# Patient Record
Sex: FEMALE | Race: WHITE
[De-identification: names, ages, dates, MRNs, and addresses within clinical notes are randomized per-mention and may not be internally consistent; named-entity substitution may affect disease eponyms.]

---

## 2021-03-01 ENCOUNTER — NON-APPOINTMENT (OUTPATIENT)
Age: 66
End: 2021-03-01

## 2021-03-01 ENCOUNTER — APPOINTMENT (OUTPATIENT)
Dept: DERMATOLOGY | Facility: CLINIC | Age: 66
End: 2021-03-01
Payer: MEDICARE

## 2021-03-01 PROCEDURE — 99203 OFFICE O/P NEW LOW 30 MIN: CPT

## 2021-03-01 NOTE — PHYSICAL EXAM
[FreeTextEntry3] : right palm;  = small pinpoint wound with collarette of scale; \par no visible lesion

## 2021-03-01 NOTE — ASSESSMENT
[FreeTextEntry1] : ? small traumatic injury;  ? splinter \par appears to be resolving without txs; no visible wart; \par pared; \par dressed\par keep bandaged, f/u if fails to improve

## 2021-03-01 NOTE — HISTORY OF PRESENT ILLNESS
[de-identified] : Pt. c/o lesion on hand;  was bleeding, very large;  for 1 week, now improving without treatments;

## 2021-03-15 ENCOUNTER — NON-APPOINTMENT (OUTPATIENT)
Age: 66
End: 2021-03-15

## 2021-03-23 ENCOUNTER — APPOINTMENT (OUTPATIENT)
Dept: DERMATOLOGY | Facility: CLINIC | Age: 66
End: 2021-03-23
Payer: MEDICARE

## 2021-03-23 DIAGNOSIS — R21 RASH AND OTHER NONSPECIFIC SKIN ERUPTION: ICD-10-CM

## 2021-03-23 PROCEDURE — 99213 OFFICE O/P EST LOW 20 MIN: CPT

## 2021-03-23 PROCEDURE — 99072 ADDL SUPL MATRL&STAF TM PHE: CPT

## 2021-03-23 NOTE — HISTORY OF PRESENT ILLNESS
[FreeTextEntry1] : Lesion of the right palm. [de-identified] : Previously tx'ed here, with "scraping of site" followed by healing of wound.  No tenderness, but some bleeding prior to her initial presentation.

## 2021-03-23 NOTE — ASSESSMENT
[FreeTextEntry1] : Imp:  Likely with pyogenic granuloma, resolved, and now fully healed.\par Discussed at length with patient.\par Call if recurs.

## 2021-03-23 NOTE — PHYSICAL EXAM
[Alert] : alert [Oriented x 3] : ~L oriented x 3 [Well Nourished] : well nourished [FreeTextEntry3] : Erythematous patch without tenderness of the right palm.  Trace central scar tissue.\par Photo from cellphone reviewed, showing central prominent granulation tissue surrounded by macerated skin.

## 2021-09-15 ENCOUNTER — OUTPATIENT (OUTPATIENT)
Dept: OUTPATIENT SERVICES | Facility: HOSPITAL | Age: 66
LOS: 1 days | End: 2021-09-15
Payer: MEDICARE

## 2021-09-15 VITALS
WEIGHT: 229.06 LBS | RESPIRATION RATE: 16 BRPM | SYSTOLIC BLOOD PRESSURE: 168 MMHG | OXYGEN SATURATION: 99 % | DIASTOLIC BLOOD PRESSURE: 94 MMHG | HEIGHT: 60 IN | TEMPERATURE: 98 F | HEART RATE: 51 BPM

## 2021-09-15 DIAGNOSIS — Z90.710 ACQUIRED ABSENCE OF BOTH CERVIX AND UTERUS: Chronic | ICD-10-CM

## 2021-09-15 DIAGNOSIS — Z01.818 ENCOUNTER FOR OTHER PREPROCEDURAL EXAMINATION: ICD-10-CM

## 2021-09-15 DIAGNOSIS — Z98.890 OTHER SPECIFIED POSTPROCEDURAL STATES: Chronic | ICD-10-CM

## 2021-09-15 DIAGNOSIS — M17.11 UNILATERAL PRIMARY OSTEOARTHRITIS, RIGHT KNEE: ICD-10-CM

## 2021-09-15 DIAGNOSIS — Z29.9 ENCOUNTER FOR PROPHYLACTIC MEASURES, UNSPECIFIED: ICD-10-CM

## 2021-09-15 LAB
ALBUMIN SERPL ELPH-MCNC: 3.6 G/DL — SIGNIFICANT CHANGE UP (ref 3.3–5)
ANION GAP SERPL CALC-SCNC: 3 MMOL/L — LOW (ref 5–17)
APTT BLD: 34.6 SEC — SIGNIFICANT CHANGE UP (ref 27.5–35.5)
BASOPHILS # BLD AUTO: 0.05 K/UL — SIGNIFICANT CHANGE UP (ref 0–0.2)
BASOPHILS NFR BLD AUTO: 0.6 % — SIGNIFICANT CHANGE UP (ref 0–2)
BUN SERPL-MCNC: 21 MG/DL — SIGNIFICANT CHANGE UP (ref 7–23)
CALCIUM SERPL-MCNC: 9.3 MG/DL — SIGNIFICANT CHANGE UP (ref 8.5–10.1)
CHLORIDE SERPL-SCNC: 107 MMOL/L — SIGNIFICANT CHANGE UP (ref 96–108)
CO2 SERPL-SCNC: 29 MMOL/L — SIGNIFICANT CHANGE UP (ref 22–31)
CREAT SERPL-MCNC: 0.87 MG/DL — SIGNIFICANT CHANGE UP (ref 0.5–1.3)
EOSINOPHIL # BLD AUTO: 0.23 K/UL — SIGNIFICANT CHANGE UP (ref 0–0.5)
EOSINOPHIL NFR BLD AUTO: 2.8 % — SIGNIFICANT CHANGE UP (ref 0–6)
GLUCOSE SERPL-MCNC: 95 MG/DL — SIGNIFICANT CHANGE UP (ref 70–99)
HCT VFR BLD CALC: 42.5 % — SIGNIFICANT CHANGE UP (ref 34.5–45)
HGB BLD-MCNC: 13.7 G/DL — SIGNIFICANT CHANGE UP (ref 11.5–15.5)
IMM GRANULOCYTES NFR BLD AUTO: 0.4 % — SIGNIFICANT CHANGE UP (ref 0–1.5)
INR BLD: 1.08 RATIO — SIGNIFICANT CHANGE UP (ref 0.88–1.16)
LYMPHOCYTES # BLD AUTO: 1.44 K/UL — SIGNIFICANT CHANGE UP (ref 1–3.3)
LYMPHOCYTES # BLD AUTO: 17.6 % — SIGNIFICANT CHANGE UP (ref 13–44)
MCHC RBC-ENTMCNC: 28.9 PG — SIGNIFICANT CHANGE UP (ref 27–34)
MCHC RBC-ENTMCNC: 32.2 GM/DL — SIGNIFICANT CHANGE UP (ref 32–36)
MCV RBC AUTO: 89.7 FL — SIGNIFICANT CHANGE UP (ref 80–100)
MONOCYTES # BLD AUTO: 0.53 K/UL — SIGNIFICANT CHANGE UP (ref 0–0.9)
MONOCYTES NFR BLD AUTO: 6.5 % — SIGNIFICANT CHANGE UP (ref 2–14)
NEUTROPHILS # BLD AUTO: 5.88 K/UL — SIGNIFICANT CHANGE UP (ref 1.8–7.4)
NEUTROPHILS NFR BLD AUTO: 72.1 % — SIGNIFICANT CHANGE UP (ref 43–77)
PLATELET # BLD AUTO: 280 K/UL — SIGNIFICANT CHANGE UP (ref 150–400)
POTASSIUM SERPL-MCNC: 3.9 MMOL/L — SIGNIFICANT CHANGE UP (ref 3.5–5.3)
POTASSIUM SERPL-SCNC: 3.9 MMOL/L — SIGNIFICANT CHANGE UP (ref 3.5–5.3)
PROTHROM AB SERPL-ACNC: 12.5 SEC — SIGNIFICANT CHANGE UP (ref 10.6–13.6)
RBC # BLD: 4.74 M/UL — SIGNIFICANT CHANGE UP (ref 3.8–5.2)
RBC # FLD: 14.9 % — HIGH (ref 10.3–14.5)
SODIUM SERPL-SCNC: 139 MMOL/L — SIGNIFICANT CHANGE UP (ref 135–145)
WBC # BLD: 8.16 K/UL — SIGNIFICANT CHANGE UP (ref 3.8–10.5)
WBC # FLD AUTO: 8.16 K/UL — SIGNIFICANT CHANGE UP (ref 3.8–10.5)

## 2021-09-15 PROCEDURE — 93005 ELECTROCARDIOGRAM TRACING: CPT

## 2021-09-15 PROCEDURE — 71046 X-RAY EXAM CHEST 2 VIEWS: CPT | Mod: 26

## 2021-09-15 PROCEDURE — 86901 BLOOD TYPING SEROLOGIC RH(D): CPT

## 2021-09-15 PROCEDURE — 86900 BLOOD TYPING SEROLOGIC ABO: CPT

## 2021-09-15 PROCEDURE — 85730 THROMBOPLASTIN TIME PARTIAL: CPT

## 2021-09-15 PROCEDURE — 83036 HEMOGLOBIN GLYCOSYLATED A1C: CPT

## 2021-09-15 PROCEDURE — 87640 STAPH A DNA AMP PROBE: CPT

## 2021-09-15 PROCEDURE — 87641 MR-STAPH DNA AMP PROBE: CPT

## 2021-09-15 PROCEDURE — 71046 X-RAY EXAM CHEST 2 VIEWS: CPT

## 2021-09-15 PROCEDURE — 36415 COLL VENOUS BLD VENIPUNCTURE: CPT

## 2021-09-15 PROCEDURE — 82040 ASSAY OF SERUM ALBUMIN: CPT

## 2021-09-15 PROCEDURE — 80048 BASIC METABOLIC PNL TOTAL CA: CPT

## 2021-09-15 PROCEDURE — 93010 ELECTROCARDIOGRAM REPORT: CPT

## 2021-09-15 PROCEDURE — 86850 RBC ANTIBODY SCREEN: CPT

## 2021-09-15 PROCEDURE — 85610 PROTHROMBIN TIME: CPT

## 2021-09-15 PROCEDURE — 85025 COMPLETE CBC W/AUTO DIFF WBC: CPT

## 2021-09-15 PROCEDURE — G0463: CPT | Mod: 25

## 2021-09-15 NOTE — H&P PST ADULT - NSICDXPASTMEDICALHX_GEN_ALL_CORE_FT
PAST MEDICAL HISTORY:  Arthritis     DM (diabetes mellitus)     HTN (hypertension)     Hyperlipidemia     Ovarian cancer and uterine cancer--2004 surgery & chemo     PAST MEDICAL HISTORY:  Arthritis     DM (diabetes mellitus)     HTN (hypertension)     Hyperlipidemia     Obesity, morbid, BMI 40.0-49.9     Ovarian cancer and uterine cancer--2004 surgery & chemo

## 2021-09-15 NOTE — H&P PST ADULT - ASSESSMENT
66 y.o female scheduled for  66 y.o female scheduled for Right Total Knee Replacement   Plan  1. Stop all NSAIDS, herbal supplements and vitamins for 7 days. Hold Metformin 24 hours before surgery   2. NPO at midnight.  3. Take the following medications Edarbi, Tiadylt with small sips of water on the morning of your procedure/surgery.  4. Use EZ sponges as directed  5. Use mupirocin as directed  6. Labs, EKG, CXR as per surgeon  7. PMD/cardiologist YOON Zambrano  visit for optimization prior to surgery as per surgeon.  8. COVID swab appt: 2021    CAPRINI SCORE    AGE RELATED RISK FACTORS                                                       MOBILITY RELATED FACTORS  [ ] Age 41-60 years                                            (1 Point)                  [ ] Bed rest                                                        (1 Point)  [x ] Age: 61-74 years                                           (2 Points)                [ ] Plaster cast                                                   (2 Points)  [ ] Age= 75 years                                              (3 Points)                 [ ] Bed bound for more than 72 hours                   (2 Points)    DISEASE RELATED RISK FACTORS                                               GENDER SPECIFIC FACTORS  [x ] Edema in the lower extremities                       (1 Point)                  [ ] Pregnancy                                                     (1 Point)  [ ] Varicose veins                                               (1 Point)                  [ ] Post-partum < 6 weeks                                   (1 Point)             [x BMI > 25 Kg/m2                                            (1 Point)                  [ ] Hormonal therapy  or oral contraception            (1 Point)                 [ ] Sepsis (in the previous month)                        (1 Point)                  [ ] History of pregnancy complications  [ ] Pneumonia or serious lung disease                                               [ ] Unexplained or recurrent                       (1 Point)           (in the previous month)                               (1 Point)  [ ] Abnormal pulmonary function test                     (1 Point)                 SURGERY RELATED RISK FACTORS  [ ] Acute myocardial infarction                              (1 Point)                 [ ]  Section                                            (1 Point)  [ ] Congestive heart failure (in the previous month)  (1 Point)                 [ ] Minor surgery                                                 (1 Point)   [ ] Inflammatory bowel disease                             (1 Point)                 [ ] Arthroscopic surgery                                        (2 Points)  [ ] Central venous access                                    (2 Points)                [ ] General surgery lasting more than 45 minutes   (2 Points)       [ ] Stroke (in the previous month)                          (5 Points)               [ x] Elective arthroplasty                                        (5 Points)                                                                                                                                               HEMATOLOGY RELATED FACTORS                                                 TRAUMA RELATED RISK FACTORS  [ ] Prior episodes of VTE                                     (3 Points)                 [ ] Fracture of the hip, pelvis, or leg                       (5 Points)  [ ] Positive family history for VTE                         (3 Points)                 [ ] Acute spinal cord injury (in the previous month)  (5 Points)  [ ] Prothrombin 62962 A                                      (3 Points)                 [ ] Paralysis  (less than 1 month)                          (5 Points)  [ ] Factor V Leiden                                             (3 Points)                 [ ] Multiple Trauma within 1 month                         (5 Points)  [ ] Lupus anticoagulants                                     (3 Points)                                                           [ ] Anticardiolipin antibodies                                (3 Points)                                                       [ ] High homocysteine in the blood                      (3 Points)                                             [ ] Other congenital or acquired thrombophilia       (3 Points)                                                [ ] Heparin induced thrombocytopenia                  (3 Points)                                          Total Score [   9       ]    The Caprini score indicates that this patient is at high risk for a VTE event (score > = 6).    Ssurgical patients in this group will benefit from both pharmacologic prophylaxis and intermittent compression devices.    The surgical team will determine the balance between VTE risk and bleeding risk, and other clinical considerations.

## 2021-09-15 NOTE — H&P PST ADULT - NSICDXPASTSURGICALHX_GEN_ALL_CORE_FT
PAST SURGICAL HISTORY:  H/O umbilical hernia repair 2005    S/P total hysterectomy bilateral salpingectomy-oophorectomy --2004 ovarian & uterine cancer

## 2021-09-15 NOTE — H&P PST ADULT - HISTORY OF PRESENT ILLNESS
66 y.o  66 y.o morbidly obese female presents for PST with hx of right knee pain. Patient reports right knee pain for sometime. She has followed with ortho and reports diagnostics revealing advanced arthritis and she is opting for surgical intervention. Scheduled for Right Total Knee Replacement

## 2021-09-16 DIAGNOSIS — M17.11 UNILATERAL PRIMARY OSTEOARTHRITIS, RIGHT KNEE: ICD-10-CM

## 2021-09-16 DIAGNOSIS — Z01.818 ENCOUNTER FOR OTHER PREPROCEDURAL EXAMINATION: ICD-10-CM

## 2021-09-16 LAB
A1C WITH ESTIMATED AVERAGE GLUCOSE RESULT: 6.5 % — HIGH (ref 4–5.6)
ESTIMATED AVERAGE GLUCOSE: 140 MG/DL — HIGH (ref 68–114)
MRSA PCR RESULT.: SIGNIFICANT CHANGE UP
S AUREUS DNA NOSE QL NAA+PROBE: DETECTED

## 2021-09-21 ENCOUNTER — APPOINTMENT (OUTPATIENT)
Dept: DISASTER EMERGENCY | Facility: CLINIC | Age: 66
End: 2021-09-21

## 2021-09-21 DIAGNOSIS — Z01.818 ENCOUNTER FOR OTHER PREPROCEDURAL EXAMINATION: ICD-10-CM

## 2021-09-22 LAB — SARS-COV-2 N GENE NPH QL NAA+PROBE: NOT DETECTED

## 2021-09-24 ENCOUNTER — TRANSCRIPTION ENCOUNTER (OUTPATIENT)
Age: 66
End: 2021-09-24

## 2021-09-24 ENCOUNTER — INPATIENT (INPATIENT)
Facility: HOSPITAL | Age: 66
LOS: 1 days | Discharge: HOME HEALTH | DRG: 470 | End: 2021-09-26
Attending: ORTHOPAEDIC SURGERY | Admitting: ORTHOPAEDIC SURGERY
Payer: MEDICARE

## 2021-09-24 ENCOUNTER — RESULT REVIEW (OUTPATIENT)
Age: 66
End: 2021-09-24

## 2021-09-24 VITALS
HEART RATE: 56 BPM | SYSTOLIC BLOOD PRESSURE: 201 MMHG | DIASTOLIC BLOOD PRESSURE: 81 MMHG | RESPIRATION RATE: 16 BRPM | HEIGHT: 60 IN | TEMPERATURE: 98 F | WEIGHT: 227.08 LBS | OXYGEN SATURATION: 98 %

## 2021-09-24 DIAGNOSIS — M17.11 UNILATERAL PRIMARY OSTEOARTHRITIS, RIGHT KNEE: ICD-10-CM

## 2021-09-24 DIAGNOSIS — Z90.710 ACQUIRED ABSENCE OF BOTH CERVIX AND UTERUS: Chronic | ICD-10-CM

## 2021-09-24 DIAGNOSIS — Z98.890 OTHER SPECIFIED POSTPROCEDURAL STATES: Chronic | ICD-10-CM

## 2021-09-24 LAB
ANION GAP SERPL CALC-SCNC: 4 MMOL/L — LOW (ref 5–17)
BUN SERPL-MCNC: 20 MG/DL — SIGNIFICANT CHANGE UP (ref 7–23)
CALCIUM SERPL-MCNC: 8.6 MG/DL — SIGNIFICANT CHANGE UP (ref 8.5–10.1)
CHLORIDE SERPL-SCNC: 108 MMOL/L — SIGNIFICANT CHANGE UP (ref 96–108)
CO2 SERPL-SCNC: 27 MMOL/L — SIGNIFICANT CHANGE UP (ref 22–31)
CREAT SERPL-MCNC: 1.02 MG/DL — SIGNIFICANT CHANGE UP (ref 0.5–1.3)
GLUCOSE SERPL-MCNC: 124 MG/DL — HIGH (ref 70–99)
HCT VFR BLD CALC: 39.1 % — SIGNIFICANT CHANGE UP (ref 34.5–45)
HGB BLD-MCNC: 12.3 G/DL — SIGNIFICANT CHANGE UP (ref 11.5–15.5)
MCHC RBC-ENTMCNC: 28.9 PG — SIGNIFICANT CHANGE UP (ref 27–34)
MCHC RBC-ENTMCNC: 31.5 GM/DL — LOW (ref 32–36)
MCV RBC AUTO: 91.8 FL — SIGNIFICANT CHANGE UP (ref 80–100)
PLATELET # BLD AUTO: 260 K/UL — SIGNIFICANT CHANGE UP (ref 150–400)
POTASSIUM SERPL-MCNC: 4.3 MMOL/L — SIGNIFICANT CHANGE UP (ref 3.5–5.3)
POTASSIUM SERPL-SCNC: 4.3 MMOL/L — SIGNIFICANT CHANGE UP (ref 3.5–5.3)
RBC # BLD: 4.26 M/UL — SIGNIFICANT CHANGE UP (ref 3.8–5.2)
RBC # FLD: 14.8 % — HIGH (ref 10.3–14.5)
SODIUM SERPL-SCNC: 139 MMOL/L — SIGNIFICANT CHANGE UP (ref 135–145)
WBC # BLD: 8.46 K/UL — SIGNIFICANT CHANGE UP (ref 3.8–10.5)
WBC # FLD AUTO: 8.46 K/UL — SIGNIFICANT CHANGE UP (ref 3.8–10.5)

## 2021-09-24 PROCEDURE — 73560 X-RAY EXAM OF KNEE 1 OR 2: CPT | Mod: 26,RT

## 2021-09-24 PROCEDURE — 99221 1ST HOSP IP/OBS SF/LOW 40: CPT

## 2021-09-24 PROCEDURE — 80048 BASIC METABOLIC PNL TOTAL CA: CPT

## 2021-09-24 PROCEDURE — 97530 THERAPEUTIC ACTIVITIES: CPT | Mod: GP

## 2021-09-24 PROCEDURE — G0008: CPT

## 2021-09-24 PROCEDURE — 73560 X-RAY EXAM OF KNEE 1 OR 2: CPT | Mod: RT

## 2021-09-24 PROCEDURE — 85027 COMPLETE CBC AUTOMATED: CPT

## 2021-09-24 PROCEDURE — C1776: CPT

## 2021-09-24 PROCEDURE — 97162 PT EVAL MOD COMPLEX 30 MIN: CPT | Mod: GP

## 2021-09-24 PROCEDURE — 86769 SARS-COV-2 COVID-19 ANTIBODY: CPT

## 2021-09-24 PROCEDURE — C1713: CPT

## 2021-09-24 PROCEDURE — 88305 TISSUE EXAM BY PATHOLOGIST: CPT | Mod: 26

## 2021-09-24 PROCEDURE — 99024 POSTOP FOLLOW-UP VISIT: CPT

## 2021-09-24 PROCEDURE — 97116 GAIT TRAINING THERAPY: CPT | Mod: GP

## 2021-09-24 PROCEDURE — 88305 TISSUE EXAM BY PATHOLOGIST: CPT

## 2021-09-24 PROCEDURE — 90686 IIV4 VACC NO PRSV 0.5 ML IM: CPT

## 2021-09-24 PROCEDURE — 82962 GLUCOSE BLOOD TEST: CPT

## 2021-09-24 PROCEDURE — 99223 1ST HOSP IP/OBS HIGH 75: CPT

## 2021-09-24 PROCEDURE — 36415 COLL VENOUS BLD VENIPUNCTURE: CPT

## 2021-09-24 RX ORDER — NADOLOL 80 MG/1
40 TABLET ORAL AT BEDTIME
Refills: 0 | Status: DISCONTINUED | OUTPATIENT
Start: 2021-09-24 | End: 2021-09-26

## 2021-09-24 RX ORDER — HYDROMORPHONE HYDROCHLORIDE 2 MG/ML
0.5 INJECTION INTRAMUSCULAR; INTRAVENOUS; SUBCUTANEOUS EVERY 4 HOURS
Refills: 0 | Status: DISCONTINUED | OUTPATIENT
Start: 2021-09-24 | End: 2021-09-26

## 2021-09-24 RX ORDER — SENNA PLUS 8.6 MG/1
1 TABLET ORAL
Qty: 7 | Refills: 0
Start: 2021-09-24 | End: 2021-09-30

## 2021-09-24 RX ORDER — DEXTROSE 50 % IN WATER 50 %
25 SYRINGE (ML) INTRAVENOUS ONCE
Refills: 0 | Status: DISCONTINUED | OUTPATIENT
Start: 2021-09-24 | End: 2021-09-26

## 2021-09-24 RX ORDER — CEFAZOLIN SODIUM 1 G
2000 VIAL (EA) INJECTION EVERY 8 HOURS
Refills: 0 | Status: COMPLETED | OUTPATIENT
Start: 2021-09-24 | End: 2021-09-25

## 2021-09-24 RX ORDER — OXYCODONE HYDROCHLORIDE 5 MG/1
10 TABLET ORAL EVERY 4 HOURS
Refills: 0 | Status: DISCONTINUED | OUTPATIENT
Start: 2021-09-24 | End: 2021-09-26

## 2021-09-24 RX ORDER — ACETAMINOPHEN 500 MG
975 TABLET ORAL EVERY 8 HOURS
Refills: 0 | Status: DISCONTINUED | OUTPATIENT
Start: 2021-09-24 | End: 2021-09-26

## 2021-09-24 RX ORDER — INSULIN LISPRO 100/ML
VIAL (ML) SUBCUTANEOUS
Refills: 0 | Status: DISCONTINUED | OUTPATIENT
Start: 2021-09-24 | End: 2021-09-26

## 2021-09-24 RX ORDER — PANTOPRAZOLE SODIUM 20 MG/1
40 TABLET, DELAYED RELEASE ORAL ONCE
Refills: 0 | Status: COMPLETED | OUTPATIENT
Start: 2021-09-24 | End: 2021-09-24

## 2021-09-24 RX ORDER — SODIUM CHLORIDE 9 MG/ML
1000 INJECTION, SOLUTION INTRAVENOUS
Refills: 0 | Status: DISCONTINUED | OUTPATIENT
Start: 2021-09-24 | End: 2021-09-26

## 2021-09-24 RX ORDER — LOSARTAN POTASSIUM 100 MG/1
50 TABLET, FILM COATED ORAL DAILY
Refills: 0 | Status: DISCONTINUED | OUTPATIENT
Start: 2021-09-24 | End: 2021-09-26

## 2021-09-24 RX ORDER — ATORVASTATIN CALCIUM 80 MG/1
40 TABLET, FILM COATED ORAL AT BEDTIME
Refills: 0 | Status: DISCONTINUED | OUTPATIENT
Start: 2021-09-24 | End: 2021-09-26

## 2021-09-24 RX ORDER — OXYCODONE HYDROCHLORIDE 5 MG/1
10 TABLET ORAL ONCE
Refills: 0 | Status: DISCONTINUED | OUTPATIENT
Start: 2021-09-24 | End: 2021-09-24

## 2021-09-24 RX ORDER — ATORVASTATIN CALCIUM 80 MG/1
40 TABLET, FILM COATED ORAL DAILY
Refills: 0 | Status: DISCONTINUED | OUTPATIENT
Start: 2021-09-24 | End: 2021-09-24

## 2021-09-24 RX ORDER — INFLUENZA VIRUS VACCINE 15; 15; 15; 15 UG/.5ML; UG/.5ML; UG/.5ML; UG/.5ML
0.5 SUSPENSION INTRAMUSCULAR ONCE
Refills: 0 | Status: COMPLETED | OUTPATIENT
Start: 2021-09-24 | End: 2021-09-25

## 2021-09-24 RX ORDER — POLYETHYLENE GLYCOL 3350 17 G/17G
17 POWDER, FOR SOLUTION ORAL
Qty: 1 | Refills: 0
Start: 2021-09-24

## 2021-09-24 RX ORDER — FERROUS SULFATE 325(65) MG
325 TABLET ORAL DAILY
Refills: 0 | Status: DISCONTINUED | OUTPATIENT
Start: 2021-09-24 | End: 2021-09-26

## 2021-09-24 RX ORDER — POLYETHYLENE GLYCOL 3350 17 G/17G
17 POWDER, FOR SOLUTION ORAL AT BEDTIME
Refills: 0 | Status: DISCONTINUED | OUTPATIENT
Start: 2021-09-24 | End: 2021-09-26

## 2021-09-24 RX ORDER — SODIUM CHLORIDE 9 MG/ML
1000 INJECTION, SOLUTION INTRAVENOUS
Refills: 0 | Status: DISCONTINUED | OUTPATIENT
Start: 2021-09-24 | End: 2021-09-24

## 2021-09-24 RX ORDER — FOLIC ACID 0.8 MG
1 TABLET ORAL DAILY
Refills: 0 | Status: DISCONTINUED | OUTPATIENT
Start: 2021-09-24 | End: 2021-09-26

## 2021-09-24 RX ORDER — OXYCODONE HYDROCHLORIDE 5 MG/1
1 TABLET ORAL
Qty: 30 | Refills: 0
Start: 2021-09-24

## 2021-09-24 RX ORDER — ONDANSETRON 8 MG/1
4 TABLET, FILM COATED ORAL ONCE
Refills: 0 | Status: DISCONTINUED | OUTPATIENT
Start: 2021-09-24 | End: 2021-09-24

## 2021-09-24 RX ORDER — ACETAMINOPHEN 500 MG
2 TABLET ORAL
Qty: 84 | Refills: 0
Start: 2021-09-24 | End: 2021-10-07

## 2021-09-24 RX ORDER — ONDANSETRON 8 MG/1
8 TABLET, FILM COATED ORAL EVERY 8 HOURS
Refills: 0 | Status: DISCONTINUED | OUTPATIENT
Start: 2021-09-24 | End: 2021-09-26

## 2021-09-24 RX ORDER — DEXTROSE 50 % IN WATER 50 %
15 SYRINGE (ML) INTRAVENOUS ONCE
Refills: 0 | Status: DISCONTINUED | OUTPATIENT
Start: 2021-09-24 | End: 2021-09-26

## 2021-09-24 RX ORDER — DILTIAZEM HCL 120 MG
360 CAPSULE, EXT RELEASE 24 HR ORAL DAILY
Refills: 0 | Status: DISCONTINUED | OUTPATIENT
Start: 2021-09-24 | End: 2021-09-26

## 2021-09-24 RX ORDER — PANTOPRAZOLE SODIUM 20 MG/1
40 TABLET, DELAYED RELEASE ORAL
Refills: 0 | Status: DISCONTINUED | OUTPATIENT
Start: 2021-09-24 | End: 2021-09-26

## 2021-09-24 RX ORDER — GLUCAGON INJECTION, SOLUTION 0.5 MG/.1ML
1 INJECTION, SOLUTION SUBCUTANEOUS ONCE
Refills: 0 | Status: DISCONTINUED | OUTPATIENT
Start: 2021-09-24 | End: 2021-09-26

## 2021-09-24 RX ORDER — RIVAROXABAN 15 MG-20MG
10 KIT ORAL ONCE
Refills: 0 | Status: COMPLETED | OUTPATIENT
Start: 2021-09-24 | End: 2021-09-24

## 2021-09-24 RX ORDER — RIVAROXABAN 15 MG-20MG
10 KIT ORAL DAILY
Refills: 0 | Status: DISCONTINUED | OUTPATIENT
Start: 2021-09-25 | End: 2021-09-26

## 2021-09-24 RX ORDER — PANTOPRAZOLE SODIUM 20 MG/1
1 TABLET, DELAYED RELEASE ORAL
Qty: 30 | Refills: 0
Start: 2021-09-24 | End: 2021-10-23

## 2021-09-24 RX ORDER — INSULIN LISPRO 100/ML
VIAL (ML) SUBCUTANEOUS AT BEDTIME
Refills: 0 | Status: DISCONTINUED | OUTPATIENT
Start: 2021-09-24 | End: 2021-09-26

## 2021-09-24 RX ORDER — CELECOXIB 200 MG/1
200 CAPSULE ORAL EVERY 12 HOURS
Refills: 0 | Status: DISCONTINUED | OUTPATIENT
Start: 2021-09-25 | End: 2021-09-26

## 2021-09-24 RX ORDER — DEXTROSE 50 % IN WATER 50 %
12.5 SYRINGE (ML) INTRAVENOUS ONCE
Refills: 0 | Status: DISCONTINUED | OUTPATIENT
Start: 2021-09-24 | End: 2021-09-26

## 2021-09-24 RX ORDER — SODIUM CHLORIDE 9 MG/ML
1000 INJECTION, SOLUTION INTRAVENOUS
Refills: 0 | Status: DISCONTINUED | OUTPATIENT
Start: 2021-09-25 | End: 2021-09-26

## 2021-09-24 RX ORDER — SENNA PLUS 8.6 MG/1
2 TABLET ORAL AT BEDTIME
Refills: 0 | Status: DISCONTINUED | OUTPATIENT
Start: 2021-09-24 | End: 2021-09-26

## 2021-09-24 RX ORDER — ASCORBIC ACID 60 MG
500 TABLET,CHEWABLE ORAL
Refills: 0 | Status: DISCONTINUED | OUTPATIENT
Start: 2021-09-24 | End: 2021-09-26

## 2021-09-24 RX ORDER — OXYCODONE HYDROCHLORIDE 5 MG/1
5 TABLET ORAL EVERY 4 HOURS
Refills: 0 | Status: DISCONTINUED | OUTPATIENT
Start: 2021-09-24 | End: 2021-09-26

## 2021-09-24 RX ORDER — FENTANYL CITRATE 50 UG/ML
50 INJECTION INTRAVENOUS
Refills: 0 | Status: DISCONTINUED | OUTPATIENT
Start: 2021-09-24 | End: 2021-09-24

## 2021-09-24 RX ADMIN — RIVAROXABAN 10 MILLIGRAM(S): KIT at 17:07

## 2021-09-24 RX ADMIN — POLYETHYLENE GLYCOL 3350 17 GRAM(S): 17 POWDER, FOR SOLUTION ORAL at 22:45

## 2021-09-24 RX ADMIN — Medication 975 MILLIGRAM(S): at 14:54

## 2021-09-24 RX ADMIN — Medication 975 MILLIGRAM(S): at 22:45

## 2021-09-24 RX ADMIN — Medication 100 MILLIGRAM(S): at 17:07

## 2021-09-24 RX ADMIN — NADOLOL 40 MILLIGRAM(S): 80 TABLET ORAL at 22:45

## 2021-09-24 RX ADMIN — Medication 1 TABLET(S): at 14:56

## 2021-09-24 RX ADMIN — ATORVASTATIN CALCIUM 40 MILLIGRAM(S): 80 TABLET, FILM COATED ORAL at 22:45

## 2021-09-24 RX ADMIN — SODIUM CHLORIDE 75 MILLILITER(S): 9 INJECTION, SOLUTION INTRAVENOUS at 13:14

## 2021-09-24 RX ADMIN — Medication 500 MILLIGRAM(S): at 22:45

## 2021-09-24 RX ADMIN — SENNA PLUS 2 TABLET(S): 8.6 TABLET ORAL at 22:45

## 2021-09-24 RX ADMIN — Medication 1 TABLET(S): at 22:45

## 2021-09-24 RX ADMIN — PANTOPRAZOLE SODIUM 40 MILLIGRAM(S): 20 TABLET, DELAYED RELEASE ORAL at 07:11

## 2021-09-24 NOTE — PHYSICAL THERAPY INITIAL EVALUATION ADULT - DISCHARGE DISPOSITION, PT EVAL
Home PT vs [facility] pending further ambulation tx and assessment, the pt hopes to go to home with home PT./home w/ home PT

## 2021-09-24 NOTE — DISCHARGE NOTE PROVIDER - NSDCCPGOAL_GEN_ALL_CORE_FT
I will START or STAY ON the medications listed below when I get home from the hospital:  None
To get better and follow your care plan as instructed.

## 2021-09-24 NOTE — DISCHARGE NOTE NURSING/CASE MANAGEMENT/SOCIAL WORK - PATIENT PORTAL LINK FT
You can access the FollowMyHealth Patient Portal offered by [facility] by registering at the following website: [email or web address]. By joining [facility]’s FollowMyHealth portal, you will also be able to view your health information using other applications (apps) compatible with our system.

## 2021-09-24 NOTE — PHYSICAL THERAPY INITIAL EVALUATION ADULT - GENERAL OBSERVATIONS, REHAB EVAL
The pt was pleasant and cooperative and eager to ambulate with PT, the pt was received on 2N, supine, with her sister and brother in law present. The pt had 1 IV and bilateral SCDs in place.

## 2021-09-24 NOTE — CONSULT NOTE ADULT - SUBJECTIVE AND OBJECTIVE BOX
PCP: Dr Khoi Zambrano    CHIEF COMPLAINT: right knee OA    HISTORY OF THE PRESENT ILLNESS: this is a 67 yo female with PMH of  OA, DM, HTN, HLD, morbid obesity, ovarian and uterine CA in  with surgery and chemo and OA of her right kneee with progressive pain with ambulation, climbing stairs and INC pain at night. She failed the usual modalities for knee pain and is now s/p right TKR  Pt is seen in PACU, IN NAD, denies any CP or SOB.  States she took both ant-htn's this am. We are consulted for medical management    PAST MEDICAL HISTORY: as above    PAST SURGICAL HISTORY: umbilical hernia repair, OPAL    FAMILY HISTORY:   both parents     SOCIAL HISTORY:  no smoking, no alcohol, no drugs    ALLERGIES: NKDA    HOME MEDS: see med rec    REVIEW OF SYSTEMS:   All 10 systems reviewed in detailed and found to be negative with the exception of what has already been described above    MEDICATIONS  (STANDING):  influenza   Vaccine 0.5 milliLiter(s) IntraMuscular once    MEDICATIONS  (PRN):      VITALS:  T(F): 97.8 (21 @ 08:37), Max: 97.8 (21 @ 08:37)  HR: 53 (21 @ 10:30) (52 - 56)  BP: 141/82 (21 @ 10:30) (127/63 - 201/81)  RR: 19 (21 @ 10:30) (15 - 21)  SpO2: 95% (21 @ 10:30) (94% - 100%)  Wt(kg): --    I&O's Summary    24 Sep 2021 07:01  -  24 Sep 2021 10:58  --------------------------------------------------------  IN: 900 mL / OUT: 0 mL / NET: 900 mL        CAPILLARY BLOOD GLUCOSE      POCT Blood Glucose.: 124 mg/dL (24 Sep 2021 09:42)  POCT Blood Glucose.: 135 mg/dL (24 Sep 2021 07:29)    PHYSICAL EXAM:    GENERAL: Comfortable, no acute distress   HEAD:  Normocephalic, atraumatic  EYES: EOMI, PERRLA  HEENT: Moist mucous membranes  NECK: Supple, No JVD  NERVOUS SYSTEM:  Alert & Oriented X3, Motor Strength 5/5 B/L upper and lower extremities  CHEST/LUNG: Clear to auscultation bilaterally  HEART: Regular rate and rhythm  ABDOMEN: Soft, non tender, Nondistended, Bowel sounds present  GENITOURINARY: Voiding, no palpable bladder  EXTREMITIES:   No clubbing, cyanosis, or edema  MUSCULOSKELETAL- right knee ace dsg c/d/i  SKIN-no rash            LABS: pending        IMPRESSION:  65yo female with above pmh a/w:  # right knee OA  # S/P right TKR    POD#0  pain control  PT eval  Bowel regimen  IVF's  Finish ABXs  regular diet  PPI  VTE prophylaxis  monitor CBC/BMP      # HTN  cont bb/CCB/arb  monitor vs    # HLD  cont statin    #T2DM   hold OHAS    # Vte prophylaxis  AC consult  [employer]  INC mobility      Thank you for the consult, will follow      
HPI:  Patient is a 66y old  Female who presents with a chief complaint of right knee pain s/p right total knee replacement 21.    Consulted by Dr. Diaz for VTE prophylaxis, risk stratification, and anticoagulation management.    PAST MEDICAL & SURGICAL HISTORY:  HTN (hypertension)    Hyperlipidemia    DM (diabetes mellitus)    Arthritis    Ovarian cancer  and uterine cancer-- surgery &amp; chemo    Obesity, morbid, BMI 40.0-49.9    H/O umbilical hernia repair      S/P total hysterectomy  bilateral salpingectomy-oophorectomy -- ovarian &amp; uterine cancer    Interval History:  21: Patient seen at bedside in PACU able to communicate effectively and appropriately. Reporting 2/10 pain right knee. Denies any personal history of bleeding or clotting. Advised of necessity for Xarelto x 12 days total. Patient is amenable to plan.    BMI: 44.4    CrCl: pending labs    Incision Time: 08  Procedure End Time:944  EBL: 100ml    Caprini VTE Risk Score:  CAPRINI SCORE  AGE RELATED RISK FACTORS                                                       MOBILITY RELATED FACTORS  [ ] Age 41-60 years                                            (1 Point)                  [ ] Bed rest /restricted mobility                      (1 Point)  [ x] Age: 61-74 years                                           (2 Points)                [ ] Plaster cast                                                   (2 Points)  [ ] Age= 75 years                                              (3 Points)                 [ ] Bed bound for more than 72 hours                   (2 Points)    DISEASE RELATED RISK FACTORS                                               GENDER SPECIFIC FACTORS  [ ] Edema in the lower extremities                       (1 Point)           [ ] Pregnancy                                                            (1 Point)  [ ] Varicose veins                                               (1 Point)                  [ ] Post-partum < 6 weeks                                      (1 Point)             x[ ] BMI > 25 Kg/m2                                            (1 Point)                  [ ] Hormonal therapy or oral contraception       (1 Point)                 [ ] Sepsis (in the previous month)                        (1 Point)             [ ] History of pregnancy complications                (1Point)  [ ] Pneumonia or serious lung disease                                             [ ] Unexplained or recurrent  (=/>3), premature                                 (In the previous month)                               (1 Point)                birth with toxemia or growth-restricted infant (1 Point)  [ ] Abnormal pulmonary function test            (1 Point)                                   SURGERY RELATED RISK FACTORS  [ ] Acute myocardial infarction                       (1 Point)                  [ ]  Section                                         (1 Point)  [ ] Congestive heart failure (in the previous month) (1 Point)   [ ] Minor surgery   lasting <45 minutes       (1 Point)   [ ] Inflammatory bowel disease                             (1 Point)          [ ] Arthroscopic surgery                                  (2 Points)  [ ] Central venous access                                    (2 Points)            [ ] General surgery lasting >45 minutes      (2 Points)       [ ] Stroke (in the previous month)                  (5 Points)            [x ] Elective major lower extremity arthroplasty (5 Points)                                   [ x ] Malignancy (present or past include skin melanoma                                          but exclude  basal skin cell)    (2 points)                                      TRAUMA RELATED RISK FACTORS                HEMATOLOGY RELATED FACTORS                                  [ ] Fracture of the hip, pelvis, or leg                       (5 Points)  [ ] Prior episodes of VTE                                     (3 Points)          [ ] Acute spinal cord injury (in the previous month)  (5 Points)  [ ] Positive family history for VTE                         (3 Points)       [ ] Paralysis (less than 1 month)                          (5 Points)  [ ] Prothrombin 01244 A                                      (3 Points)         [ ] Multiple Trauma (within 1month)                 (5Points)                                                                                                                                                                [ ] Factor V Leiden                                          (3 Points)                                OTHER RISK FACTORS                          [ ] Lupus anticoagulants                                     (3 Points)                     [x ] BMI > 40                          (1 Point)                                                         [ ] Anticardiolipin antibodies                                (3 Points)                 [ ] Smoking                              (1Point)                                                [ ] High homocysteine in the blood                      (3 Points)                [  ] Diabetes requiring insulin (1point)                         [ ] Other congenital or acquired thrombophilia       (3 Points)          [  ] Chemotherapy                   (1 Point)  [ ] Heparin induced thrombocytopenia                  (3 Points)             [  ] Blood Transfusion                (1 point)                                                                                                             Total Score [     11     ]                                                                                                                                                                                                                                                                                                                                                                                                                                       IMPROVE Bleeding Risk Score:1.5      Falls Risk:   High ( x )  Mod (  )  Low (  )      FAMILY HISTORY:    Denies any personal or familial history of clotting or bleeding disorders.    Allergies    Bee stings (Swelling)  No Known Drug Allergies    Intolerances    REVIEW OF SYSTEMS    (  )Fever	        (  )Constipation	(  )SOB				  (  )Headache   (  )Dysuria  (  )Chills	        (  )Melena	        (  )Dyspnea on exertion (  )Dizziness    (  )Polyuria  (  )Nausea      (  )Hematochezia	(  )Cough                          (  )Syncope      (  )Hematuria  (  )Vomiting   (  )Chest Pain	        (  )Wheezing			  (  )Weakness  (  )Diarrhea    (  )Palpitations	(  )Anorexia			  ( x )Myalgia       ( x  ) Arthralgia    Pertinent positives in HPI and daily subjective. All other systems negative.    Vital Signs Last 24 Hrs  T(C): 36.6 (21 @ 08:37), Max: 36.6 (21 @ 08:37)  T(F): 97.8 (21 @ 08:37), Max: 97.8 (21 @ 08:37)  HR: 53 (21 @ 10:30) (52 - 56)  BP: 141/82 (21 @ 10:30) (127/63 - 201/81)  BP(mean): --  RR: 19 (21 @ 10:30) (15 - 21)  SpO2: 95% (21 @ 10:30) (94% - 100%)      PHYSICAL EXAM:    Constitutional: Appears Well    Neurological: A& O x 3    Skin: Warm    Respiratory and Thorax: normal effort; Breath sounds: normal; No rales/wheezing/rhonchi  	  Cardiovascular: S1, S2, regular, NMBR	MP: SBR 47bpm    Gastrointestinal: BS + x 4Q, nontender	    Genitourinary:  Bladder nondistended, nontender    Musculoskeletal:   General Right:   + muscle/joint tenderness,   normal tone, no joint swelling,   ROM: limited	    General Left:   no muscle/joint tenderness,   normal tone, no joint swelling,   ROM: full    Knee:  Right: Incision: ; Dressing CDI    Lower extrems:   Right: no calf tenderness              negative collette's sign               + pedal pulses    Left:   no calf tenderness              negative collette's sign               + pedal pulses                          12.3   8.46  )-----------( 260      ( 24 Sep 2021 11:10 )             39.1           139  |  108  |  20  ----------------------------<  124<H>  4.3   |  27  |  1.02    Ca    8.6      24 Sep 2021 11:10    MEDICATIONS  (STANDING):  influenza   Vaccine 0.5 milliLiter(s) IntraMuscular once  propranolol 40 milliGRAM(s) Oral at bedtime  rivaroxaban 10 milliGRAM(s) Oral once      **Current DVT Prophylaxis:    LMWH                   (  )  Heparin SQ           (  )  Coumadin             (  )  Xarelto                  ( x )  Eliquis                   (  )  Venodynes           ( x )  Ambulation          (x  )  UFH                       (  )  ECASA                   (  )  Contraindicated  (  )

## 2021-09-24 NOTE — PHYSICAL THERAPY INITIAL EVALUATION ADULT - ADDITIONAL COMMENTS
The pt reports having a few steps to enter the home with rails and a few steps inside without rails. The pt acknowledges having a few falls in the past, the pt states that she had a few "slip and falls" on ice in the past. The pt's sister reports that the patient is very "sedentary."

## 2021-09-24 NOTE — DISCHARGE NOTE PROVIDER - NSDCFUADDINST_GEN_ALL_CORE_FT
Discharge Instructions for Total Knee Arthroplasty:    1. ACTIVITY: WBAT, Rolling walker, Daily PT. Gentle ROM 0-full as tolerated. Walk plenty.  Knee Immobilizer at night time only for 3 weeks.  2. CALL FOR: fever over 101, wound redness, drainage or open area, calf pain/calf swelling.  3. BANDAGE: Change dressing to a new Mepilex Ag bandage POD7 (10/1). May change sooner if dressing saturated or falling off. DO NOT REMOVE BANDAGE TO CHECK WOUND ON INTAKE.  4. SHOWER: Okay to shower. Do not scrub over dressing or submerge. No baths or hot tub.   5. STAPLES: RN Remove Staples POD14 (10/8).  6. DVT PE PROPHYLAXIS: Managed by Anticoag Team. See Med Rec.  7. GI: Continue Protonix daily while on Anticoagulant. an eRx has been sent to your pharmacy.  8. LABS:  INR if on Coumadin.  9. FOLLOW UP: Dr. Diaz in 1 month. Call to schedule.  10. MEDICATIONS:  If going home, eRX sent to your pharmacy for .   11.**Call office if medications not covered under your insurance, especially BLOOD CLOT PREVENTION/anticoagulant medication.

## 2021-09-24 NOTE — CONSULT NOTE ADULT - ASSESSMENT
This is a 66 year old female s/p right knee replacement with high risk for VTE due to age, BMI >40, impaired mobility r/t orthopedic surgery. She is low beleding risk.    Discussed Xarelto and the risks and benefits with patient. Emphasized the importance of taking medication daily to prevent VTE. Verbalized understanding and is in agreement with treatment plan.    Plan:  ::Xarelto 10 mg PO daily x 12 days starting tonight at 6pm with last dose--->10/5/21  ::PPI- Protonix 40 mg PO daily   ::Daily CBC/BMP  ::Enc ambulation  ::Kathy    Thank you for this consult, will continue to follow.

## 2021-09-24 NOTE — DISCHARGE NOTE PROVIDER - HOSPITAL COURSE
H&P:  Pt is a66y Female     Now s/p Right Total Knee Arthroplasty. Pt is afebrile with stable vital signs. Pain is controlled. Alert and Oriented. Exam reveals intact EHL FHL TA GS, +DP. Dressing is clean and dry with a new bandage on.  Primary osteoarthritis of right knee    HTN (hypertension)    Hyperlipidemia    DM (diabetes mellitus)    Arthritis    Ovarian cancer    Obesity, morbid, BMI 40.0-49.9    H/O umbilical hernia repair    S/P total hysterectomy      Hospital Course:  Patient presented to [facility] medically cleared for elective Right Knee Replacement Surgery, having failed outpatient conservative management. Prophylactic antibiotics were started before the procedure and continued for 24 hours. They were admitted after surgery to the orthopedic floor.   There were no complications during the hospital stay. All home medications were continued.     Routine consults were obtained from the Anticoagulation Team for DVT/PE prophylaxis, from Physical Therapy for twice daily PT, and from the Hospitalist for Medical Co-management. Patient was placed on anticoagulation.  Pertinent home medications were continued.  Daily labs were followed.      On POD 0 pt was stable overnight. No major events post op. Pt received twice daily PT and a new dressing was applied if needed prior to discharge. The plan is for DC to home with home PT. The orthopedic Attending is aware and agrees.

## 2021-09-24 NOTE — DISCHARGE NOTE NURSING/CASE MANAGEMENT/SOCIAL WORK - NSDCVIVACCINE_GEN_ALL_CORE_FT
No Vaccines Administered. influenza, injectable, quadrivalent, preservative free; 25-Sep-2021 09:22; Michelle Benedict (RN); [employer]; [id number] (Exp. Date: 30-Jun-2022); IntraMuscular; Deltoid Left.; 0.5 milliLiter(s); VIS (VIS Published: 15-Aug-2019, VIS Presented: 25-Sep-2021);

## 2021-09-24 NOTE — DISCHARGE NOTE PROVIDER - CARE PROVIDER_API CALL
Jax Diaz)  Orthopaedic Surgery  [address] [place] [address] [place] [address]  Phone: [phone]  Fax: [phone]  Follow Up Time:

## 2021-09-24 NOTE — PHYSICAL THERAPY INITIAL EVALUATION ADULT - PERTINENT HX OF CURRENT PROBLEM, REHAB EVAL
66 y.o morbidly obese female presents for PST with hx of right knee pain. Patient reports right knee pain for sometime. She has followed with ortho and reports diagnostics revealing advanced arthritis and she is opting for surgical intervention. Scheduled for Right Total Knee Replacement

## 2021-09-24 NOTE — CONSULT NOTE ADULT - ATTENDING COMMENTS
Patient is seen and examined at bedside, d/w NP Roseanna Cruz  67yo/F s/p RT TKR  Seen postop, was up and ambulated with PT  Pain is OK controlled  Incentive spirometry  Bowel regimen  Mobilize with PT  Agree with above assessment and plan. D/w pt and sister at bedside

## 2021-09-24 NOTE — DISCHARGE NOTE PROVIDER - NSDCMRMEDTOKEN_GEN_ALL_CORE_FT
atorvastatin 40 mg oral tablet: 1 tab(s) orally once a day  Edarbi 80 mg oral tablet: 1 tab(s) orally once a day  ezetimibe 10 mg oral tablet: 1 tab(s) orally once a day  ibuprofen 200 mg oral tablet: 2  orally once a day (at bedtime)  metFORMIN 500 mg oral tablet: orally once a day (in the morning)  MiraLax oral powder for reconstitution: 17 gram(s) orally once a day, As Needed   Multiple Vitamins oral tablet: 1 tab(s) orally once a day  nadolol 40 mg oral tablet: 1 tab(s) orally once a day (at bedtime)  oxyCODONE 5 mg oral tablet: 1 tab(s) orally every 6 hours MDD:6 tablets  Protonix 40 mg oral delayed release tablet: 1 tab(s) orally once a day   senna oral tablet: 1 tab(s) orally once a day, As Needed -for constipation   Tiadylt  mg/24 hours oral capsule, extended release: 1 cap(s) orally once a day  Tylenol Extra Strength 500 mg oral tablet: 2 tab(s) orally 3 times a day   Vitamin B12: orally once a day   atorvastatin 40 mg oral tablet: 1 tab(s) orally once a day  Edarbi 80 mg oral tablet: 1 tab(s) orally once a day  ezetimibe 10 mg oral tablet: 1 tab(s) orally once a day  metFORMIN 500 mg oral tablet: orally once a day (in the morning)  MiraLax oral powder for reconstitution: 17 gram(s) orally once a day, As Needed   Multiple Vitamins oral tablet: 1 tab(s) orally once a day  nadolol 40 mg oral tablet: 1 tab(s) orally once a day (at bedtime)  oxyCODONE 5 mg oral tablet: 1 tab(s) orally every 6 hours MDD:6 tablets  Protonix 40 mg oral delayed release tablet: 1 tab(s) orally once a day   rivaroxaban 10 mg oral tablet: 1 tab once daily as directed by  Services [phone]  senna oral tablet: 1 tab(s) orally once a day, As Needed -for constipation   Tiadylt  mg/24 hours oral capsule, extended release: 1 cap(s) orally once a day  Tylenol Extra Strength 500 mg oral tablet: 2 tab(s) orally 3 times a day   Vitamin B12: orally once a day

## 2021-09-24 NOTE — PROGRESS NOTE ADULT - SUBJECTIVE AND OBJECTIVE BOX
Orthopedics Post-op Check    POD 0 Right Total Knee Arthroplasty  Pt seen and examined in PACU. Pain is controlled. Pt feeling well. No nausea or vomiting.     Vital Signs Last 24 Hrs  T(C): 36.6 (09-24-21 @ 08:37), Max: 36.6 (09-24-21 @ 08:37)  T(F): 97.8 (09-24-21 @ 08:37), Max: 97.8 (09-24-21 @ 08:37)  HR: 53 (09-24-21 @ 10:30) (52 - 56)  BP: 141/82 (09-24-21 @ 10:30) (127/63 - 201/81)  BP(mean): --  RR: 19 (09-24-21 @ 10:30) (15 - 21)  SpO2: 95% (09-24-21 @ 10:30) (94% - 100%)                        12.3   8.46  )-----------( 260      ( 24 Sep 2021 11:10 )             39.1       Exam:  NAD AAOx3  Dressing clean and dry  SCDs on b/l  +EHL FHL TA GS  SILT toes 1-5  +DP  Calf Soft NT    A/P:  Stable POD 0 Right Total Knee Arthroplasty  -Analgesia  -Ppx ABX  -DVT PE ppx  -OOB PT  -Encourage IS

## 2021-09-25 LAB
ANION GAP SERPL CALC-SCNC: 4 MMOL/L — LOW (ref 5–17)
BUN SERPL-MCNC: 23 MG/DL — SIGNIFICANT CHANGE UP (ref 7–23)
CALCIUM SERPL-MCNC: 9.7 MG/DL — SIGNIFICANT CHANGE UP (ref 8.5–10.1)
CHLORIDE SERPL-SCNC: 106 MMOL/L — SIGNIFICANT CHANGE UP (ref 96–108)
CO2 SERPL-SCNC: 28 MMOL/L — SIGNIFICANT CHANGE UP (ref 22–31)
COVID-19 SPIKE DOMAIN AB INTERP: POSITIVE
COVID-19 SPIKE DOMAIN ANTIBODY RESULT: >250 U/ML — HIGH
CREAT SERPL-MCNC: 1.02 MG/DL — SIGNIFICANT CHANGE UP (ref 0.5–1.3)
GLUCOSE SERPL-MCNC: 128 MG/DL — HIGH (ref 70–99)
HCT VFR BLD CALC: 33.5 % — LOW (ref 34.5–45)
HGB BLD-MCNC: 10.7 G/DL — LOW (ref 11.5–15.5)
MCHC RBC-ENTMCNC: 28.7 PG — SIGNIFICANT CHANGE UP (ref 27–34)
MCHC RBC-ENTMCNC: 31.9 GM/DL — LOW (ref 32–36)
MCV RBC AUTO: 89.8 FL — SIGNIFICANT CHANGE UP (ref 80–100)
PLATELET # BLD AUTO: 212 K/UL — SIGNIFICANT CHANGE UP (ref 150–400)
POTASSIUM SERPL-MCNC: 4.2 MMOL/L — SIGNIFICANT CHANGE UP (ref 3.5–5.3)
POTASSIUM SERPL-SCNC: 4.2 MMOL/L — SIGNIFICANT CHANGE UP (ref 3.5–5.3)
RBC # BLD: 3.73 M/UL — LOW (ref 3.8–5.2)
RBC # FLD: 14.7 % — HIGH (ref 10.3–14.5)
SARS-COV-2 IGG+IGM SERPL QL IA: >250 U/ML — HIGH
SARS-COV-2 IGG+IGM SERPL QL IA: POSITIVE
SODIUM SERPL-SCNC: 138 MMOL/L — SIGNIFICANT CHANGE UP (ref 135–145)
WBC # BLD: 9.54 K/UL — SIGNIFICANT CHANGE UP (ref 3.8–10.5)
WBC # FLD AUTO: 9.54 K/UL — SIGNIFICANT CHANGE UP (ref 3.8–10.5)

## 2021-09-25 PROCEDURE — 99231 SBSQ HOSP IP/OBS SF/LOW 25: CPT

## 2021-09-25 PROCEDURE — 99232 SBSQ HOSP IP/OBS MODERATE 35: CPT

## 2021-09-25 RX ORDER — RIVAROXABAN 15 MG-20MG
1 KIT ORAL
Qty: 10 | Refills: 0
Start: 2021-09-25 | End: 2021-10-04

## 2021-09-25 RX ORDER — IBUPROFEN 200 MG
2 TABLET ORAL
Qty: 0 | Refills: 0 | DISCHARGE

## 2021-09-25 RX ADMIN — Medication 500 MILLIGRAM(S): at 22:24

## 2021-09-25 RX ADMIN — NADOLOL 40 MILLIGRAM(S): 80 TABLET ORAL at 22:24

## 2021-09-25 RX ADMIN — SENNA PLUS 2 TABLET(S): 8.6 TABLET ORAL at 22:26

## 2021-09-25 RX ADMIN — CELECOXIB 200 MILLIGRAM(S): 200 CAPSULE ORAL at 22:24

## 2021-09-25 RX ADMIN — OXYCODONE HYDROCHLORIDE 10 MILLIGRAM(S): 5 TABLET ORAL at 23:15

## 2021-09-25 RX ADMIN — Medication 1 TABLET(S): at 06:05

## 2021-09-25 RX ADMIN — Medication 1 TABLET(S): at 09:22

## 2021-09-25 RX ADMIN — Medication 325 MILLIGRAM(S): at 09:22

## 2021-09-25 RX ADMIN — Medication 975 MILLIGRAM(S): at 06:06

## 2021-09-25 RX ADMIN — Medication 500 MILLIGRAM(S): at 09:22

## 2021-09-25 RX ADMIN — Medication 1: at 12:28

## 2021-09-25 RX ADMIN — INFLUENZA VIRUS VACCINE 0.5 MILLILITER(S): 15; 15; 15; 15 SUSPENSION INTRAMUSCULAR at 09:22

## 2021-09-25 RX ADMIN — CELECOXIB 200 MILLIGRAM(S): 200 CAPSULE ORAL at 09:22

## 2021-09-25 RX ADMIN — OXYCODONE HYDROCHLORIDE 10 MILLIGRAM(S): 5 TABLET ORAL at 22:24

## 2021-09-25 RX ADMIN — Medication 1 MILLIGRAM(S): at 09:21

## 2021-09-25 RX ADMIN — Medication 975 MILLIGRAM(S): at 13:55

## 2021-09-25 RX ADMIN — Medication 360 MILLIGRAM(S): at 16:50

## 2021-09-25 RX ADMIN — PANTOPRAZOLE SODIUM 40 MILLIGRAM(S): 20 TABLET, DELAYED RELEASE ORAL at 06:05

## 2021-09-25 RX ADMIN — OXYCODONE HYDROCHLORIDE 5 MILLIGRAM(S): 5 TABLET ORAL at 09:21

## 2021-09-25 RX ADMIN — Medication 100 MILLIGRAM(S): at 00:13

## 2021-09-25 RX ADMIN — POLYETHYLENE GLYCOL 3350 17 GRAM(S): 17 POWDER, FOR SOLUTION ORAL at 22:24

## 2021-09-25 RX ADMIN — Medication 1: at 08:58

## 2021-09-25 RX ADMIN — RIVAROXABAN 10 MILLIGRAM(S): KIT at 09:25

## 2021-09-25 RX ADMIN — Medication 975 MILLIGRAM(S): at 22:25

## 2021-09-25 RX ADMIN — Medication 975 MILLIGRAM(S): at 22:24

## 2021-09-25 RX ADMIN — ATORVASTATIN CALCIUM 40 MILLIGRAM(S): 80 TABLET, FILM COATED ORAL at 22:24

## 2021-09-25 RX ADMIN — CELECOXIB 200 MILLIGRAM(S): 200 CAPSULE ORAL at 22:25

## 2021-09-25 RX ADMIN — LOSARTAN POTASSIUM 50 MILLIGRAM(S): 100 TABLET, FILM COATED ORAL at 09:21

## 2021-09-25 RX ADMIN — Medication 1 TABLET(S): at 13:56

## 2021-09-25 RX ADMIN — OXYCODONE HYDROCHLORIDE 5 MILLIGRAM(S): 5 TABLET ORAL at 09:51

## 2021-09-25 RX ADMIN — Medication 1 TABLET(S): at 22:24

## 2021-09-25 NOTE — PROGRESS NOTE ADULT - SUBJECTIVE AND OBJECTIVE BOX
POD 1 Right Total Knee Arthroplasty  Pt seen and examined at bedside. Pain is controlled. Pt feeling well. No nausea or vomiting.     Vital Signs Last 24 Hrs  T(C): 36.6 (25 Sep 2021 05:00), Max: 36.9 (24 Sep 2021 13:00)  T(F): 97.8 (25 Sep 2021 05:00), Max: 98.5 (24 Sep 2021 13:00)  HR: 52 (25 Sep 2021 05:00) (46 - 56)  BP: 150/69 (25 Sep 2021 05:00) (127/63 - 164/58)  BP(mean): --  RR: 16 (25 Sep 2021 05:00) (14 - 21)  SpO2: 95% (25 Sep 2021 05:00) (93% - 100%)    LABS:                        12.3   8.46  )-----------( 260      ( 24 Sep 2021 11:10 )             39.1     24 Sep 2021 11:10    139    |  108    |  20     ----------------------------<  124    4.3     |  27     |  1.02     Ca    8.6        24 Sep 2021 11:10        Exam:  NAD AAOx3  Dressing clean and dry  SCDs on b/l  +EHL FHL TA GS  SILT toes 1-5  +DP  Calf Soft NT    A/P:  Stable POD 1 Right Total Knee Arthroplasty  -Analgesia  -Ppx ABX  -DVT PE ppx  -OOB PT  -Encourage IS   -DC Planning after working with PT likely home today

## 2021-09-25 NOTE — PROGRESS NOTE ADULT - SUBJECTIVE AND OBJECTIVE BOX
HPI:  Patient is a 66y old  Female who presents with a chief complaint of right knee pain s/p right total knee replacement 21.    Consulted by Dr. Diaz for VTE prophylaxis, risk stratification, and anticoagulation management.    PAST MEDICAL & SURGICAL HISTORY:  HTN (hypertension)    Hyperlipidemia    DM (diabetes mellitus)    Arthritis    Ovarian cancer  and uterine cancer-- surgery &amp; chemo    Obesity, morbid, BMI 40.0-49.9    H/O umbilical hernia repair      S/P total hysterectomy  bilateral salpingectomy-oophorectomy -- ovarian &amp; uterine cancer    Interval History:  21: Patient seen at bedside in PACU able to communicate effectively and appropriately. Reporting 2/10 pain right knee. Denies any personal history of bleeding or clotting. Advised of necessity for Xarelto x 12 days total. Patient is amenable to plan.  21: Patient seen at bedside OOB to chair relaying that she was walked in hallway with walker and would like to go home today. She denies any issues with Xarelto Denies pain in knee but "This dressing is what is bothering me the most" referring to ace bandage.    BMI: 44.4    CrCl: pending labs    Incision Time: 08  Procedure End Time:944  EBL: 100ml    Caprini VTE Risk Score:  CAPRINI SCORE  AGE RELATED RISK FACTORS                                                       MOBILITY RELATED FACTORS  [ ] Age 41-60 years                                            (1 Point)                  [ ] Bed rest /restricted mobility                      (1 Point)  [ x] Age: 61-74 years                                           (2 Points)                [ ] Plaster cast                                                   (2 Points)  [ ] Age= 75 years                                              (3 Points)                 [ ] Bed bound for more than 72 hours                   (2 Points)    DISEASE RELATED RISK FACTORS                                               GENDER SPECIFIC FACTORS  [ ] Edema in the lower extremities                       (1 Point)           [ ] Pregnancy                                                            (1 Point)  [ ] Varicose veins                                               (1 Point)                  [ ] Post-partum < 6 weeks                                      (1 Point)             x[ ] BMI > 25 Kg/m2                                            (1 Point)                  [ ] Hormonal therapy or oral contraception       (1 Point)                 [ ] Sepsis (in the previous month)                        (1 Point)             [ ] History of pregnancy complications                (1Point)  [ ] Pneumonia or serious lung disease                                             [ ] Unexplained or recurrent  (=/>3), premature                                 (In the previous month)                               (1 Point)                birth with toxemia or growth-restricted infant (1 Point)  [ ] Abnormal pulmonary function test            (1 Point)                                   SURGERY RELATED RISK FACTORS  [ ] Acute myocardial infarction                       (1 Point)                  [ ]  Section                                         (1 Point)  [ ] Congestive heart failure (in the previous month) (1 Point)   [ ] Minor surgery   lasting <45 minutes       (1 Point)   [ ] Inflammatory bowel disease                             (1 Point)          [ ] Arthroscopic surgery                                  (2 Points)  [ ] Central venous access                                    (2 Points)            [ ] General surgery lasting >45 minutes      (2 Points)       [ ] Stroke (in the previous month)                  (5 Points)            [x ] Elective major lower extremity arthroplasty (5 Points)                                   [ x ] Malignancy (present or past include skin melanoma                                          but exclude  basal skin cell)    (2 points)                                      TRAUMA RELATED RISK FACTORS                HEMATOLOGY RELATED FACTORS                                  [ ] Fracture of the hip, pelvis, or leg                       (5 Points)  [ ] Prior episodes of VTE                                     (3 Points)          [ ] Acute spinal cord injury (in the previous month)  (5 Points)  [ ] Positive family history for VTE                         (3 Points)       [ ] Paralysis (less than 1 month)                          (5 Points)  [ ] Prothrombin 15347 A                                      (3 Points)         [ ] Multiple Trauma (within 1month)                 (5Points)                                                                                                                                                                [ ] Factor V Leiden                                          (3 Points)                                OTHER RISK FACTORS                          [ ] Lupus anticoagulants                                     (3 Points)                     [x ] BMI > 40                          (1 Point)                                                         [ ] Anticardiolipin antibodies                                (3 Points)                 [ ] Smoking                              (1Point)                                                [ ] High homocysteine in the blood                      (3 Points)                [  ] Diabetes requiring insulin (1point)                         [ ] Other congenital or acquired thrombophilia       (3 Points)          [  ] Chemotherapy                   (1 Point)  [ ] Heparin induced thrombocytopenia                  (3 Points)             [  ] Blood Transfusion                (1 point)                                                                                                             Total Score [     11     ]                                                                                                                                                                                                                                                                                                                                                                                                                                       IMPROVE Bleeding Risk Score:1.5      Falls Risk:   High ( x )  Mod (  )  Low (  )      FAMILY HISTORY:    Denies any personal or familial history of clotting or bleeding disorders.    Allergies    Bee stings (Swelling)  No Known Drug Allergies    Intolerances    REVIEW OF SYSTEMS    (  )Fever	        (  )Constipation	(  )SOB				  (  )Headache   (  )Dysuria  (  )Chills	        (  )Melena	        (  )Dyspnea on exertion (  )Dizziness    (  )Polyuria  (  )Nausea      (  )Hematochezia	(  )Cough                          (  )Syncope      (  )Hematuria  (  )Vomiting   (  )Chest Pain	        (  )Wheezing			  (  )Weakness  (  )Diarrhea    (  )Palpitations	(  )Anorexia			  ( x )Myalgia       ( x  ) Arthralgia    Pertinent positives in HPI and daily subjective. All other systems negative.    Vital Signs Last 24 Hrs  T(C): 37.1 (21 @ 09:30), Max: 37.1 (21 @ 09:30)  T(F): 98.7 (21 @ 09:30), Max: 98.7 (21 @ 09:30)  HR: 59 (21 @ 09:30) (46 - 59)  BP: 157/52 (21 @ 09:30) (150/61 - 164/58)  BP(mean): --  RR: 16 (21 @ 09:30) (14 - 17)  SpO2: 96% (21 @ 09:30) (93% - 98%)      PHYSICAL EXAM:    Constitutional: Appears Well    Neurological: A& O x 3    Skin: Warm    Respiratory and Thorax: normal effort; Breath sounds: normal; No rales/wheezing/rhonchi  	  Cardiovascular: S1, S2, regular, NMBR	MP: SBR 47bpm    Gastrointestinal: BS + x 4Q, nontender	    Genitourinary:  Bladder nondistended, nontender    Musculoskeletal:   General Right:   + muscle/joint tenderness,   normal tone, no joint swelling,   ROM: limited	    General Left:   no muscle/joint tenderness,   normal tone, no joint swelling,   ROM: full    Knee:  Right: Incision: ; Dressing CDI    Lower extrems:   Right: no calf tenderness              negative collette's sign               + pedal pulses    Left:   no calf tenderness              negative collette's sign               + pedal pulses                          10.7   9.54  )-----------( 212      ( 25 Sep 2021 07:42 )             33.5           138  |  106  |  23  ----------------------------<  128<H>  4.2   |  28  |  1.02    Ca    9.7      25 Sep 2021 07:42                                12.3   8.46  )-----------( 260      ( 24 Sep 2021 11:10 )             39.1           139  |  108  |  20  ----------------------------<  124<H>  4.3   |  27  |  1.02    Ca    8.6      24 Sep 2021 11:10    MEDICATIONS  (STANDING):  influenza   Vaccine 0.5 milliLiter(s) IntraMuscular once  propranolol 40 milliGRAM(s) Oral at bedtime  rivaroxaban 10 milliGRAM(s) Oral once      **Current DVT Prophylaxis:    LMWH                   (  )  Heparin SQ           (  )  Coumadin             (  )  Xarelto                  ( x )  Eliquis                   (  )  Venodynes           ( x )  Ambulation          (x  )  UFH                       (  )  ECASA                   (  )  Contraindicated  (  )

## 2021-09-25 NOTE — PROGRESS NOTE ADULT - SUBJECTIVE AND OBJECTIVE BOX
PCP: Dr Khoi Zambrano    CHIEF COMPLAINT: right knee OA    HISTORY OF THE PRESENT ILLNESS: this is a 65 yo female with PMH of  OA, DM, HTN, HLD, morbid obesity, ovarian and uterine CA in  with surgery and chemo and OA of her right kneee with progressive pain with ambulation, climbing stairs and INC pain at night. She failed the usual modalities for knee pain and is now s/p right TKR  Pt is seen in PACU, IN NAD, denies any CP or SOB.  States she took both ant-htn's this am. We are consulted for medical management    PAST MEDICAL HISTORY: as above  PAST SURGICAL HISTORY: umbilical hernia repair, OPAL  FAMILY HISTORY:   both parents   SOCIAL HISTORY:  no smoking, no alcohol, no drugs  ALLERGIES: NKDA  HOME MEDS: see med rec    21- did great with PT, denies pain. Wants to go home    REVIEW OF SYSTEMS:   All 10 systems reviewed in detailed and found to be negative with the exception of what has already been described above    Vital Signs Last 24 Hrs  T(C): 37.1 (25 Sep 2021 09:30), Max: 37.1 (25 Sep 2021 09:30)  T(F): 98.7 (25 Sep 2021 09:30), Max: 98.7 (25 Sep 2021 09:30)  HR: 54 (25 Sep 2021 14:00) (52 - 59)  BP: 160/50 (25 Sep 2021 14:00) (150/69 - 164/58)  BP(mean): --  RR: 15 (25 Sep 2021 14:00) (15 - 17)  SpO2: 96% (25 Sep 2021 14:00) (93% - 96%)    PHYSICAL EXAM:  GENERAL: Comfortable, no acute distress   HEAD:  Normocephalic, atraumatic  EYES: EOMI, PERRLA  HEENT: Moist mucous membranes  NECK: Supple, No JVD  NERVOUS SYSTEM:  Alert & Oriented X3, Motor Strength 5/5 B/L upper and lower extremities  CHEST/LUNG: Clear to auscultation bilaterally  HEART: Regular rate and rhythm  ABDOMEN: Soft, non tender, Nondistended, Bowel sounds present  GENITOURINARY: Voiding, no palpable bladder  EXTREMITIES:   No clubbing, cyanosis, or edema  MUSCULOSKELETAL- right knee ace dsg c/d/i  SKIN-no rash    LABS:                         10.7   9.54  )-----------( 212      ( 25 Sep 2021 07:42 )             33.5     25 Sep 2021 07:42    138    |  106    |  23     ----------------------------<  128    4.2     |  28     |  1.02     Ca    9.7        25 Sep 2021 07:42    CAPILLARY BLOOD GLUCOSE  POCT Blood Glucose.: 167 mg/dL (25 Sep 2021 11:48)  POCT Blood Glucose.: 161 mg/dL (25 Sep 2021 08:27)  POCT Blood Glucose.: 144 mg/dL (24 Sep 2021 22:47)  POCT Blood Glucose.: 107 mg/dL (24 Sep 2021 17:28)    MEDICATIONS  (STANDING):  acetaminophen   Tablet .. 975 milliGRAM(s) Oral every 8 hours  ascorbic acid 500 milliGRAM(s) Oral two times a day  atorvastatin 40 milliGRAM(s) Oral at bedtime  calcium carbonate 1250 mG  + Vitamin D (OsCal 500 + D) 1 Tablet(s) Oral three times a day  celecoxib 200 milliGRAM(s) Oral every 12 hours  dextrose 40% Gel 15 Gram(s) Oral once  dextrose 5%. 1000 milliLiter(s) (50 mL/Hr) IV Continuous <Continuous>  dextrose 5%. 1000 milliLiter(s) (100 mL/Hr) IV Continuous <Continuous>  dextrose 50% Injectable 25 Gram(s) IV Push once  dextrose 50% Injectable 12.5 Gram(s) IV Push once  dextrose 50% Injectable 25 Gram(s) IV Push once  diltiazem    milliGRAM(s) Oral daily  ferrous    sulfate 325 milliGRAM(s) Oral daily  folic acid 1 milliGRAM(s) Oral daily  glucagon  Injectable 1 milliGRAM(s) IntraMuscular once  insulin lispro (ADMELOG) corrective regimen sliding scale   SubCutaneous three times a day before meals  insulin lispro (ADMELOG) corrective regimen sliding scale   SubCutaneous at bedtime  lactated ringers. 1000 milliLiter(s) (75 mL/Hr) IV Continuous <Continuous>  losartan 50 milliGRAM(s) Oral daily  multivitamin 1 Tablet(s) Oral daily  nadolol 40 milliGRAM(s) Oral at bedtime  pantoprazole    Tablet 40 milliGRAM(s) Oral before breakfast  polyethylene glycol 3350 17 Gram(s) Oral at bedtime  rivaroxaban 10 milliGRAM(s) Oral daily  senna 2 Tablet(s) Oral at bedtime    MEDICATIONS  (PRN):  aluminum hydroxide/magnesium hydroxide/simethicone Suspension 30 milliLiter(s) Oral four times a day PRN Indigestion  HYDROmorphone  Injectable 0.5 milliGRAM(s) SubCutaneous every 4 hours PRN Severe Pain (7 - 10)  ondansetron Injectable 8 milliGRAM(s) IV Push every 8 hours PRN Nausea and/or Vomiting  oxyCODONE    IR 5 milliGRAM(s) Oral every 4 hours PRN Mild Pain (1 - 3)  oxyCODONE    IR 10 milliGRAM(s) Oral every 4 hours PRN Moderate Pain (4 - 6)    IMPRESSION:  67yo female with above pmh a/w:  #right knee OA  #S/P right TKR  Ortho following  PT as tolerated  Pain meds prn  AC by xarelto  HH stable  Bowel regimen  Incentive spirometry    #HTN  cont bb/CCB/arb  monitor vs    #HLD  cont statin    #T2DM   hold OHAS    # Vte prophylaxis- xarelto    #Dispo- home with home PT today-tomorrow. D/w pt

## 2021-09-26 VITALS
TEMPERATURE: 98 F | HEART RATE: 50 BPM | DIASTOLIC BLOOD PRESSURE: 56 MMHG | SYSTOLIC BLOOD PRESSURE: 159 MMHG | OXYGEN SATURATION: 95 % | RESPIRATION RATE: 16 BRPM

## 2021-09-26 PROCEDURE — 99232 SBSQ HOSP IP/OBS MODERATE 35: CPT

## 2021-09-26 PROCEDURE — 99231 SBSQ HOSP IP/OBS SF/LOW 25: CPT

## 2021-09-26 RX ADMIN — LOSARTAN POTASSIUM 50 MILLIGRAM(S): 100 TABLET, FILM COATED ORAL at 09:53

## 2021-09-26 RX ADMIN — Medication 1: at 12:00

## 2021-09-26 RX ADMIN — Medication 325 MILLIGRAM(S): at 09:53

## 2021-09-26 RX ADMIN — PANTOPRAZOLE SODIUM 40 MILLIGRAM(S): 20 TABLET, DELAYED RELEASE ORAL at 06:00

## 2021-09-26 RX ADMIN — CELECOXIB 200 MILLIGRAM(S): 200 CAPSULE ORAL at 09:54

## 2021-09-26 RX ADMIN — Medication 975 MILLIGRAM(S): at 14:59

## 2021-09-26 RX ADMIN — Medication 1 TABLET(S): at 06:00

## 2021-09-26 RX ADMIN — CELECOXIB 200 MILLIGRAM(S): 200 CAPSULE ORAL at 09:53

## 2021-09-26 RX ADMIN — Medication 1 TABLET(S): at 09:53

## 2021-09-26 RX ADMIN — Medication 975 MILLIGRAM(S): at 06:00

## 2021-09-26 RX ADMIN — Medication 1 MILLIGRAM(S): at 09:53

## 2021-09-26 RX ADMIN — Medication 500 MILLIGRAM(S): at 09:53

## 2021-09-26 RX ADMIN — Medication 1 TABLET(S): at 14:59

## 2021-09-26 RX ADMIN — RIVAROXABAN 10 MILLIGRAM(S): KIT at 09:53

## 2021-09-26 RX ADMIN — Medication 360 MILLIGRAM(S): at 09:54

## 2021-09-26 NOTE — PROGRESS NOTE ADULT - SUBJECTIVE AND OBJECTIVE BOX
HPI:  Patient is a 66y old  Female who presents with a chief complaint of right knee pain s/p right total knee replacement 21.    Consulted by Dr. Diaz for VTE prophylaxis, risk stratification, and anticoagulation management.    PAST MEDICAL & SURGICAL HISTORY:  HTN (hypertension)    Hyperlipidemia    DM (diabetes mellitus)    Arthritis    Ovarian cancer  and uterine cancer-- surgery &amp; chemo    Obesity, morbid, BMI 40.0-49.9    H/O umbilical hernia repair      S/P total hysterectomy  bilateral salpingectomy-oophorectomy -- ovarian &amp; uterine cancer    Interval History:  21: Patient seen at bedside in PACU able to communicate effectively and appropriately. Reporting 2/10 pain right knee. Denies any personal history of bleeding or clotting. Advised of necessity for Xarelto x 12 days total. Patient is amenable to plan.  21: Patient seen at bedside OOB to chair relaying that she was walked in hallway with walker and would like to go home today. She denies any issues with Xarelto Denies pain in knee but "This dressing is what is bothering me the most" referring to ace bandage.  21: Patient seen at bedside reporting no issues with PT and going home today. Asked "Can I drink beer with the blood thinner," advised to avoid etoh intake while on blood thinner due to thinning effect. Patient verbalized understanding     BMI: 44.4    CrCl: pending labs    Incision Time: 806  Procedure End Time:944  EBL: 100ml    Caprini VTE Risk Score:  CAPRINI SCORE  AGE RELATED RISK FACTORS                                                       MOBILITY RELATED FACTORS  [ ] Age 41-60 years                                            (1 Point)                  [ ] Bed rest /restricted mobility                      (1 Point)  [ x] Age: 61-74 years                                           (2 Points)                [ ] Plaster cast                                                   (2 Points)  [ ] Age= 75 years                                              (3 Points)                 [ ] Bed bound for more than 72 hours                   (2 Points)    DISEASE RELATED RISK FACTORS                                               GENDER SPECIFIC FACTORS  [ ] Edema in the lower extremities                       (1 Point)           [ ] Pregnancy                                                            (1 Point)  [ ] Varicose veins                                               (1 Point)                  [ ] Post-partum < 6 weeks                                      (1 Point)             x[ ] BMI > 25 Kg/m2                                            (1 Point)                  [ ] Hormonal therapy or oral contraception       (1 Point)                 [ ] Sepsis (in the previous month)                        (1 Point)             [ ] History of pregnancy complications                (1Point)  [ ] Pneumonia or serious lung disease                                             [ ] Unexplained or recurrent  (=/>3), premature                                 (In the previous month)                               (1 Point)                birth with toxemia or growth-restricted infant (1 Point)  [ ] Abnormal pulmonary function test            (1 Point)                                   SURGERY RELATED RISK FACTORS  [ ] Acute myocardial infarction                       (1 Point)                  [ ]  Section                                         (1 Point)  [ ] Congestive heart failure (in the previous month) (1 Point)   [ ] Minor surgery   lasting <45 minutes       (1 Point)   [ ] Inflammatory bowel disease                             (1 Point)          [ ] Arthroscopic surgery                                  (2 Points)  [ ] Central venous access                                    (2 Points)            [ ] General surgery lasting >45 minutes      (2 Points)       [ ] Stroke (in the previous month)                  (5 Points)            [x ] Elective major lower extremity arthroplasty (5 Points)                                   [ x ] Malignancy (present or past include skin melanoma                                          but exclude  basal skin cell)    (2 points)                                      TRAUMA RELATED RISK FACTORS                HEMATOLOGY RELATED FACTORS                                  [ ] Fracture of the hip, pelvis, or leg                       (5 Points)  [ ] Prior episodes of VTE                                     (3 Points)          [ ] Acute spinal cord injury (in the previous month)  (5 Points)  [ ] Positive family history for VTE                         (3 Points)       [ ] Paralysis (less than 1 month)                          (5 Points)  [ ] Prothrombin 62093 A                                      (3 Points)         [ ] Multiple Trauma (within 1month)                 (5Points)                                                                                                                                                                [ ] Factor V Leiden                                          (3 Points)                                OTHER RISK FACTORS                          [ ] Lupus anticoagulants                                     (3 Points)                     [x ] BMI > 40                          (1 Point)                                                         [ ] Anticardiolipin antibodies                                (3 Points)                 [ ] Smoking                              (1Point)                                                [ ] High homocysteine in the blood                      (3 Points)                [  ] Diabetes requiring insulin (1point)                         [ ] Other congenital or acquired thrombophilia       (3 Points)          [  ] Chemotherapy                   (1 Point)  [ ] Heparin induced thrombocytopenia                  (3 Points)             [  ] Blood Transfusion                (1 point)                                                                                                             Total Score [     11     ]                                                                                                                                                                                                                                                                                                                                                                                                                                       IMPROVE Bleeding Risk Score:1.5      Falls Risk:   High ( x )  Mod (  )  Low (  )      FAMILY HISTORY:    Denies any personal or familial history of clotting or bleeding disorders.    Allergies    Bee stings (Swelling)  No Known Drug Allergies    Intolerances    REVIEW OF SYSTEMS    (  )Fever	        (  )Constipation	(  )SOB				  (  )Headache   (  )Dysuria  (  )Chills	        (  )Melena	        (  )Dyspnea on exertion (  )Dizziness    (  )Polyuria  (  )Nausea      (  )Hematochezia	(  )Cough                          (  )Syncope      (  )Hematuria  (  )Vomiting   (  )Chest Pain	        (  )Wheezing			  (  )Weakness  (  )Diarrhea    (  )Palpitations	(  )Anorexia			  ( x )Myalgia       ( x  ) Arthralgia    Pertinent positives in HPI and daily subjective. All other systems negative.    Vital Signs Last 24 Hrs  T(C): 36.7 (21 @ 08:20), Max: 36.9 (21 @ 20:13)  T(F): 98 (21 @ 08:20), Max: 98.5 (21 @ 20:13)  HR: 50 (21 @ 08:20) (50 - 59)  BP: 159/56 (21 @ 08:20) (151/51 - 160/69)  BP(mean): --  RR: 16 (21 @ 08:20) (15 - 16)  SpO2: 95% (21 @ 08:20) (94% - 96%)      PHYSICAL EXAM:    Constitutional: Appears Well    Neurological: A& O x 3    Skin: Warm    Respiratory and Thorax: normal effort; Breath sounds: normal; No rales/wheezing/rhonchi  	  Cardiovascular: S1, S2, regular, NMBR	MP: SBR 47bpm    Gastrointestinal: BS + x 4Q, nontender	    Genitourinary:  Bladder nondistended, nontender    Musculoskeletal:   General Right:   + muscle/joint tenderness,   normal tone, no joint swelling,   ROM: limited	    General Left:   no muscle/joint tenderness,   normal tone, no joint swelling,   ROM: full    Knee:  Right: Incision: ; Dressing CDI    Lower extrems:   Right: no calf tenderness              negative collette's sign               + pedal pulses    Left:   no calf tenderness              negative collette's sign               + pedal pulses                          10.7   9.54  )-----------(       ( 25 Sep 2021 07:42 )             33.5           138  |  106  |  23  ----------------------------<  128<H>  4.2   |  28  |  1.02    Ca    9.7      25 Sep 2021 07:42                                10.7   9.54  )-----------(       ( 25 Sep 2021 07:42 )             33.5       09-    138  |  106  |  23  ----------------------------<  128<H>  4.2   |  28  |  1.02    Ca    9.7      25 Sep 2021 07:42                                12.3   8.46  )-----------( 260      ( 24 Sep 2021 11:10 )             39.1           139  |  108  |  20  ----------------------------<  124<H>  4.3   |  27  |  1.02    Ca    8.6      24 Sep 2021 11:10    MEDICATIONS  (STANDING):  acetaminophen   Tablet .. 975 milliGRAM(s) Oral every 8 hours  ascorbic acid 500 milliGRAM(s) Oral two times a day  atorvastatin 40 milliGRAM(s) Oral at bedtime  calcium carbonate 1250 mG  + Vitamin D (OsCal 500 + D) 1 Tablet(s) Oral three times a day  celecoxib 200 milliGRAM(s) Oral every 12 hours  dextrose 40% Gel 15 Gram(s) Oral once  dextrose 5%. 1000 milliLiter(s) IV Continuous <Continuous>  dextrose 5%. 1000 milliLiter(s) IV Continuous <Continuous>  dextrose 50% Injectable 12.5 Gram(s) IV Push once  dextrose 50% Injectable 25 Gram(s) IV Push once  dextrose 50% Injectable 25 Gram(s) IV Push once  diltiazem    milliGRAM(s) Oral daily  ferrous    sulfate 325 milliGRAM(s) Oral daily  folic acid 1 milliGRAM(s) Oral daily  glucagon  Injectable 1 milliGRAM(s) IntraMuscular once  insulin lispro (ADMELOG) corrective regimen sliding scale   SubCutaneous at bedtime  insulin lispro (ADMELOG) corrective regimen sliding scale   SubCutaneous three times a day before meals  lactated ringers. 1000 milliLiter(s) IV Continuous <Continuous>  losartan 50 milliGRAM(s) Oral daily  multivitamin 1 Tablet(s) Oral daily  nadolol 40 milliGRAM(s) Oral at bedtime  pantoprazole    Tablet 40 milliGRAM(s) Oral before breakfast  polyethylene glycol 3350 17 Gram(s) Oral at bedtime  rivaroxaban 10 milliGRAM(s) Oral daily  senna 2 Tablet(s) Oral at bedtime        **Current DVT Prophylaxis:    LMWH                   (  )  Heparin SQ           (  )  Coumadin             (  )  Xarelto                  ( x )  Eliquis                   (  )  Venodynes           ( x )  Ambulation          (x  )  UFH                       (  )  ECASA                   (  )  Contraindicated  (  )

## 2021-09-26 NOTE — PROGRESS NOTE ADULT - SUBJECTIVE AND OBJECTIVE BOX
PCP: Dr Khoi Zambrano    CHIEF COMPLAINT: right knee OA    HISTORY OF THE PRESENT ILLNESS: this is a 65 yo female with PMH of  OA, DM, HTN, HLD, morbid obesity, ovarian and uterine CA in  with surgery and chemo and OA of her right kneee with progressive pain with ambulation, climbing stairs and INC pain at night. She failed the usual modalities for knee pain and is now s/p right TKR  Pt is seen in PACU, IN NAD, denies any CP or SOB.  States she took both ant-htn's this am. We are consulted for medical management    PAST MEDICAL HISTORY: as above  PAST SURGICAL HISTORY: umbilical hernia repair, OPAL  FAMILY HISTORY:   both parents   SOCIAL HISTORY:  no smoking, no alcohol, no drugs  ALLERGIES: NKDA  HOME MEDS: see med rec    21- did great with PT, denies pain. Wants to go home  21 doing good, did stairs with PT. Going home today    REVIEW OF SYSTEMS:   All 10 systems reviewed in detailed and found to be negative with the exception of what has already been described above    Vital Signs Last 24 Hrs  T(C): 36.7 (26 Sep 2021 08:20), Max: 36.9 (25 Sep 2021 20:13)  T(F): 98 (26 Sep 2021 08:20), Max: 98.5 (25 Sep 2021 20:13)  HR: 50 (26 Sep 2021 08:20) (50 - 59)  BP: 159/56 (26 Sep 2021 08:20) (151/51 - 160/69)  BP(mean): --  RR: 16 (26 Sep 2021 08:20) (15 - 16)  SpO2: 95% (26 Sep 2021 08:20) (94% - 96%)    PHYSICAL EXAM:  GENERAL: Comfortable, no acute distress   HEAD:  Normocephalic, atraumatic  EYES: EOMI, PERRLA  HEENT: Moist mucous membranes  NECK: Supple, No JVD  NERVOUS SYSTEM:  Alert & Oriented X3, Motor Strength 5/5 B/L upper and lower extremities  CHEST/LUNG: Clear to auscultation bilaterally  HEART: Regular rate and rhythm  ABDOMEN: Soft, non tender, Nondistended, Bowel sounds present  GENITOURINARY: Voiding, no palpable bladder  EXTREMITIES:   No clubbing, cyanosis, or edema  MUSCULOSKELETAL- right knee ace dsg c/d/i  SKIN-no rash    LABS:                         10.7   9.54  )-----------( 212      ( 25 Sep 2021 07:42 )             33.5     25 Sep 2021 07:42    138    |  106    |  23     ----------------------------<  128    4.2     |  28     |  1.02     Ca    9.7        25 Sep 2021 07:42    CAPILLARY BLOOD GLUCOSE  POCT Blood Glucose.: 168 mg/dL (26 Sep 2021 11:56)  POCT Blood Glucose.: 133 mg/dL (26 Sep 2021 07:32)  POCT Blood Glucose.: 177 mg/dL (25 Sep 2021 22:23)  POCT Blood Glucose.: 149 mg/dL (25 Sep 2021 16:52)    MEDICATIONS  (STANDING):  acetaminophen   Tablet .. 975 milliGRAM(s) Oral every 8 hours  ascorbic acid 500 milliGRAM(s) Oral two times a day  atorvastatin 40 milliGRAM(s) Oral at bedtime  calcium carbonate 1250 mG  + Vitamin D (OsCal 500 + D) 1 Tablet(s) Oral three times a day  celecoxib 200 milliGRAM(s) Oral every 12 hours  dextrose 40% Gel 15 Gram(s) Oral once  dextrose 5%. 1000 milliLiter(s) (50 mL/Hr) IV Continuous <Continuous>  dextrose 5%. 1000 milliLiter(s) (100 mL/Hr) IV Continuous <Continuous>  dextrose 50% Injectable 25 Gram(s) IV Push once  dextrose 50% Injectable 12.5 Gram(s) IV Push once  dextrose 50% Injectable 25 Gram(s) IV Push once  diltiazem    milliGRAM(s) Oral daily  ferrous    sulfate 325 milliGRAM(s) Oral daily  folic acid 1 milliGRAM(s) Oral daily  glucagon  Injectable 1 milliGRAM(s) IntraMuscular once  insulin lispro (ADMELOG) corrective regimen sliding scale   SubCutaneous three times a day before meals  insulin lispro (ADMELOG) corrective regimen sliding scale   SubCutaneous at bedtime  lactated ringers. 1000 milliLiter(s) (75 mL/Hr) IV Continuous <Continuous>  losartan 50 milliGRAM(s) Oral daily  multivitamin 1 Tablet(s) Oral daily  nadolol 40 milliGRAM(s) Oral at bedtime  pantoprazole    Tablet 40 milliGRAM(s) Oral before breakfast  polyethylene glycol 3350 17 Gram(s) Oral at bedtime  rivaroxaban 10 milliGRAM(s) Oral daily  senna 2 Tablet(s) Oral at bedtime    MEDICATIONS  (PRN):  aluminum hydroxide/magnesium hydroxide/simethicone Suspension 30 milliLiter(s) Oral four times a day PRN Indigestion  HYDROmorphone  Injectable 0.5 milliGRAM(s) SubCutaneous every 4 hours PRN Severe Pain (7 - 10)  ondansetron Injectable 8 milliGRAM(s) IV Push every 8 hours PRN Nausea and/or Vomiting  oxyCODONE    IR 5 milliGRAM(s) Oral every 4 hours PRN Mild Pain (1 - 3)  oxyCODONE    IR 10 milliGRAM(s) Oral every 4 hours PRN Moderate Pain (4 - 6)    IMPRESSION:  65yo female with above pmh a/w:  #right knee OA  #S/P right TKR  Ortho following  PT as tolerated  Pain meds prn  AC by xarelto  HH stable  Bowel regimen  Incentive spirometry    #HTN  cont bb/CCB/arb  monitor vs    #HLD  cont statin    #T2DM   hold OHAS    #Vte prophylaxis- xarelto    #Dispo- home with home PT today. D/w pt

## 2021-09-26 NOTE — PROGRESS NOTE ADULT - SUBJECTIVE AND OBJECTIVE BOX
Pt seen and examined at bedside. Pt reports pain is well controlled. No acute overnight events. Denies fevers, dizziness, CP, SOB, N/V, numbness/tingling, calf pain.    Labs:                        10.7   9.54  )-----------( 212      ( 25 Sep 2021 07:42 )             33.5     09-25    138  |  106  |  23  ----------------------------<  128<H>  4.2   |  28  |  1.02    Ca    9.7      25 Sep 2021 07:42    Vitals:  T(C): 36.8 (09-26-21 @ 00:30), Max: 37.1 (09-25-21 @ 09:30)  HR: 56 (09-26-21 @ 00:30) (54 - 59)  BP: 151/51 (09-26-21 @ 00:30) (151/51 - 160/69)  RR: 16 (09-26-21 @ 00:30) (15 - 16)  SpO2: 94% (09-26-21 @ 00:30) (94% - 96%)    Physical Exam:  Gen: NAD, resting comfortably    RLE:  Dressing clean, dry, intact  +EHL/FHL/TA/GS  SILT L2-S1  +DP/PT Pulses  Compartments soft and compressible  No calf TTP B/L

## 2021-09-26 NOTE — PROGRESS NOTE ADULT - ASSESSMENT
A/P: Pt is a 66y Female POD 1 from [facility].  -Pain Control  -DVT PPx per AC team  -WBAT  -PT/OT  -Encourage Incentive Spirometry  -Med Management  -Dispo Planning--likely home today following PT  -Will discuss with attending and advise if plan changes
This is a 66 year old female s/p right knee replacement with high risk for VTE due to age, BMI >40, impaired mobility r/t orthopedic surgery. She is low beleding risk.    Discussed Xarelto and the risks and benefits with patient. Emphasized the importance of taking medication daily to prevent VTE. Verbalized understanding and is in agreement with treatment plan.    Plan:  ::Xarelto 10 mg PO daily x 12 days starting tonight at 6pm with last dose--->10/5/21 copay $10  ::PPI- Protonix 40 mg PO daily   ::Daily CBC/BMP  ::Enc ambulation  ::Vensharones    Will continue to follow.  Dispo: Home 9/26
This is a 66 year old female s/p right knee replacement with high risk for VTE due to age, BMI >40, impaired mobility r/t orthopedic surgery. She is low beleding risk.    Discussed Xarelto and the risks and benefits with patient. Emphasized the importance of taking medication daily to prevent VTE. Verbalized understanding and is in agreement with treatment plan.    Plan:  ::Xarelto 10 mg PO daily x 12 days starting tonight at 6pm with last dose--->10/5/21 copay $10  ::PPI- Protonix 40 mg PO daily   ::Daily CBC/BMP  ::Enc ambulation  ::Venodynes    Will continue to follow.  Dispo: Home

## 2021-09-30 DIAGNOSIS — Z90.79 ACQUIRED ABSENCE OF OTHER GENITAL ORGAN(S): ICD-10-CM

## 2021-09-30 DIAGNOSIS — M17.11 UNILATERAL PRIMARY OSTEOARTHRITIS, RIGHT KNEE: ICD-10-CM

## 2021-09-30 DIAGNOSIS — I10 ESSENTIAL (PRIMARY) HYPERTENSION: ICD-10-CM

## 2021-09-30 DIAGNOSIS — E11.9 TYPE 2 DIABETES MELLITUS WITHOUT COMPLICATIONS: ICD-10-CM

## 2021-09-30 DIAGNOSIS — Z23 ENCOUNTER FOR IMMUNIZATION: ICD-10-CM

## 2021-09-30 DIAGNOSIS — Z90.710 ACQUIRED ABSENCE OF BOTH CERVIX AND UTERUS: ICD-10-CM

## 2021-09-30 DIAGNOSIS — E66.01 MORBID (SEVERE) OBESITY DUE TO EXCESS CALORIES: ICD-10-CM

## 2021-09-30 DIAGNOSIS — Z85.42 PERSONAL HISTORY OF MALIGNANT NEOPLASM OF OTHER PARTS OF UTERUS: ICD-10-CM

## 2021-09-30 DIAGNOSIS — Z85.43 PERSONAL HISTORY OF MALIGNANT NEOPLASM OF OVARY: ICD-10-CM

## 2021-09-30 DIAGNOSIS — Z92.21 PERSONAL HISTORY OF ANTINEOPLASTIC CHEMOTHERAPY: ICD-10-CM

## 2021-09-30 DIAGNOSIS — E78.5 HYPERLIPIDEMIA, UNSPECIFIED: ICD-10-CM

## 2021-09-30 DIAGNOSIS — Z90.722 ACQUIRED ABSENCE OF OVARIES, BILATERAL: ICD-10-CM

## 2022-01-25 NOTE — PHYSICAL THERAPY INITIAL EVALUATION ADULT - REFERRING PHYSICIAN, REHAB EVAL
Encounter Date: 2022       History     Chief Complaint   Patient presents with    Animal Bite     Bit by friends dog on right hand and arm. Multiple small puncture wounds noted. Bleeding controlled. Reports dog is up to date on all immunizations      The patient is a healthy 29 year old female, who presents to the ER for an emergent evaluation following multiple dog bites. She states that her friend's dog is a nervous rescue who has current rabies vaccination. She states that the dog bite was provoked by being woken up unexpectedly. The small breed dog bit her right forearm and then bit her right hand. The injury occurred just prior to arrival. She is right hand dominant. She has had a TD vaccine in less than 5 years. She states that she did take Ibuprofen 400 mg for pain prior to arrival. She denies any numbness, weakness, or decreased ROM.          Review of patient's allergies indicates:   Allergen Reactions    Cephalosporins Rash    Penicillins Rash    Sulfa (sulfonamide antibiotics) Rash    Corn containing products Diarrhea and Nausea And Vomiting    Eggs [egg derived] Nausea Only    Latex, natural rubber Dermatitis and Rash     Past Medical History:   Diagnosis Date    Allergy      Past Surgical History:   Procedure Laterality Date    ADENOIDECTOMY      TONSILLECTOMY       Family History   Problem Relation Age of Onset    Fibromyalgia Mother     Hypertension Father      Social History     Tobacco Use    Smoking status: Former Smoker     Types: Cigarettes     Quit date: 2021     Years since quittin.6    Smokeless tobacco: Never Used   Substance Use Topics    Alcohol use: Yes     Comment: once week     Drug use: Yes     Types: Marijuana     Review of Systems   Constitutional: Negative for diaphoresis.   HENT: Negative for facial swelling.    Eyes: Negative for pain.   Respiratory: Negative for shortness of breath.    Cardiovascular: Negative for chest pain.   Gastrointestinal:  Negative for nausea and vomiting.   Musculoskeletal: Negative for joint swelling.   Skin: Positive for wound.   Allergic/Immunologic: Negative for immunocompromised state.   Neurological: Negative for weakness and numbness.   Psychiatric/Behavioral: Negative for confusion. The patient is nervous/anxious.        Physical Exam     Initial Vitals [01/24/22 2213]   BP Pulse Resp Temp SpO2   119/68 90 15 98.4 °F (36.9 °C) 100 %      MAP       --         Physical Exam    Nursing note and vitals reviewed.  Constitutional: She appears well-developed and well-nourished.   HENT:   Head: Atraumatic.   Eyes: Conjunctivae are normal.   Cardiovascular: Normal rate and intact distal pulses.   Pulmonary/Chest: No respiratory distress.   Abdominal: She exhibits no distension.   Musculoskeletal:      Comments: Acute superficial puncture wounds to right forearm and right hand from acute dog bites - see image.      Neurological: She is alert and oriented to person, place, and time. She has normal strength. No sensory deficit.   Skin: Skin is warm and dry.   Psychiatric:   She appears mildly upset and nervous                  ED Course   Procedures  Labs Reviewed - No data to display       Imaging Results           X-Ray Hand 3 view Right (Final result)  Result time 01/25/22 00:38:51    Final result by Shawn Powell MD (01/25/22 00:38:51)                 Impression:      Soft tissue defect as described above.    No foreign bodies are noted.    No fracture.    This report was flagged in Epic as abnormal.      Electronically signed by: Shawn Powell  Date:    01/25/2022  Time:    00:38             Narrative:    EXAMINATION:  XR HAND COMPLETE 3 VIEW RIGHT    CLINICAL HISTORY:  dog bite; Open bite of right hand, initial encounter    TECHNIQUE:  PA, lateral, and oblique views of the right hand were performed.    COMPARISON:  None    FINDINGS:  Imaging of the right hand indicates that there is a soft tissue defect in the webspace  between the thumb and index finger.  Apparently a gauze bandage is been placed over the area.  There does not appear to be evidence of a foreign body.  No apparent fracture injury involving the bones of the thumb or index finger.                                X-Ray Forearm Right (Final result)  Result time 01/25/22 00:37:32    Final result by Shawn Powell MD (01/25/22 00:37:32)                 Impression:      No apparent evidence of a fracture.    No foreign body.    Soft tissue defect appreciated.    This report was flagged in Epic as abnormal.      Electronically signed by: Shawn Powell  Date:    01/25/2022  Time:    00:37             Narrative:    EXAMINATION:  XR FOREARM RIGHT    CLINICAL HISTORY:  Open bite of right forearm, initial encounter    TECHNIQUE:  AP and lateral views of the right forearm were performed.    COMPARISON:  None    FINDINGS:  Multiple views of the right forearm indicates no evidence of an acute fracture injury.  There appears to be a soft tissue defect seen along the lateral aspect of the distal forearm near the radial styloid.  No foreign bodies.  No apparent injury involving the wrist joint or elbow joint.                                 Medications   neomycin-bacitracnZn-polymyxnB packet (5 each Topical (Top) Given 1/24/22 2321)   ondansetron disintegrating tablet 4 mg (4 mg Oral Given 1/24/22 2314)   clindamycin capsule 300 mg (300 mg Oral Given 1/24/22 2320)   ibuprofen tablet 400 mg (400 mg Oral Given 1/24/22 2321)     Medical Decision Making:   Initial Assessment:   28 yo female, presents for an emergent evaluation due to acute dog bite of her right forearm and right hand. TD and Rabies vaccines are reportedly current.   Differential Diagnosis:   Dog bite, puncture wounds, fracture, infection, etc   Clinical Tests:   Radiological Study: Ordered and Reviewed  ED Management:  Vital signs reviewed - benign   Wounds copiously irrigated using normal saline and cleaned using  Leisa Riggs skin anti-septic   Wounds dressed with triple antibiotic ointment and sterile gauze   X rays of right forearm and right hand reviewed - benign   Pt allergic to PCN, Sulfa, and Cephalosporin antibiotics - therefore Clindamycin was chosen for skin wound prophylaxis - 1st dose given in the ER and Rx for same provided   Pt given Ibuprofen for pain  Pt advised on proper wound care   Pt advised to follow up with primary care in the next 24-48 hours for wound re-check   Pt advised to return to the ER promptly if worse in any way                           Clinical Impression:   Final diagnoses:  [S61.451A, W54.0XXA] Dog bite of right hand, initial encounter (Primary)  [S51.851A, W54.0XXA] Dog bite of right forearm, initial encounter                 Francisco Salvador PA-C  01/25/22 0043

## 2022-09-20 NOTE — PATIENT PROFILE ADULT - NSPROSPHOSPCHAPLAINYN_GEN_A_NUR
ON TREATMENT  NOTE  RADIATION ONCOLOGY DEPARTMENT    Patient name:  Geena Richey    Primary Physician:  Pcp Pt States None MRN: [id number]  CSN: [id number]   Referring physician:  No ref. provider found : 1962, 60 y.o.     ENCOUNTER DATE:  22    DIAGNOSIS:    Primary lung squamous cell carcinoma, left (HCC)  Staging form: Lung, AJCC 8th Edition  - Clinical: Stage IIB (cT2b, cN1, cM0) - Signed by Nicole Newell M.D. on 2022  Histologic grade (G): G2  Histologic grading system: 4 grade system      TREATMENT SUMMARY:  Aria Treatment Information          Some values may be hidden. Unless noted otherwise, only the newest values recorded on each date are displayed.           Aria Treatment Summary 22   Course First Treatment Date 2022   Course Last Treatment Date 2022   L Lung,Hilum Plan from Course C2_L_lung/med   Fraction 16 of 30   Elapsed Course Days    Prescribed Fraction Dose 200 cGy   Prescribed Total Dose 6,000 cGy   L Lung,Hilum Reference Point from Course C2_L_lung/med   Elapsed Course Days    Session Dose 200 cGy   Total Dose 3,200 cGy   L Lung,Hilum CP Reference Point from Course C2_L_lung/med   Elapsed Course Days    Session Dose 210 cGy   Total Dose 3,359 cGy   2ndCheckPlan Plan from Course C1   Cylinder Plan from Course C1   Cylinder+.5 Reference Point from Course C1   calc pt Reference Point from Course C1               SUBJECTIVE:  Less pain today.  She is breathing better as well.        VITAL SIGNS:  KPS: 100, Fully active, able to carry on all pre-disease performed without restriction (ECOG equivalent 0)  Encounter Vitals  Blood Pressure: 106/71  Pulse: 90  Pulse Oximetry: 94 % (has oxygen tank)  No flowsheet data found.       PHYSICAL EXAM:  Short of breath today      Toxicity Assessment 2022   Toxicity Assessment Chest Chest Chest Chest   Fatigue (lethargy, malaise,  asthenia) Moderate (e.g., decrease in performance status by 1 ECOG level or 20% [place]) or causing difficulty performing some activities Moderate (e.g., decrease in performance status by 1 ECOG level or 20% [place]) or causing difficulty performing some activities Increased fatigue over baseline, but not altering normal activities Moderate (e.g., decrease in performance status by 1 ECOG level or 20% [place]) or causing difficulty performing some activities   Radiation Dermatitis None None None None   Anorexia None None None Loss of appetite   Dyspepsia/heartburn Severe Severe None None   Dysphagia, Esophagitis, odynophagoa (painful swallowing) None Mild dysphagia, but can eat regular diet None None   RT Dysphagia-Esophageal None None None None   Cough Mild, relieved by non-prescription medication Absent Mild, relieved by non-prescription medication Mild, relieved by non-prescription medication   Dyspnea Dyspnea at rest or requiring ventilator support Normal Dyspnea on exertion Dyspnea on exertion           IMPRESSION:  Cancer Staging  Primary lung squamous cell carcinoma, left (HCC)  Staging form: Lung, AJCC 8th Edition  - Clinical: Stage IIB (cT2b, cN1, cM0) - Signed by Nicole Newell M.D. on 8/16/2022      PLAN:  No change in treatment plan    Disposition:  Treatment plan reviewed. Questions answered. Continue therapy outlined.     Nicole Newell M.D.    No orders of the defined types were placed in this encounter.                   no

## 2022-12-15 ENCOUNTER — NON-APPOINTMENT (OUTPATIENT)
Age: 67
End: 2022-12-15

## 2022-12-15 DIAGNOSIS — Z82.69 FAMILY HISTORY OF OTHER DISEASES OF THE MUSCULOSKELETAL SYSTEM AND CONNECTIVE TISSUE: ICD-10-CM

## 2022-12-15 DIAGNOSIS — M17.11 UNILATERAL PRIMARY OSTEOARTHRITIS, RIGHT KNEE: ICD-10-CM

## 2022-12-15 RX ORDER — AZILSARTAN KAMEDOXOMIL 80 MG/1
80 TABLET ORAL DAILY
Refills: 0 | Status: ACTIVE | COMMUNITY

## 2022-12-15 RX ORDER — ATORVASTATIN CALCIUM 40 MG/1
40 TABLET, FILM COATED ORAL DAILY
Refills: 0 | Status: ACTIVE | COMMUNITY

## 2023-03-10 NOTE — H&P PST ADULT - ALLERGIC/IMMUNOLOGIC COMMENTS
For information on Fall & Injury Prevention, visit: [email or web address] OR  [email or web address] OR  https://www.cdc.gov/steadi/patient.html
as listed above

## 2023-11-23 NOTE — PHYSICAL THERAPY INITIAL EVALUATION ADULT - WEIGHT-BEARING RESTRICTIONS: STAND/SIT, REHAB EVAL
needed, If symptoms worsen    Your Primary Care Doctor      Please call to schedule a follow-up appointment for a visit within the next 1 week. Please be sure to discuss your emergency department visit and any results of testing.       DISCHARGE MEDICATIONS:  New Prescriptions    No medications on file          Grant Brown MD  11/23/23 1211
weight-bearing as tolerated

## 2024-03-21 PROBLEM — E66.01 MORBID (SEVERE) OBESITY DUE TO EXCESS CALORIES: Chronic | Status: ACTIVE | Noted: 2021-09-15

## 2024-03-21 PROBLEM — E11.9 TYPE 2 DIABETES MELLITUS WITHOUT COMPLICATIONS: Chronic | Status: ACTIVE | Noted: 2021-09-15

## 2024-03-21 PROBLEM — M19.90 UNSPECIFIED OSTEOARTHRITIS, UNSPECIFIED SITE: Chronic | Status: ACTIVE | Noted: 2021-09-15

## 2024-03-21 PROBLEM — I10 ESSENTIAL (PRIMARY) HYPERTENSION: Chronic | Status: ACTIVE | Noted: 2021-09-15

## 2024-03-21 PROBLEM — E78.5 HYPERLIPIDEMIA, UNSPECIFIED: Chronic | Status: ACTIVE | Noted: 2021-09-15

## 2024-03-21 PROBLEM — C56.9 MALIGNANT NEOPLASM OF UNSPECIFIED OVARY: Chronic | Status: ACTIVE | Noted: 2021-09-15

## 2024-03-22 ENCOUNTER — APPOINTMENT (OUTPATIENT)
Dept: ELECTROPHYSIOLOGY | Facility: CLINIC | Age: 69
End: 2024-03-22
Payer: MEDICARE

## 2024-03-22 VITALS
BODY MASS INDEX: 40.59 KG/M2 | HEIGHT: 61 IN | OXYGEN SATURATION: 96 % | WEIGHT: 215 LBS | HEART RATE: 129 BPM | DIASTOLIC BLOOD PRESSURE: 79 MMHG | SYSTOLIC BLOOD PRESSURE: 148 MMHG

## 2024-03-22 DIAGNOSIS — E66.01 MORBID (SEVERE) OBESITY DUE TO EXCESS CALORIES: ICD-10-CM

## 2024-03-22 DIAGNOSIS — I10 ESSENTIAL (PRIMARY) HYPERTENSION: ICD-10-CM

## 2024-03-22 DIAGNOSIS — E11.49 TYPE 2 DIABETES MELLITUS WITH OTHER DIABETIC NEUROLOGICAL COMPLICATION: ICD-10-CM

## 2024-03-22 PROCEDURE — 99204 OFFICE O/P NEW MOD 45 MIN: CPT

## 2024-03-22 PROCEDURE — 93000 ELECTROCARDIOGRAM COMPLETE: CPT

## 2024-03-22 NOTE — REVIEW OF SYSTEMS
[Feeling Fatigued] : feeling fatigued [Dyspnea on exertion] : dyspnea during exertion [Negative] : Neurological [Palpitations] : no palpitations

## 2024-03-22 NOTE — HISTORY OF PRESENT ILLNESS
[FreeTextEntry1] : 69-year-old female with history of hypertension diabetes endometrial/ovarian cancer status postTAH/BSO-remission, persistent atrial fibrillation nonischemic cardiomyopathy. 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 Patient has A-fib difficult to rate control despite being on maximum dose of medical therapy amiodarone 200 mg daily Toprol- mg bid, Cardizem 240 twice daily. She has alcantar with walking ~ one city block. Denies palpitations, cp, dizziness, syncope.   ECG 3.22.24 AF RVR 150s bpm narrow QRS    Cardiac workup: PET stress test from 3/2024 shows no perfusion defect mildly reduced LVEF 45%

## 2024-03-22 NOTE — DISCUSSION/SUMMARY
[EKG obtained to assist in diagnosis and management of assessed problem(s)] : EKG obtained to assist in diagnosis and management of assessed problem(s) [FreeTextEntry1] : 69-year-old female with multiple comorbidities as above including recent diagnosis of spleen CVA as well as thyroid mass patient will need to undergo splenectomy in the near future.  Patient has difficult to rate controlled A-fib with RVR despite multiple medications.  She has mild cardiomyopathy likely tachycardia induced and mild symptoms of dyspnea.  Patient has been compliant with Eliquis 5 mg twice daily.  We will load properly with amiodarone 600 mg daily for 10 days followed by cardioversion and hopefully maintain sinus rhythm. Afterwards while patient maintaining sinus rhythm she should be stable to proceed with surgical treatment for cancer Continue current medications Continue to follow with PCP cardiology and oncology  Will obtain TTE report from Dr Zambrano office I spent a total of 45 minutes on the encounter evaluating and discussing treatment options with the patient, as well as counseling and coordination of care as stated above.

## 2024-03-26 RX ORDER — NADOLOL 40 MG/1
40 TABLET ORAL DAILY
Refills: 0 | Status: DISCONTINUED | COMMUNITY
End: 2024-03-26

## 2024-03-26 RX ORDER — METOPROLOL SUCCINATE 100 MG/1
100 TABLET, EXTENDED RELEASE ORAL DAILY
Qty: 30 | Refills: 3 | Status: ACTIVE | COMMUNITY
Start: 2024-03-22

## 2024-03-27 RX ORDER — AMIODARONE HYDROCHLORIDE 200 MG/1
200 TABLET ORAL
Qty: 90 | Refills: 1 | Status: ACTIVE | COMMUNITY
Start: 2024-03-22 | End: 1900-01-01

## 2024-03-28 ENCOUNTER — APPOINTMENT (OUTPATIENT)
Dept: ELECTROPHYSIOLOGY | Facility: CLINIC | Age: 69
End: 2024-03-28
Payer: MEDICARE

## 2024-03-28 ENCOUNTER — NON-APPOINTMENT (OUTPATIENT)
Age: 69
End: 2024-03-28

## 2024-03-28 VITALS
DIASTOLIC BLOOD PRESSURE: 57 MMHG | OXYGEN SATURATION: 97 % | SYSTOLIC BLOOD PRESSURE: 97 MMHG | WEIGHT: 215 LBS | RESPIRATION RATE: 16 BRPM | HEIGHT: 61 IN | BODY MASS INDEX: 40.59 KG/M2

## 2024-03-28 PROCEDURE — 93000 ELECTROCARDIOGRAM COMPLETE: CPT

## 2024-03-28 PROCEDURE — 99213 OFFICE O/P EST LOW 20 MIN: CPT

## 2024-03-28 RX ORDER — DILTIAZEM HYDROCHLORIDE 240 MG/1
240 CAPSULE, COATED, EXTENDED RELEASE ORAL TWICE DAILY
Refills: 0 | Status: ACTIVE | COMMUNITY

## 2024-03-28 RX ORDER — GLIPIZIDE 5 MG/1
5 TABLET ORAL
Refills: 0 | Status: ACTIVE | COMMUNITY

## 2024-03-28 RX ORDER — DILTIAZEM HYDROCHLORIDE 360 MG/1
360 CAPSULE, EXTENDED RELEASE ORAL
Refills: 0 | Status: DISCONTINUED | COMMUNITY
End: 2024-03-28

## 2024-03-28 RX ORDER — APIXABAN 5 MG/1
5 TABLET, FILM COATED ORAL
Qty: 60 | Refills: 3 | Status: ACTIVE | COMMUNITY

## 2024-03-28 RX ORDER — SPIRONOLACTONE 25 MG/1
25 TABLET, FILM COATED ORAL
Refills: 0 | Status: ACTIVE | COMMUNITY

## 2024-03-28 RX ORDER — METFORMIN HYDROCHLORIDE 500 MG/1
500 TABLET, COATED ORAL DAILY
Refills: 0 | Status: DISCONTINUED | COMMUNITY
End: 2024-03-28

## 2024-03-28 RX ORDER — EMPAGLIFLOZIN 10 MG/1
10 TABLET, FILM COATED ORAL
Refills: 0 | Status: ACTIVE | COMMUNITY

## 2024-03-28 RX ORDER — OXYCODONE 5 MG/1
5 TABLET ORAL
Refills: 0 | Status: DISCONTINUED | COMMUNITY
End: 2024-03-28

## 2024-03-28 NOTE — PHYSICAL EXAM
[Clear Lung Fields] : clear lung fields [Normal] : no edema, no cyanosis, no clubbing, no varicosities [de-identified] : irregular S1S2

## 2024-03-28 NOTE — HISTORY OF PRESENT ILLNESS
[FreeTextEntry1] : 69-year-old female with history of hypertension diabetes endometrial/ovarian cancer status postTAH/BSO-remission, persistent atrial fibrillation nonischemic cardiomyopathy. 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 Patient has A-fib difficult to rate control despite being on maximum dose of medical therapy amiodarone 200 mg daily Toprol- mg bid, Cardizem 240 twice daily. She has alcantar with walking ~ one city block. Denies palpitations, cp, dizziness, syncope.   ECG 3.22.24 AF RVR 150s bpm narrow QRS ECG 3/28/24 AF 89 bpm  Pt presents with her sister, she is asymptomatic and  her HRs are much lower on the increased dose of Amiodarone.  Currently taking Amiodarone 400mg daily for 10 days. (she will decrease to 200mg on Monday 4/1). She is awaiting surgery on her spleen in which she has a tumor.

## 2024-03-28 NOTE — DISCUSSION/SUMMARY
[EKG obtained to assist in diagnosis and management of assessed problem(s)] : EKG obtained to assist in diagnosis and management of assessed problem(s) [FreeTextEntry2] : sister-Rosalinda

## 2024-03-28 NOTE — ASSESSMENT
[FreeTextEntry1] : She remains in atrial fibrillation but rates are now controlled.  She was sceduled for DCCV on 3/29 but takes Jardiance and has to hold x 3 days.  Now she will have DCCV on Mon 4/1. Continue Eliquis 5mg BID Continue Amiodarone 400mg PO daily to complete 10 days, then she will decrease dose to 200mg daily. Decrease Toprol XL to 100mg  daily from BID dose and decrease cardizem CD 240mg to daily from BID dose. If her sister find her BP to be high she will give extra dose Metoprolol as needed.

## 2024-04-01 ENCOUNTER — OUTPATIENT (OUTPATIENT)
Dept: OUTPATIENT SERVICES | Facility: HOSPITAL | Age: 69
LOS: 1 days | End: 2024-04-01
Payer: MEDICARE

## 2024-04-01 DIAGNOSIS — Z98.890 OTHER SPECIFIED POSTPROCEDURAL STATES: Chronic | ICD-10-CM

## 2024-04-01 DIAGNOSIS — Z90.710 ACQUIRED ABSENCE OF BOTH CERVIX AND UTERUS: Chronic | ICD-10-CM

## 2024-04-01 PROCEDURE — 85027 COMPLETE CBC AUTOMATED: CPT

## 2024-04-01 PROCEDURE — 83735 ASSAY OF MAGNESIUM: CPT

## 2024-04-01 PROCEDURE — 93005 ELECTROCARDIOGRAM TRACING: CPT | Mod: XU

## 2024-04-01 PROCEDURE — 92960 CARDIOVERSION ELECTRIC EXT: CPT

## 2024-04-01 PROCEDURE — 36415 COLL VENOUS BLD VENIPUNCTURE: CPT

## 2024-04-01 RX ORDER — DILTIAZEM HCL 120 MG
1 CAPSULE, EXT RELEASE 24 HR ORAL
Qty: 0 | Refills: 0 | DISCHARGE

## 2024-04-01 RX ORDER — METFORMIN HYDROCHLORIDE 850 MG/1
0 TABLET ORAL
Qty: 0 | Refills: 0 | DISCHARGE

## 2024-04-01 RX ORDER — NADOLOL 80 MG/1
1 TABLET ORAL
Qty: 0 | Refills: 0 | DISCHARGE

## 2024-04-03 DIAGNOSIS — I48.19 OTHER PERSISTENT ATRIAL FIBRILLATION: ICD-10-CM

## 2024-04-04 DIAGNOSIS — I48.19 OTHER PERSISTENT ATRIAL FIBRILLATION: ICD-10-CM

## 2024-04-19 ENCOUNTER — INPATIENT (INPATIENT)
Facility: HOSPITAL | Age: 69
LOS: 7 days | Discharge: NURSING, REHAB OR LONG-TERM CARE | DRG: 310 | End: 2024-04-27
Attending: STUDENT IN AN ORGANIZED HEALTH CARE EDUCATION/TRAINING PROGRAM
Payer: MEDICARE

## 2024-04-19 VITALS
SYSTOLIC BLOOD PRESSURE: 164 MMHG | OXYGEN SATURATION: 96 % | TEMPERATURE: 98 F | RESPIRATION RATE: 18 BRPM | DIASTOLIC BLOOD PRESSURE: 100 MMHG | HEART RATE: 100 BPM | WEIGHT: 240.08 LBS | HEIGHT: 61 IN

## 2024-04-19 DIAGNOSIS — Z96.651 PRESENCE OF RIGHT ARTIFICIAL KNEE JOINT: Chronic | ICD-10-CM

## 2024-04-19 DIAGNOSIS — Z90.710 ACQUIRED ABSENCE OF BOTH CERVIX AND UTERUS: Chronic | ICD-10-CM

## 2024-04-19 DIAGNOSIS — I48.91 UNSPECIFIED ATRIAL FIBRILLATION: ICD-10-CM

## 2024-04-19 DIAGNOSIS — Z98.890 OTHER SPECIFIED POSTPROCEDURAL STATES: Chronic | ICD-10-CM

## 2024-04-19 LAB
ALBUMIN SERPL ELPH-MCNC: 3.2 G/DL — LOW (ref 3.3–5)
ALP SERPL-CCNC: 79 U/L — SIGNIFICANT CHANGE UP (ref 40–120)
ALT FLD-CCNC: 51 U/L — SIGNIFICANT CHANGE UP (ref 12–78)
ANION GAP SERPL CALC-SCNC: 7 MMOL/L — SIGNIFICANT CHANGE UP (ref 5–17)
APPEARANCE UR: ABNORMAL
AST SERPL-CCNC: 41 U/L — HIGH (ref 15–37)
BACTERIA # UR AUTO: ABNORMAL /HPF
BASOPHILS # BLD AUTO: 0.03 K/UL — SIGNIFICANT CHANGE UP (ref 0–0.2)
BASOPHILS NFR BLD AUTO: 0.3 % — SIGNIFICANT CHANGE UP (ref 0–2)
BILIRUB SERPL-MCNC: 0.6 MG/DL — SIGNIFICANT CHANGE UP (ref 0.2–1.2)
BILIRUB UR-MCNC: NEGATIVE — SIGNIFICANT CHANGE UP
BUN SERPL-MCNC: 61 MG/DL — HIGH (ref 7–23)
CALCIUM SERPL-MCNC: 10.6 MG/DL — HIGH (ref 8.5–10.1)
CAST: 10 /LPF — HIGH (ref 0–4)
CHLORIDE SERPL-SCNC: 115 MMOL/L — HIGH (ref 96–108)
CO2 SERPL-SCNC: 20 MMOL/L — LOW (ref 22–31)
COLOR SPEC: YELLOW — SIGNIFICANT CHANGE UP
CREAT SERPL-MCNC: 2.68 MG/DL — HIGH (ref 0.5–1.3)
DIFF PNL FLD: NEGATIVE — SIGNIFICANT CHANGE UP
EGFR: 19 ML/MIN/1.73M2 — LOW
EOSINOPHIL # BLD AUTO: 0.07 K/UL — SIGNIFICANT CHANGE UP (ref 0–0.5)
EOSINOPHIL NFR BLD AUTO: 0.8 % — SIGNIFICANT CHANGE UP (ref 0–6)
GLUCOSE SERPL-MCNC: 143 MG/DL — HIGH (ref 70–99)
GLUCOSE UR QL: >=1000 MG/DL
HCT VFR BLD CALC: 42.3 % — SIGNIFICANT CHANGE UP (ref 34.5–45)
HGB BLD-MCNC: 13.2 G/DL — SIGNIFICANT CHANGE UP (ref 11.5–15.5)
HYALINE CASTS # UR AUTO: PRESENT
IMM GRANULOCYTES NFR BLD AUTO: 0.3 % — SIGNIFICANT CHANGE UP (ref 0–0.9)
KETONES UR-MCNC: NEGATIVE MG/DL — SIGNIFICANT CHANGE UP
LEUKOCYTE ESTERASE UR-ACNC: ABNORMAL
LYMPHOCYTES # BLD AUTO: 1.07 K/UL — SIGNIFICANT CHANGE UP (ref 1–3.3)
LYMPHOCYTES # BLD AUTO: 12.5 % — LOW (ref 13–44)
MAGNESIUM SERPL-MCNC: 2.4 MG/DL — SIGNIFICANT CHANGE UP (ref 1.6–2.6)
MCHC RBC-ENTMCNC: 28.7 PG — SIGNIFICANT CHANGE UP (ref 27–34)
MCHC RBC-ENTMCNC: 31.2 GM/DL — LOW (ref 32–36)
MCV RBC AUTO: 92 FL — SIGNIFICANT CHANGE UP (ref 80–100)
MONOCYTES # BLD AUTO: 0.57 K/UL — SIGNIFICANT CHANGE UP (ref 0–0.9)
MONOCYTES NFR BLD AUTO: 6.6 % — SIGNIFICANT CHANGE UP (ref 2–14)
NEUTROPHILS # BLD AUTO: 6.81 K/UL — SIGNIFICANT CHANGE UP (ref 1.8–7.4)
NEUTROPHILS NFR BLD AUTO: 79.5 % — HIGH (ref 43–77)
NITRITE UR-MCNC: NEGATIVE — SIGNIFICANT CHANGE UP
NT-PROBNP SERPL-SCNC: 2058 PG/ML — HIGH (ref 0–125)
PH UR: 5 — SIGNIFICANT CHANGE UP (ref 5–8)
PLATELET # BLD AUTO: 302 K/UL — SIGNIFICANT CHANGE UP (ref 150–400)
POTASSIUM SERPL-MCNC: 5.1 MMOL/L — SIGNIFICANT CHANGE UP (ref 3.5–5.3)
POTASSIUM SERPL-SCNC: 5.1 MMOL/L — SIGNIFICANT CHANGE UP (ref 3.5–5.3)
PROT SERPL-MCNC: 7.2 GM/DL — SIGNIFICANT CHANGE UP (ref 6–8.3)
PROT UR-MCNC: SIGNIFICANT CHANGE UP MG/DL
RBC # BLD: 4.6 M/UL — SIGNIFICANT CHANGE UP (ref 3.8–5.2)
RBC # FLD: 15.9 % — HIGH (ref 10.3–14.5)
RBC CASTS # UR COMP ASSIST: 6 /HPF — HIGH (ref 0–4)
SODIUM SERPL-SCNC: 142 MMOL/L — SIGNIFICANT CHANGE UP (ref 135–145)
SP GR SPEC: 1.02 — SIGNIFICANT CHANGE UP (ref 1–1.03)
SQUAMOUS # UR AUTO: 16 /HPF — HIGH (ref 0–5)
TROPONIN I, HIGH SENSITIVITY RESULT: 7.66 NG/L — SIGNIFICANT CHANGE UP
UROBILINOGEN FLD QL: 0.2 MG/DL — SIGNIFICANT CHANGE UP (ref 0.2–1)
WBC # BLD: 8.58 K/UL — SIGNIFICANT CHANGE UP (ref 3.8–10.5)
WBC # FLD AUTO: 8.58 K/UL — SIGNIFICANT CHANGE UP (ref 3.8–10.5)
WBC UR QL: 33 /HPF — HIGH (ref 0–5)

## 2024-04-19 PROCEDURE — 85025 COMPLETE CBC W/AUTO DIFF WBC: CPT

## 2024-04-19 PROCEDURE — 82550 ASSAY OF CK (CPK): CPT

## 2024-04-19 PROCEDURE — 83036 HEMOGLOBIN GLYCOSYLATED A1C: CPT

## 2024-04-19 PROCEDURE — 86140 C-REACTIVE PROTEIN: CPT

## 2024-04-19 PROCEDURE — 72141 MRI NECK SPINE W/O DYE: CPT | Mod: MC

## 2024-04-19 PROCEDURE — 82607 VITAMIN B-12: CPT

## 2024-04-19 PROCEDURE — 76770 US EXAM ABDO BACK WALL COMP: CPT

## 2024-04-19 PROCEDURE — 84443 ASSAY THYROID STIM HORMONE: CPT

## 2024-04-19 PROCEDURE — 70450 CT HEAD/BRAIN W/O DYE: CPT | Mod: 26,MC

## 2024-04-19 PROCEDURE — 72131 CT LUMBAR SPINE W/O DYE: CPT | Mod: MC

## 2024-04-19 PROCEDURE — 82085 ASSAY OF ALDOLASE: CPT

## 2024-04-19 PROCEDURE — 97116 GAIT TRAINING THERAPY: CPT | Mod: GP

## 2024-04-19 PROCEDURE — 72146 MRI CHEST SPINE W/O DYE: CPT | Mod: MC

## 2024-04-19 PROCEDURE — 93005 ELECTROCARDIOGRAM TRACING: CPT

## 2024-04-19 PROCEDURE — 86803 HEPATITIS C AB TEST: CPT

## 2024-04-19 PROCEDURE — 36415 COLL VENOUS BLD VENIPUNCTURE: CPT

## 2024-04-19 PROCEDURE — 85027 COMPLETE CBC AUTOMATED: CPT

## 2024-04-19 PROCEDURE — 97163 PT EVAL HIGH COMPLEX 45 MIN: CPT | Mod: GP

## 2024-04-19 PROCEDURE — 83735 ASSAY OF MAGNESIUM: CPT

## 2024-04-19 PROCEDURE — 70551 MRI BRAIN STEM W/O DYE: CPT | Mod: MC

## 2024-04-19 PROCEDURE — 71045 X-RAY EXAM CHEST 1 VIEW: CPT | Mod: 26

## 2024-04-19 PROCEDURE — 80053 COMPREHEN METABOLIC PANEL: CPT

## 2024-04-19 PROCEDURE — 93010 ELECTROCARDIOGRAM REPORT: CPT

## 2024-04-19 PROCEDURE — 73630 X-RAY EXAM OF FOOT: CPT | Mod: 26,LT

## 2024-04-19 PROCEDURE — 84100 ASSAY OF PHOSPHORUS: CPT

## 2024-04-19 PROCEDURE — 72148 MRI LUMBAR SPINE W/O DYE: CPT | Mod: MC

## 2024-04-19 PROCEDURE — 97530 THERAPEUTIC ACTIVITIES: CPT | Mod: GP

## 2024-04-19 PROCEDURE — 80061 LIPID PANEL: CPT

## 2024-04-19 PROCEDURE — 82962 GLUCOSE BLOOD TEST: CPT

## 2024-04-19 PROCEDURE — 99223 1ST HOSP IP/OBS HIGH 75: CPT

## 2024-04-19 PROCEDURE — 92960 CARDIOVERSION ELECTRIC EXT: CPT

## 2024-04-19 PROCEDURE — 99285 EMERGENCY DEPT VISIT HI MDM: CPT

## 2024-04-19 PROCEDURE — 80048 BASIC METABOLIC PNL TOTAL CA: CPT

## 2024-04-19 PROCEDURE — 85652 RBC SED RATE AUTOMATED: CPT

## 2024-04-19 RX ORDER — SODIUM CHLORIDE 9 MG/ML
1000 INJECTION, SOLUTION INTRAVENOUS
Refills: 0 | Status: DISCONTINUED | OUTPATIENT
Start: 2024-04-19 | End: 2024-04-27

## 2024-04-19 RX ORDER — ATORVASTATIN CALCIUM 80 MG/1
40 TABLET, FILM COATED ORAL AT BEDTIME
Refills: 0 | Status: DISCONTINUED | OUTPATIENT
Start: 2024-04-19 | End: 2024-04-27

## 2024-04-19 RX ORDER — PREGABALIN 225 MG/1
0 CAPSULE ORAL
Qty: 0 | Refills: 0 | DISCHARGE

## 2024-04-19 RX ORDER — DILTIAZEM HCL 120 MG
10 CAPSULE, EXT RELEASE 24 HR ORAL ONCE
Refills: 0 | Status: COMPLETED | OUTPATIENT
Start: 2024-04-19 | End: 2024-04-19

## 2024-04-19 RX ORDER — CEFTRIAXONE 500 MG/1
1000 INJECTION, POWDER, FOR SOLUTION INTRAMUSCULAR; INTRAVENOUS ONCE
Refills: 0 | Status: DISCONTINUED | OUTPATIENT
Start: 2024-04-19 | End: 2024-04-19

## 2024-04-19 RX ORDER — SODIUM CHLORIDE 9 MG/ML
1000 INJECTION INTRAMUSCULAR; INTRAVENOUS; SUBCUTANEOUS ONCE
Refills: 0 | Status: COMPLETED | OUTPATIENT
Start: 2024-04-19 | End: 2024-04-19

## 2024-04-19 RX ORDER — INSULIN LISPRO 100/ML
VIAL (ML) SUBCUTANEOUS
Refills: 0 | Status: DISCONTINUED | OUTPATIENT
Start: 2024-04-19 | End: 2024-04-27

## 2024-04-19 RX ORDER — CEFTRIAXONE 500 MG/1
1000 INJECTION, POWDER, FOR SOLUTION INTRAMUSCULAR; INTRAVENOUS ONCE
Refills: 0 | Status: COMPLETED | OUTPATIENT
Start: 2024-04-19 | End: 2024-04-19

## 2024-04-19 RX ORDER — DEXTROSE 50 % IN WATER 50 %
12.5 SYRINGE (ML) INTRAVENOUS ONCE
Refills: 0 | Status: DISCONTINUED | OUTPATIENT
Start: 2024-04-19 | End: 2024-04-27

## 2024-04-19 RX ORDER — AMIODARONE HYDROCHLORIDE 400 MG/1
200 TABLET ORAL DAILY
Refills: 0 | Status: DISCONTINUED | OUTPATIENT
Start: 2024-04-19 | End: 2024-04-19

## 2024-04-19 RX ORDER — APIXABAN 2.5 MG/1
5 TABLET, FILM COATED ORAL
Refills: 0 | Status: DISCONTINUED | OUTPATIENT
Start: 2024-04-19 | End: 2024-04-27

## 2024-04-19 RX ORDER — DEXTROSE 10 % IN WATER 10 %
125 INTRAVENOUS SOLUTION INTRAVENOUS ONCE
Refills: 0 | Status: DISCONTINUED | OUTPATIENT
Start: 2024-04-19 | End: 2024-04-27

## 2024-04-19 RX ORDER — AMIODARONE HYDROCHLORIDE 400 MG/1
200 TABLET ORAL DAILY
Refills: 0 | Status: DISCONTINUED | OUTPATIENT
Start: 2024-04-20 | End: 2024-04-27

## 2024-04-19 RX ORDER — DILTIAZEM HCL 120 MG
5 CAPSULE, EXT RELEASE 24 HR ORAL
Qty: 125 | Refills: 0 | Status: DISCONTINUED | OUTPATIENT
Start: 2024-04-19 | End: 2024-04-20

## 2024-04-19 RX ORDER — DEXTROSE 50 % IN WATER 50 %
15 SYRINGE (ML) INTRAVENOUS ONCE
Refills: 0 | Status: DISCONTINUED | OUTPATIENT
Start: 2024-04-19 | End: 2024-04-27

## 2024-04-19 RX ORDER — INSULIN LISPRO 100/ML
VIAL (ML) SUBCUTANEOUS AT BEDTIME
Refills: 0 | Status: DISCONTINUED | OUTPATIENT
Start: 2024-04-19 | End: 2024-04-27

## 2024-04-19 RX ORDER — GLUCAGON INJECTION, SOLUTION 0.5 MG/.1ML
1 INJECTION, SOLUTION SUBCUTANEOUS ONCE
Refills: 0 | Status: DISCONTINUED | OUTPATIENT
Start: 2024-04-19 | End: 2024-04-27

## 2024-04-19 RX ORDER — DEXTROSE 50 % IN WATER 50 %
25 SYRINGE (ML) INTRAVENOUS ONCE
Refills: 0 | Status: DISCONTINUED | OUTPATIENT
Start: 2024-04-19 | End: 2024-04-27

## 2024-04-19 RX ORDER — METOPROLOL TARTRATE 50 MG
100 TABLET ORAL DAILY
Refills: 0 | Status: DISCONTINUED | OUTPATIENT
Start: 2024-04-19 | End: 2024-04-27

## 2024-04-19 RX ADMIN — Medication 5 MG/HR: at 08:54

## 2024-04-19 RX ADMIN — CEFTRIAXONE 1000 MILLIGRAM(S): 500 INJECTION, POWDER, FOR SOLUTION INTRAMUSCULAR; INTRAVENOUS at 10:07

## 2024-04-19 RX ADMIN — Medication 10 MILLIGRAM(S): at 06:03

## 2024-04-19 RX ADMIN — SODIUM CHLORIDE 1000 MILLILITER(S): 9 INJECTION INTRAMUSCULAR; INTRAVENOUS; SUBCUTANEOUS at 06:01

## 2024-04-19 RX ADMIN — AMIODARONE HYDROCHLORIDE 200 MILLIGRAM(S): 400 TABLET ORAL at 08:56

## 2024-04-19 RX ADMIN — ATORVASTATIN CALCIUM 40 MILLIGRAM(S): 80 TABLET, FILM COATED ORAL at 22:30

## 2024-04-19 RX ADMIN — APIXABAN 5 MILLIGRAM(S): 2.5 TABLET, FILM COATED ORAL at 22:30

## 2024-04-19 NOTE — H&P ADULT - ASSESSMENT
68yo female with hx of DM II, HTN, A fib s/p recent ablation 1 month ago, recent diagnosis of Splenic CA w/ plan for splenectomy on May 10th, presented to the ED w/ complaints of not feeling well as well as frequent falls this week w/ most recent episode today, noted to have A FIB w/ RVR    #A Fib w/ RVR  -Continue Cardizem gtt w/ Amiodarone  -Continue home Metoprolol  -Discussed case w/ EP, Dr. Vazquez. Unable to perform Cardioversion as pt took her Jardiance this AM  -Plan for cardioversion on Monday  -Monitor tele    #JUSTINO on CKD III  -Likely prerenal in the setting of above  -Monitor renal function, avoid nephrotoxic agents    #Frequent falls  #Left foot toe bruising  #Neuropathy  -No fracture noted. No pain elicited by pt  -Pt likely has peripheral neuropathy to some degree  -Will benefit from Gabapentin. Recommend starting low dose once renal function improves and once pt is weaned off Cardizem gtt  -PT consult    #DM II  -SSI + Fingersticks    #Splenic Cancer  -Recent diagnosis  -Plan for splenectomy on May 10th at [facility]    #VTE ppx  -Eliquis

## 2024-04-19 NOTE — ED PROVIDER NOTE - NSICDXPASTMEDICALHX_GEN_ALL_CORE_FT
PAST MEDICAL HISTORY:  Arthritis     DM (diabetes mellitus)     HTN (hypertension)     Hyperlipidemia     Obesity, morbid, BMI 40.0-49.9     Ovarian cancer and uterine cancer--2004 surgery & chemo

## 2024-04-19 NOTE — ED PROVIDER NOTE - OBJECTIVE STATEMENT
68 yo F hx of DM, HTN, HLD, Afib sp recent ablation on cardizem, amiodarone, eliquis, splenic CA, possible metastatic based on CT don't at [facility] (oncologist on [facility]), pw frequent falls. Pt reportedly fell on Sunday, and fell again this evening. Pt states she had "jelly legs" and they just gave out under her. In the ED, pt is in Afib in RVR in 130's.

## 2024-04-19 NOTE — ED ADULT NURSE NOTE - NS ED NOTE  TALK SOMEONE YN
Patient Instructions by Kali Landrum MD, PhD at 07/06/17 11:55 AM     Author:  Kali Landrum MD, PhD Service:  (none) Author Type:  Physician     Filed:  07/06/17 11:57 AM Encounter Date:  7/6/2017 Status:  Signed     :  Kali Landrum MD, PhD (Physician)            Follow up in 3 months, sooner if necessary    Please get fasting blood test at least 1 day prior to next appointment. Fast for 10 hours. It is OK to take medications. DO DRINK ENOUGH WATER, but no coffee, tea, or other beverages      Try to gain 5 lbs in the next 3 months      Revision History        User Key Date/Time User Provider Type Action    > [N/A] 07/06/17 11:57 AM Kali Landrum MD, PhD Physician Sign             No

## 2024-04-19 NOTE — ED PROVIDER NOTE - CLINICAL SUMMARY MEDICAL DECISION MAKING FREE TEXT BOX
Tyesha Mckeon MD, Attending  ddx includes, but is not limited to the following: Afib rvr, electrolyte disturbance, UTI, PNA, intracranial hemorrhage, secondary to fall.    plan: screening labs, trop, pro bnp, urinalysis, xrays, rate control, anticipate admission.   update: see progress notes.   ----

## 2024-04-19 NOTE — ED PROVIDER NOTE - CARE PLAN
Principal Discharge DX:	Atrial fibrillation with rapid ventricular response  Secondary Diagnosis:	Leg weakness   1

## 2024-04-19 NOTE — ED ADULT NURSE NOTE - NSFALLHARMRISKINTERV_ED_ALL_ED
Assistance OOB with selected safe patient handling equipment if applicable/Assistance with ambulation/Communicate risk of Fall with Harm to all staff, patient, and family/Monitor gait and stability/Provide visual cue: red socks, yellow wristband, yellow gown, etc/Reinforce activity limits and safety measures with patient and family/Bed in lowest position, wheels locked, appropriate side rails in place/Call bell, personal items and telephone in reach/Instruct patient to call for assistance before getting out of bed/chair/stretcher/Non-slip footwear applied when patient is off stretcher/[place] to call system/Physically safe environment - no spills, clutter or unnecessary equipment/Purposeful Proactive Rounding/Room/bathroom lighting operational, light cord in reach

## 2024-04-19 NOTE — ED PROVIDER NOTE - NSICDXPASTSURGICALHX_GEN_ALL_CORE_FT
PAST SURGICAL HISTORY:  H/O total knee replacement, right     H/O umbilical hernia repair 2005    S/P total hysterectomy bilateral salpingectomy-oophorectomy --2004 ovarian & uterine cancer

## 2024-04-19 NOTE — ED ADULT NURSE NOTE - OBJECTIVE STATEMENT
The pt is a 68 y/o female A&OX4 ambulatory at baseline presenting to the ED c/o fall. Pt reports "I was going into the bathroom and slid down the wall". Pt denies head strike or LOC. Pt reports she is on blood thinners. Pt denies CP, SOB, chills, N/V/D, fever. Pt denies pain/ discomfort at this time. Respirations even and unlabored, no distress noted. Pt changed into a gown, EKG completed, IV in place and labs sent to lab.

## 2024-04-19 NOTE — PHARMACOTHERAPY INTERVENTION NOTE - COMMENTS
Med history complete, reviewed medications and allergies with patient and patient provided med list confirmed medication list with doctor first med profile, all medication related questions answered

## 2024-04-19 NOTE — CONSULT NOTE ADULT - ASSESSMENT
68 yo F with above PMHx including AFib s/p DCCV on 4/1/24, compliant with meds- eliquis, amio, cardizem 120mg, toprol 100mg presented with recurrent falls d/t worsening leg weakness, denies syncope, found to have AF w/RVR, denies any symptoms from AFib.  - IV cardizem drip for rate control  - continue amiodarone 200mg po daily  - hold metoprolol while on IV cardizem  -uninterrupted eliquis 5mg BID  - repeat echo  - maintain K+>4.0, Mg++>2.0  - hold Jardiance for 3 days, plan for DCCV on Monday (4/22/24) if remains in AFib  - keep pt NPO post MN on 4/21  Plan d/w pt/family//ER provider

## 2024-04-19 NOTE — ED PROVIDER NOTE - PHYSICAL EXAMINATION
Tyesha Mckeon MD Attending  GEN: Patient awake alert NAD.   HEENT: normocephalic, atraumatic, EOMI, no scleral icterus, dry MM, poor dentition.   CARDIAC: + Afib RVR  PULM: non labored.   ABD: soft NT, ND  MSK: Moving all extremities, no edema. 5/5 strength and full ROM in all extremities.    NEURO: A&Ox3, no focal neurological deficits, CN 2-12 grossly intact  SKIN: warm, dry, no rash. Ambulatory

## 2024-04-19 NOTE — ED ADULT TRIAGE NOTE - CHIEF COMPLAINT QUOTE
pt bibems from home. As per ems, " witnessed fall, - head strike, - loc, witnessed by family and slid down wall". Pt remembers sliding down wall. Pt A&ox4 and endorses falling a lot more recently. On eliquis. VS stable.

## 2024-04-19 NOTE — PATIENT PROFILE ADULT - FUNCTIONAL ASSESSMENT - BASIC MOBILITY SECTION LABEL
Problems reprioritized. Patient report given, questions answered & plan of care reviewed 
with GUANAKO Simeon. .

## 2024-04-19 NOTE — ED PROVIDER NOTE - PROGRESS NOTE DETAILS
Tyesha Mckeon MD, Attending  Pt signed out to Dr. Lyman pending UA, CT head, xr foot and admission for Afib rvr despite recent ablation and generalized weakness with frequent falls.

## 2024-04-19 NOTE — ED ADULT NURSE REASSESSMENT NOTE - NS ED NURSE REASSESS COMMENT FT1
Report received from night RN, pt is resting in bed, dtr at bedside. Pt on cardiac monitor. NAD. Safety and comfort measures maintained, call bell within reach.

## 2024-04-19 NOTE — CONSULT NOTE ADULT - SUBJECTIVE AND OBJECTIVE BOX
HPI: 68 yo F with PMHx of DM, HTN, HLD, Afib s/p DCCV on 24 on cardizem, amiodarone, eliquis, thyroid mass, splenic CA, possible metastatic based on CT [facility] scheduled for splenectomy on 5/10, presented with frequent falls. Pt reportedly fell last week and fell again this morning,  Pt states she had "jelly legs" and they just gave out under her. denies dizziness or syncope during falls.  In the ED, pt is in Afib in RVR up to 150's. IV cardizem started for rate control. Pt has been compliant with compliant with meds including eliquis, took Jardiance yesterday.  Cardio-DrJames    Pt is resting in the bed, denies chest pain/SOB/palpitations, c/o increasing  weakness  in both legs   EKG: (24) AFib 131 bpm QRS 86ms QTC 445ms          (24) post DCCV- SB 46 bpm   Cardiac PET ST: 2024) no ischemia, LVEF 45%  echo (2024) LVEF 63%, mildly dilated LA      PAST MEDICAL & SURGICAL HISTORY:  HTN (hypertension)      Hyperlipidemia      DM (diabetes mellitus)      Arthritis      Ovarian cancer  and uterine cancer-- surgery & chemo      Obesity, morbid, BMI 40.0-49.9      H/O umbilical hernia repair        S/P total hysterectomy  bilateral salpingectomy-oophorectomy -- ovarian & uterine cancer      H/O total knee replacement, right      SOCIAL HISTORY: lives with sister at home  Allergies    No Known Drug Allergies  Bee stings (Swelling)          MEDICATIONS  (STANDING):  aMIOdarone    Tablet 200 milliGRAM(s) Oral daily  apixaban 5 milliGRAM(s) Oral two times a day  diltiazem Infusion 5 mG/Hr (5 mL/Hr) IV Continuous <Continuous>    MEDICATIONS  (PRN):    ROS: All other ROS is negative unless indicated above.      Physical Exam:  Vital Signs Last 24 Hrs  T(C): 36.4 (2024 07:17), Max: 36.4 (2024 05:14)  T(F): 97.5 (2024 07:17), Max: 97.6 (2024 05:14)  HR: 127 (2024 08:51) (100 - 132)  BP: 117/90 (2024 08:51) (112/58 - 164/100)  BP(mean): 100 (2024 08:51) (62 - 122)  RR: 18 (2024 08:51) (17 - 18)  SpO2: 99% (2024 08:51) (96% - 100%)    Parameters below as of 2024 08:51  Patient On (Oxygen Delivery Method): room air      Constitutional: well developed,  no deformities and no acute distress    Neurological: Alert & Oriented x 3, JERRY, no focal deficits    HEENT: NC/AT, PERRLA, EOMI,  Neck supple.    Respiratory: CTA B/L, No wheezing/crackles/rhonchi    Cardiovascular: (+) irregular  S1 & S2,     Gastrointestinal: soft, NT, nondistended, (+) BS    Genitourinary: non distended bladder, voiding freely    Extremities: No pedal edema, No clubbing, No cyanosis    Skin:  normal skin color and pigmentation, no skin lesions          LABS:                        13.2   8.58  )-----------( 302      ( 2024 06:02 )             42.3     04-19    142  |  115<H>  |  61<H>  ----------------------------<  143<H>  5.1   |  20<L>  |  2.68<H>    Ca    10.6<H>      2024 06:02  Mg     2.4     -    TPro  7.2  /  Alb  3.2<L>  /  TBili  0.6  /  DBili  x   /  AST  41<H>  /  ALT  51  /  AlkPhos  79  04-19      Urinalysis Basic - ( 2024 08:31 )    Color: Yellow / Appearance: Cloudy / S.020 / pH: x  Gluc: x / Ketone: Negative mg/dL  / Bili: Negative / Urobili: 0.2 mg/dL   Blood: x / Protein: Trace mg/dL / Nitrite: Negative   Leuk Esterase: Moderate / RBC: 6 /HPF / WBC 33 /HPF   Sq Epi: x / Non Sq Epi: 16 /HPF / Bacteria: Many /HPF

## 2024-04-19 NOTE — ED ADULT NURSE NOTE - NSFALLRISKASMTTYPE_ED_ALL_ED
Educated pt that he is on Namenda 10 mg BID and not 5 mg daily. He agreed. Has the 10 mg. Will dispose of the 5mg   Initial (On Arrival)

## 2024-04-20 LAB
A1C WITH ESTIMATED AVERAGE GLUCOSE RESULT: 6.1 % — HIGH (ref 4–5.6)
ANION GAP SERPL CALC-SCNC: 6 MMOL/L — SIGNIFICANT CHANGE UP (ref 5–17)
BUN SERPL-MCNC: 50 MG/DL — HIGH (ref 7–23)
CALCIUM SERPL-MCNC: 10.5 MG/DL — HIGH (ref 8.5–10.1)
CHLORIDE SERPL-SCNC: 117 MMOL/L — HIGH (ref 96–108)
CO2 SERPL-SCNC: 19 MMOL/L — LOW (ref 22–31)
CREAT SERPL-MCNC: 1.93 MG/DL — HIGH (ref 0.5–1.3)
EGFR: 28 ML/MIN/1.73M2 — LOW
ESTIMATED AVERAGE GLUCOSE: 128 MG/DL — HIGH (ref 68–114)
GLUCOSE BLDC GLUCOMTR-MCNC: 102 MG/DL — HIGH (ref 70–99)
GLUCOSE BLDC GLUCOMTR-MCNC: 112 MG/DL — HIGH (ref 70–99)
GLUCOSE BLDC GLUCOMTR-MCNC: 119 MG/DL — HIGH (ref 70–99)
GLUCOSE BLDC GLUCOMTR-MCNC: 152 MG/DL — HIGH (ref 70–99)
GLUCOSE SERPL-MCNC: 110 MG/DL — HIGH (ref 70–99)
HCT VFR BLD CALC: 39.4 % — SIGNIFICANT CHANGE UP (ref 34.5–45)
HCV AB S/CO SERPL IA: 0.07 S/CO — SIGNIFICANT CHANGE UP (ref 0–0.99)
HCV AB SERPL-IMP: SIGNIFICANT CHANGE UP
HGB BLD-MCNC: 12.5 G/DL — SIGNIFICANT CHANGE UP (ref 11.5–15.5)
MCHC RBC-ENTMCNC: 28.9 PG — SIGNIFICANT CHANGE UP (ref 27–34)
MCHC RBC-ENTMCNC: 31.7 GM/DL — LOW (ref 32–36)
MCV RBC AUTO: 91.2 FL — SIGNIFICANT CHANGE UP (ref 80–100)
PLATELET # BLD AUTO: 276 K/UL — SIGNIFICANT CHANGE UP (ref 150–400)
POTASSIUM SERPL-MCNC: 5 MMOL/L — SIGNIFICANT CHANGE UP (ref 3.5–5.3)
POTASSIUM SERPL-SCNC: 5 MMOL/L — SIGNIFICANT CHANGE UP (ref 3.5–5.3)
RBC # BLD: 4.32 M/UL — SIGNIFICANT CHANGE UP (ref 3.8–5.2)
RBC # FLD: 15.9 % — HIGH (ref 10.3–14.5)
SODIUM SERPL-SCNC: 142 MMOL/L — SIGNIFICANT CHANGE UP (ref 135–145)
WBC # BLD: 7.84 K/UL — SIGNIFICANT CHANGE UP (ref 3.8–10.5)
WBC # FLD AUTO: 7.84 K/UL — SIGNIFICANT CHANGE UP (ref 3.8–10.5)

## 2024-04-20 PROCEDURE — 93010 ELECTROCARDIOGRAM REPORT: CPT

## 2024-04-20 PROCEDURE — 99232 SBSQ HOSP IP/OBS MODERATE 35: CPT

## 2024-04-20 RX ORDER — DILTIAZEM HCL 120 MG
10 CAPSULE, EXT RELEASE 24 HR ORAL
Qty: 125 | Refills: 0 | Status: DISCONTINUED | OUTPATIENT
Start: 2024-04-20 | End: 2024-04-20

## 2024-04-20 RX ORDER — DILTIAZEM HCL 120 MG
10 CAPSULE, EXT RELEASE 24 HR ORAL
Qty: 125 | Refills: 0 | Status: DISCONTINUED | OUTPATIENT
Start: 2024-04-20 | End: 2024-04-22

## 2024-04-20 RX ADMIN — AMIODARONE HYDROCHLORIDE 200 MILLIGRAM(S): 400 TABLET ORAL at 08:52

## 2024-04-20 RX ADMIN — APIXABAN 5 MILLIGRAM(S): 2.5 TABLET, FILM COATED ORAL at 08:52

## 2024-04-20 RX ADMIN — Medication 1: at 11:53

## 2024-04-20 RX ADMIN — APIXABAN 5 MILLIGRAM(S): 2.5 TABLET, FILM COATED ORAL at 22:08

## 2024-04-20 RX ADMIN — Medication 10 MG/HR: at 12:11

## 2024-04-20 RX ADMIN — ATORVASTATIN CALCIUM 40 MILLIGRAM(S): 80 TABLET, FILM COATED ORAL at 22:08

## 2024-04-20 RX ADMIN — Medication 100 MILLIGRAM(S): at 08:52

## 2024-04-20 NOTE — PROGRESS NOTE ADULT - SUBJECTIVE AND OBJECTIVE BOX
ELECTROPHYSIOLOGY  PROGRESS NOTE    Reason for follow up: AF with RVR  Overnight: Pt continues to have AF with RVR > 110-Cardizaem infusion up titrated for HR control   Update: Continue to uptitrate from 5 mg/hr to 7.5 mg /hr for HR <110 Plan for DCCV on Monday am, continue amiodarone po & BB    Subjective: "  _____I'm ok_________________"    General: No fatigue, no fevers/chills  Respiratory: No dyspnea, no cough, no wheeze  CV: No chest pain, no palpitations  Abd: No nausea  Neuro: No headache, no dizziness  	  Vitals:  T(C): 36.5 (04-20-24 @ 08:38), Max: 36.7 (04-19-24 @ 15:30)  HR: 88 (04-20-24 @ 08:38) (87 - 115)  BP: 124/88 (04-20-24 @ 08:38) (105/63 - 139/95)  RR: 19 (04-20-24 @ 08:38) (18 - 25)  SpO2: 97% (04-20-24 @ 08:38) (95% - 100%)  Wt(kg): --  I&O's Summary    19 Apr 2024 07:01  -  20 Apr 2024 07:00  --------------------------------------------------------  IN: 0 mL / OUT: 1000 mL / NET: -1000 mL      Weight (kg): 99.2 (04-19 @ 18:33)      PHYSICAL EXAM:  Appearance: Comfortable. No acute distress  HEENT:  Head and neck: Atraumatic. Normocephalic.  Normal oral mucosa, PERRL, Neck is supple. No JVD, No carotid bruit.   Neurologic: A & O x 3, no focal deficits. EOMI.  Lymphatic: No cervical lymphadenopathy  Cardiovascular: Normal S1 S2, No murmur, rubs/gallops. No JVD, No edema  Respiratory: Lungs clear to auscultation  Gastrointestinal:  Soft, Non-tender, + BS  Lower Extremities: No edema  Psychiatry: Patient is calm. No agitation. Mood & affect appropriate  Skin: No rashes/ ecchymoses/cyanosis/ulcers visualized on the face, hands or feet.      CURRENT MEDICATIONS:  aMIOdarone    Tablet 200 milliGRAM(s) Oral daily  diltiazem Infusion 10 mG/Hr IV Continuous <Continuous>  metoprolol succinate  milliGRAM(s) Oral daily    atorvastatin  dextrose 50% Injectable  dextrose 50% Injectable  glucagon  Injectable  insulin lispro (ADMELOG) corrective regimen sliding scale  insulin lispro (ADMELOG) corrective regimen sliding scale  apixaban  dextrose 10% Bolus  dextrose 5%.  dextrose 5%.      DIAGNOSTIC TESTING:  [ ] Echocardiogram:   [ ]  Catheterization:  [ ] Stress Test:    OTHER: 	      LABS:	 	                            12.5   7.84  )-----------( 276      ( 20 Apr 2024 07:26 )             39.4     04-20    142  |  117<H>  |  50<H>  ----------------------------<  110<H>  5.0   |  19<L>  |  1.93<H>    Ca    10.5<H>      20 Apr 2024 07:26  Mg     2.4     04-19    TPro  7.2  /  Alb  3.2<L>  /  TBili  0.6  /  DBili  x   /  AST  41<H>  /  ALT  51  /  AlkPhos  79  04-19    proBNP:   Lipid Profile:   HgA1c:   TSH:       TELEMETRY: Reviewed    ECG:  Reviewed by me. 	                                                                                                                                ELECTROPHYSIOLOGY  PROGRESS NOTE    Reason for follow up: AF with RVR  Overnight: Pt continues to have AF with RVR > 110-Cardizaem infusion up titrated for HR control   Update: Continue to uptitrate from 5 mg/hr to 7.5 mg /hr for HR <110 Plan for DCCV on Monday am, continue amiodarone po & BB    Subjective: "  _____I'm ok_________________"    General: No fatigue, no fevers/chills  Respiratory: No dyspnea, no cough, no wheeze  CV: No chest pain, no palpitations  Abd: No nausea  Neuro: No headache, no dizziness  	  Vitals:  T(C): 36.5 (04-20-24 @ 08:38), Max: 36.7 (04-19-24 @ 15:30)  HR: 88 (04-20-24 @ 08:38) (87 - 115)  BP: 124/88 (04-20-24 @ 08:38) (105/63 - 139/95)  RR: 19 (04-20-24 @ 08:38) (18 - 25)  SpO2: 97% (04-20-24 @ 08:38) (95% - 100%)  Wt(kg): --  I&O's Summary    19 Apr 2024 07:01  -  20 Apr 2024 07:00  --------------------------------------------------------  IN: 0 mL / OUT: 1000 mL / NET: -1000 mL      Weight (kg): 99.2 (04-19 @ 18:33)      PHYSICAL EXAM:  Appearance: Comfortable. No acute distress  HEENT:  Head and neck: Atraumatic. Normocephalic.  Normal oral mucosa, PERRL, Neck is supple. No JVD, No carotid bruit.   Neurologic: A & O x 3, no focal deficits. EOMI.  Lymphatic: No cervical lymphadenopathy  Cardiovascular: Normal S1 S2, No murmur, rubs/gallops. No JVD, No edema  Respiratory: Lungs clear to auscultation  Gastrointestinal:  Soft, Non-tender, + BS  Lower Extremities: No edema  Psychiatry: Patient is calm. No agitation. Mood & affect appropriate  Skin: No rashes/ ecchymoses/cyanosis/ulcers visualized on the face, hands or feet.      CURRENT MEDICATIONS:  aMIOdarone    Tablet 200 milliGRAM(s) Oral daily  diltiazem Infusion 10 mG/Hr IV Continuous <Continuous>  metoprolol succinate  milliGRAM(s) Oral daily    atorvastatin  dextrose 50% Injectable  dextrose 50% Injectable  glucagon  Injectable  insulin lispro (ADMELOG) corrective regimen sliding scale  insulin lispro (ADMELOG) corrective regimen sliding scale  apixaban  dextrose 10% Bolus  dextrose 5%.  dextrose 5%.      LABS:	 	                            12.5   7.84  )-----------( 276      ( 20 Apr 2024 07:26 )             39.4     04-20    142  |  117<H>  |  50<H>  ----------------------------<  110<H>  5.0   |  19<L>  |  1.93<H>    Ca    10.5<H>      20 Apr 2024 07:26  Mg     2.4     04-19    TPro  7.2  /  Alb  3.2<L>  /  TBili  0.6  /  DBili  x   /  AST  41<H>  /  ALT  51  /  AlkPhos  79  04-19        TELEMETRY: Reviewed  AF with RVR 110s  ECG:  Reviewed by me. 	  entricular Rate 131 BPM    Atrial Rate 133 BPM    QRS Duration 86 ms    Q-T Interval 302 ms    QTC Calculation(Bazett) 445 ms    R Axis -42 degrees    T Axis 24 degrees    Diagnosis Line Atrial fibrillation with rapid ventricular response  Left axis deviation  Minimal voltage criteria for LVH, may be normal variant  Abnormal ECG  When compared with ECG of 01-APR-2024 08:40,  Atrial fibrillation has replaced Sinus rhythm  Vent. rate has increased BY  85 BPM  Nonspecific T wave abnormality has replaced inverted T waves in Inferior leads  Confirmed by Palla MD, Obi ([id number]) on 4/19/2024 9:12:05 PM

## 2024-04-20 NOTE — PROGRESS NOTE ADULT - ASSESSMENT
70yo female with hx of DM II, HTN, A fib s/p recent ablation 1 month ago, recent diagnosis of Splenic CA w/ plan for splenectomy on May 10th, presented to the ED w/ complaints of not feeling well as well as frequent falls this week w/ most recent episode today, noted to have A FIB w/ RVR    #A Fib w/ RVR  -Continue Cardizem gtt w/ Amiodarone  -Continue home Metoprolol  -Discussed case w/ EP, Dr. Vazquez. Unable to perform Cardioversion as pt took her Jardiance this AM  -Plan for cardioversion on Monday, continue to hold jardiance   -Monitor tele    #JUSTINO on CKD III  -Likely prerenal in the setting of above  -Monitor renal function, avoid nephrotoxic agents    #Frequent falls  #Left foot toe bruising  #Neuropathy  -No fracture noted. No pain elicited by pt  -Pt likely has peripheral neuropathy to some degree  -Will benefit from Gabapentin. Recommend starting low dose once renal function improves and once pt is weaned off Cardizem gtt  Cr improving - continue to monitor   -PT consult    #DM II  -SSI + Fingersticks    #Splenic Cancer  -Recent diagnosis  -Plan for splenectomy on May 10th at [facility]    #VTE ppx  -Eliquis

## 2024-04-20 NOTE — PROGRESS NOTE ADULT - ASSESSMENT
Assessment and Recommendation:   · Assessment	  70 yo F with above PMHx including AFib s/p DCCV on 4/1/24, compliant with meds- eliquis, amio, cardizem 120mg, toprol 100mg presented with recurrent falls d/t worsening leg weakness, denies syncope, found to have AF w/RVR, denies any symptoms from AFib.  - IV cardizem drip for rate control-titrate to keep HR < 110  - continue amiodarone 200mg po daily  -uninterrupted eliquis 5mg BID  - repeat echo  - maintain K+>4.0, Mg++>2.0  - hold Jardiance for 3 days, plan for DCCV on Monday (4/22/24) if remains in AFib  - keep pt NPO post MN on 4/21 Assessment and Recommendation:   · Assessment	  70 yo F with above PMHx including AFib s/p DCCV on 4/1/24, compliant with meds- eliquis, amio, cardizem 120mg, toprol 100mg presented with recurrent falls d/t worsening leg weakness, denies syncope, found to have AF w/RVR, denies any symptoms from AFib.  - IV cardizem drip for rate control-titrate to keep HR < 110  - continue amiodarone 200mg po daily, BB daily  -uninterrupted eliquis 5mg BID  - repeat echo  - maintain K+>4.0, Mg++>2.0  - hold Jardiance for 3 days, plan for DCCV on Monday (4/22/24) if remains in AFib  - keep pt NPO post MN on 4/21 Assessment and Recommendation:   · Assessment	  70 yo F with above PMHx including AFib s/p DCCV on 4/1/24, compliant with meds- eliquis, amio, cardizem 120mg, toprol 100mg presented with recurrent falls d/t worsening leg weakness, denies syncope, found to have AF w/RVR, denies any symptoms from AFib.  - IV cardizem drip for rate control-titrate to keep HR < 110  - continue amiodarone 200mg po daily, continue home BB daily  -uninterrupted eliquis 5mg BID  - repeat echo  - maintain K+>4.0, Mg++>2.0  - hold Jardiance for 3 days, plan for DCCV on Monday (4/22/24) if remains in AFib  - keep pt NPO post MN on 4/21

## 2024-04-20 NOTE — PROGRESS NOTE ADULT - SUBJECTIVE AND OBJECTIVE BOX
HOSPITALIST ATTENDING PROGRESS NOTE    Chart and meds reviewed.      Subjective: Patient seen and examined. resting comfortably plan for CV on monday, Continue eliquis, amio, cardizem 120mg, toprol 100mg. Denies chest pain palpations. On IV cardizem. hold Jardiance for 3 days      Additional results/Imaging, I have personally reviewed:    LABS:                            12.5   7.84  )-----------( 276      ( 20 Apr 2024 07:26 )             39.4     04-20    142  |  117<H>  |  50<H>  ----------------------------<  110<H>  5.0   |  19<L>  |  1.93<H>    Ca    10.5<H>      20 Apr 2024 07:26  Mg     2.4     04-19    TPro  7.2  /  Alb  3.2<L>  /  TBili  0.6  /  DBili  x   /  AST  41<H>  /  ALT  51  /  AlkPhos  79  04-19        LIVER FUNCTIONS - ( 19 Apr 2024 06:02 )  Alb: 3.2 g/dL / Pro: 7.2 gm/dL / ALK PHOS: 79 U/L / ALT: 51 U/L / AST: 41 U/L / GGT: x             Urinalysis Basic - ( 20 Apr 2024 07:26 )    Color: x / Appearance: x / SG: x / pH: x  Gluc: 110 mg/dL / Ketone: x  / Bili: x / Urobili: x   Blood: x / Protein: x / Nitrite: x   Leuk Esterase: x / RBC: x / WBC x   Sq Epi: x / Non Sq Epi: x / Bacteria: x              All other systems reviewed and found to be negative with the exception of what has been described above.    MEDICATIONS  (STANDING):  aMIOdarone    Tablet 200 milliGRAM(s) Oral daily  apixaban 5 milliGRAM(s) Oral two times a day  atorvastatin 40 milliGRAM(s) Oral at bedtime  dextrose 10% Bolus 125 milliLiter(s) IV Bolus once  dextrose 5%. 1000 milliLiter(s) (50 mL/Hr) IV Continuous <Continuous>  dextrose 5%. 1000 milliLiter(s) (100 mL/Hr) IV Continuous <Continuous>  dextrose 50% Injectable 25 Gram(s) IV Push once  dextrose 50% Injectable 12.5 Gram(s) IV Push once  diltiazem Infusion 10 mG/Hr (10 mL/Hr) IV Continuous <Continuous>  glucagon  Injectable 1 milliGRAM(s) IntraMuscular once  insulin lispro (ADMELOG) corrective regimen sliding scale   SubCutaneous three times a day before meals  insulin lispro (ADMELOG) corrective regimen sliding scale   SubCutaneous at bedtime  metoprolol succinate  milliGRAM(s) Oral daily    MEDICATIONS  (PRN):  dextrose Oral Gel 15 Gram(s) Oral once PRN Blood Glucose LESS THAN 70 milliGRAM(s)/deciliter      VITALS:  T(F): 97.7 (04-20-24 @ 08:38), Max: 98.1 (04-19-24 @ 15:30)  HR: 109 (04-20-24 @ 11:25) (88 - 115)  BP: 151/99 (04-20-24 @ 11:25) (105/63 - 151/99)  RR: 19 (04-20-24 @ 08:38) (18 - 20)  SpO2: 97% (04-20-24 @ 08:38) (95% - 100%)  Wt(kg): --    I&O's Summary    19 Apr 2024 07:01  -  20 Apr 2024 07:00  --------------------------------------------------------  IN: 0 mL / OUT: 1000 mL / NET: -1000 mL        CAPILLARY BLOOD GLUCOSE      POCT Blood Glucose.: 152 mg/dL (20 Apr 2024 11:50)  POCT Blood Glucose.: 102 mg/dL (20 Apr 2024 07:55)  POCT Blood Glucose.: 106 mg/dL (19 Apr 2024 22:26)  POCT Blood Glucose.: 105 mg/dL (19 Apr 2024 17:21)      PHYSICAL EXAM:  Gen: No acute distress   HEENT:  pupils equal and reactive, EOMI,   NECK:   supple, no carotid bruits, No JVD  CV:  +S1, +S2, regular rate rhythm, no murmurs or rubs  RESP:   lungs clear to auscultation bilaterally, no wheezing, rales, rhonchi, good air entry bilaterally  GI:  abdomen soft, non-tender, non-distended, normal BS, no bruits, no abdominal masses, no palpable masses  MSK:   normal muscle tone, no atrophy, no rigidity, no contractions  EXT:  no clubbing, no cyanosis, no edema, no calf pain, swelling or erythema  VASCULAR:  pulses equal and symmetric in the upper and lower extremities  NEURO:  AAOX3, no focal neurological deficits, follows all commands, able to move extremities spontaneously  SKIN:  no ulcers, lesions or rashes      CULTURES:      Telemetry, personally reviewed

## 2024-04-21 LAB
ANION GAP SERPL CALC-SCNC: 5 MMOL/L — SIGNIFICANT CHANGE UP (ref 5–17)
BUN SERPL-MCNC: 45 MG/DL — HIGH (ref 7–23)
CALCIUM SERPL-MCNC: 10.6 MG/DL — HIGH (ref 8.5–10.1)
CHLORIDE SERPL-SCNC: 119 MMOL/L — HIGH (ref 96–108)
CO2 SERPL-SCNC: 20 MMOL/L — LOW (ref 22–31)
CREAT SERPL-MCNC: 1.81 MG/DL — HIGH (ref 0.5–1.3)
EGFR: 30 ML/MIN/1.73M2 — LOW
GLUCOSE BLDC GLUCOMTR-MCNC: 110 MG/DL — HIGH (ref 70–99)
GLUCOSE BLDC GLUCOMTR-MCNC: 110 MG/DL — HIGH (ref 70–99)
GLUCOSE BLDC GLUCOMTR-MCNC: 122 MG/DL — HIGH (ref 70–99)
GLUCOSE BLDC GLUCOMTR-MCNC: 96 MG/DL — SIGNIFICANT CHANGE UP (ref 70–99)
GLUCOSE SERPL-MCNC: 108 MG/DL — HIGH (ref 70–99)
HCT VFR BLD CALC: 38.8 % — SIGNIFICANT CHANGE UP (ref 34.5–45)
HGB BLD-MCNC: 12.2 G/DL — SIGNIFICANT CHANGE UP (ref 11.5–15.5)
MCHC RBC-ENTMCNC: 28.6 PG — SIGNIFICANT CHANGE UP (ref 27–34)
MCHC RBC-ENTMCNC: 31.4 GM/DL — LOW (ref 32–36)
MCV RBC AUTO: 90.9 FL — SIGNIFICANT CHANGE UP (ref 80–100)
PLATELET # BLD AUTO: 266 K/UL — SIGNIFICANT CHANGE UP (ref 150–400)
POTASSIUM SERPL-MCNC: 5.1 MMOL/L — SIGNIFICANT CHANGE UP (ref 3.5–5.3)
POTASSIUM SERPL-SCNC: 5.1 MMOL/L — SIGNIFICANT CHANGE UP (ref 3.5–5.3)
RBC # BLD: 4.27 M/UL — SIGNIFICANT CHANGE UP (ref 3.8–5.2)
RBC # FLD: 15.9 % — HIGH (ref 10.3–14.5)
SODIUM SERPL-SCNC: 144 MMOL/L — SIGNIFICANT CHANGE UP (ref 135–145)
WBC # BLD: 7.47 K/UL — SIGNIFICANT CHANGE UP (ref 3.8–10.5)
WBC # FLD AUTO: 7.47 K/UL — SIGNIFICANT CHANGE UP (ref 3.8–10.5)

## 2024-04-21 PROCEDURE — 72131 CT LUMBAR SPINE W/O DYE: CPT | Mod: 26

## 2024-04-21 PROCEDURE — 99233 SBSQ HOSP IP/OBS HIGH 50: CPT

## 2024-04-21 PROCEDURE — 99232 SBSQ HOSP IP/OBS MODERATE 35: CPT

## 2024-04-21 RX ADMIN — AMIODARONE HYDROCHLORIDE 200 MILLIGRAM(S): 400 TABLET ORAL at 09:51

## 2024-04-21 RX ADMIN — Medication 100 MILLIGRAM(S): at 09:51

## 2024-04-21 RX ADMIN — APIXABAN 5 MILLIGRAM(S): 2.5 TABLET, FILM COATED ORAL at 22:01

## 2024-04-21 RX ADMIN — APIXABAN 5 MILLIGRAM(S): 2.5 TABLET, FILM COATED ORAL at 09:51

## 2024-04-21 RX ADMIN — Medication 10 MG/HR: at 14:30

## 2024-04-21 RX ADMIN — ATORVASTATIN CALCIUM 40 MILLIGRAM(S): 80 TABLET, FILM COATED ORAL at 22:01

## 2024-04-21 NOTE — PROGRESS NOTE ADULT - SUBJECTIVE AND OBJECTIVE BOX
HOSPITALIST ATTENDING PROGRESS NOTE    Chart and meds reviewed.      Subjective: Patient seen and examined. Resting comfortably Afib rate controlled at this time.       Additional results/Imaging, I have personally reviewed:    LABS:                            12.2   7.47  )-----------( 266      ( 21 Apr 2024 07:23 )             38.8     04-21    144  |  119<H>  |  45<H>  ----------------------------<  108<H>  5.1   |  20<L>  |  1.81<H>    Ca    10.6<H>      21 Apr 2024 07:23              Urinalysis Basic - ( 21 Apr 2024 07:23 )    Color: x / Appearance: x / SG: x / pH: x  Gluc: 108 mg/dL / Ketone: x  / Bili: x / Urobili: x   Blood: x / Protein: x / Nitrite: x   Leuk Esterase: x / RBC: x / WBC x   Sq Epi: x / Non Sq Epi: x / Bacteria: x              All other systems reviewed and found to be negative with the exception of what has been described above.    MEDICATIONS  (STANDING):  aMIOdarone    Tablet 200 milliGRAM(s) Oral daily  apixaban 5 milliGRAM(s) Oral two times a day  atorvastatin 40 milliGRAM(s) Oral at bedtime  dextrose 10% Bolus 125 milliLiter(s) IV Bolus once  dextrose 5%. 1000 milliLiter(s) (50 mL/Hr) IV Continuous <Continuous>  dextrose 5%. 1000 milliLiter(s) (100 mL/Hr) IV Continuous <Continuous>  dextrose 50% Injectable 25 Gram(s) IV Push once  dextrose 50% Injectable 12.5 Gram(s) IV Push once  diltiazem Infusion 10 mG/Hr (10 mL/Hr) IV Continuous <Continuous>  glucagon  Injectable 1 milliGRAM(s) IntraMuscular once  insulin lispro (ADMELOG) corrective regimen sliding scale   SubCutaneous three times a day before meals  insulin lispro (ADMELOG) corrective regimen sliding scale   SubCutaneous at bedtime  metoprolol succinate  milliGRAM(s) Oral daily    MEDICATIONS  (PRN):  dextrose Oral Gel 15 Gram(s) Oral once PRN Blood Glucose LESS THAN 70 milliGRAM(s)/deciliter      VITALS:  T(F): 97.2 (04-21-24 @ 09:01), Max: 98.1 (04-20-24 @ 20:44)  HR: 94 (04-21-24 @ 09:45) (83 - 99)  BP: 134/76 (04-21-24 @ 09:45) (112/92 - 134/76)  RR: 18 (04-21-24 @ 09:01) (18 - 19)  SpO2: 96% (04-21-24 @ 09:01) (96% - 96%)  Wt(kg): --    I&O's Summary    20 Apr 2024 07:01  -  21 Apr 2024 07:00  --------------------------------------------------------  IN: 0 mL / OUT: 2125 mL / NET: -2125 mL    21 Apr 2024 07:01  -  21 Apr 2024 13:26  --------------------------------------------------------  IN: 0 mL / OUT: 150 mL / NET: -150 mL        CAPILLARY BLOOD GLUCOSE      POCT Blood Glucose.: 110 mg/dL (21 Apr 2024 12:15)  POCT Blood Glucose.: 96 mg/dL (21 Apr 2024 07:31)  POCT Blood Glucose.: 112 mg/dL (20 Apr 2024 22:05)  POCT Blood Glucose.: 119 mg/dL (20 Apr 2024 16:15)      PHYSICAL EXAM:  · Constitutional	well-groomed; no distress; obese  · Constitutional Comments	elderly female  · Eyes	PERRL; EOMI; conjunctiva clear  · ENMT	no gross abnormalities  · Respiratory	clear to auscultation bilaterally; no wheezes; no rales; no rhonchi  · Cardiovascular	irregular rate and rhythm; Irregularly irregular rhythm; tachycardia  · Gastrointestinal	soft; nontender; nondistended; normal active bowel sounds  · Neurological	cranial nerves II-XII intact; sensation intact  · Skin	warm and dry; color normal; no rashes; no ulcers  · Musculoskeletal	normal; ROM intact; strength 5/5 bilateral upper extremities; strength 5/5 bilateral lower extremities  · Musculoskeletal Comments	Left foot 5th digit ecchymosis/bruising  · Psychiatric	normal affect; alert and oriented x3; normal behavior      CULTURES:      Telemetry, personally reviewed

## 2024-04-21 NOTE — PROGRESS NOTE ADULT - ASSESSMENT
70yo female with hx of DM II, HTN, A fib s/p recent ablation 1 month ago, recent diagnosis of Splenic CA w/ plan for splenectomy on May 10th, presented to the ED w/ complaints of not feeling well as well as frequent falls this week w/ most recent episode today, noted to have A FIB w/ RVR    #A Fib w/ RVR  -Continue Cardizem gtt w/ Amiodarone  -Continue home Metoprolol  -Discussed case w/ EP, Dr. Vazquez. Unable to perform Cardioversion as pt took her Jardiance on admission morning  -Plan for cardioversion on Monday, continue to hold jardiance   -Monitor tele  Cardiology consulted, recommendations appreciated     #JUSTINO on CKD III  -Likely prerenal in the setting of above  -Monitor renal function, avoid nephrotoxic agents    #Frequent falls  #Left foot toe bruising  #Neuropathy  -No fracture noted. No pain elicited by pt  -Pt likely has peripheral neuropathy to some degree  -Will benefit from Gabapentin. 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 Dobbins - Consult, recommendations appreciated.   Scheduled for abdominal CT  4/24/24          #VTE ppx  -Eliquis

## 2024-04-21 NOTE — PROGRESS NOTE ADULT - ASSESSMENT
Assessment and Recommendation:   · Assessment	  70 yo F with above PMHx including AFib s/p DCCV on 4/1/24, compliant with meds- eliquis, amio, cardizem 120mg, toprol 100mg presented with recurrent falls d/t worsening leg weakness, denies syncope, found to have AF w/RVR, denies any symptoms from AFib.  - IV cardizem drip for rate control-titrate to keep HR < 110 ( currently @ 10 mg/hr)  - continue amiodarone 200mg po daily, continue home BB daily  - continue uninterrupted eliquis 5mg BID  - repeat echo  - maintain K+>4.0, Mg++>2.0  - hold Jardiance for 3 days, plan for DCCV tomorrow am   -  NPO past MN on 4/21

## 2024-04-21 NOTE — PROGRESS NOTE ADULT - SUBJECTIVE AND OBJECTIVE BOX
ELECTROPHYSIOLOGY   PROGRESS NOTE    Reason for follow up: AF with RVR  Overnight: No new events. Pt continues to have PAF with rapid rates improved since on Cardizem drip @ 10 mg/hr) to 70-90s Denies any c/o  Update: NPO after midnight for DCCV in am COntinue Amiodarone daily and cardizem infusion    Subjective: "  _________I'm ok_____________"    General: No fatigue, no fevers/chills  Respiratory: No dyspnea, no cough, no wheeze  CV: No chest pain, no palpitations  Abd: No nausea  Neuro: No headache, no dizziness  	  Vitals:  T(C): 36.2 (04-21-24 @ 09:01), Max: 36.7 (04-20-24 @ 20:44)  HR: 83 (04-21-24 @ 09:01) (83 - 109)  BP: 127/53 (04-21-24 @ 09:01) (112/92 - 151/99)  RR: 18 (04-21-24 @ 09:01) (18 - 19)  SpO2: 96% (04-21-24 @ 09:01) (96% - 96%)  Wt(kg): --  I&O's Summary    20 Apr 2024 07:01  -  21 Apr 2024 07:00  --------------------------------------------------------  IN: 0 mL / OUT: 2125 mL / NET: -2125 mL      Weight (kg): 99.2 (04-19 @ 18:33)      PHYSICAL EXAM:  Appearance: Comfortable. No acute distress  HEENT:  Head and neck: Atraumatic. Normocephalic.  Normal oral mucosa, PERRL, Neck is supple. No JVD, No carotid bruit.   Neurologic: A & O x 3, no focal deficits. EOMI.  Lymphatic: No cervical lymphadenopathy  Cardiovascular: Normal S1 S2, No murmur, rubs/gallops. No JVD, No edema  Respiratory: Lungs clear to auscultation  Gastrointestinal:  Soft, Non-tender, + BS  Lower Extremities: No edema  Psychiatry: Patient is calm. No agitation. Mood & affect appropriate  Skin: No rashes/ ecchymoses/cyanosis/ulcers visualized on the face, hands or feet.      CURRENT MEDICATIONS:  aMIOdarone    Tablet 200 milliGRAM(s) Oral daily  diltiazem Infusion 10 mG/Hr IV Continuous <Continuous>  metoprolol succinate  milliGRAM(s) Oral daily    atorvastatin  dextrose 50% Injectable  dextrose 50% Injectable  glucagon  Injectable  insulin lispro (ADMELOG) corrective regimen sliding scale  insulin lispro (ADMELOG) corrective regimen sliding scale  apixaban  dextrose 10% Bolus  dextrose 5%.  dextrose 5%.  	      LABS:	 	                            12.2   7.47  )-----------( 266      ( 21 Apr 2024 07:23 )             38.8     04-21    144  |  119<H>  |  45<H>  ----------------------------<  108<H>  5.1   |  20<L>  |  1.81<H>    Ca    10.6<H>      21 Apr 2024 07:23            TELEMETRY: Reviewed  as above  ECG:  Reviewed by me. 	    Ventricular Rate 105 BPM    Atrial Rate 416 BPM    QRS Duration 98 ms    Q-T Interval 328 ms    QTC Calculation(Bazett) 433 ms    R Axis -60 degrees    T Axis 17 degrees    Diagnosis Line Atrial fibrillation with rapid ventricular response  Left axis deviation  Abnormal ECG  When compared with ECG of 19-APR-2024 05:46,  No significant change was found  Confirmed by Palla MD, Obi ([id number]) on 4/21/2024 9:05:20 AM

## 2024-04-22 DIAGNOSIS — I50.22 CHRONIC SYSTOLIC (CONGESTIVE) HEART FAILURE: ICD-10-CM

## 2024-04-22 DIAGNOSIS — I48.19 OTHER PERSISTENT ATRIAL FIBRILLATION: ICD-10-CM
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
## 2024-04-22 LAB
ALBUMIN SERPL ELPH-MCNC: 2.9 G/DL — LOW (ref 3.3–5)
ALP SERPL-CCNC: 71 U/L — SIGNIFICANT CHANGE UP (ref 40–120)
ALT FLD-CCNC: 49 U/L — SIGNIFICANT CHANGE UP (ref 12–78)
ANION GAP SERPL CALC-SCNC: 7 MMOL/L — SIGNIFICANT CHANGE UP (ref 5–17)
AST SERPL-CCNC: 27 U/L — SIGNIFICANT CHANGE UP (ref 15–37)
BILIRUB SERPL-MCNC: 0.6 MG/DL — SIGNIFICANT CHANGE UP (ref 0.2–1.2)
BUN SERPL-MCNC: 41 MG/DL — HIGH (ref 7–23)
CALCIUM SERPL-MCNC: 10.6 MG/DL — HIGH (ref 8.5–10.1)
CHLORIDE SERPL-SCNC: 117 MMOL/L — HIGH (ref 96–108)
CO2 SERPL-SCNC: 20 MMOL/L — LOW (ref 22–31)
CREAT SERPL-MCNC: 1.7 MG/DL — HIGH (ref 0.5–1.3)
CRP SERPL-MCNC: 5 MG/L — HIGH
EGFR: 32 ML/MIN/1.73M2 — LOW
ERYTHROCYTE [SEDIMENTATION RATE] IN BLOOD: 43 MM/HR — HIGH (ref 0–20)
GLUCOSE BLDC GLUCOMTR-MCNC: 103 MG/DL — HIGH (ref 70–99)
GLUCOSE BLDC GLUCOMTR-MCNC: 109 MG/DL — HIGH (ref 70–99)
GLUCOSE BLDC GLUCOMTR-MCNC: 91 MG/DL — SIGNIFICANT CHANGE UP (ref 70–99)
GLUCOSE BLDC GLUCOMTR-MCNC: 93 MG/DL — SIGNIFICANT CHANGE UP (ref 70–99)
GLUCOSE SERPL-MCNC: 118 MG/DL — HIGH (ref 70–99)
HCT VFR BLD CALC: 41.2 % — SIGNIFICANT CHANGE UP (ref 34.5–45)
HGB BLD-MCNC: 13.2 G/DL — SIGNIFICANT CHANGE UP (ref 11.5–15.5)
MAGNESIUM SERPL-MCNC: 2.1 MG/DL — SIGNIFICANT CHANGE UP (ref 1.6–2.6)
MCHC RBC-ENTMCNC: 29.1 PG — SIGNIFICANT CHANGE UP (ref 27–34)
MCHC RBC-ENTMCNC: 32 GM/DL — SIGNIFICANT CHANGE UP (ref 32–36)
MCV RBC AUTO: 90.9 FL — SIGNIFICANT CHANGE UP (ref 80–100)
PLATELET # BLD AUTO: 296 K/UL — SIGNIFICANT CHANGE UP (ref 150–400)
POTASSIUM SERPL-MCNC: 5.3 MMOL/L — SIGNIFICANT CHANGE UP (ref 3.5–5.3)
POTASSIUM SERPL-SCNC: 5.3 MMOL/L — SIGNIFICANT CHANGE UP (ref 3.5–5.3)
PROT SERPL-MCNC: 6.7 GM/DL — SIGNIFICANT CHANGE UP (ref 6–8.3)
RBC # BLD: 4.53 M/UL — SIGNIFICANT CHANGE UP (ref 3.8–5.2)
RBC # FLD: 15.9 % — HIGH (ref 10.3–14.5)
SODIUM SERPL-SCNC: 144 MMOL/L — SIGNIFICANT CHANGE UP (ref 135–145)
WBC # BLD: 9.43 K/UL — SIGNIFICANT CHANGE UP (ref 3.8–10.5)
WBC # FLD AUTO: 9.43 K/UL — SIGNIFICANT CHANGE UP (ref 3.8–10.5)

## 2024-04-22 PROCEDURE — 93010 ELECTROCARDIOGRAM REPORT: CPT | Mod: 76

## 2024-04-22 PROCEDURE — 92960 CARDIOVERSION ELECTRIC EXT: CPT

## 2024-04-22 PROCEDURE — 99233 SBSQ HOSP IP/OBS HIGH 50: CPT

## 2024-04-22 PROCEDURE — 92960 CARDIOVERSION ELECTRIC EXT: CPT | Mod: 77

## 2024-04-22 PROCEDURE — 99232 SBSQ HOSP IP/OBS MODERATE 35: CPT | Mod: 25

## 2024-04-22 RX ORDER — DILTIAZEM HCL 120 MG
120 CAPSULE, EXT RELEASE 24 HR ORAL DAILY
Refills: 0 | Status: DISCONTINUED | OUTPATIENT
Start: 2024-04-22 | End: 2024-04-27

## 2024-04-22 RX ADMIN — AMIODARONE HYDROCHLORIDE 200 MILLIGRAM(S): 400 TABLET ORAL at 09:45

## 2024-04-22 RX ADMIN — APIXABAN 5 MILLIGRAM(S): 2.5 TABLET, FILM COATED ORAL at 23:02

## 2024-04-22 RX ADMIN — ATORVASTATIN CALCIUM 40 MILLIGRAM(S): 80 TABLET, FILM COATED ORAL at 23:02

## 2024-04-22 RX ADMIN — Medication 100 MILLIGRAM(S): at 09:45

## 2024-04-22 RX ADMIN — APIXABAN 5 MILLIGRAM(S): 2.5 TABLET, FILM COATED ORAL at 09:45

## 2024-04-22 NOTE — PROGRESS NOTE ADULT - SUBJECTIVE AND OBJECTIVE BOX
HOSPITALIST ATTENDING PROGRESS NOTE    Chart and meds reviewed.      Subjective: Patient seen and examined. Patient to go for DCCV today.       Additional results/Imaging, I have personally reviewed:    LABS:                            13.2   9.43  )-----------( 296      ( 22 Apr 2024 06:38 )             41.2     04-22    144  |  117<H>  |  41<H>  ----------------------------<  118<H>  5.3   |  20<L>  |  1.70<H>    Ca    10.6<H>      22 Apr 2024 06:38  Mg     2.1     04-22    TPro  6.7  /  Alb  2.9<L>  /  TBili  0.6  /  DBili  x   /  AST  27  /  ALT  49  /  AlkPhos  71  04-22        LIVER FUNCTIONS - ( 22 Apr 2024 06:38 )  Alb: 2.9 g/dL / Pro: 6.7 gm/dL / ALK PHOS: 71 U/L / ALT: 49 U/L / AST: 27 U/L / GGT: x             Urinalysis Basic - ( 22 Apr 2024 06:38 )    Color: x / Appearance: x / SG: x / pH: x  Gluc: 118 mg/dL / Ketone: x  / Bili: x / Urobili: x   Blood: x / Protein: x / Nitrite: x   Leuk Esterase: x / RBC: x / WBC x   Sq Epi: x / Non Sq Epi: x / Bacteria: x              All other systems reviewed and found to be negative with the exception of what has been described above.    MEDICATIONS  (STANDING):  aMIOdarone    Tablet 200 milliGRAM(s) Oral daily  apixaban 5 milliGRAM(s) Oral two times a day  atorvastatin 40 milliGRAM(s) Oral at bedtime  dextrose 10% Bolus 125 milliLiter(s) IV Bolus once  dextrose 5%. 1000 milliLiter(s) (50 mL/Hr) IV Continuous <Continuous>  dextrose 5%. 1000 milliLiter(s) (100 mL/Hr) IV Continuous <Continuous>  dextrose 50% Injectable 25 Gram(s) IV Push once  dextrose 50% Injectable 12.5 Gram(s) IV Push once  diltiazem    milliGRAM(s) Oral daily  glucagon  Injectable 1 milliGRAM(s) IntraMuscular once  insulin lispro (ADMELOG) corrective regimen sliding scale   SubCutaneous three times a day before meals  insulin lispro (ADMELOG) corrective regimen sliding scale   SubCutaneous at bedtime  metoprolol succinate  milliGRAM(s) Oral daily    MEDICATIONS  (PRN):  dextrose Oral Gel 15 Gram(s) Oral once PRN Blood Glucose LESS THAN 70 milliGRAM(s)/deciliter      VITALS:  T(F): 98.1 (04-22-24 @ 12:58), Max: 98.2 (04-22-24 @ 07:32)  HR: 52 (04-22-24 @ 12:58) (52 - 110)  BP: 93/76 (04-22-24 @ 12:58) (93/76 - 138/63)  RR: 18 (04-22-24 @ 12:58) (18 - 18)  SpO2: 100% (04-22-24 @ 12:58) (95% - 100%)  Wt(kg): --    I&O's Summary    21 Apr 2024 07:01  -  22 Apr 2024 07:00  --------------------------------------------------------  IN: 0 mL / OUT: 685 mL / NET: -685 mL        CAPILLARY BLOOD GLUCOSE      POCT Blood Glucose.: 109 mg/dL (22 Apr 2024 10:32)  POCT Blood Glucose.: 103 mg/dL (22 Apr 2024 06:48)  POCT Blood Glucose.: 110 mg/dL (21 Apr 2024 21:58)  POCT Blood Glucose.: 122 mg/dL (21 Apr 2024 17:00)      PHYSICAL EXAM:  · Constitutional	well-groomed; no distress; obese  · Constitutional Comments	elderly female  · Eyes	PERRL; EOMI; conjunctiva clear  · ENMT	no gross abnormalities  · Respiratory	clear to auscultation bilaterally; no wheezes; no rales; no rhonchi  · Cardiovascular	irregular rate and rhythm; Irregularly irregular rhythm; tachycardia  · Gastrointestinal	soft; nontender; nondistended; normal active bowel sounds  · Neurological	cranial nerves II-XII intact; sensation intact  · Skin	warm and dry; color normal; no rashes; no ulcers  · Musculoskeletal	normal; ROM intact; strength 5/5 bilateral upper extremities; strength 5/5 bilateral lower extremities  · Musculoskeletal Comments	Left foot 5th digit ecchymosis/bruising  · Psychiatric	normal affect; alert and oriented x3; normal behavior      CULTURES:      Telemetry, personally reviewed

## 2024-04-22 NOTE — PHYSICAL THERAPY INITIAL EVALUATION ADULT - GENERAL OBSERVATIONS, REHAB EVAL
Pt. received supine in bed + carson agreeable to PT. Bed mob with Max A weak trunk control, Sit to stand with RW Mod A unable to amb at the time due to B/LLE's weakness L>R. Pt. back to bed and left with bed alarm set on table, CBWR, RN aware.

## 2024-04-22 NOTE — PROGRESS NOTE ADULT - SUBJECTIVE AND OBJECTIVE BOX
HPI: 68 yo F with PMHx of DM, HTN, HLD, Afib s/p DCCV on 4/1/24 on cardizem, amiodarone, eliquis, thyroid mass, splenic CA, possible metastatic based [facility] scheduled for splenectomy on 5/10, presented with frequent falls. Pt reportedly fell last week and fell again this morning,  Pt states she had "jelly legs" and they just gave out under her. denies dizziness or syncope during falls.  In the ED, pt is in Afib in RVR up to 150's. IV cardizem started for rate control. Pt has been compliant with compliant with meds including eliquis, took Jardiance yesterday.  Cardio-    4/19/24: Pt is resting in the bed, denies chest pain/SOB/palpitations, c/o increasing  weakness  in both legs   EKG: (4/19/24) AFib 131 bpm QRS 86ms QTC 445ms          (4/1/24) post DCCV- SB 46 bpm   Cardiac PET ST: March 2024) no ischemia, LVEF 45%  echo (Jan 2024) LVEF 63%, mildly dilated LA    4/22/24  Pt is resting in the bed, NAD. No event overnight.  Tele:  ROS: All other ROS is negative unless indicated above.      Physical Exam:  Vital Signs Last 24 Hrs  T(C): 36.8 (22 Apr 2024 07:32), Max: 36.8 (22 Apr 2024 07:32)  T(F): 98.2 (22 Apr 2024 07:32), Max: 98.2 (22 Apr 2024 07:32)  HR: 103 (22 Apr 2024 07:32) (65 - 103)  BP: 138/63 (22 Apr 2024 07:32) (124/96 - 138/63)  BP(mean): --  RR: 18 (22 Apr 2024 07:32) (18 - 18)  SpO2: 96% (22 Apr 2024 07:32) (96% - 98%)    Parameters below as of 22 Apr 2024 07:32  Patient On (Oxygen Delivery Method): room air      Constitutional: well developed,  no deformities and no acute distress    Neurological: Alert & Oriented x 3, JERRY, no focal deficits    HEENT: NC/AT, PERRLA, EOMI,  Neck supple.    Respiratory: CTA B/L, No wheezing/crackles/rhonchi    Cardiovascular: (+) S1 & S2, RRR, No m/r/g    Gastrointestinal: soft, NT, nondistended, (+) BS    Genitourinary: non distended bladder, voiding freely    Extremities: No pedal edema, No clubbing, No cyanosis    Skin:  normal skin color and pigmentation, no skin lesions            Allergies    No Known Drug Allergies  Bee stings (Swelling)    Intolerances      MEDICATIONS  (STANDING):  aMIOdarone    Tablet 200 milliGRAM(s) Oral daily  apixaban 5 milliGRAM(s) Oral two times a day  atorvastatin 40 milliGRAM(s) Oral at bedtime  dextrose 10% Bolus 125 milliLiter(s) IV Bolus once  dextrose 5%. 1000 milliLiter(s) (50 mL/Hr) IV Continuous <Continuous>  dextrose 5%. 1000 milliLiter(s) (100 mL/Hr) IV Continuous <Continuous>  dextrose 50% Injectable 25 Gram(s) IV Push once  dextrose 50% Injectable 12.5 Gram(s) IV Push once  diltiazem Infusion 10 mG/Hr (10 mL/Hr) IV Continuous <Continuous>  glucagon  Injectable 1 milliGRAM(s) IntraMuscular once  insulin lispro (ADMELOG) corrective regimen sliding scale   SubCutaneous three times a day before meals  insulin lispro (ADMELOG) corrective regimen sliding scale   SubCutaneous at bedtime  metoprolol succinate  milliGRAM(s) Oral daily    MEDICATIONS  (PRN):  dextrose Oral Gel 15 Gram(s) Oral once PRN Blood Glucose LESS THAN 70 milliGRAM(s)/deciliter    LABS:                        13.2   9.43  )-----------( 296      ( 22 Apr 2024 06:38 )             41.2     04-22    144  |  117<H>  |  41<H>  ----------------------------<  118<H>  5.3   |  20<L>  |  1.70<H>    Ca    10.6<H>      22 Apr 2024 06:38  Mg     2.1     04-22    TPro  6.7  /  Alb  2.9<L>  /  TBili  0.6  /  DBili  x   /  AST  27  /  ALT  49  /  AlkPhos  71  04-22                     HPI: 68 yo F with PMHx of DM, HTN, HLD, Afib s/p DCCV on 4/1/24 on cardizem, amiodarone, eliquis, thyroid mass, splenic CA, possible metastatic [facility] scheduled for splenectomy on 5/10, presented with frequent falls. Pt reportedly fell last week and fell again this morning,  Pt states she had "jelly legs" and they just gave out under her. denies dizziness or syncope during falls.  In the ED, pt is in Afib in RVR up to 150's. IV cardizem started for rate control. Pt has been compliant with compliant with meds including eliquis, took Jardiance yesterday.  Cardio-    4/19/24: Pt is resting in the bed, denies chest pain/SOB/palpitations, c/o increasing  weakness  in both legs   EKG: (4/19/24) AFib 131 bpm QRS 86ms QTC 445ms          (4/1/24) post DCCV- SB 46 bpm   Cardiac PET ST: March 2024) no ischemia, LVEF 45%  echo (Jan 2024) LVEF 63%, mildly dilated LA    4/22/24: s/p successful DCCV today: SB 55's bpm  EKG: SB 53bpm, FL 160ms, QRS 92 ms, QT 414ms  Pt is resting in the bed, denies chest pain/SOB/palpitations    ROS: All other ROS is negative unless indicated above.      Physical Exam:  Vital Signs Last 24 Hrs  T(C): 36.8 (22 Apr 2024 07:32), Max: 36.8 (22 Apr 2024 07:32)  T(F): 98.2 (22 Apr 2024 07:32), Max: 98.2 (22 Apr 2024 07:32)  HR: 103 (22 Apr 2024 07:32) (65 - 103)  BP: 138/63 (22 Apr 2024 07:32) (124/96 - 138/63)  RR: 18 (22 Apr 2024 07:32) (18 - 18)  SpO2: 96% (22 Apr 2024 07:32) (96% - 98%)    Parameters below as of 22 Apr 2024 07:32  Patient On (Oxygen Delivery Method): room air      Constitutional: well developed,  no deformities and no acute distress    Neurological: Alert & Oriented x 3, JERRY, no focal deficits    HEENT: NC/AT, PERRLA, EOMI,  Neck supple.    Respiratory: CTA B/L, No wheezing/crackles/rhonchi    Cardiovascular: (+) S1 & S2, RRR,     Gastrointestinal: soft, NT, nondistended, (+) BS    Genitourinary: non distended bladder, voiding freely    Extremities: No pedal edema, No clubbing, No cyanosis    Skin:  normal skin color and pigmentation, no skin lesions            Allergies    No Known Drug Allergies  Bee stings (Swelling)        MEDICATIONS  (STANDING):  aMIOdarone    Tablet 200 milliGRAM(s) Oral daily  apixaban 5 milliGRAM(s) Oral two times a day  atorvastatin 40 milliGRAM(s) Oral at bedtime  dextrose 10% Bolus 125 milliLiter(s) IV Bolus once  dextrose 5%. 1000 milliLiter(s) (50 mL/Hr) IV Continuous <Continuous>  dextrose 5%. 1000 milliLiter(s) (100 mL/Hr) IV Continuous <Continuous>  dextrose 50% Injectable 25 Gram(s) IV Push once  dextrose 50% Injectable 12.5 Gram(s) IV Push once  diltiazem Infusion 10 mG/Hr (10 mL/Hr) IV Continuous <Continuous>  glucagon  Injectable 1 milliGRAM(s) IntraMuscular once  insulin lispro (ADMELOG) corrective regimen sliding scale   SubCutaneous three times a day before meals  insulin lispro (ADMELOG) corrective regimen sliding scale   SubCutaneous at bedtime  metoprolol succinate  milliGRAM(s) Oral daily    MEDICATIONS  (PRN):  dextrose Oral Gel 15 Gram(s) Oral once PRN Blood Glucose LESS THAN 70 milliGRAM(s)/deciliter    LABS:                        13.2   9.43  )-----------( 296      ( 22 Apr 2024 06:38 )             41.2     04-22    144  |  117<H>  |  41<H>  ----------------------------<  118<H>  5.3   |  20<L>  |  1.70<H>    Ca    10.6<H>      22 Apr 2024 06:38  Mg     2.1     04-22    TPro  6.7  /  Alb  2.9<L>  /  TBili  0.6  /  DBili  x   /  AST  27  /  ALT  49  /  AlkPhos  71  04-22

## 2024-04-22 NOTE — PACU DISCHARGE NOTE - COMMENTS
pt aaox4, pt on monitor - sb 50s, report given to chuy arndt, pt denies any chest pain, palpitations, or nvd. dc instructions given to patient, verbalized understanding, awaiting for transport back to

## 2024-04-22 NOTE — PROGRESS NOTE ADULT - NS ATTEND AMEND GEN_ALL_CORE FT
agree with a/p as above  difficult to control AF and RVR episodes mildly reduced LVEF, pt does not feel palpitations, s/p DCCV to SR today  Pt will need splenectomy for spleen tumor, possibly metastatic disease, since it is difficult to control AF RVR despite three medications, LA size moderately dilated may benefit from ablation procedure to control AF and better optimize for splenectomy. Pt would need to be on uninterrupted OAC for at least 4 weeks (ideally 8 weeks) post AF ablation before proceeding with splenectomy      falls may be related to amiodarone use and plan to stop it 4 weeks post ablation (would ideally stop it after she has splenectomy)     I spent a total of 35 minutes on the encounter, including chart review, evaluating and discussing treatment options with the patient, as well as counseling and coordination as stated above.

## 2024-04-22 NOTE — PHYSICAL THERAPY INITIAL EVALUATION ADULT - ADDITIONAL COMMENTS
The pt reports having a few steps to enter the home with rails and a few steps inside without rails. The pt acknowledges having a few falls in the past The pt reports having a 3 steps to enter the home with rails and no steps inside. The pt acknowledges having a few falls in the past week, has RW at home

## 2024-04-22 NOTE — PROGRESS NOTE ADULT - PROBLEM SELECTOR PROBLEM 2
SOB that got worse yesterday. Pt with hx of SOB. C/o lower back pain when breathing and sitting. Son suspects pt to have fluid build up. Denies swelling. Falls

## 2024-04-22 NOTE — CONSULT NOTE ADULT - SUBJECTIVE AND OBJECTIVE BOX
CHIEF COMPLAINT: Patient is a 69y old  Female who presents with a chief complaint of Fall (21 Apr 2024 13:26)      HPI:  70yo female with hx of DM II, HTN, A fib s/p recent ablation 1 month ago, recent diagnosis of Splenic CA w/ plan for splenectomy on May 10th, presented to the ED w/ complaints of not feeling well as well as frequent falls this week w/ most recent episode today. Pt states she feels her legs are weak and unable to stand independently. States she fell twice this week. Denies numbness/tingling in her LE. Denies cp, palpitations, sob, abd pain, N/V, fever, chills.    In the ED, pt noted to be in A FIB w/ RVR unable to control w/ IV Cardizem. Started on Cardizem gtt w/ home Amiodarone to bring rate down to 100-120s.  (19 Apr 2024 12:42)    4/22-patient well known to me; with above history-however did not have ablation, but rather underwent DC/CV for afib by Dr. Haider due to poor response from medications.  Patient underwent procedure to control heart rate for needed surgery for splenic cancer.  PAtient was in office last week and was in NSR, but started to feel poorly last week after office visit and fell multiple times; pt bibems from home. As per ems, " witnessed fall, - head strike, - loc, witnessed by family and slid down wall". Pt remembers sliding down wall. Pt A&ox4 and endorses falling a lot more recently.   Pt reportedly fell on Sunday, and fell again this evening. Pt states she had "jelly legs" and they just gave out under her. In the ED, pt is in Afib in RVR in 130's.   Came to ER-noted to be in Afib/RVR and difficult to control.  Given amiodarone in ER and currently HR in the 80's and BP in the 130/80's.          70 yo F with above PMHx including AFib s/p DCCV on 4/1/24, compliant with meds- eliquis, amio, cardizem 120mg, toprol 100mg presented with recurrent falls d/t worsening leg weakness, denies syncope, found to have AF w/RVR, denies any symptoms from AFib.   IV cardizem drip for rate control-titrate to keep HR < 110;  continue amiodarone 200mg po daily, continue home BB daily;  uninterrupted eliquis 5mg BID.   Repeat echo and maintain K+>4.0, Mg++>2.0.   Hold Jardiance for 3 days, plan for DCCV on Monday (4/22/24)       PMHx: PAST MEDICAL & SURGICAL HISTORY:  HTN (hypertension)      Hyperlipidemia      DM (diabetes mellitus)      Arthritis      Ovarian cancer  and uterine cancer--2004 surgery & chemo      Obesity, morbid, BMI 40.0-49.9      H/O umbilical hernia repair  2005      S/P total hysterectomy  bilateral salpingectomy-oophorectomy --2004 ovarian & uterine cancer      H/O total knee replacement, right            Soc Hx:       Allergies: Allergies    No Known Drug Allergies  Bee stings (Swelling)    Intolerances          REVIEW OF SYSTEMS:    CONSTITUTIONAL: No weakness, fevers or chills  EYES/ENT: No visual changes;  No vertigo or throat pain   NECK: No pain or stiffness  RESPIRATORY: No cough, wheezing, hemoptysis; No shortness of breath  CARDIOVASCULAR: No chest pain or palpitations  GASTROINTESTINAL: No abdominal or epigastric pain. No nausea, vomiting, or hematemesis; No diarrhea or constipation. No melena or hematochezia.  GENITOURINARY: No dysuria, frequency or hematuria  NEUROLOGICAL: No numbness or weakness  SKIN: No itching, burning, rashes, or lesions   All other review of systems is negative unless indicated above    Vital Signs Last 24 Hrs  T(C): 36.7 (21 Apr 2024 20:50), Max: 36.7 (21 Apr 2024 20:50)  T(F): 98.1 (21 Apr 2024 20:50), Max: 98.1 (21 Apr 2024 20:50)  HR: 65 (22 Apr 2024 02:00) (65 - 94)  BP: 124/96 (22 Apr 2024 02:00) (124/96 - 134/76)  BP(mean): 66 (21 Apr 2024 09:01) (66 - 66)  RR: 18 (21 Apr 2024 20:50) (18 - 18)  SpO2: 98% (21 Apr 2024 20:50) (96% - 98%)    Parameters below as of 21 Apr 2024 20:50  Patient On (Oxygen Delivery Method): room air        I&O's Summary    21 Apr 2024 07:01  -  22 Apr 2024 07:00  --------------------------------------------------------  IN: 0 mL / OUT: 685 mL / NET: -685 mL        CAPILLARY BLOOD GLUCOSE      POCT Blood Glucose.: 103 mg/dL (22 Apr 2024 06:48)  POCT Blood Glucose.: 110 mg/dL (21 Apr 2024 21:58)  POCT Blood Glucose.: 122 mg/dL (21 Apr 2024 17:00)  POCT Blood Glucose.: 110 mg/dL (21 Apr 2024 12:15)      PHYSICAL EXAM:   Constitutional: NAD, awake and alert, well-developed  HEENT: PERR, EOMI, Normal Hearing, MMM  Neck:  JVD  Respiratory: Breath sounds are clear bilaterally, No wheezing, rales or rhonchi  Cardiovascular: S1 and S2, irregular rate and rhythm,  Murmurs, gallops or rubs  Gastrointestinal: Bowel Sounds present, soft, nontender, nondistended, no guarding, no rebound  Extremities: No peripheral edema      MEDICATIONS:  MEDICATIONS  (STANDING):  aMIOdarone    Tablet 200 milliGRAM(s) Oral daily  apixaban 5 milliGRAM(s) Oral two times a day  atorvastatin 40 milliGRAM(s) Oral at bedtime  dextrose 10% Bolus 125 milliLiter(s) IV Bolus once  dextrose 5%. 1000 milliLiter(s) (50 mL/Hr) IV Continuous <Continuous>  dextrose 5%. 1000 milliLiter(s) (100 mL/Hr) IV Continuous <Continuous>  dextrose 50% Injectable 25 Gram(s) IV Push once  dextrose 50% Injectable 12.5 Gram(s) IV Push once  diltiazem Infusion 10 mG/Hr (10 mL/Hr) IV Continuous <Continuous>  glucagon  Injectable 1 milliGRAM(s) IntraMuscular once  insulin lispro (ADMELOG) corrective regimen sliding scale   SubCutaneous three times a day before meals  insulin lispro (ADMELOG) corrective regimen sliding scale   SubCutaneous at bedtime  metoprolol succinate  milliGRAM(s) Oral daily      LABS: All Labs Reviewed:                        13.2   9.43  )-----------( 296      ( 22 Apr 2024 06:38 )             41.2     04-22    144  |  117<H>  |  41<H>  ----------------------------<  118<H>  5.3   |  20<L>  |  1.70<H>    Ca    10.6<H>      22 Apr 2024 06:38    TPro  6.7  /  Alb  2.9<L>  /  TBili  0.6  /  DBili  x   /  AST  27  /  ALT  49  /  AlkPhos  71  04-22            Blood Culture: Organism --  Gram Stain Blood -- Gram Stain --  Specimen Source Clean Catch None  Culture-Blood --      lipid profile       RADIOLOGY:    EKG:    Telemetry:  afib at 83bpm,     ECHO:  pending

## 2024-04-22 NOTE — PHYSICAL THERAPY INITIAL EVALUATION ADULT - PERTINENT HX OF CURRENT PROBLEM, REHAB EVAL
70yo female with hx of DM II, HTN, A fib s/p recent ablation 1 month ago, recent diagnosis of Splenic CA w/ plan for splenectomy on May 10th, presented to the ED w/ complaints of not feeling well as well as frequent falls this week w/ most recent episode today. Pt states she feels her legs are weak and unable to stand independently. States she fell twice this week. Denies numbness/tingling in her LE. Denies cp, palpitations, sob, abd pain, N/V, fever, chills. 70yo female with hx of DM II, HTN, A fib s/p recent ablation 1 month ago, recent diagnosis of Splenic CA w/ plan for splenectomy on May 10th, presented to the ED w/ complaints of not feeling well as well as frequent falls this week w/ most recent episode today. Pt states she feels her legs are weak and unable to stand independently. States she fell twice this week. Pt states she had "jelly legs" and they just gave out under her. Denies numbness/tingling in her LE. Denies cp, palpitations, sob, abd pain, N/V, fever, chills.   Pt. had Electrophysiology this AM s/p successful DCCV today: SB 55's bpm.  Plan for Afib ablation as out pt

## 2024-04-22 NOTE — PROGRESS NOTE ADULT - ASSESSMENT
70yo female with hx of DM II, HTN, A fib s/p recent ablation 1 month ago, recent diagnosis of Splenic CA w/ plan for splenectomy on May 10th, presented to the ED w/ complaints of not feeling well as well as frequent falls this week w/ most recent episode today, noted to have A FIB w/ RVR    #A Fib w/ RVR  -Discussed case w/ EP, Dr. Vazquez. Unable to perform Cardioversion as pt took her Jardiance on admission morning  -Plan for cardioversion on Monday, continue to hold jardiance   -Monitor tele  Cardiology consulted, recommendations appreciated   DC IV cardizem,  resume cardizem 120mg daily  - continue amiodarone 200mg po daily  - metoprolol succinate 100mg daily  -uninterrupted eliquis 5mg BID  - Plan for Afib ablation as out pt  -muscle weakness may be related to amiodarone use? will plan to stop amiodarone 4 weeks post ablation    #JUSTINO on CKD III  -Likely prerenal in the setting of above  -Monitor renal function, avoid nephrotoxic agents    #Frequent falls/ ? Jelly legs  #Left foot toe bruising  #Neuropathy  -No fracture noted. No pain elicited by pt  -Pt likely has peripheral neuropathy to some degree  -Will benefit from Gabapentin. 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 Dobbins - Consult, recommendations appreciated.   Scheduled for abdominal CT  4/24/24          #VTE ppx  -Eliquis   68yo female with hx of DM II, HTN, A fib s/p recent ablation 1 month ago, recent diagnosis of Splenic CA w/ plan for splenectomy on May 10th, presented to the ED w/ complaints of not feeling well as well as frequent falls this week w/ most recent episode today, noted to have A FIB w/ RVR    #A Fib w/ RVR  -Discussed case w/ EP, Dr. Vazquez. Unable to perform Cardioversion as pt took her Jardiance on admission morning  -Plan for cardioversion on Monday, continue to hold jardiance   -Monitor tele  Cardiology consulted, recommendations appreciated   DC IV cardizem,  resume cardizem 120mg daily  - continue amiodarone 200mg po daily  - metoprolol succinate 100mg daily  -uninterrupted eliquis 5mg BID  - Plan for ablation as out pt at [place] before splenectomy  -muscle weakness may be related to amiodarone use? will plan to stop amiodarone 4 weeks post ablation    #JUSTINO on ?CKD III  -Likely prerenal in the setting of above  -Monitor renal function, avoid nephrotoxic agents  Cr improving, continue to monitor 1.7 today  Consider trial of void in AM    #Frequent falls/ ? Jelly legs  #Left foot toe bruising  #Neuropathy  -No fracture noted. No pain elicited by pt  -Pt likely has peripheral neuropathy to some degree  -Will benefit from Gabapentin. 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 Dobbins - Consult, recommendations appreciated.   Scheduled for abdominal CT  4/24/24  -Dr hamilton her surg/onc physician to speak with Dr Zambrano for possible abdominal CT while currently inpatient          #VTE ppx  -Eliquis

## 2024-04-22 NOTE — PROGRESS NOTE ADULT - ASSESSMENT
68 yo F with above PMHx including AFib s/p DCCV on 4/1/24, compliant with meds- eliquis, amio, cardizem 120mg, toprol 100mg presented with recurrent falls d/t worsening leg weakness, denies syncope, found to have AF w/RVR, denies any symptoms from AFib.  - IV cardizem drip for rate control  - continue amiodarone 200mg po daily  - hold metoprolol while on IV cardizem  -uninterrupted eliquis 5mg BID  - repeat echo  - maintain K+>4.0, Mg++>2.0  - hold Jardiance for 3 days, plan for DCCV on Monday (4/22/24) if remains in AFib  - keep pt NPO post MN on 4/21  Plan d/w pt/family//ER provider 70 yo F with above PMHx including AFib s/p DCCV on 4/1/24, compliant with meds- eliquis, amio, cardizem 120mg, toprol 100mg presented with recurrent falls d/t worsening leg weakness, denies syncope, found to have AF w/RVR, denies any symptoms from AFib.   s/p successful DCCV today: SB 55's bpm  - d/c IV cardizem drip, resume cardizem 120mg daily  - continue amiodarone 200mg po daily  - metoprolol succinate 100mg daily  -uninterrupted eliquis 5mg BID  - maintain K+>4.0, Mg++>2.0  - Plan for Afib ablation as out pt  - physical therapy  Plan d/w pt/family//hospitalist 68 yo F with above PMHx including AFib s/p DCCV on 4/1/24, compliant with meds- eliquis, amio, cardizem 120mg, toprol 100mg presented with recurrent falls d/t worsening leg weakness, denies syncope, found to have AF w/RVR, denies any symptoms from AFib.   s/p successful DCCV today: SB 55's bpm  - d/c IV cardizem drip, resume cardizem 120mg daily  - continue amiodarone 200mg po daily  - metoprolol succinate 100mg daily  -uninterrupted eliquis 5mg BID  - maintain K+>4.0, Mg++>2.0  - Plan for Afib ablation as out pt  -muscle weakness may be related to amiodarone use? will plan to stop amiodarone 4 weeks post ablation  - physical therapy  -?UTI and unable to urinate to be addressed by primary team      Plan d/w pt/family//hospitalist

## 2024-04-22 NOTE — CONSULT NOTE ADULT - ASSESSMENT
patient with failed DC/CV 1 month ago now with recurrent falls, weak legs and noted to be in afib/RVR.     1-DC/CV for today  2-consider curative ablation  3-weakness and jelly legs from low BP when patient went into afib.  Maintenance of sinus rhythm would be beneficial  Consideration for ablation prior to planned surgery in may; will discuss with EP{S timing of procedure

## 2024-04-22 NOTE — PROCEDURAL SAFETY CHECKLIST WITH OR WITHOUT SEDATION - NSPOSTCOMMENTFT_GEN_ALL_CORE
Brief   Time Out   Anesthesia start  Cardioversion    Anesthesia end    Finding Brief @1126   Time Out 1127  Anesthesia start @ 1127  Cardioversion @ 1128    Anesthesia end @1135    Finding - convert to NSR  70s Brief @1126   Time Out 1127  Anesthesia start @ 1127  Cardioversion 200J x 1 @ 1128    Anesthesia end @1135    Finding - convert to NSR  70s

## 2024-04-23 LAB
-  AMOXICILLIN/CLAVULANIC ACID: SIGNIFICANT CHANGE UP
-  AMPICILLIN/SULBACTAM: SIGNIFICANT CHANGE UP
-  AMPICILLIN: SIGNIFICANT CHANGE UP
-  AZTREONAM: SIGNIFICANT CHANGE UP
-  CEFAZOLIN: SIGNIFICANT CHANGE UP
-  CEFEPIME: SIGNIFICANT CHANGE UP
-  CEFOXITIN: SIGNIFICANT CHANGE UP
-  CEFTRIAXONE: SIGNIFICANT CHANGE UP
-  CEFUROXIME: SIGNIFICANT CHANGE UP
-  CIPROFLOXACIN: SIGNIFICANT CHANGE UP
-  ERTAPENEM: SIGNIFICANT CHANGE UP
-  GENTAMICIN: SIGNIFICANT CHANGE UP
-  IMIPENEM: SIGNIFICANT CHANGE UP
-  LEVOFLOXACIN: SIGNIFICANT CHANGE UP
-  MEROPENEM: SIGNIFICANT CHANGE UP
-  NITROFURANTOIN: SIGNIFICANT CHANGE UP
-  PIPERACILLIN/TAZOBACTAM: SIGNIFICANT CHANGE UP
-  TOBRAMYCIN: SIGNIFICANT CHANGE UP
-  TRIMETHOPRIM/SULFAMETHOXAZOLE: SIGNIFICANT CHANGE UP
ALBUMIN SERPL ELPH-MCNC: 2.8 G/DL — LOW (ref 3.3–5)
ALP SERPL-CCNC: 68 U/L — SIGNIFICANT CHANGE UP (ref 40–120)
ALT FLD-CCNC: 45 U/L — SIGNIFICANT CHANGE UP (ref 12–78)
ANION GAP SERPL CALC-SCNC: 6 MMOL/L — SIGNIFICANT CHANGE UP (ref 5–17)
AST SERPL-CCNC: 22 U/L — SIGNIFICANT CHANGE UP (ref 15–37)
BILIRUB SERPL-MCNC: 0.6 MG/DL — SIGNIFICANT CHANGE UP (ref 0.2–1.2)
BUN SERPL-MCNC: 49 MG/DL — HIGH (ref 7–23)
CALCIUM SERPL-MCNC: 10.7 MG/DL — HIGH (ref 8.5–10.1)
CHLORIDE SERPL-SCNC: 116 MMOL/L — HIGH (ref 96–108)
CO2 SERPL-SCNC: 23 MMOL/L — SIGNIFICANT CHANGE UP (ref 22–31)
CREAT SERPL-MCNC: 1.83 MG/DL — HIGH (ref 0.5–1.3)
CULTURE RESULTS: ABNORMAL
EGFR: 29 ML/MIN/1.73M2 — LOW
GLUCOSE BLDC GLUCOMTR-MCNC: 102 MG/DL — HIGH (ref 70–99)
GLUCOSE BLDC GLUCOMTR-MCNC: 120 MG/DL — HIGH (ref 70–99)
GLUCOSE BLDC GLUCOMTR-MCNC: 139 MG/DL — HIGH (ref 70–99)
GLUCOSE BLDC GLUCOMTR-MCNC: 98 MG/DL — SIGNIFICANT CHANGE UP (ref 70–99)
GLUCOSE SERPL-MCNC: 99 MG/DL — SIGNIFICANT CHANGE UP (ref 70–99)
HCT VFR BLD CALC: 40.2 % — SIGNIFICANT CHANGE UP (ref 34.5–45)
HGB BLD-MCNC: 12.4 G/DL — SIGNIFICANT CHANGE UP (ref 11.5–15.5)
MCHC RBC-ENTMCNC: 28.6 PG — SIGNIFICANT CHANGE UP (ref 27–34)
MCHC RBC-ENTMCNC: 30.8 GM/DL — LOW (ref 32–36)
MCV RBC AUTO: 92.8 FL — SIGNIFICANT CHANGE UP (ref 80–100)
METHOD TYPE: SIGNIFICANT CHANGE UP
ORGANISM # SPEC MICROSCOPIC CNT: ABNORMAL
ORGANISM # SPEC MICROSCOPIC CNT: SIGNIFICANT CHANGE UP
PLATELET # BLD AUTO: 281 K/UL — SIGNIFICANT CHANGE UP (ref 150–400)
POTASSIUM SERPL-MCNC: 5.4 MMOL/L — HIGH (ref 3.5–5.3)
POTASSIUM SERPL-SCNC: 5.4 MMOL/L — HIGH (ref 3.5–5.3)
PROT SERPL-MCNC: 6.5 GM/DL — SIGNIFICANT CHANGE UP (ref 6–8.3)
RBC # BLD: 4.33 M/UL — SIGNIFICANT CHANGE UP (ref 3.8–5.2)
RBC # FLD: 15.8 % — HIGH (ref 10.3–14.5)
SODIUM SERPL-SCNC: 145 MMOL/L — SIGNIFICANT CHANGE UP (ref 135–145)
SPECIMEN SOURCE: SIGNIFICANT CHANGE UP
WBC # BLD: 7.88 K/UL — SIGNIFICANT CHANGE UP (ref 3.8–10.5)
WBC # FLD AUTO: 7.88 K/UL — SIGNIFICANT CHANGE UP (ref 3.8–10.5)

## 2024-04-23 PROCEDURE — 99233 SBSQ HOSP IP/OBS HIGH 50: CPT

## 2024-04-23 RX ORDER — CEFUROXIME AXETIL 250 MG
250 TABLET ORAL EVERY 12 HOURS
Refills: 0 | Status: DISCONTINUED | OUTPATIENT
Start: 2024-04-23 | End: 2024-04-27

## 2024-04-23 RX ADMIN — APIXABAN 5 MILLIGRAM(S): 2.5 TABLET, FILM COATED ORAL at 10:11

## 2024-04-23 RX ADMIN — ATORVASTATIN CALCIUM 40 MILLIGRAM(S): 80 TABLET, FILM COATED ORAL at 21:35

## 2024-04-23 RX ADMIN — Medication 100 MILLIGRAM(S): at 10:11

## 2024-04-23 RX ADMIN — APIXABAN 5 MILLIGRAM(S): 2.5 TABLET, FILM COATED ORAL at 21:36

## 2024-04-23 RX ADMIN — AMIODARONE HYDROCHLORIDE 200 MILLIGRAM(S): 400 TABLET ORAL at 10:11

## 2024-04-23 RX ADMIN — Medication 250 MILLIGRAM(S): at 21:35

## 2024-04-23 RX ADMIN — Medication 120 MILLIGRAM(S): at 10:11

## 2024-04-23 NOTE — CONSULT NOTE ADULT - SUBJECTIVE AND OBJECTIVE BOX
Patient is a 69y old  Female who presents with a chief complaint of Fall     HPI:  70 y/o female with h/o DM II, HTN, A fib s/p recent ablation 1 month ago, recent diagnosis of splenic CA w/ plan for splenectomy on May 10th was admitted on  for increased weakness, frequent falls this week w/ most recent episode on the day of admission. Pt states she feels her legs are weak and unable to stand independently. States she fell twice this week. Denies numbness/tingling in her LE. No fever, chills reported at home. In the ED, pt noted to be in A FIB w/ RVR and received Cardizem gtt w/ home Amiodarone to bring rate down to 100-120s. Workup showed bacteriuria.     PMH: as above  PSH: as above  Meds: per reconciliation sheet, noted below  MEDICATIONS  (STANDING):  aMIOdarone    Tablet 200 milliGRAM(s) Oral daily  apixaban 5 milliGRAM(s) Oral two times a day  atorvastatin 40 milliGRAM(s) Oral at bedtime  dextrose 10% Bolus 125 milliLiter(s) IV Bolus once  dextrose 5%. 1000 milliLiter(s) (50 mL/Hr) IV Continuous <Continuous>  dextrose 5%. 1000 milliLiter(s) (100 mL/Hr) IV Continuous <Continuous>  dextrose 50% Injectable 25 Gram(s) IV Push once  dextrose 50% Injectable 12.5 Gram(s) IV Push once  diltiazem    milliGRAM(s) Oral daily  glucagon  Injectable 1 milliGRAM(s) IntraMuscular once  insulin lispro (ADMELOG) corrective regimen sliding scale   SubCutaneous three times a day before meals  insulin lispro (ADMELOG) corrective regimen sliding scale   SubCutaneous at bedtime  metoprolol succinate  milliGRAM(s) Oral daily    MEDICATIONS  (PRN):  dextrose Oral Gel 15 Gram(s) Oral once PRN Blood Glucose LESS THAN 70 milliGRAM(s)/deciliter    Allergies    No Known Drug Allergies  Bee stings (Swelling)    Intolerances      Social: no smoking, no alcohol, no illegal drugs; no recent travel, no exposure to TB  FAMILY HISTORY:    no history of premature cardiovascular disease in first degree relatives    ROS: the patient denies fever, no chills, no HA, no seizures, no dizziness, no sore throat, no nasal congestion, no blurry vision, no CP, no palpitations, no SOB, no cough, no abdominal pain, no diarrhea, no N/V, has urinary frequency, no leg pain, no claudication, no rash, no joint aches, no rectal pain or bleeding, no night sweats  All other systems reviewed and are negative    Vital Signs Last 24 Hrs  T(C): 36.4 (2024 07:33), Max: 36.7 (2024 12:58)  T(F): 97.5 (2024 07:33), Max: 98.1 (2024 12:58)  HR: 65 (2024 10:00) (52 - 110)  BP: 148/82 (2024 10:00) (93/76 - 148/82)  BP(mean): --  RR: 18 (:33) (18 - 18)  SpO2: 96% (:33) (95% - 100%)    Parameters below as of 2024 07:33  Patient On (Oxygen Delivery Method): room air      Daily     Daily Weight in k.4 (2024 06:00)    PE:    Constitutional:  No acute distress  HEENT: NC/AT, EOMI, PERRLA, conjunctivae clear; ears and nose atraumatic; pharynx benign  Neck: supple; thyroid not palpable  Back: no tenderness  Respiratory: respiratory effort normal; clear to auscultation  Cardiovascular: S1S2 regular, no murmurs  Abdomen: soft, not tender, not distended, positive BS; no liver or spleen organomegaly  Genitourinary: no suprapubic tenderness  Lymphatic: no LN palpable  Musculoskeletal: no muscle tenderness, no joint swelling or tenderness  Extremities: no pedal edema  Neurological/ Psychiatric: AxOx3, judgement and insight normal; moving all extremities  Skin: no rashes; no palpable lesions    Labs: all available labs reviewed                        12.4   7.88  )-----------( 281      ( 2024 06:28 )             40.2     04-    145  |  116<H>  |  49<H>  ----------------------------<  99  5.4<H>   |  23  |  1.83<H>    Ca    10.7<H>      2024 06:28  Mg     2.1     -    TPro  6.5  /  Alb  2.8<L>  /  TBili  0.6  /  DBili  x   /  AST  22  /  ALT  45  /  AlkPhos  68  04-23     LIVER FUNCTIONS - ( 2024 06:28 )  Alb: 2.8 g/dL / Pro: 6.5 gm/dL / ALK PHOS: 68 U/L / ALT: 45 U/L / AST: 22 U/L / GGT: x           Culture - Urine (collected 2024 08:31)  Source: Clean Catch None  Preliminary Report (2024 16:22):    50,000 - 99,000 CFU/mL Streptococcus gallolyticus "Susceptibilities not    performed"    10,000 - 49,000 CFU/mL Escherichia coli    Radiology: all available radiological tests reviewed    < from: CT Lumbar Spine No Cont (24 @ 14:27) >  No acute fracture or traumatic malalignment.  MRI would be required to evaluate the ligamentous structures at higher   sensitivity as well as for better evaluation of the lumbar canal and its contents.  < end of copied text >    Advanced directives addressed: full resuscitation Patient is a 69y old  Female who presents with a chief complaint of Fall     HPI:  68 y/o female with h/o DM II, HTN, A fib s/p recent ablation 1 month ago, recent diagnosis of splenic CA w/ plan for splenectomy on May 10th was admitted on  for increased weakness, frequent falls this week w/ most recent episode on the day of admission. Pt states she feels her legs are weak and unable to stand independently. States she fell twice this week. Denies numbness/tingling in her LE. No fever, chills reported at home. In the ED, pt noted to be in A FIB w/ RVR and received Cardizem gtt w/ home Amiodarone to bring rate down to 100-120s. Workup showed bacteriuria.   Marie in place.    PMH: as above  PSH: as above  Meds: per reconciliation sheet, noted below  MEDICATIONS  (STANDING):  aMIOdarone    Tablet 200 milliGRAM(s) Oral daily  apixaban 5 milliGRAM(s) Oral two times a day  atorvastatin 40 milliGRAM(s) Oral at bedtime  dextrose 10% Bolus 125 milliLiter(s) IV Bolus once  dextrose 5%. 1000 milliLiter(s) (50 mL/Hr) IV Continuous <Continuous>  dextrose 5%. 1000 milliLiter(s) (100 mL/Hr) IV Continuous <Continuous>  dextrose 50% Injectable 25 Gram(s) IV Push once  dextrose 50% Injectable 12.5 Gram(s) IV Push once  diltiazem    milliGRAM(s) Oral daily  glucagon  Injectable 1 milliGRAM(s) IntraMuscular once  insulin lispro (ADMELOG) corrective regimen sliding scale   SubCutaneous three times a day before meals  insulin lispro (ADMELOG) corrective regimen sliding scale   SubCutaneous at bedtime  metoprolol succinate  milliGRAM(s) Oral daily    MEDICATIONS  (PRN):  dextrose Oral Gel 15 Gram(s) Oral once PRN Blood Glucose LESS THAN 70 milliGRAM(s)/deciliter    Allergies    No Known Drug Allergies  Bee stings (Swelling)    Intolerances      Social: no smoking, no alcohol, no illegal drugs; no recent travel, no exposure to TB  FAMILY HISTORY:    no history of premature cardiovascular disease in first degree relatives    ROS: the patient denies fever, no chills, no HA, no seizures, no dizziness, no sore throat, no nasal congestion, no blurry vision, no CP, no palpitations, no SOB, no cough, no abdominal pain, no diarrhea, no N/V, has urinary frequency, no leg pain, no claudication, no rash, no joint aches, no rectal pain or bleeding, no night sweats  All other systems reviewed and are negative    Vital Signs Last 24 Hrs  T(C): 36.4 (2024 07:33), Max: 36.7 (2024 12:58)  T(F): 97.5 (2024 07:33), Max: 98.1 (2024 12:58)  HR: 65 (2024 10:00) (52 - 110)  BP: 148/82 (2024 10:00) (93/76 - 148/82)  BP(mean): --  RR: 18 (2024 07:33) (18 - 18)  SpO2: 96% (:33) (95% - 100%)    Parameters below as of 2024 07:33  Patient On (Oxygen Delivery Method): room air      Daily     Daily Weight in k.4 (2024 06:00)    PE:    Constitutional:  No acute distress  HEENT: NC/AT, EOMI, PERRLA, conjunctivae clear; ears and nose atraumatic; pharynx benign  Neck: supple; thyroid not palpable  Back: no tenderness  Respiratory: respiratory effort normal; clear to auscultation  Cardiovascular: S1S2 regular, no murmurs  Abdomen: soft, not tender, not distended, positive BS; no liver or spleen organomegaly  Genitourinary: no suprapubic tenderness  Lymphatic: no LN palpable  Musculoskeletal: no muscle tenderness, no joint swelling or tenderness  Extremities: no pedal edema  Neurological/ Psychiatric: AxOx3, judgement and insight normal; moving all extremities  Skin: no rashes; no palpable lesions    Labs: all available labs reviewed                        12.   7  )-----------( 281      ( 2024 06:28 )             40.2     04-    145  |  116<H>  |  49<H>  ----------------------------<  99  5.4<H>   |  23  |  1.83<H>    Ca    10.7<H>      2024 06:28  Mg     2.1     04-    TPro  6.5  /  Alb  2.8<L>  /  TBili  0.6  /  DBili  x   /  AST  22  /  ALT  45  /  AlkPhos  68  04-23     LIVER FUNCTIONS - ( 2024 06:28 )  Alb: 2.8 g/dL / Pro: 6.5 gm/dL / ALK PHOS: 68 U/L / ALT: 45 U/L / AST: 22 U/L / GGT: x           Culture - Urine (collected 2024 08:31)  Source: Clean Catch None  Preliminary Report (2024 16:22):    50,000 - 99,000 CFU/mL Streptococcus gallolyticus "Susceptibilities not    performed"    10,000 - 49,000 CFU/mL Escherichia coli    Radiology: all available radiological tests reviewed    < from: CT Lumbar Spine No Cont (24 @ 14:27) >  No acute fracture or traumatic malalignment.  MRI would be required to evaluate the ligamentous structures at higher   sensitivity as well as for better evaluation of the lumbar canal and its contents.  < end of copied text >    Advanced directives addressed: full resuscitation

## 2024-04-23 NOTE — PROGRESS NOTE ADULT - ASSESSMENT
patient with falls/difficutly with urination and recurrent afib, s/p dc/cv.    1-Afib-on metoprolol and amiodarone-d/w EPS possible side effect to medications.  May consider afib ablation ASAP and then cessation of amiodarone.  Continue metoprolol.  No ischmemia on nuclear stress testing.   Failed sotolol; may consider flecainide    2-ataxia and urinary retention-consider neuro and  evaluation; aside from medications; obstructive masses, neoplasms, stones, gyn issues, bladder issues infections (ie lyme, viruses) and immune (GB. transverse myelitis, etc) should be evaluated.  MRI of spine/pelvis if no resolution with cessation of medications

## 2024-04-23 NOTE — PROGRESS NOTE ADULT - SUBJECTIVE AND OBJECTIVE BOX
HOSPITALIST ATTENDING PROGRESS NOTE     Chart and meds reviewed. Patient seen and examined     CC: Fall    Subjective: Pt seen and evaluated. Still reports of "jelly legs". No other acute complaints.   Hospital course reviewed with sister in great detail. 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 Artis.       All other systems and founds to be negative with exception of what has been described above.         PHYSICAL EXAM:  Vital Signs Last 24 Hrs  T(C): 36.8 (2024 20:31), Max: 36.8 (2024 20:31)  T(F): 98.2 (2024 20:31), Max: 98.2 (2024 20:31)  HR: 61 (2024 20:31) (59 - 65)  BP: 142/47 (2024 20:31) (142/47 - 148/82)  RR: 18 (2024 20:31) (18 - 18)  SpO2: 100% (2024 20:31) (96% - 100%)    Parameters below as of 2024 20:31  Patient On (Oxygen Delivery Method): room air      Daily     Daily Weight in k.4 (2024 06:00)    GEN: NAD   HEENT: EOMI,  moist mucous membranes  NECK : Soft and supple, no JVD  LUNG: CTABL, No wheezing, rales or rhonchi  CVS: S1S2+, RRR, no M/G/R  GI: BS+, soft, NT/ND, no guarding, no rebound, +carson in place   EXTREMITIES: No peripheral edema  VASCULAR: 2+ peripheral pulses  NEURO: AAOx3, grossly non-focal   SKIN: No rashes    HOME MEDICATIONS:  Home Medications:  amiodarone 200 mg oral tablet: 1 tab(s) orally once a day (2024 11:02)  atorvastatin 40 mg oral tablet: 1 tab(s) orally once a day (2024 09:51)  DilTIAZem (Eqv-Cardizem CD) 120 mg/24 hours oral capsule, extended release: 1 cap(s) orally once a day (2024 11:02)  Edarbi 80 mg oral tablet: 1 tab(s) orally once a day (2024 09:51)  Eliquis 5 mg oral tablet: 1 tab(s) orally 2 times a day (2024 09:51)  ezetimibe 10 mg oral tablet: 1 tab(s) orally once a day (2024 09:51)  glipiZIDE 5 mg oral tablet: 1 tab(s) orally once a day (2024 09:51)  Jardiance 10 mg oral tablet: 1 tab(s) orally once a day (2024 09:51)  spironolactone 25 mg oral tablet: 1 tab(s) orally once a day (2024 12:40)  Toprol- mg oral tablet, extended release: 1 tab(s) orally once a day (2024 09:51)      MEDICATIONS  MEDICATIONS  (STANDING):  aMIOdarone    Tablet 200 milliGRAM(s) Oral daily  apixaban 5 milliGRAM(s) Oral two times a day  atorvastatin 40 milliGRAM(s) Oral at bedtime  cefuroxime   Tablet 250 milliGRAM(s) Oral every 12 hours  dextrose 10% Bolus 125 milliLiter(s) IV Bolus once  dextrose 5%. 1000 milliLiter(s) (50 mL/Hr) IV Continuous <Continuous>  dextrose 5%. 1000 milliLiter(s) (100 mL/Hr) IV Continuous <Continuous>  dextrose 50% Injectable 25 Gram(s) IV Push once  dextrose 50% Injectable 12.5 Gram(s) IV Push once  diltiazem    milliGRAM(s) Oral daily  glucagon  Injectable 1 milliGRAM(s) IntraMuscular once  insulin lispro (ADMELOG) corrective regimen sliding scale   SubCutaneous three times a day before meals  insulin lispro (ADMELOG) corrective regimen sliding scale   SubCutaneous at bedtime  metoprolol succinate  milliGRAM(s) Oral daily      LABS: All Labs Reviewed:                        12.4   7.88  )-----------( 281      ( 2024 06:28 )             40.2     04-23    145  |  116<H>  |  49<H>  ----------------------------<  99  5.4<H>   |  23  |  1.83<H>    Ca    10.7<H>      2024 06:28  Mg     2.1     04-22    TPro  6.5  /  Alb  2.8<L>  /  TBili  0.6  /  DBili  x   /  AST  22  /  ALT  45  /  AlkPhos  68          Urinalysis Basic - ( 2024 06:28 )    Color: x / Appearance: x / SG: x / pH: x  Gluc: 99 mg/dL / Ketone: x  / Bili: x / Urobili: x   Blood: x / Protein: x / Nitrite: x   Leuk Esterase: x / RBC: x / WBC x   Sq Epi: x / Non Sq Epi: x / Bacteria: x        Blood Culture:  @ 08:31  Organism Escherichia coli  Gram Stain Blood -- Gram Stain --  Specimen Source Clean Catch None  Culture-Blood --      I&O's Detail    2024 07:01  -  2024 07:00  --------------------------------------------------------  IN:  Total IN: 0 mL    OUT:    Indwelling Catheter - Urethral (mL): 300 mL  Total OUT: 300 mL    Total NET: -300 mL      2024 07:01  -  2024 22:53  --------------------------------------------------------  IN:  Total IN: 0 mL    OUT:    Indwelling Catheter - Urethral (mL): 390 mL  Total OUT: 390 mL    Total NET: -390 mL        CAPILLARY BLOOD GLUCOSE      POCT Blood Glucose.: 102 mg/dL (2024 21:35)  POCT Blood Glucose.: 120 mg/dL (2024 16:32)  POCT Blood Glucose.: 139 mg/dL (2024 12:18)  POCT Blood Glucose.: 98 mg/dL (2024 07:59)  POCT Blood Glucose.: 91 mg/dL (2024 22:58)        CARDIOLOGY TESTING   EKG: reviewed     ECHO: reviewd       RADIOLOGY: reviewed

## 2024-04-23 NOTE — PROGRESS NOTE ADULT - SUBJECTIVE AND OBJECTIVE BOX
CHIEF COMPLAINT: Patient is a 69y old  Female who presents with a chief complaint of Fall (22 Apr 2024 15:35)      HPI:  70yo female with hx of DM II, HTN, A fib s/p recent ablation 1 month ago, recent diagnosis of Splenic CA w/ plan for splenectomy on May 10th, presented to the ED w/ complaints of not feeling well as well as frequent falls this week w/ most recent episode today. Pt states she feels her legs are weak and unable to stand independently. States she fell twice this week. Denies numbness/tingling in her LE. Denies cp, palpitations, sob, abd pain, N/V, fever, chills.    In the ED, pt noted to be in A FIB w/ RVR unable to control w/ IV Cardizem. Started on Cardizem gtt w/ home Amiodarone to bring rate down to 100-120s.  (19 Apr 2024 12:42)    4/22-patient well known to me; with above history-however did not have ablation, but rather underwent DC/CV for afib by Dr. Haider due to poor response from medications.  Patient underwent procedure to control heart rate for needed surgery for splenic cancer.  PAtient was in office last week and was in NSR, but started to feel poorly last week after office visit and fell multiple times; pt bibems from home. As per ems, " witnessed fall, - head strike, - loc, witnessed by family and slid down wall". Pt remembers sliding down wall. Pt A&ox4 and endorses falling a lot more recently.   Pt reportedly fell on Sunday, and fell again this evening. Pt states she had "jelly legs" and they just gave out under her. In the ED, pt is in Afib in RVR in 130's.   Came to ER-noted to be in Afib/RVR and difficult to control.  Given amiodarone in ER and currently HR in the 80's and BP in the 130/80's.          70 yo F with above PMHx including AFib s/p DCCV on 4/1/24, compliant with meds- eliquis, amio, cardizem 120mg, toprol 100mg presented with recurrent falls d/t worsening leg weakness, denies syncope, found to have AF w/RVR, denies any symptoms from AFib.   IV cardizem drip for rate control-titrate to keep HR < 110;  continue amiodarone 200mg po daily, continue home BB daily;  uninterrupted eliquis 5mg BID.   Repeat echo and maintain K+>4.0, Mg++>2.0.   Hold Jardiance for 3 days, plan for DCCV on Monday (4/22/24)     4/23-patient s/p DC/CV (seond time) currently in simus rhythm at 60bpm.   Will need 4-6 weeks of AC (d/w her surgeon-pushed surgery back to end of may).   D/w EPS-consideration for atrial fibrillation ablation prior to surgery is now a posibility.  Concern for ataxia and urinary retention.   (Amiodarone is only new medication and while ataxia is a known side effect, urinary retention is not).          PMHx: PAST MEDICAL & SURGICAL HISTORY:  HTN (hypertension)      Hyperlipidemia      DM (diabetes mellitus)      Arthritis      Ovarian cancer  and uterine cancer--2004 surgery & chemo      Obesity, morbid, BMI 40.0-49.9      H/O umbilical hernia repair  2005      S/P total hysterectomy  bilateral salpingectomy-oophorectomy --2004 ovarian & uterine cancer      H/O total knee replacement, right          Allergies: Allergies    No Known Drug Allergies  Bee stings (Swelling)    Intolerances          REVIEW OF SYSTEMS:    CONSTITUTIONAL: No weakness, fevers or chills  EYES/ENT: No visual changes;  No vertigo or throat pain   NECK: No pain or stiffness  RESPIRATORY: No cough, wheezing, hemoptysis; No shortness of breath  CARDIOVASCULAR: No chest pain or palpitations  GASTROINTESTINAL: No abdominal or epigastric pain. No nausea, vomiting, or hematemesis; No diarrhea or constipation. No melena or hematochezia.  GENITOURINARY: No dysuria, frequency or hematuria  NEUROLOGICAL: No numbness or weakness  SKIN: No itching, burning, rashes, or lesions   All other review of systems is negative unless indicated above    Vital Signs Last 24 Hrs  T(C): 36.7 (22 Apr 2024 20:12), Max: 36.8 (22 Apr 2024 07:32)  T(F): 98 (22 Apr 2024 20:12), Max: 98.2 (22 Apr 2024 07:32)  HR: 64 (22 Apr 2024 20:12) (52 - 110)  BP: 148/66 (22 Apr 2024 20:12) (93/76 - 148/66)  BP(mean): --  RR: 18 (22 Apr 2024 20:12) (18 - 18)  SpO2: 98% (22 Apr 2024 20:12) (95% - 100%)    Parameters below as of 22 Apr 2024 20:12  Patient On (Oxygen Delivery Method): room air        I&O's Summary    22 Apr 2024 07:01  -  23 Apr 2024 07:00  --------------------------------------------------------  IN: 0 mL / OUT: 300 mL / NET: -300 mL            PHYSICAL EXAM:   Constitutional: NAD, awake and alert, well-developed  HEENT: PERR, EOMI, Normal Hearing, MMM  Neck: Soft and supple, No LAD, No JVD  Respiratory: Breath sounds are clear bilaterally, No wheezing, rales or rhonchi  Cardiovascular: S1 and S2, regular rate and rhythm, no Murmurs, gallops or rubs  Gastrointestinal: Bowel Sounds present, soft, nontender, nondistended, no guarding, no rebound  Extremities: No peripheral edema  Vascular: 2+ peripheral pulses  Neurological: A/O x 3, no focal deficits  Musculoskeletal: 5/5 strength b/l upper and lower extremities  Skin: No rashes    MEDICATIONS:  MEDICATIONS  (STANDING):  aMIOdarone    Tablet 200 milliGRAM(s) Oral daily  apixaban 5 milliGRAM(s) Oral two times a day  atorvastatin 40 milliGRAM(s) Oral at bedtime  dextrose 10% Bolus 125 milliLiter(s) IV Bolus once  dextrose 5%. 1000 milliLiter(s) (50 mL/Hr) IV Continuous <Continuous>  dextrose 5%. 1000 milliLiter(s) (100 mL/Hr) IV Continuous <Continuous>  dextrose 50% Injectable 25 Gram(s) IV Push once  dextrose 50% Injectable 12.5 Gram(s) IV Push once  diltiazem    milliGRAM(s) Oral daily  glucagon  Injectable 1 milliGRAM(s) IntraMuscular once  insulin lispro (ADMELOG) corrective regimen sliding scale   SubCutaneous three times a day before meals  insulin lispro (ADMELOG) corrective regimen sliding scale   SubCutaneous at bedtime  metoprolol succinate  milliGRAM(s) Oral daily      LABS: All Labs Reviewed:                        13.2   9.43  )-----------( 296      ( 22 Apr 2024 06:38 )             41.2     04-22    144  |  117<H>  |  41<H>  ----------------------------<  118<H>  5.3   |  20<L>  |  1.70<H>    Ca    10.6<H>      22 Apr 2024 06:38  Mg     2.1     04-22    TPro  6.7  /  Alb  2.9<L>  /  TBili  0.6  /  DBili  x   /  AST  27  /  ALT  49  /  AlkPhos  71  04-22          Blood Culture: Organism --  Gram Stain Blood -- Gram Stain --  Specimen Source Clean Catch None  Culture-Blood --          BNP     RADIOLOGY:    EKG:      Telemetry:    ECHO:

## 2024-04-23 NOTE — PROGRESS NOTE ADULT - ASSESSMENT
70yo female with hx of DM II, HTN, A fib s/p recent ablation 1 month ago, recent diagnosis of Splenic CA w/ plan for splenectomy on May 10th, presented to the ED w/ complaints of not feeling well as well as frequent falls this week w/ most recent episode today, noted to have A FIB w/ RVR    #pAfib with RVR   -s/p DCCV  on 04/22/24  -currenlty in NSR   -Eliquis   -Cardizem 120mg   -Amiodarone 200mg PO daily   -Metoprolol Succinate 100mg   -May consider afib ablation ASAP and then cessation of amiodarone.  No ischmemia on nuclear stress testing.     -previously failed sotolol, per cards     -EP reccs nooted     #JUSTINO on CKD, with urinary retention    -unsure if prerenal or post renal   -UA: reviewed   -UCx: Strep Gallolyticus and E.coli   -Ceftin 250mg PO   -carson in place   -check RBUS   -monitor i&o's   -avoid nephrotoxic medications     #Frequent falls, reports of "jelly legs" with ?urinary retention, neuropathy  -CT L/s spine noted: moderate multilevel central canal and neural foraminal stenosis  -f/u on TSH, Vitamin B12   -f/u on MRI brain, C/S and L/S (ideally would like to do with IV contrast but use of IV contrast limited due to renal function_  -Neurology eval   -PT consult   -PT consult    #Diabetes   -hold oral medications: Glipizide, Jardiance   -A1c: 6.1  -RISS    #Splenomegaly, 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 Dobbins Scheduled for abdominal CT with IV contrast   4/24/24  -Dr Artis her surg/onc physician to speak with Dr Zabmrano for possible abdominal CT while currently inpatient  -family has all the recent imaging on [facility]. imaging done in Jan reviewed including CT, PET     #Hyperkalemia with  -repeat labs in AM     #Hypercalcemia, ?unsure etiology. Pt with significant Hx of malignancy   -corrected Ca: 11.7  -monitor and trend     #VTE  -Eliquis    #Advanced Care Directives   -FULL CODE for now   -sister is the HCP, will bring documents in AM     discussed with sister/HCP in great detail

## 2024-04-23 NOTE — CONSULT NOTE ADULT - ASSESSMENT
70 y/o female with h/o DM II, HTN, A fib s/p recent ablation 1 month ago, recent diagnosis of splenic CA w/ plan for splenectomy on May 10th was admitted on 4/19 for increased weakness, frequent falls this week w/ most recent episode on the day of admission. Pt states she feels her legs are weak and unable to stand independently. States she fell twice this week. Denies numbness/tingling in her LE. No fever, chills reported at home. In the ED, pt noted to be in A FIB w/ RVR and received Cardizem gtt w/ home Amiodarone to bring rate down to 100-120s. Workup showed bacteriuria.    1. Pyuria. Probable UTI. Bacteriuria with strep gallolyticus. 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 Splenic Ca.  -cultures reviewed   -no clinical signs of sepsis     70 y/o female with h/o DM II, HTN, A fib s/p recent ablation 1 month ago, recent diagnosis of splenic CA w/ plan for splenectomy on May 10th was admitted on 4/19 for increased weakness, frequent falls this week w/ most recent episode on the day of admission. Pt states she feels her legs are weak and unable to stand independently. States she fell twice this week. Denies numbness/tingling in her LE. No fever, chills reported at home. In the ED, pt noted to be in A FIB w/ RVR and received Cardizem gtt w/ home Amiodarone to bring rate down to 100-120s. Workup showed bacteriuria.    1. Pyuria. Probable UTI. Bacteriuria with strep gallolyticus. Urinary retention with carson in place. 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 Splenic Ca.  -cultures reviewed   -no clinical signs of sepsis  -start ceftin 250 mg PO q12h  -reason for abx use and side effects reviewed with patient; monitor BMP   -old chart reviewed to assess prior cultures  -monitor temps  -f/u CBC  -supportive care  2. Other issues:   -care per medicine    IV to PO abx token dose not apply.

## 2024-04-24 LAB
ADD ON TEST-SPECIMEN IN LAB: SIGNIFICANT CHANGE UP
ALBUMIN SERPL ELPH-MCNC: 2.7 G/DL — LOW (ref 3.3–5)
ALP SERPL-CCNC: 63 U/L — SIGNIFICANT CHANGE UP (ref 40–120)
ALT FLD-CCNC: 47 U/L — SIGNIFICANT CHANGE UP (ref 12–78)
ANION GAP SERPL CALC-SCNC: 6 MMOL/L — SIGNIFICANT CHANGE UP (ref 5–17)
AST SERPL-CCNC: 23 U/L — SIGNIFICANT CHANGE UP (ref 15–37)
BASOPHILS # BLD AUTO: 0.03 K/UL — SIGNIFICANT CHANGE UP (ref 0–0.2)
BASOPHILS NFR BLD AUTO: 0.4 % — SIGNIFICANT CHANGE UP (ref 0–2)
BILIRUB SERPL-MCNC: 0.5 MG/DL — SIGNIFICANT CHANGE UP (ref 0.2–1.2)
BUN SERPL-MCNC: 55 MG/DL — HIGH (ref 7–23)
CALCIUM SERPL-MCNC: 10.8 MG/DL — HIGH (ref 8.5–10.1)
CHLORIDE SERPL-SCNC: 117 MMOL/L — HIGH (ref 96–108)
CK SERPL-CCNC: 55 U/L — SIGNIFICANT CHANGE UP (ref 26–192)
CO2 SERPL-SCNC: 20 MMOL/L — LOW (ref 22–31)
CREAT SERPL-MCNC: 1.76 MG/DL — HIGH (ref 0.5–1.3)
EGFR: 31 ML/MIN/1.73M2 — LOW
EOSINOPHIL # BLD AUTO: 0.25 K/UL — SIGNIFICANT CHANGE UP (ref 0–0.5)
EOSINOPHIL NFR BLD AUTO: 3.4 % — SIGNIFICANT CHANGE UP (ref 0–6)
GLUCOSE BLDC GLUCOMTR-MCNC: 100 MG/DL — HIGH (ref 70–99)
GLUCOSE BLDC GLUCOMTR-MCNC: 121 MG/DL — HIGH (ref 70–99)
GLUCOSE BLDC GLUCOMTR-MCNC: 86 MG/DL — SIGNIFICANT CHANGE UP (ref 70–99)
GLUCOSE BLDC GLUCOMTR-MCNC: 96 MG/DL — SIGNIFICANT CHANGE UP (ref 70–99)
GLUCOSE SERPL-MCNC: 111 MG/DL — HIGH (ref 70–99)
HCT VFR BLD CALC: 39.1 % — SIGNIFICANT CHANGE UP (ref 34.5–45)
HGB BLD-MCNC: 12.1 G/DL — SIGNIFICANT CHANGE UP (ref 11.5–15.5)
IMM GRANULOCYTES NFR BLD AUTO: 0.3 % — SIGNIFICANT CHANGE UP (ref 0–0.9)
LYMPHOCYTES # BLD AUTO: 1.07 K/UL — SIGNIFICANT CHANGE UP (ref 1–3.3)
LYMPHOCYTES # BLD AUTO: 14.6 % — SIGNIFICANT CHANGE UP (ref 13–44)
MAGNESIUM SERPL-MCNC: 2.2 MG/DL — SIGNIFICANT CHANGE UP (ref 1.6–2.6)
MCHC RBC-ENTMCNC: 28.4 PG — SIGNIFICANT CHANGE UP (ref 27–34)
MCHC RBC-ENTMCNC: 30.9 GM/DL — LOW (ref 32–36)
MCV RBC AUTO: 91.8 FL — SIGNIFICANT CHANGE UP (ref 80–100)
MONOCYTES # BLD AUTO: 0.65 K/UL — SIGNIFICANT CHANGE UP (ref 0–0.9)
MONOCYTES NFR BLD AUTO: 8.8 % — SIGNIFICANT CHANGE UP (ref 2–14)
NEUTROPHILS # BLD AUTO: 5.33 K/UL — SIGNIFICANT CHANGE UP (ref 1.8–7.4)
NEUTROPHILS NFR BLD AUTO: 72.5 % — SIGNIFICANT CHANGE UP (ref 43–77)
PHOSPHATE SERPL-MCNC: 4.2 MG/DL — SIGNIFICANT CHANGE UP (ref 2.5–4.5)
PLATELET # BLD AUTO: 245 K/UL — SIGNIFICANT CHANGE UP (ref 150–400)
POTASSIUM SERPL-MCNC: 5.1 MMOL/L — SIGNIFICANT CHANGE UP (ref 3.5–5.3)
POTASSIUM SERPL-SCNC: 5.1 MMOL/L — SIGNIFICANT CHANGE UP (ref 3.5–5.3)
PROT SERPL-MCNC: 6.3 GM/DL — SIGNIFICANT CHANGE UP (ref 6–8.3)
RBC # BLD: 4.26 M/UL — SIGNIFICANT CHANGE UP (ref 3.8–5.2)
RBC # FLD: 15.6 % — HIGH (ref 10.3–14.5)
SODIUM SERPL-SCNC: 143 MMOL/L — SIGNIFICANT CHANGE UP (ref 135–145)
TSH SERPL-MCNC: <0.01 UU/ML — LOW (ref 0.34–4.82)
VIT B12 SERPL-MCNC: 1055 PG/ML — SIGNIFICANT CHANGE UP (ref 232–1245)
WBC # BLD: 7.35 K/UL — SIGNIFICANT CHANGE UP (ref 3.8–10.5)
WBC # FLD AUTO: 7.35 K/UL — SIGNIFICANT CHANGE UP (ref 3.8–10.5)

## 2024-04-24 PROCEDURE — 99223 1ST HOSP IP/OBS HIGH 75: CPT

## 2024-04-24 PROCEDURE — 99232 SBSQ HOSP IP/OBS MODERATE 35: CPT

## 2024-04-24 PROCEDURE — 76770 US EXAM ABDO BACK WALL COMP: CPT | Mod: 26

## 2024-04-24 RX ADMIN — Medication 250 MILLIGRAM(S): at 21:25

## 2024-04-24 RX ADMIN — Medication 120 MILLIGRAM(S): at 10:57

## 2024-04-24 RX ADMIN — AMIODARONE HYDROCHLORIDE 200 MILLIGRAM(S): 400 TABLET ORAL at 10:56

## 2024-04-24 RX ADMIN — APIXABAN 5 MILLIGRAM(S): 2.5 TABLET, FILM COATED ORAL at 10:56

## 2024-04-24 RX ADMIN — APIXABAN 5 MILLIGRAM(S): 2.5 TABLET, FILM COATED ORAL at 21:25

## 2024-04-24 RX ADMIN — Medication 250 MILLIGRAM(S): at 10:56

## 2024-04-24 RX ADMIN — Medication 100 MILLIGRAM(S): at 10:57

## 2024-04-24 RX ADMIN — ATORVASTATIN CALCIUM 40 MILLIGRAM(S): 80 TABLET, FILM COATED ORAL at 21:25

## 2024-04-24 NOTE — CONSULT NOTE ADULT - SUBJECTIVE AND OBJECTIVE BOX
CC: recurrent falls      HPI:  70yo female with hx of DM II, HTN, A fib s/p recent ablation 1 month ago, recent diagnosis of Splenic CA w/ plan for splenectomy on May 10th, presented to the ED w/ complaints of not feeling well as well as frequent falls this week w/ most recent episode today. Pt states she feels her legs are weak and unable to stand independently. States she fell twice this week. Denies numbness/tingling in her LE. Denies cp, palpitations, sob, abd pain, N/V, fever, chills.    In the ED, pt noted to be in A FIB w/ RVR unable to control w/ IV Cardizem. Started on Cardizem gtt w/ home Amiodarone to bring rate down to 100-120s.  (19 Apr 2024 12:42)      PAST MEDICAL & SURGICAL HISTORY:  HTN (hypertension)      Hyperlipidemia      DM (diabetes mellitus)      Arthritis      Ovarian cancer  and uterine cancer--2004 surgery & chemo      Obesity, morbid, BMI 40.0-49.9      H/O umbilical hernia repair  2005      S/P total hysterectomy  bilateral salpingectomy-oophorectomy --2004 ovarian & uterine cancer      H/O total knee replacement, right          FAMILY HISTORY:      Social Hx:  Nonsmoker, no drug or alcohol use.  Lives with sister    MEDICATIONS  (STANDING):  aMIOdarone    Tablet 200 milliGRAM(s) Oral daily  apixaban 5 milliGRAM(s) Oral two times a day  atorvastatin 40 milliGRAM(s) Oral at bedtime  cefuroxime   Tablet 250 milliGRAM(s) Oral every 12 hours  dextrose 10% Bolus 125 milliLiter(s) IV Bolus once  dextrose 5%. 1000 milliLiter(s) (50 mL/Hr) IV Continuous <Continuous>  dextrose 5%. 1000 milliLiter(s) (100 mL/Hr) IV Continuous <Continuous>  dextrose 50% Injectable 25 Gram(s) IV Push once  dextrose 50% Injectable 12.5 Gram(s) IV Push once  diltiazem    milliGRAM(s) Oral daily  glucagon  Injectable 1 milliGRAM(s) IntraMuscular once  insulin lispro (ADMELOG) corrective regimen sliding scale   SubCutaneous three times a day before meals  insulin lispro (ADMELOG) corrective regimen sliding scale   SubCutaneous at bedtime  metoprolol succinate  milliGRAM(s) Oral daily       Allergies  No Known Drug Allergies  Bee stings (Swelling)      ROS: Pertinent positives in HPI, all other ROS were reviewed and are negative.      Vital Signs Last 24 Hrs  T(C): 36.7 (24 Apr 2024 08:08), Max: 36.8 (23 Apr 2024 20:31)  T(F): 98 (24 Apr 2024 08:08), Max: 98.2 (23 Apr 2024 20:31)  HR: 56 (24 Apr 2024 08:08) (56 - 61)  BP: 145/52 (24 Apr 2024 08:08) (142/47 - 145/52)  BP(mean): --  RR: 18 (24 Apr 2024 08:08) (18 - 18)  SpO2: 100% (24 Apr 2024 08:08) (100% - 100%)        Constitutional: awake and alert.  HEENT: PERRLA, EOMI,   Neck: Supple.  Respiratory: Breath sounds are clear bilaterally  Cardiovascular: S1 and S2, regular / irregular rhythm  Gastrointestinal: soft, nontender  Extremities:  no edema  Vascular: Caritid Bruit - no  Musculoskeletal: no joint swelling/tenderness, no abnormal movements  Skin: No rashes    Neurological exam:  HF: A x O x 3. Appropriately interactive, normal affect. Speech fluent, No Aphasia or paraphasic errors. Naming /repetition intact   CN: SHANA, EOMI, VFF, facial sensation normal, no NLFD, tongue midline, Palate moves equally, SCM equal bilaterally  Motor: No pronator drift, Strength 5/5 in all 4 ext, normal bulk and tone, no tremor, rigidity or bradykinesia.    Sens: Intact to light touch / PP/ VS/ JS    Reflexes: Symmetric and normal . BJ 2+, BR 2+, KJ 2+, AJ 2+, downgoing toes b/l  Coord:  No FNFA, dysmetria, AGATA intact   Gait/Balance: Normal/Cannot test    NIHSS:          Labs:   04-24    143  |  117<H>  |  55<H>  ----------------------------<  111<H>  5.1   |  20<L>  |  1.76<H>    Ca    10.8<H>      24 Apr 2024 06:45  Phos  4.2     04-24  Mg     2.2     04-24    TPro  6.3  /  Alb  2.7<L>  /  TBili  0.5  /  DBili  x   /  AST  23  /  ALT  47  /  AlkPhos  63  04-24                              12.1   7.35  )-----------( 245      ( 24 Apr 2024 06:45 )             39.1       Radiology:  < from: CT Head No Cont (04.19.24 @ 08:15) >  IMPRESSION: No acute intracranial hemorrhage, mass effect, or shift of   the midline structures.    Chronic lacunar infarcts within the right centrum semiovale.           CC: recurrent falls      HPI:  70 yo female with hx of learning disability, DM II, HTN, rapid A fib s/p recent ablation 1 month ago and s/p DCCV here 4/22, ovarian and uterine cancer, recent diagnosis of Splenic CA w/ plan for splenectomy on May 10th, presented s/p fall.  Pt. states she was walking to the bathroom and she had weakness of her LLE and fell.  20 minutes later her family heard her and called the ambulance.  There was no LOC, pain, dysarthria, diplopia, paresthesias, dizziness.  She ahd a similar episode a few days before that where she fell getting out of her recliner due to LLE weakness but did  not go to the hospital, and about 1-2 months ago was walking and LLE gave out and she fell.  She has had urinary retention since saturday, denies bowel issues.  She also has had LBP x 2 months.  Prior nto the fall this admission she was able to walk anassisted but now is unable to ambulate unassisted.  Labs here show hypercalcemia.  Lumbar CT shows moderate stenosis and DDD, HCT does not show any acute findings, +chronic lacunar infarcts within the R centrum semiovale.  She now has a carson and is on antibiotics for a UTI.        PAST MEDICAL & SURGICAL HISTORY:  HTN (hypertension)      Hyperlipidemia      DM (diabetes mellitus)      Arthritis      Ovarian cancer  and uterine cancer--2004 surgery & chemo      Obesity, morbid, BMI 40.0-49.9      H/O umbilical hernia repair  2005      S/P total hysterectomy  bilateral salpingectomy-oophorectomy --2004 ovarian & uterine cancer      H/O total knee replacement, right          FAMILY HISTORY:      Social Hx:  Nonsmoker, no drug or alcohol use.  Lives with sister    MEDICATIONS  (STANDING):  aMIOdarone    Tablet 200 milliGRAM(s) Oral daily  apixaban 5 milliGRAM(s) Oral two times a day  atorvastatin 40 milliGRAM(s) Oral at bedtime  cefuroxime   Tablet 250 milliGRAM(s) Oral every 12 hours  dextrose 10% Bolus 125 milliLiter(s) IV Bolus once  dextrose 5%. 1000 milliLiter(s) (50 mL/Hr) IV Continuous <Continuous>  dextrose 5%. 1000 milliLiter(s) (100 mL/Hr) IV Continuous <Continuous>  dextrose 50% Injectable 25 Gram(s) IV Push once  dextrose 50% Injectable 12.5 Gram(s) IV Push once  diltiazem    milliGRAM(s) Oral daily  glucagon  Injectable 1 milliGRAM(s) IntraMuscular once  insulin lispro (ADMELOG) corrective regimen sliding scale   SubCutaneous three times a day before meals  insulin lispro (ADMELOG) corrective regimen sliding scale   SubCutaneous at bedtime  metoprolol succinate  milliGRAM(s) Oral daily       Allergies  No Known Drug Allergies  Bee stings (Swelling)      ROS: Pertinent positives in HPI, all other ROS were reviewed and are negative.      Vital Signs Last 24 Hrs  T(C): 36.7 (24 Apr 2024 08:08), Max: 36.8 (23 Apr 2024 20:31)  T(F): 98 (24 Apr 2024 08:08), Max: 98.2 (23 Apr 2024 20:31)  HR: 56 (24 Apr 2024 08:08) (56 - 61)  BP: 145/52 (24 Apr 2024 08:08) (142/47 - 145/52)  BP(mean): --  RR: 18 (24 Apr 2024 08:08) (18 - 18)  SpO2: 100% (24 Apr 2024 08:08) (100% - 100%)        Constitutional: awake, sitting in chair, NAD  HEAD: Normocephalic  Neck: Supple.  Extremities:  no edema  Musculoskeletal: no abnormal movements  Skin: No rashes    Neurological exam:  HF: A x O x 3. Appropriately interactive, flat affect. Speech fluent, No Aphasia or paraphasic errors. Naming /repetition intact   CN: SHANA, EOMI, VFF, facial sensation normal, no NLFD, tongue midline, Palate moves equally, SCM equal bilaterally  Motor: No pronator drift, Strength 5/5 in all 3 ext except for LLE 4/5 L knee extension, normal bulk and tone, no tremor, rigidity or bradykinesia.    Sens: Intact to light touch   Reflexes: BJ 1+, BR 1+, KJ trace+, AJ 0+, downgoing toes b/l  Coord:  No dysmetria, AGATA intact   Gait/Balance: Cannot test    NIHSS: 0          Labs:   04-24    143  |  117<H>  |  55<H>  ----------------------------<  111<H>  5.1   |  20<L>  |  1.76<H>    Ca    10.8<H>      24 Apr 2024 06:45  Phos  4.2     04-24  Mg     2.2     04-24    TPro  6.3  /  Alb  2.7<L>  /  TBili  0.5  /  DBili  x   /  AST  23  /  ALT  47  /  AlkPhos  63  04-24                              12.1   7.35  )-----------( 245      ( 24 Apr 2024 06:45 )             39.1       Radiology:  < from: CT Head No Cont (04.19.24 @ 08:15) >  IMPRESSION: No acute intracranial hemorrhage, mass effect, or shift of   the midline structures.    Chronic lacunar infarcts within the right centrum semiovale.    < from: CT Lumbar Spine No Cont (04.21.24 @ 14:27) >  IMPRESSION:    No acute fracture or traumatic malalignment.    MRI would be required to evaluate the ligamentous structures at higher   sensitivity as well as for better evaluation of the lumbar canal and its   contents.                 CC: Recurrent falls      HPI:  68 yo female with hx of learning disability, DM II, HTN, A-fib s/p ablation, s/p DCCV on 4/22, also has ovarian and uterine CA, recent diagnosis of Splenic CA, was scheduled for splenectomy on 5/10/24; presented to ED s/p fall. Pt reports she was walking to the bathroom,  she felt weakness of her LLE and fell, 20 minutes later her family heard her and called the ambulance, no LOC, pain, dysarthria, diplopia, paresthesias, dizziness or seizure like activity. She had a similar episode a few days prior when she fell while getting out of her recliner - left LLE gave way - did  not go to the hospital, again 1/ 1-2 months ago was walking and LLE gave out and she fell.  She has had urinary retention since saturday, denies bowel issues, also has mid - LBP x 2 months. Pt reports she was able to walk unassisted prior to this recent fall, now is unable to ambulate unassisted.      CT Lumbar shows moderate stenosis and DDD,   HCT does not show any acute findings, chronic lacunar infarcts within the R centrum semiovale.    Has carson cath now and is on antibiotics for a UTI.   Labs here show hypercalcemia.        PAST MEDICAL & SURGICAL HISTORY:  HTN (hypertension)  Hyperlipidemia  DM (diabetes mellitus)  Arthritis  Ovarian cancer and uterine cancer--2004 surgery & chemo  Obesity, morbid, BMI 40.0-49.9  H/O umbilical hernia repair 2005  S/P total hysterectomy bilateral salpingectomy-oophorectomy --2004 ovarian & uterine cancer  H/O total knee replacement, right      FAMILY HISTORY: No relevant history provided      Social Hx:  Nonsmoker, no drug or alcohol use.  Lives with sister      MEDICATIONS  (STANDING):  aMIOdarone    Tablet 200 milliGRAM(s) Oral daily  apixaban 5 milliGRAM(s) Oral two times a day  atorvastatin 40 milliGRAM(s) Oral at bedtime  cefuroxime   Tablet 250 milliGRAM(s) Oral every 12 hours  dextrose 10% Bolus 125 milliLiter(s) IV Bolus once  dextrose 5%. 1000 milliLiter(s) (50 mL/Hr) IV Continuous <Continuous>  dextrose 5%. 1000 milliLiter(s) (100 mL/Hr) IV Continuous <Continuous>  dextrose 50% Injectable 25 Gram(s) IV Push once  dextrose 50% Injectable 12.5 Gram(s) IV Push once  diltiazem    milliGRAM(s) Oral daily  glucagon  Injectable 1 milliGRAM(s) IntraMuscular once  insulin lispro (ADMELOG) corrective regimen sliding scale   SubCutaneous three times a day before meals  insulin lispro (ADMELOG) corrective regimen sliding scale   SubCutaneous at bedtime  metoprolol succinate  milliGRAM(s) Oral daily       Allergies  No Known Drug Allergies  Bee stings (Swelling)      ROS: Pertinent positives in HPI, all other ROS were reviewed and are negative.        Vital Signs Last 24 Hrs  T(C): 36.7 (24 Apr 2024 08:08), Max: 36.8 (23 Apr 2024 20:31)  T(F): 98 (24 Apr 2024 08:08), Max: 98.2 (23 Apr 2024 20:31)  HR: 56 (24 Apr 2024 08:08) (56 - 61)  BP: 145/52 (24 Apr 2024 08:08) (142/47 - 145/52)  BP(mean): --  RR: 18 (24 Apr 2024 08:08) (18 - 18)  SpO2: 100% (24 Apr 2024 08:08) (100% - 100%)        Constitutional: Obese, sitting in chair, NAD  HEAD: Normocephalic  Neck: Supple.  Extremities:  no edema  Musculoskeletal: no abnormal movements  Skin: No rashes      Neurological exam:  HF: A x O x 3. Appropriately interactive. Speech fluent, No Aphasia or paraphasic errors. Naming /repetition intact   CN: SHANA, EOMI, VFF, facial sensation normal, no NLFD, tongue midline, Palate moves equally, SCM equal bilaterally  Motor: No pronator drift, Strength LLE 4/5, BUE and RLE 5/5, normal bulk and tone, no tremor, rigidity    Sens: Intact to light touch - PP, no appreciable sensory level. JS intact  Reflexes: BJ 1+, BR 1+, KJ trace+, AJ 0+, downgoing toes b/l  Coord:  No dysmetria, AGATA intact   Gait/Balance: Cannot test    NIHSS: 1          Labs:   04-24    143  |  117<H>  |  55<H>  ----------------------------<  111<H>  5.1   |  20<L>  |  1.76<H>    Ca    10.8<H>      24 Apr 2024 06:45  Phos  4.2     04-24  Mg     2.2     04-24    TPro  6.3  /  Alb  2.7<L>  /  TBili  0.5  /  DBili  x   /  AST  23  /  ALT  47  /  AlkPhos  63  04-24                              12.1   7.35  )-----------( 245      ( 24 Apr 2024 06:45 )             39.1       Radiology:  < from: CT Head No Cont (04.19.24 @ 08:15) >  IMPRESSION: No acute intracranial hemorrhage, mass effect, or shift of   the midline structures.    Chronic lacunar infarcts within the right centrum semiovale.    < from: CT Lumbar Spine No Cont (04.21.24 @ 14:27) >  IMPRESSION:    No acute fracture or traumatic malalignment.    MRI would be required to evaluate the ligamentous structures at higher   sensitivity as well as for better evaluation of the lumbar canal and its   contents.

## 2024-04-24 NOTE — PROGRESS NOTE ADULT - ASSESSMENT
68yo female with hx of DM II, HTN, A fib s/p recent ablation 1 month ago, recent diagnosis of Splenic CA w/ plan for splenectomy on May 10th, presented to the ED w/ complaints of not feeling well as well as frequent falls this week w/ most recent episode today, noted to have A FIB w/ RVR    #pAfib with RVR   -s/p DCCV  on 04/22/24  -currently in NSR   -Eliquis   -Cardizem 120mg   -Amiodarone 200mg PO daily   -Metoprolol Succinate 100mg   -May consider afib ablation ASAP and then cessation of amiodarone.  No ischaemia on nuclear stress testing.     -previously failed sotolol, per cards     -EP reccs noted     #JUSTINO on CKD, with urinary retention    -unsure if prerenal or post renal   -UA: reviewed   -UCx: Strep Gallolyticus and E.coli   -Ceftin 250mg PO   -carson in place, plan for TOV in 1-2 days  -RBUS: reviewed   -monitor i&o's   -avoid nephrotoxic medications     #Frequent falls, reports of "jelly legs" with ?urinary retention, neuropathy  -CT L/s spine noted: moderate multilevel central canal and neural foraminal stenosis  -TSH: noted   -Vitamin B-12: wnl   -f/u on MRI brain, C/S and L/S (ideally would like to do with IV contrast but use of IV contrast limited due to renal function)  -Neurology eval and reccs noted     #Diabetes   -hold oral medications: Glipizide, Jardiance   -A1c: 6.1  -RISS    #Splenomegaly, 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 Dobbins Scheduled for abdominal CT with IV contrast   4/24/24  -Dr Artis her surg/onc physician to speak with Dr Zambrano for possible abdominal CT while currently inpatient  -family has all the recent imaging on [facility]. imaging done in Jan reviewed including CT, PET     #Hyperkalemia   -resolved     #Hypercalcemia, ?unsure etiology. Pt with significant Hx of malignancy   -corrected Ca: 11.7  -monitor and trend     #VTE  -Eliquis    #Advanced Care Directives   -FULL CODE for now   -sister is the HCP, will bring documents in AM     discussed with sister/HCP in great detail

## 2024-04-24 NOTE — CONSULT NOTE ADULT - NS ATTEND AMEND GEN_ALL_CORE FT
Patient seen and examined, history reviewed,  Agree with above plan    Above plan discussed with patient and Dr. Hickey

## 2024-04-24 NOTE — PROGRESS NOTE ADULT - ASSESSMENT
68 y/o female with h/o DM II, HTN, A fib s/p recent ablation 1 month ago, recent diagnosis of splenic CA w/ plan for splenectomy on May 10th was admitted on 4/19 for increased weakness, frequent falls this week w/ most recent episode on the day of admission. Pt states she feels her legs are weak and unable to stand independently. States she fell twice this week. Denies numbness/tingling in her LE. No fever, chills reported at home. In the ED, pt noted to be in A FIB w/ RVR and received Cardizem gtt w/ home Amiodarone to bring rate down to 100-120s. Workup showed bacteriuria.    1. Pyuria. Probable UTI. Bacteriuria with strep gallolyticus/ E.coli. Urinary retention with carson in place. 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 Splenic Ca.  -cultures reviewed   -no clinical signs of sepsis  -on ceftin 250 mg PO q12h # 2  -tolerating abx well so far; no side effects noted  -continue abx coverage for 10 days  -monitor temps  -f/u CBC  -supportive care  2. Other issues:   -care per medicine    IV to PO abx token dose not apply.

## 2024-04-24 NOTE — CONSULT NOTE ADULT - ASSESSMENT
1. Splenic mass possible angiosarcoma ? lymphoma. not biopsied  due for Splenectomy at [facility] in 5/24  2. weaknwss ? due to AF  if symptoms do not improve with control of AF, consider neurology evaluation  FU with Dr Weaver and Dr Madan Artis after DC

## 2024-04-24 NOTE — PROGRESS NOTE ADULT - SUBJECTIVE AND OBJECTIVE BOX
Date of service: 04-24-24 @ 12:39    OOB to chair  Marie in place  Urine in folwy bag is dark  No fever    ROS: no fever or chills; denies dizziness, no HA, no SOB or cough, no abdominal pain, no diarrhea or constipation; no legs pain, no rashes    MEDICATIONS  (STANDING):  aMIOdarone    Tablet 200 milliGRAM(s) Oral daily  apixaban 5 milliGRAM(s) Oral two times a day  atorvastatin 40 milliGRAM(s) Oral at bedtime  cefuroxime   Tablet 250 milliGRAM(s) Oral every 12 hours  dextrose 10% Bolus 125 milliLiter(s) IV Bolus once  dextrose 5%. 1000 milliLiter(s) (50 mL/Hr) IV Continuous <Continuous>  dextrose 5%. 1000 milliLiter(s) (100 mL/Hr) IV Continuous <Continuous>  dextrose 50% Injectable 25 Gram(s) IV Push once  dextrose 50% Injectable 12.5 Gram(s) IV Push once  diltiazem    milliGRAM(s) Oral daily  glucagon  Injectable 1 milliGRAM(s) IntraMuscular once  insulin lispro (ADMELOG) corrective regimen sliding scale   SubCutaneous three times a day before meals  insulin lispro (ADMELOG) corrective regimen sliding scale   SubCutaneous at bedtime  metoprolol succinate  milliGRAM(s) Oral daily    Vital Signs Last 24 Hrs  T(C): 36.7 (24 Apr 2024 08:08), Max: 36.8 (23 Apr 2024 20:31)  T(F): 98 (24 Apr 2024 08:08), Max: 98.2 (23 Apr 2024 20:31)  HR: 56 (24 Apr 2024 08:08) (56 - 61)  BP: 145/52 (24 Apr 2024 08:08) (142/47 - 145/52)  BP(mean): --  RR: 18 (24 Apr 2024 08:08) (18 - 18)  SpO2: 100% (24 Apr 2024 08:08) (100% - 100%)    Parameters below as of 24 Apr 2024 08:08  Patient On (Oxygen Delivery Method): room air         Physical exam:    Constitutional:  No acute distress  HEENT: NC/AT, EOMI, PERRLA, conjunctivae clear; ears and nose atraumatic; pharynx benign  Neck: supple; thyroid not palpable  Back: no tenderness  Respiratory: respiratory effort normal; clear to auscultation  Cardiovascular: S1S2 regular, no murmurs  Abdomen: soft, not tender, not distended, positive BS; no liver or spleen organomegaly  Genitourinary: no suprapubic tenderness  Lymphatic: no LN palpable  Musculoskeletal: no muscle tenderness, no joint swelling or tenderness  Extremities: no pedal edema  Neurological/ Psychiatric: AxOx3, judgement and insight normal; moving all extremities  Skin: no rashes; no palpable lesions    Labs: all available labs reviewed                        12.4   7.88  )-----------( 281      ( 23 Apr 2024 06:28 )             40.2     04-23    145  |  116<H>  |  49<H>  ----------------------------<  99  5.4<H>   |  23  |  1.83<H>    Ca    10.7<H>      23 Apr 2024 06:28  Mg     2.1     04-22    TPro  6.5  /  Alb  2.8<L>  /  TBili  0.6  /  DBili  x   /  AST  22  /  ALT  45  /  AlkPhos  68  04-23     LIVER FUNCTIONS - ( 23 Apr 2024 06:28 )  Alb: 2.8 g/dL / Pro: 6.5 gm/dL / ALK PHOS: 68 U/L / ALT: 45 U/L / AST: 22 U/L / GGT: x           Culture - Urine (collected 19 Apr 2024 08:31)  Source: Clean Catch None  Final Report (23 Apr 2024 17:09):    50,000 - 99,000 CFU/mL Streptococcus gallolyticus "Susceptibilities not    performed"    10,000 - 49,000 CFU/mL Escherichia coli  Organism: Escherichia coli (23 Apr 2024 17:09)  Organism: Escherichia coli (23 Apr 2024 17:09)      Method Type: MARCO      -  Amoxicillin/Clavulanic Acid: S <=8/4      -  Ampicillin: S <=8 These ampicillin results predict results for amoxicillin      -  Ampicillin/Sulbactam: S <=4/2      -  Aztreonam: S <=4      -  Cefazolin: S <=2 For uncomplicated UTI with K. pneumoniae, E. coli, or P. mirablis: MARCO <=16 is sensitive and MARCO >=32 is resistant. This also predicts results for oral agents cefaclor, cefdinir, cefpodoxime, cefprozil, cefuroxime axetil, cephalexin and locarbef for uncomplicated UTI. Note that some isolates may be susceptible to these agents while testing resistant to cefazolin.      -  Cefepime: S <=2      -  Cefoxitin: S <=8      -  Ceftriaxone: S <=1      -  Cefuroxime: S <=4      -  Ciprofloxacin: S <=0.25      -  Ertapenem: S <=0.5      -  Gentamicin: S <=2      -  Imipenem: S <=1      -  Levofloxacin: S <=0.5      -  Meropenem: S <=1      -  Nitrofurantoin: S <=32 Should not be used to treat pyelonephritis      -  Piperacillin/Tazobactam: S <=8      -  Tobramycin: S <=2      -  Trimethoprim/Sulfamethoxazole: S <=0.5/9.5    Radiology: all available radiological tests reviewed    < from: CT Lumbar Spine No Cont (04.21.24 @ 14:27) >  No acute fracture or traumatic malalignment.  MRI would be required to evaluate the ligamentous structures at higher   sensitivity as well as for better evaluation of the lumbar canal and its contents.  < end of copied text >    Advanced directives addressed: full resuscitation

## 2024-04-24 NOTE — PROGRESS NOTE ADULT - SUBJECTIVE AND OBJECTIVE BOX
HOSPITALIST ATTENDING PROGRESS NOTE     Chart and meds reviewed. Patient seen and examined     CC: Fall    Subjective: Pt seen and evaluated. Still reports of "jelly legs". No other acute complaints.   Hospital course reviewed with sister in great detail. 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 Artis.     : Pt seen and evaluated. No new events. No new complaints.       All other systems and founds to be negative with exception of what has been described above.         PHYSICAL EXAM:  Vital Signs Last 24 Hrs  T(C): 36.9 (2024 20:08), Max: 36.9 (2024 20:08)  T(F): 98.4 (2024 20:08), Max: 98.4 (2024 20:08)  HR: 61 (2024 20:08) (56 - 61)  BP: 151/45 (2024 20:08) (145/52 - 151/45)  RR: 18 (2024 20:08) (18 - 18)  SpO2: 97% (2024 20:08) (97% - 100%)    Parameters below as of 2024 20:08  Patient On (Oxygen Delivery Method): room air          Daily     Daily Weight in k.4 (2024 06:00)    GEN: NAD   HEENT: EOMI,  moist mucous membranes  NECK : Soft and supple, no JVD  LUNG: CTABL, No wheezing, rales or rhonchi  CVS: S1S2+, RRR, no M/G/R  GI: BS+, soft, NT/ND, no guarding, no rebound, +carson in place   EXTREMITIES: No peripheral edema  VASCULAR: 2+ peripheral pulses  NEURO: AAOx3, grossly non-focal   SKIN: No rashes    HOME MEDICATIONS:  Home Medications:  amiodarone 200 mg oral tablet: 1 tab(s) orally once a day (2024 11:02)  atorvastatin 40 mg oral tablet: 1 tab(s) orally once a day (2024 09:51)  DilTIAZem (Eqv-Cardizem CD) 120 mg/24 hours oral capsule, extended release: 1 cap(s) orally once a day (2024 11:02)  Edarbi 80 mg oral tablet: 1 tab(s) orally once a day (2024 09:51)  Eliquis 5 mg oral tablet: 1 tab(s) orally 2 times a day (2024 09:51)  ezetimibe 10 mg oral tablet: 1 tab(s) orally once a day (2024 09:51)  glipiZIDE 5 mg oral tablet: 1 tab(s) orally once a day (2024 09:51)  Jardiance 10 mg oral tablet: 1 tab(s) orally once a day (2024 09:51)  spironolactone 25 mg oral tablet: 1 tab(s) orally once a day (2024 12:40)  Toprol- mg oral tablet, extended release: 1 tab(s) orally once a day (2024 09:51)      MEDICATIONS  MEDICATIONS  (STANDING):  aMIOdarone    Tablet 200 milliGRAM(s) Oral daily  apixaban 5 milliGRAM(s) Oral two times a day  atorvastatin 40 milliGRAM(s) Oral at bedtime  cefuroxime   Tablet 250 milliGRAM(s) Oral every 12 hours  dextrose 10% Bolus 125 milliLiter(s) IV Bolus once  dextrose 5%. 1000 milliLiter(s) (50 mL/Hr) IV Continuous <Continuous>  dextrose 5%. 1000 milliLiter(s) (100 mL/Hr) IV Continuous <Continuous>  dextrose 50% Injectable 25 Gram(s) IV Push once  dextrose 50% Injectable 12.5 Gram(s) IV Push once  diltiazem    milliGRAM(s) Oral daily  glucagon  Injectable 1 milliGRAM(s) IntraMuscular once  insulin lispro (ADMELOG) corrective regimen sliding scale   SubCutaneous three times a day before meals  insulin lispro (ADMELOG) corrective regimen sliding scale   SubCutaneous at bedtime  metoprolol succinate  milliGRAM(s) Oral daily      LABS: All Labs Reviewed:                        12.1   7.35  )-----------( 245      ( 2024 06:45 )             39.1   04-24    143  |  117<H>  |  55<H>  ----------------------------<  111<H>  5.1   |  20<L>  |  1.76<H>    Ca    10.8<H>      2024 06:45  Phos  4.2     -  Mg     2.2     -24    TPro  6.3  /  Alb  2.7<L>  /  TBili  0.5  /  DBili  x   /  AST  23  /  ALT  47  /  AlkPhos  63          Urinalysis Basic - ( 2024 06:28 )    Color: x / Appearance: x / SG: x / pH: x  Gluc: 99 mg/dL / Ketone: x  / Bili: x / Urobili: x   Blood: x / Protein: x / Nitrite: x   Leuk Esterase: x / RBC: x / WBC x   Sq Epi: x / Non Sq Epi: x / Bacteria: x    Culture - Urine (24 @ 08:31)    -  Amoxicillin/Clavulanic Acid: S <=8/4   -  Ampicillin: S <=8 These ampicillin results predict results for amoxicillin   -  Ampicillin/Sulbactam: S <=4/2   -  Aztreonam: S <=4   -  Cefazolin: S <=2 For uncomplicated UTI with K. pneumoniae, E. coli, or P. mirablis: MARCO <=16 is sensitive and MARCO >=32 is resistant. This also predicts results for oral agents cefaclor, cefdinir, cefpodoxime, cefprozil, cefuroxime axetil, cephalexin and locarbef for uncomplicated UTI. Note that some isolates may be susceptible to these agents while testing resistant to cefazolin.   -  Cefepime: S <=2   -  Cefoxitin: S <=8   -  Ceftriaxone: S <=1   -  Cefuroxime: S <=4   -  Ciprofloxacin: S <=0.25   -  Ertapenem: S <=0.5   -  Gentamicin: S <=2   -  Imipenem: S <=1   -  Levofloxacin: S <=0.5   -  Meropenem: S <=1   -  Nitrofurantoin: S <=32 Should not be used to treat pyelonephritis   -  Piperacillin/Tazobactam: S <=8   -  Tobramycin: S <=2   -  Trimethoprim/Sulfamethoxazole: S <=0.5/9.5   Specimen Source: Clean Catch None   Culture Results:   50,000 - 99,000 CFU/mL Streptococcus gallolyticus "Susceptibilities not  performed"  10,000 - 49,000 CFU/mL Escherichia coli   Organism Identification: Escherichia coli   Organism: Escherichia coli   Method Type: MARCO    I&O's Detail    2024 07:01  -  2024 07:00  --------------------------------------------------------  IN:  Total IN: 0 mL    OUT:    Indwelling Catheter - Urethral (mL): 390 mL  Total OUT: 390 mL    Total NET: -390 mL      2024 07:01  -  2024 21:44  --------------------------------------------------------  IN:  Total IN: 0 mL    OUT:    Indwelling Catheter - Urethral (mL): 600 mL  Total OUT: 600 mL    Total NET: -600 mL      CAPILLARY BLOOD GLUCOSE      POCT Blood Glucose.: 100 mg/dL (2024 21:04)  POCT Blood Glucose.: 86 mg/dL (2024 17:43)  POCT Blood Glucose.: 121 mg/dL (2024 12:19)  POCT Blood Glucose.: 96 mg/dL (2024 08:53)        CARDIOLOGY TESTING   EKG: reviewed     ECHO: reviewd       RADIOLOGY: reviewed

## 2024-04-24 NOTE — CONSULT NOTE ADULT - ASSESSMENT
68 yo female with hx of learning disability, DM II, HTN, rapid A fib s/p recent ablation 1 month ago and s/p DCCV here 4/22, ovarian and uterine cancer, recent diagnosis of Splenic CA w/ plan for splenectomy on May 10th, presented s/p fall.  Pt. states she was walking to the bathroom and she had weakness of her LLE and fell.  20 minutes later her family heard her and called the ambulance.  There was no LOC, pain, dysarthria, diplopia, paresthesias, dizziness.  She ahd a similar episode a few days before that where she fell getting out of her recliner due to LLE weakness but did  not go to the hospital, and about 1-2 months ago was walking and LLE gave out and she fell.  She has had urinary retention since saturday, denies bowel issues.  She also has had LBP x 2 months.  Prior nto the fall this admission she was able to walk anassisted but now is unable to ambulate unassisted.  Labs here show hypercalcemia.  Lumbar CT shows moderate stenosis and DDD, HCT does not show any acute findings, +chronic lacunar infarcts within the R centrum semiovale.  She now has a carson and is on antibiotics for a UTI.  On PE LLE knee extension is 4/5 otherwise there is no focal deficits.      #LLE local weakness specifically with knee extension.  Unlikely stroke but cannot r/o.  Possibility of metastatic lesion or Lumbar stenosis causing sx's in the setting of h/o ovarian, uterine, splenic cancer and hypercalcemia      Recommendations  -F/U MRI imaging of brain, spine as ordered by hospitalist  -check for underlying metabolic instability  -Ongoing oncology evaluation  -trend calcium  -PT/OT/Rehab eval  -will follow    D/W Dr. Kuo, Dr. Hickey   68 yo F with hx of learning disability, DM II, HTN, A-fib s/p ablation, s/p DCCV on 4/22, also has ovarian and uterine CA, recent diagnosis of Splenic CA, was scheduled for splenectomy on 5/10/24; presented to ED s/p fall. Pt reports she was walking to the bathroom, felt  weakness of her LLE and fell, 20 minutes later her family heard her and called the ambulance, no LOC, pain, dysarthria, diplopia, paresthesias, dizziness or seizure like activity. She had a similar episode a few days prior, fell while getting out of her recliner- LLE gave way, 1/ 1-2 months ago her LLE gave out and she fell. She has had urinary retention since saturday, also has mid - LBP x 2 months. CT Lumbar shows moderate stenosis and DDD,  HCT does not show any acute findings, chronic lacunar infarcts within the R .        # Recurring left LLE weakness resulting in falls and urinary retention + mid- LBP, subacute onset; rule out stroke, lumbar radiculopathy and  CNS / spinal mass lesions vs mets given h/o ovarian, uterine, splenic cancer    Recommendations  -F/U MRI imaging of brain, spine as ordered by hospitalist  -check for underlying metabolic instability  -Ongoing oncology evaluation  -Ttrend calcium  -PT/OT/Rehab eval  -will follow    D/W Dr. Kuo, Dr. Hickey

## 2024-04-24 NOTE — CONSULT NOTE ADULT - SUBJECTIVE AND OBJECTIVE BOX
Patient is a 69y old  Female who presents with a chief complaint of Fall (23 Apr 2024 22:53)      HPI:  68yo female with hx of DM II, HTN, A fib s/p recent ablation 1 month ago, recent diagnosis of Splenic mass w/ plan for splenectomy on May 10th, presented to the ED w/ complaints of not feeling well as well as frequent falls this week w/ most recent episode today. Pt states she feels her legs are weak and unable to stand independently. States she fell twice this week. Denies numbness/tingling in her LE. Denies cp, palpitations, sob, abd pain, N/V, fever, chills.    In the ED, pt noted to be in A FIB w/ RVR unable to control w/ IV Cardizem. Started on Cardizem gtt w/ home Amiodarone to bring rate down to 100-120s.  (19 Apr 2024 12:42)    now well controlled       PAST MEDICAL & SURGICAL HISTORY:  HTN (hypertension)      Hyperlipidemia      DM (diabetes mellitus)      Arthritis      Ovarian cancer  and uterine cancer--2004 surgery & chemo  oncologist Dr Weaver      Obesity, morbid, BMI 40.0-49.9      H/O umbilical hernia repair  2005      S/P total hysterectomy  bilateral salpingectomy-oophorectomy --2004 ovarian & uterine cancer      H/O total knee replacement, right          REVIEW OF SYSTEMS    General:	  weakness  no fever, night sweats or weight loss      Allergies    No Known Drug Allergies  Bee stings (Swelling)    Intolerances        FAMILY HISTORY:      Home Medications:  amiodarone 200 mg oral tablet: 1 tab(s) orally once a day (19 Apr 2024 11:02)  atorvastatin 40 mg oral tablet: 1 tab(s) orally once a day (19 Apr 2024 09:51)  DilTIAZem (Eqv-Cardizem CD) 120 mg/24 hours oral capsule, extended release: 1 cap(s) orally once a day (19 Apr 2024 11:02)  Edarbi 80 mg oral tablet: 1 tab(s) orally once a day (19 Apr 2024 09:51)  Eliquis 5 mg oral tablet: 1 tab(s) orally 2 times a day (19 Apr 2024 09:51)  ezetimibe 10 mg oral tablet: 1 tab(s) orally once a day (19 Apr 2024 09:51)  glipiZIDE 5 mg oral tablet: 1 tab(s) orally once a day (19 Apr 2024 09:51)  Jardiance 10 mg oral tablet: 1 tab(s) orally once a day (19 Apr 2024 09:51)  spironolactone 25 mg oral tablet: 1 tab(s) orally once a day (19 Apr 2024 12:40)  Toprol- mg oral tablet, extended release: 1 tab(s) orally once a day (19 Apr 2024 09:51)      MEDICATIONS  (STANDING):  aMIOdarone    Tablet 200 milliGRAM(s) Oral daily  apixaban 5 milliGRAM(s) Oral two times a day  atorvastatin 40 milliGRAM(s) Oral at bedtime  cefuroxime   Tablet 250 milliGRAM(s) Oral every 12 hours  dextrose 10% Bolus 125 milliLiter(s) IV Bolus once  dextrose 5%. 1000 milliLiter(s) (50 mL/Hr) IV Continuous <Continuous>  dextrose 5%. 1000 milliLiter(s) (100 mL/Hr) IV Continuous <Continuous>  dextrose 50% Injectable 25 Gram(s) IV Push once  dextrose 50% Injectable 12.5 Gram(s) IV Push once  diltiazem    milliGRAM(s) Oral daily  glucagon  Injectable 1 milliGRAM(s) IntraMuscular once  insulin lispro (ADMELOG) corrective regimen sliding scale   SubCutaneous three times a day before meals  insulin lispro (ADMELOG) corrective regimen sliding scale   SubCutaneous at bedtime  metoprolol succinate  milliGRAM(s) Oral daily    MEDICATIONS  (PRN):  dextrose Oral Gel 15 Gram(s) Oral once PRN Blood Glucose LESS THAN 70 milliGRAM(s)/deciliter      PHYSICAL EXAM:  Vital Signs Last 24 Hrs  T(C): 36.7 (24 Apr 2024 08:08), Max: 36.8 (23 Apr 2024 20:31)  T(F): 98 (24 Apr 2024 08:08), Max: 98.2 (23 Apr 2024 20:31)  HR: 56 (24 Apr 2024 08:08) (56 - 61)  BP: 145/52 (24 Apr 2024 08:08) (142/47 - 145/52)  BP(mean): --  RR: 18 (24 Apr 2024 08:08) (18 - 18)  SpO2: 100% (24 Apr 2024 08:08) (100% - 100%)    Parameters below as of 24 Apr 2024 08:08  Patient On (Oxygen Delivery Method): room air      Gen:   comfortable  overweight  chest clear  H1 H2        LABS:                        12.1   7.35  )-----------( 245      ( 24 Apr 2024 06:45 )             39.1     24 Apr 2024 06:45    143    |  117    |  55     ----------------------------<  111    5.1     |  20     |  1.76     Ca    10.8       24 Apr 2024 06:45  Phos  4.2       24 Apr 2024 06:45  Mg     2.2       24 Apr 2024 06:45    TPro  6.3    /  Alb  2.7    /  TBili  0.5    /  DBili  x      /  AST  23     /  ALT  47     /  AlkPhos  63     24 Apr 2024 06:45    LIVER FUNCTIONS - ( 24 Apr 2024 06:45 )  Alb: 2.7 g/dL / Pro: 6.3 gm/dL / ALK PHOS: 63 U/L / ALT: 47 U/L / AST: 23 U/L / GGT: x                 RADIOLOGY & ADDITIONAL STUDIES:  [facility] studies    PET CT feb 5 2024  hypermetabolic splenic lesion 5.8x 4.3 cm SUV of 3.8  no lymphadenopathy    MRI abdomen 1/24  9 cm heterogeneous T2 signal splenic mass occupying the majority of the spleen 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

## 2024-04-25 ENCOUNTER — TRANSCRIPTION ENCOUNTER (OUTPATIENT)
Age: 69
End: 2024-04-25

## 2024-04-25 LAB
ALBUMIN SERPL ELPH-MCNC: 2.8 G/DL — LOW (ref 3.3–5)
ALDOLASE SERPL-CCNC: 10.5 U/L — HIGH (ref 3.3–10.3)
ALP SERPL-CCNC: 69 U/L — SIGNIFICANT CHANGE UP (ref 40–120)
ALT FLD-CCNC: 53 U/L — SIGNIFICANT CHANGE UP (ref 12–78)
ANION GAP SERPL CALC-SCNC: 4 MMOL/L — LOW (ref 5–17)
AST SERPL-CCNC: 30 U/L — SIGNIFICANT CHANGE UP (ref 15–37)
BILIRUB SERPL-MCNC: 0.5 MG/DL — SIGNIFICANT CHANGE UP (ref 0.2–1.2)
BUN SERPL-MCNC: 61 MG/DL — HIGH (ref 7–23)
CALCIUM SERPL-MCNC: 10.8 MG/DL — HIGH (ref 8.5–10.1)
CHLORIDE SERPL-SCNC: 116 MMOL/L — HIGH (ref 96–108)
CO2 SERPL-SCNC: 22 MMOL/L — SIGNIFICANT CHANGE UP (ref 22–31)
CREAT SERPL-MCNC: 1.94 MG/DL — HIGH (ref 0.5–1.3)
EGFR: 27 ML/MIN/1.73M2 — LOW
GLUCOSE BLDC GLUCOMTR-MCNC: 101 MG/DL — HIGH (ref 70–99)
GLUCOSE BLDC GLUCOMTR-MCNC: 127 MG/DL — HIGH (ref 70–99)
GLUCOSE BLDC GLUCOMTR-MCNC: 90 MG/DL — SIGNIFICANT CHANGE UP (ref 70–99)
GLUCOSE BLDC GLUCOMTR-MCNC: 93 MG/DL — SIGNIFICANT CHANGE UP (ref 70–99)
GLUCOSE SERPL-MCNC: 104 MG/DL — HIGH (ref 70–99)
POTASSIUM SERPL-MCNC: 5.2 MMOL/L — SIGNIFICANT CHANGE UP (ref 3.5–5.3)
POTASSIUM SERPL-SCNC: 5.2 MMOL/L — SIGNIFICANT CHANGE UP (ref 3.5–5.3)
PROT SERPL-MCNC: 6.7 GM/DL — SIGNIFICANT CHANGE UP (ref 6–8.3)
SODIUM SERPL-SCNC: 142 MMOL/L — SIGNIFICANT CHANGE UP (ref 135–145)

## 2024-04-25 PROCEDURE — 72146 MRI CHEST SPINE W/O DYE: CPT | Mod: 26

## 2024-04-25 PROCEDURE — 72141 MRI NECK SPINE W/O DYE: CPT | Mod: 26

## 2024-04-25 PROCEDURE — 72148 MRI LUMBAR SPINE W/O DYE: CPT | Mod: 26

## 2024-04-25 PROCEDURE — 99232 SBSQ HOSP IP/OBS MODERATE 35: CPT

## 2024-04-25 PROCEDURE — 70551 MRI BRAIN STEM W/O DYE: CPT | Mod: 26

## 2024-04-25 RX ORDER — ASPIRIN/CALCIUM CARB/MAGNESIUM 324 MG
81 TABLET ORAL DAILY
Refills: 0 | Status: DISCONTINUED | OUTPATIENT
Start: 2024-04-25 | End: 2024-04-27

## 2024-04-25 RX ORDER — SODIUM CHLORIDE 9 MG/ML
1000 INJECTION, SOLUTION INTRAVENOUS
Refills: 0 | Status: DISCONTINUED | OUTPATIENT
Start: 2024-04-25 | End: 2024-04-27

## 2024-04-25 RX ADMIN — AMIODARONE HYDROCHLORIDE 200 MILLIGRAM(S): 400 TABLET ORAL at 11:52

## 2024-04-25 RX ADMIN — ATORVASTATIN CALCIUM 40 MILLIGRAM(S): 80 TABLET, FILM COATED ORAL at 21:57

## 2024-04-25 RX ADMIN — Medication 100 MILLIGRAM(S): at 11:52

## 2024-04-25 RX ADMIN — APIXABAN 5 MILLIGRAM(S): 2.5 TABLET, FILM COATED ORAL at 21:56

## 2024-04-25 RX ADMIN — Medication 81 MILLIGRAM(S): at 18:27

## 2024-04-25 RX ADMIN — SODIUM CHLORIDE 75 MILLILITER(S): 9 INJECTION, SOLUTION INTRAVENOUS at 23:00

## 2024-04-25 RX ADMIN — Medication 250 MILLIGRAM(S): at 21:57

## 2024-04-25 RX ADMIN — Medication 250 MILLIGRAM(S): at 11:52

## 2024-04-25 RX ADMIN — APIXABAN 5 MILLIGRAM(S): 2.5 TABLET, FILM COATED ORAL at 11:52

## 2024-04-25 NOTE — DISCHARGE NOTE NURSING/CASE MANAGEMENT/SOCIAL WORK - NSDCPEPTCAREGIVEDUMATLIST _GEN_ALL_CORE
Heart Failure/Stroke/Influenza Vaccination Heart Failure/Stroke/Influenza Vaccination/Apixaban/Eliquis

## 2024-04-25 NOTE — PROGRESS NOTE ADULT - ASSESSMENT
68 yo F with hx of learning disability, DM II, HTN, A-fib s/p ablation, s/p DCCV on 4/22, also has ovarian and uterine CA, recent diagnosis of Splenic CA, was scheduled for splenectomy on 5/10/24; presented to ED s/p fall. Pt reports she was walking to the bathroom, felt  weakness of her LLE and fell, 20 minutes later her family heard her and called the ambulance, no LOC, pain, dysarthria, diplopia, paresthesias, dizziness or seizure like activity. She had a similar episode a few days prior, fell while getting out of her recliner- LLE gave way, 1/ 1-2 months ago her LLE gave out and she fell. She has had urinary retention since saturday, also has mid - LBP x 2 months. CT Lumbar shows moderate stenosis and DDD,  HCT does not show any acute findings, chronic lacunar infarcts within the R .        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  Of note, the study was done without contrast    - F/U with oncology    D/W Pt and Dr. Hickey

## 2024-04-25 NOTE — DISCHARGE NOTE NURSING/CASE MANAGEMENT/SOCIAL WORK - PATIENT PORTAL LINK FT
You can access the FollowMyHealth Patient Portal offered by [facility] by registering at the following website: [email or web address]. By joining [facility]’s FollowMyHealth portal, you will also be able to view your health information using other applications (apps) compatible with our system.

## 2024-04-25 NOTE — DISCHARGE NOTE NURSING/CASE MANAGEMENT/SOCIAL WORK - NSDCVIVACCINE_GEN_ALL_CORE_FT
influenza, injectable, quadrivalent, preservative free; 25-Sep-2021 09:22; Michelle Benedict (RN); [employer]; [id number] (Exp. Date: 30-Jun-2022); IntraMuscular; Deltoid Left.; 0.5 milliLiter(s); VIS (VIS Published: 15-Aug-2019, VIS Presented: 25-Sep-2021);

## 2024-04-25 NOTE — DISCHARGE NOTE NURSING/CASE MANAGEMENT/SOCIAL WORK - NSDCPEFALRISK_GEN_ALL_CORE
For information on Fall & Injury Prevention, visit: [email or web address] OR  [email or web address] OR  https://www.cdc.gov/steadi/patient.html

## 2024-04-25 NOTE — PROGRESS NOTE ADULT - ASSESSMENT
68yo female with hx of DM II, HTN, A fib s/p recent ablation 1 month ago, recent diagnosis of Splenic CA w/ plan for splenectomy on May 10th, presented to the ED w/ complaints of not feeling well as well as frequent falls this week w/ most recent episode today, noted to have A FIB w/ RVR    #pAfib with RVR   -s/p DCCV  on 04/22/24  -currently in NSR   -Eliquis   -Cardizem 120mg   -Amiodarone 200mg PO daily   -Metoprolol Succinate 100mg   -May consider afib ablation ASAP and then cessation of amiodarone.  No ischaemia on nuclear stress testing.     -previously failed sotolol, per cards     -EP reccs noted     #JUSTINO on ?CKD, with urinary retention    -unsure if prerenal or post renal   -UA: reviewed   -UCx: Strep Gallolyticus and E.coli   -Ceftin 250mg PO   -TOV today   -RBUS: reviewed   -monitor i&o's   -avoid nephrotoxic medications   -nephrology eval     #Recurrent falls, LLE weakness, Acute infarct in watershed areas anterior+posterior parasagittal distribution +remote infarctions age indeterminate on MRI brain  -ASA   -Atorvastatin   -less likely embolic in nature   -BP normal   -lipid panel in AM   -A1c: 6.1  -PT/OT   -TSH: noted   -Vitamin B-12: wnl   -neurology eval appreciated     #LBP; history of cancers,   -MRIs of the cervical, thoracic and lumbar spine revealed no spinal cord lesions or pathology, diffuse degenerative changes are noted  -imaging done w/o contrast due to impaired renal function     #Diabetes   -hold oral medications: Glipizide, Jardiance   -A1c: 6.1  -RISS    #Splenomegaly, 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 Dobbins Scheduled for abdominal CT with IV contrast   4/24/24  -Dr Artis her surg/onc physician to speak with Dr Zambrano for possible abdominal CT while currently inpatient  -family has all the recent imaging on [facility]ette. imaging done in Jan reviewed including CT, PET     #Hyperkalemia   -resolved     #Hypercalcemia, ?unsure etiology. Pt with significant Hx of malignancy   -corrected Ca: 11.7  -monitor and trend   -gentle IV hydration     #VTE  -Eliquis    #Advanced Care Directives   -DNR/DNI     discussed with sister/HCP in great detail     discharge plan to [facility], likely over the weekend

## 2024-04-25 NOTE — PROGRESS NOTE ADULT - SUBJECTIVE AND OBJECTIVE BOX
HOSPITALIST ATTENDING PROGRESS NOTE     Chart and meds reviewed. Patient seen and examined     CC: Fall    Subjective: Pt seen and evaluated. Still reports of "jelly legs". No other acute complaints.   Hospital course reviewed with sister in great detail. 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 Artis.     4/24: Pt seen and evaluated. No new events. No new complaints.     4/25: Pt seen and evaluated. Feeling better. Still with mild LLE weakness.   s/p MRI: +acute watershed infarcts   TOV     All other systems and founds to be negative with exception of what has been described above.     PHYSICAL EXAM:  Vital Signs Last 24 Hrs  T(C): 36.4 (25 Apr 2024 20:00), Max: 36.6 (25 Apr 2024 07:27)  T(F): 97.5 (25 Apr 2024 20:00), Max: 97.9 (25 Apr 2024 07:27)  HR: 62 (25 Apr 2024 20:00) (58 - 62)  BP: 100/67 (25 Apr 2024 20:00) (100/67 - 141/55)  RR: 18 (25 Apr 2024 20:00) (18 - 18)  SpO2: 98% (25 Apr 2024 20:00) (96% - 98%)    Parameters below as of 25 Apr 2024 20:00  Patient On (Oxygen Delivery Method): room air        GEN: NAD   HEENT: EOMI,  moist mucous membranes  NECK : Soft and supple, no JVD  LUNG: CTABL, No wheezing, rales or rhonchi  CVS: S1S2+, RRR, no M/G/R  GI: BS+, soft, NT/ND, no guarding, no rebound, +carson in place   EXTREMITIES: No peripheral edema  VASCULAR: 2+ peripheral pulses  NEURO: AAOx3, +4/5LLE weakness   SKIN: No rashes    HOME MEDICATIONS:  Home Medications:  amiodarone 200 mg oral tablet: 1 tab(s) orally once a day (19 Apr 2024 11:02)  atorvastatin 40 mg oral tablet: 1 tab(s) orally once a day (19 Apr 2024 09:51)  DilTIAZem (Eqv-Cardizem CD) 120 mg/24 hours oral capsule, extended release: 1 cap(s) orally once a day (19 Apr 2024 11:02)  Edarbi 80 mg oral tablet: 1 tab(s) orally once a day (19 Apr 2024 09:51)  Eliquis 5 mg oral tablet: 1 tab(s) orally 2 times a day (19 Apr 2024 09:51)  ezetimibe 10 mg oral tablet: 1 tab(s) orally once a day (19 Apr 2024 09:51)  glipiZIDE 5 mg oral tablet: 1 tab(s) orally once a day (19 Apr 2024 09:51)  Jardiance 10 mg oral tablet: 1 tab(s) orally once a day (19 Apr 2024 09:51)  spironolactone 25 mg oral tablet: 1 tab(s) orally once a day (19 Apr 2024 12:40)  Toprol- mg oral tablet, extended release: 1 tab(s) orally once a day (19 Apr 2024 09:51)      MEDICATIONS  MEDICATIONS  (STANDING):  aMIOdarone    Tablet 200 milliGRAM(s) Oral daily  apixaban 5 milliGRAM(s) Oral two times a day  aspirin enteric coated 81 milliGRAM(s) Oral daily  atorvastatin 40 milliGRAM(s) Oral at bedtime  cefuroxime   Tablet 250 milliGRAM(s) Oral every 12 hours  dextrose 10% Bolus 125 milliLiter(s) IV Bolus once  dextrose 5%. 1000 milliLiter(s) (50 mL/Hr) IV Continuous <Continuous>  dextrose 5%. 1000 milliLiter(s) (100 mL/Hr) IV Continuous <Continuous>  dextrose 50% Injectable 25 Gram(s) IV Push once  dextrose 50% Injectable 12.5 Gram(s) IV Push once  diltiazem    milliGRAM(s) Oral daily  glucagon  Injectable 1 milliGRAM(s) IntraMuscular once  insulin lispro (ADMELOG) corrective regimen sliding scale   SubCutaneous three times a day before meals  insulin lispro (ADMELOG) corrective regimen sliding scale   SubCutaneous at bedtime  metoprolol succinate  milliGRAM(s) Oral daily      LABS: All Labs Reviewed:                        12.1   7.35  )-----------( 245      ( 24 Apr 2024 06:45 )             39.1   04-25    142  |  116<H>  |  61<H>  ----------------------------<  104<H>  5.2   |  22  |  1.94<H>    Ca    10.8<H>      25 Apr 2024 06:33  Phos  4.2     04-24  Mg     2.2     04-24    TPro  6.7  /  Alb  2.8<L>  /  TBili  0.5  /  DBili  x   /  AST  30  /  ALT  53  /  AlkPhos  69  04-25                 Urinalysis Basic - ( 23 Apr 2024 06:28 )    Color: x / Appearance: x / SG: x / pH: x  Gluc: 99 mg/dL / Ketone: x  / Bili: x / Urobili: x   Blood: x / Protein: x / Nitrite: x   Leuk Esterase: x / RBC: x / WBC x   Sq Epi: x / Non Sq Epi: x / Bacteria: x    Culture - Urine (04.19.24 @ 08:31)    -  Amoxicillin/Clavulanic Acid: S <=8/4   -  Ampicillin: S <=8 These ampicillin results predict results for amoxicillin   -  Ampicillin/Sulbactam: S <=4/2   -  Aztreonam: S <=4   -  Cefazolin: S <=2 For uncomplicated UTI with K. pneumoniae, E. coli, or P. mirablis: MARCO <=16 is sensitive and MARCO >=32 is resistant. This also predicts results for oral agents cefaclor, cefdinir, cefpodoxime, cefprozil, cefuroxime axetil, cephalexin and locarbef for uncomplicated UTI. Note that some isolates may be susceptible to these agents while testing resistant to cefazolin.   -  Cefepime: S <=2   -  Cefoxitin: S <=8   -  Ceftriaxone: S <=1   -  Cefuroxime: S <=4   -  Ciprofloxacin: S <=0.25   -  Ertapenem: S <=0.5   -  Gentamicin: S <=2   -  Imipenem: S <=1   -  Levofloxacin: S <=0.5   -  Meropenem: S <=1   -  Nitrofurantoin: S <=32 Should not be used to treat pyelonephritis   -  Piperacillin/Tazobactam: S <=8   -  Tobramycin: S <=2   -  Trimethoprim/Sulfamethoxazole: S <=0.5/9.5   Specimen Source: Clean Catch None   Culture Results:   50,000 - 99,000 CFU/mL Streptococcus gallolyticus "Susceptibilities not  performed"  10,000 - 49,000 CFU/mL Escherichia coli   Organism Identification: Escherichia coli   Organism: Escherichia coli   Method Type: MARCO    I&O's Detail    24 Apr 2024 07:01  -  25 Apr 2024 07:00  --------------------------------------------------------  IN:  Total IN: 0 mL    OUT:    Indwelling Catheter - Urethral (mL): 600 mL    Voided (mL): 400 mL  Total OUT: 1000 mL    Total NET: -1000 mL      CAPILLARY BLOOD GLUCOSE      POCT Blood Glucose.: 101 mg/dL (25 Apr 2024 21:55)  POCT Blood Glucose.: 93 mg/dL (25 Apr 2024 17:07)  POCT Blood Glucose.: 127 mg/dL (25 Apr 2024 12:20)  POCT Blood Glucose.: 90 mg/dL (25 Apr 2024 07:51)          CARDIOLOGY TESTING   EKG: reviewed     ECHO: reviewed       RADIOLOGY: reviewed

## 2024-04-25 NOTE — PROGRESS NOTE ADULT - SUBJECTIVE AND OBJECTIVE BOX
Patient has no complaints this morning except lower back pain, otherwise feels better.    MRI brain reveals watershed infarcts in the anterior+posterior parasagittal distribution, also noted remote infarctions age indeterminate    MRIs of the cervical, thoracic and lumbar spine revealed no spinal cord lesions or pathology, diffuse degenerative changes are noted.  Of note, the study was done without contrast    ROS: As above, other ROS Negative     MEDICATIONS  (STANDING):  aMIOdarone    Tablet 200 milliGRAM(s) Oral daily  apixaban 5 milliGRAM(s) Oral two times a day  aspirin enteric coated 81 milliGRAM(s) Oral daily  atorvastatin 40 milliGRAM(s) Oral at bedtime  cefuroxime   Tablet 250 milliGRAM(s) Oral every 12 hours  dextrose 10% Bolus 125 milliLiter(s) IV Bolus once  dextrose 5%. 1000 milliLiter(s) (50 mL/Hr) IV Continuous <Continuous>  dextrose 5%. 1000 milliLiter(s) (100 mL/Hr) IV Continuous <Continuous>  dextrose 50% Injectable 25 Gram(s) IV Push once  dextrose 50% Injectable 12.5 Gram(s) IV Push once  diltiazem    milliGRAM(s) Oral daily  glucagon  Injectable 1 milliGRAM(s) IntraMuscular once  insulin lispro (ADMELOG) corrective regimen sliding scale   SubCutaneous three times a day before meals  insulin lispro (ADMELOG) corrective regimen sliding scale   SubCutaneous at bedtime  metoprolol succinate  milliGRAM(s) Oral daily      Vital Signs Last 24 Hrs  T(C): 36.6 (25 Apr 2024 07:27), Max: 36.9 (24 Apr 2024 20:08)  T(F): 97.9 (25 Apr 2024 07:27), Max: 98.4 (24 Apr 2024 20:08)  HR: 58 (25 Apr 2024 07:27) (58 - 61)  BP: 141/55 (25 Apr 2024 07:27) (141/55 - 151/45)  BP(mean): --  RR: 18 (25 Apr 2024 07:27) (18 - 18)  SpO2: 96% (25 Apr 2024 07:27) (96% - 97%)    Parameters below as of 25 Apr 2024 07:27  Patient On (Oxygen Delivery Method): room air    Constitutional: Obese, sitting in chair, NAD  HEAD: Normocephalic  Neck: Supple.  Extremities:  no edema  Musculoskeletal: no abnormal movements  Skin: No rashes      Neurological exam:  HF: A x O x 3. Appropriately interactive. Speech fluent, No Aphasia or paraphasic errors. Naming /repetition intact   CN: SHANA, EOMI, VFF, facial sensation normal, no NLFD, tongue midline, Palate moves equally, SCM equal bilaterally  Motor: No pronator drift, Strength LLE 4/5, BUE and RLE 5/5, normal bulk and tone, no tremor, rigidity    Sens: Intact to light touch - PP, no appreciable sensory level. JS intact  Reflexes: BJ 1+, BR 1+, KJ trace+, AJ 0+, downgoing toes b/l  Coord:  No dysmetria, AGATA intact   Gait/Balance: Cannot test    NIHSS: 1                            12.1   7.35  )-----------( 245      ( 24 Apr 2024 06:45 )             39.1     04-25    142  |  116<H>  |  61<H>  ----------------------------<  104<H>  5.2   |  22  |  1.94<H>    Ca    10.8<H>      25 Apr 2024 06:33  Phos  4.2     04-24  Mg     2.2     04-24    TPro  6.7  /  Alb  2.8<L>  /  TBili  0.5  /  DBili  x   /  AST  30  /  ALT  53  /  AlkPhos  69  04-25      Radiology report:  < from: MR Head No Cont (04.25.24 @ 11:12) >  IMPRESSION:    1. BRAIN:  Cerebral hemispheric white matter lesions demonstrate anterior   to posterior parasagittal distribution that suggests low flow/watershed   mechanism infarction. These include lesions consistent with acute   infarction, additional lesions consistent with remote infarction and   other lesions that are age indeterminate likely recent. The pattern   suggests stuttering infarction from low flow in the same anatomic   distribution. Ischemic white matter disease and atrophy typical for age.    2. CERVICAL SPINE: Mild-to-moderate cervical degenerative disc disease   includes C3-C4 broad shallow central disc protrusion (disc herniation)   that causes ventral cord deformity.    3. THORACIC SPINE: No significant thoracic degenerative disc disease.   Thoracic cord  intact.    4. LUMBAR SPINE:   No significant lumbar degenerative disc disease. Conus   and cauda equina intact.    5. The need for supplemental gadolinium-enhanced imaging should be   determined based on the clinical features    < from: CT Head No Cont (04.19.24 @ 08:15) >  IMPRESSION: No acute intracranial hemorrhage, mass effect, or shift of   the midline structures.    Chronic lacunar infarcts within the right centrum semiovale.    < from: CT Lumbar Spine No Cont (04.21.24 @ 14:27) >  IMPRESSION:    No acute fracture or traumatic malalignment.    MRI would be required to evaluate the ligamentous structures at higher   sensitivity as well as for better evaluation of the lumbar canal and its   contents.

## 2024-04-26 LAB
ALBUMIN SERPL ELPH-MCNC: 2.8 G/DL — LOW (ref 3.3–5)
ALP SERPL-CCNC: 68 U/L — SIGNIFICANT CHANGE UP (ref 40–120)
ALT FLD-CCNC: 57 U/L — SIGNIFICANT CHANGE UP (ref 12–78)
ANION GAP SERPL CALC-SCNC: 3 MMOL/L — LOW (ref 5–17)
AST SERPL-CCNC: 29 U/L — SIGNIFICANT CHANGE UP (ref 15–37)
BILIRUB SERPL-MCNC: 0.5 MG/DL — SIGNIFICANT CHANGE UP (ref 0.2–1.2)
BUN SERPL-MCNC: 55 MG/DL — HIGH (ref 7–23)
CALCIUM SERPL-MCNC: 10.7 MG/DL — HIGH (ref 8.5–10.1)
CHLORIDE SERPL-SCNC: 116 MMOL/L — HIGH (ref 96–108)
CHOLEST SERPL-MCNC: 165 MG/DL — SIGNIFICANT CHANGE UP
CO2 SERPL-SCNC: 23 MMOL/L — SIGNIFICANT CHANGE UP (ref 22–31)
CREAT SERPL-MCNC: 1.9 MG/DL — HIGH (ref 0.5–1.3)
EGFR: 28 ML/MIN/1.73M2 — LOW
GLUCOSE BLDC GLUCOMTR-MCNC: 106 MG/DL — HIGH (ref 70–99)
GLUCOSE BLDC GLUCOMTR-MCNC: 145 MG/DL — HIGH (ref 70–99)
GLUCOSE BLDC GLUCOMTR-MCNC: 83 MG/DL — SIGNIFICANT CHANGE UP (ref 70–99)
GLUCOSE BLDC GLUCOMTR-MCNC: 92 MG/DL — SIGNIFICANT CHANGE UP (ref 70–99)
GLUCOSE SERPL-MCNC: 99 MG/DL — SIGNIFICANT CHANGE UP (ref 70–99)
HCT VFR BLD CALC: 38.5 % — SIGNIFICANT CHANGE UP (ref 34.5–45)
HDLC SERPL-MCNC: 47 MG/DL — LOW
HGB BLD-MCNC: 12.3 G/DL — SIGNIFICANT CHANGE UP (ref 11.5–15.5)
LIPID PNL WITH DIRECT LDL SERPL: 99 MG/DL — SIGNIFICANT CHANGE UP
MCHC RBC-ENTMCNC: 28.9 PG — SIGNIFICANT CHANGE UP (ref 27–34)
MCHC RBC-ENTMCNC: 31.9 GM/DL — LOW (ref 32–36)
MCV RBC AUTO: 90.4 FL — SIGNIFICANT CHANGE UP (ref 80–100)
NON HDL CHOLESTEROL: 117 MG/DL — SIGNIFICANT CHANGE UP
PLATELET # BLD AUTO: 249 K/UL — SIGNIFICANT CHANGE UP (ref 150–400)
POTASSIUM SERPL-MCNC: 5.3 MMOL/L — SIGNIFICANT CHANGE UP (ref 3.5–5.3)
POTASSIUM SERPL-SCNC: 5.3 MMOL/L — SIGNIFICANT CHANGE UP (ref 3.5–5.3)
PROT SERPL-MCNC: 6.5 GM/DL — SIGNIFICANT CHANGE UP (ref 6–8.3)
RBC # BLD: 4.26 M/UL — SIGNIFICANT CHANGE UP (ref 3.8–5.2)
RBC # FLD: 15.6 % — HIGH (ref 10.3–14.5)
SODIUM SERPL-SCNC: 142 MMOL/L — SIGNIFICANT CHANGE UP (ref 135–145)
TRIGL SERPL-MCNC: 102 MG/DL — SIGNIFICANT CHANGE UP
WBC # BLD: 6.37 K/UL — SIGNIFICANT CHANGE UP (ref 3.8–10.5)
WBC # FLD AUTO: 6.37 K/UL — SIGNIFICANT CHANGE UP (ref 3.8–10.5)

## 2024-04-26 PROCEDURE — 99232 SBSQ HOSP IP/OBS MODERATE 35: CPT

## 2024-04-26 PROCEDURE — 99223 1ST HOSP IP/OBS HIGH 75: CPT

## 2024-04-26 RX ADMIN — APIXABAN 5 MILLIGRAM(S): 2.5 TABLET, FILM COATED ORAL at 22:21

## 2024-04-26 RX ADMIN — APIXABAN 5 MILLIGRAM(S): 2.5 TABLET, FILM COATED ORAL at 09:20

## 2024-04-26 RX ADMIN — Medication 81 MILLIGRAM(S): at 09:19

## 2024-04-26 RX ADMIN — Medication 250 MILLIGRAM(S): at 09:20

## 2024-04-26 RX ADMIN — Medication 250 MILLIGRAM(S): at 22:21

## 2024-04-26 RX ADMIN — AMIODARONE HYDROCHLORIDE 200 MILLIGRAM(S): 400 TABLET ORAL at 09:20

## 2024-04-26 RX ADMIN — Medication 120 MILLIGRAM(S): at 09:20

## 2024-04-26 RX ADMIN — Medication 100 MILLIGRAM(S): at 09:20

## 2024-04-26 RX ADMIN — SODIUM CHLORIDE 75 MILLILITER(S): 9 INJECTION, SOLUTION INTRAVENOUS at 11:46

## 2024-04-26 RX ADMIN — ATORVASTATIN CALCIUM 40 MILLIGRAM(S): 80 TABLET, FILM COATED ORAL at 22:21

## 2024-04-26 NOTE — PROGRESS NOTE ADULT - ASSESSMENT
70 yo F with hx of learning disability, DM II, HTN, A-fib s/p ablation, s/p DCCV on 4/22, also has ovarian and uterine CA, recent diagnosis of Splenic CA, was scheduled for splenectomy on 5/10/24; presented to ED s/p fall. Pt reports she was walking to the bathroom, felt  weakness of her LLE and fell, 20 minutes later her family heard her and called the ambulance, no LOC, pain, dysarthria, diplopia, paresthesias, dizziness or seizure like activity. She had a similar episode a few days prior, fell while getting out of her recliner- LLE gave way, 1/ 1-2 months ago her LLE gave out and she fell. She has had urinary retention since saturday, also has mid - LBP x 2 months. CT Lumbar shows moderate stenosis and DDD,  HCT does not show any acute findings, chronic lacunar infarcts within the R .        # Acute infarct in watershed areas anterior+posterior parasagittal distribution +remote infarctions age indeterminate on MRI brain    - Continue Eliquis, BASA and Atorvastatin 40 MG daily  – PT/OT  – Rehab placement    # LBP - history of cancers, MRIs of the cervical, thoracic and lumbar spine revealed no spinal cord lesions or pathology, diffuse degenerative changes are noted.  Of note, the study was done without contrast    - F/U with oncology    D/W Pt and Dr. Hickey    Call neuro if needed henceforth, Anupam is on service from 4/27/2024

## 2024-04-26 NOTE — CONSULT NOTE ADULT - CONSULT REQUESTED DATE/TIME
19-Apr-2024 12:18
24-Apr-2024 12:55
26-Apr-2024 20:49
22-Apr-2024 07:52
23-Apr-2024 10:37
24-Apr-2024 12:17

## 2024-04-26 NOTE — PROGRESS NOTE ADULT - SUBJECTIVE AND OBJECTIVE BOX
HOSPITALIST ATTENDING PROGRESS NOTE     Chart and meds reviewed. Patient seen and examined     CC: Fall    Subjective: Pt seen and evaluated. Still reports of "jelly legs". No other acute complaints.   Hospital course reviewed with sister in great detail. 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 Artis.     4/24: Pt seen and evaluated. No new events. No new complaints.     4/25: Pt seen and evaluated. Feeling better. Still with mild LLE weakness.   s/p MRI: +acute watershed infarcts   TOV     4/26: Pt seen and evaluated. No acute complaints. LLE resolving. TOV in process.     All other systems and founds to be negative with exception of what has been described above.     PHYSICAL EXAM:  Vital Signs Last 24 Hrs  T(C): 36.7 (26 Apr 2024 20:12), Max: 36.7 (26 Apr 2024 07:40)  T(F): 98 (26 Apr 2024 20:12), Max: 98 (26 Apr 2024 07:40)  HR: 58 (26 Apr 2024 20:12) (56 - 58)  BP: 152/65 (26 Apr 2024 20:12) (149/62 - 152/65)  BP(mean): 84 (26 Apr 2024 07:40) (84 - 84)  RR: 18 (26 Apr 2024 20:12) (18 - 18)  SpO2: 97% (26 Apr 2024 20:12) (97% - 99%)    GEN: NAD   HEENT: EOMI,  moist mucous membranes  NECK : Soft and supple, no JVD  LUNG: CTABL, No wheezing, rales or rhonchi  CVS: S1S2+, RRR, no M/G/R  GI: BS+, soft, NT/ND, no guarding, no rebound,  EXTREMITIES: No peripheral edema  VASCULAR: 2+ peripheral pulses  NEURO: AAOx3, +4/5LLE weakness   SKIN: No rashes    HOME MEDICATIONS:  Home Medications:  amiodarone 200 mg oral tablet: 1 tab(s) orally once a day (19 Apr 2024 11:02)  atorvastatin 40 mg oral tablet: 1 tab(s) orally once a day (19 Apr 2024 09:51)  DilTIAZem (Eqv-Cardizem CD) 120 mg/24 hours oral capsule, extended release: 1 cap(s) orally once a day (19 Apr 2024 11:02)  Edarbi 80 mg oral tablet: 1 tab(s) orally once a day (19 Apr 2024 09:51)  Eliquis 5 mg oral tablet: 1 tab(s) orally 2 times a day (19 Apr 2024 09:51)  ezetimibe 10 mg oral tablet: 1 tab(s) orally once a day (19 Apr 2024 09:51)  glipiZIDE 5 mg oral tablet: 1 tab(s) orally once a day (19 Apr 2024 09:51)  Jardiance 10 mg oral tablet: 1 tab(s) orally once a day (19 Apr 2024 09:51)  spironolactone 25 mg oral tablet: 1 tab(s) orally once a day (19 Apr 2024 12:40)  Toprol- mg oral tablet, extended release: 1 tab(s) orally once a day (19 Apr 2024 09:51)      MEDICATIONS  MEDICATIONS  (STANDING):  aMIOdarone    Tablet 200 milliGRAM(s) Oral daily  apixaban 5 milliGRAM(s) Oral two times a day  aspirin enteric coated 81 milliGRAM(s) Oral daily  atorvastatin 40 milliGRAM(s) Oral at bedtime  cefuroxime   Tablet 250 milliGRAM(s) Oral every 12 hours  dextrose 10% Bolus 125 milliLiter(s) IV Bolus once  dextrose 5%. 1000 milliLiter(s) (50 mL/Hr) IV Continuous <Continuous>  dextrose 5%. 1000 milliLiter(s) (100 mL/Hr) IV Continuous <Continuous>  dextrose 50% Injectable 25 Gram(s) IV Push once  dextrose 50% Injectable 12.5 Gram(s) IV Push once  diltiazem    milliGRAM(s) Oral daily  glucagon  Injectable 1 milliGRAM(s) IntraMuscular once  insulin lispro (ADMELOG) corrective regimen sliding scale   SubCutaneous three times a day before meals  insulin lispro (ADMELOG) corrective regimen sliding scale   SubCutaneous at bedtime  lactated ringers. 1000 milliLiter(s) (75 mL/Hr) IV Continuous <Continuous>  metoprolol succinate  milliGRAM(s) Oral daily      LABS: All Labs Reviewed:                        12.3   6.37  )-----------( 249      ( 26 Apr 2024 07:20 )             38.5   04-26    142  |  116<H>  |  55<H>  ----------------------------<  99  5.3   |  23  |  1.90<H>    Ca    10.7<H>      26 Apr 2024 07:20    TPro  6.5  /  Alb  2.8<L>  /  TBili  0.5  /  DBili  x   /  AST  29  /  ALT  57  /  AlkPhos  68  04-26                   Urinalysis Basic - ( 23 Apr 2024 06:28 )    Color: x / Appearance: x / SG: x / pH: x  Gluc: 99 mg/dL / Ketone: x  / Bili: x / Urobili: x   Blood: x / Protein: x / Nitrite: x   Leuk Esterase: x / RBC: x / WBC x   Sq Epi: x / Non Sq Epi: x / Bacteria: x    Culture - Urine (04.19.24 @ 08:31)    -  Amoxicillin/Clavulanic Acid: S <=8/4   -  Ampicillin: S <=8 These ampicillin results predict results for amoxicillin   -  Ampicillin/Sulbactam: S <=4/2   -  Aztreonam: S <=4   -  Cefazolin: S <=2 For uncomplicated UTI with K. pneumoniae, E. coli, or P. mirablis: MARCO <=16 is sensitive and MARCO >=32 is resistant. This also predicts results for oral agents cefaclor, cefdinir, cefpodoxime, cefprozil, cefuroxime axetil, cephalexin and locarbef for uncomplicated UTI. Note that some isolates may be susceptible to these agents while testing resistant to cefazolin.   -  Cefepime: S <=2   -  Cefoxitin: S <=8   -  Ceftriaxone: S <=1   -  Cefuroxime: S <=4   -  Ciprofloxacin: S <=0.25   -  Ertapenem: S <=0.5   -  Gentamicin: S <=2   -  Imipenem: S <=1   -  Levofloxacin: S <=0.5   -  Meropenem: S <=1   -  Nitrofurantoin: S <=32 Should not be used to treat pyelonephritis   -  Piperacillin/Tazobactam: S <=8   -  Tobramycin: S <=2   -  Trimethoprim/Sulfamethoxazole: S <=0.5/9.5   Specimen Source: Clean Catch None   Culture Results:   50,000 - 99,000 CFU/mL Streptococcus gallolyticus "Susceptibilities not  performed"  10,000 - 49,000 CFU/mL Escherichia coli   Organism Identification: Escherichia coli   Organism: Escherichia coli   Method Type: MARCO    I&O's Detail    25 Apr 2024 07:01  -  26 Apr 2024 07:00  --------------------------------------------------------  IN:  Total IN: 0 mL    OUT:    Indwelling Catheter - Urethral (mL): 700 mL  Total OUT: 700 mL    Total NET: -700 mL      26 Apr 2024 07:01  -  26 Apr 2024 22:20  --------------------------------------------------------  IN:  Total IN: 0 mL    OUT:    Voided (mL): 300 mL  Total OUT: 300 mL    Total NET: -300 mL        CAPILLARY BLOOD GLUCOSE      POCT Blood Glucose.: 92 mg/dL (26 Apr 2024 22:17)  POCT Blood Glucose.: 106 mg/dL (26 Apr 2024 17:25)  POCT Blood Glucose.: 145 mg/dL (26 Apr 2024 11:32)  POCT Blood Glucose.: 83 mg/dL (26 Apr 2024 07:54)          CARDIOLOGY TESTING   EKG: reviewed     ECHO: reviewed       RADIOLOGY: reviewed

## 2024-04-26 NOTE — PROGRESS NOTE ADULT - SUBJECTIVE AND OBJECTIVE BOX
Patient has no complaints this morning, remarkable improvement of left leg weakness, feels better, low back pain has improved    Blood pressure optimal    ROS: As above, other ROS Negative       MEDICATIONS  (STANDING):  aMIOdarone    Tablet 200 milliGRAM(s) Oral daily  apixaban 5 milliGRAM(s) Oral two times a day  aspirin enteric coated 81 milliGRAM(s) Oral daily  atorvastatin 40 milliGRAM(s) Oral at bedtime  cefuroxime   Tablet 250 milliGRAM(s) Oral every 12 hours  dextrose 10% Bolus 125 milliLiter(s) IV Bolus once  dextrose 5%. 1000 milliLiter(s) (50 mL/Hr) IV Continuous <Continuous>  dextrose 5%. 1000 milliLiter(s) (100 mL/Hr) IV Continuous <Continuous>  dextrose 50% Injectable 25 Gram(s) IV Push once  dextrose 50% Injectable 12.5 Gram(s) IV Push once  diltiazem    milliGRAM(s) Oral daily  glucagon  Injectable 1 milliGRAM(s) IntraMuscular once  insulin lispro (ADMELOG) corrective regimen sliding scale   SubCutaneous three times a day before meals  insulin lispro (ADMELOG) corrective regimen sliding scale   SubCutaneous at bedtime  lactated ringers. 1000 milliLiter(s) (75 mL/Hr) IV Continuous <Continuous>  metoprolol succinate  milliGRAM(s) Oral daily      Vital Signs Last 24 Hrs  T(C): 36.7 (26 Apr 2024 07:40), Max: 36.7 (26 Apr 2024 07:40)  T(F): 98 (26 Apr 2024 07:40), Max: 98 (26 Apr 2024 07:40)  HR: 56 (26 Apr 2024 07:40) (56 - 62)  BP: 149/62 (26 Apr 2024 07:40) (100/67 - 149/62)  BP(mean): 84 (26 Apr 2024 07:40) (84 - 84)  RR: 18 (26 Apr 2024 07:40) (18 - 18)  SpO2: 99% (26 Apr 2024 07:40) (98% - 99%)    Parameters below as of 26 Apr 2024 07:40  Patient On (Oxygen Delivery Method): room air    Constitutional: Obese, sitting in chair, NAD  HEAD: Normocephalic  Neck: Supple.  Extremities:  no edema  Musculoskeletal: no abnormal movements  Skin: No rashes      Neurological exam:  HF: A x O x 3. Appropriately interactive. Speech fluent, No Aphasia or paraphasic errors. Naming /repetition intact   CN: SHANA, EOMI, VFF, facial sensation normal, no NLFD, tongue midline, Palate moves equally, SCM equal bilaterally  Motor: No pronator drift, Strength LLE 4+/5, BUE and RLE 5/5, normal bulk and tone, no tremor, rigidity    Sens: Intact to light touch - PP, no appreciable sensory level. JS intact  Reflexes: BJ 1+, BR 1+, KJ trace+, AJ 0+, downgoing toes b/l  Coord:  No dysmetria, AGATA intact   Gait/Balance: Cannot test    NIHSS: 1                          12.3   6.37  )-----------( 249      ( 26 Apr 2024 07:20 )             38.5     04-26    142  |  116<H>  |  55<H>  ----------------------------<  99  5.3   |  23  |  1.90<H>    Ca    10.7<H>      26 Apr 2024 07:20    TPro  6.5  /  Alb  2.8<L>  /  TBili  0.5  /  DBili  x   /  AST  29  /  ALT  57  /  AlkPhos  68  04-26 04-26 Chol 165 LDL -- HDL 47<L> Trig 102    Radiology report:   from: MR Head No Cont (04.25.24 @ 11:12) >  IMPRESSION:    1. BRAIN:  Cerebral hemispheric white matter lesions demonstrate anterior   to posterior parasagittal distribution that suggests low flow/watershed   mechanism infarction. These include lesions consistent with acute   infarction, additional lesions consistent with remote infarction and   other lesions that are age indeterminate likely recent. The pattern   suggests stuttering infarction from low flow in the same anatomic   distribution. Ischemic white matter disease and atrophy typical for age.    2. CERVICAL SPINE: Mild-to-moderate cervical degenerative disc disease   includes C3-C4 broad shallow central disc protrusion (disc herniation)   that causes ventral cord deformity.    3. THORACIC SPINE: No significant thoracic degenerative disc disease.   Thoracic cord  intact.    4. LUMBAR SPINE:   No significant lumbar degenerative disc disease. Conus   and cauda equina intact.    5. The need for supplemental gadolinium-enhanced imaging should be   determined based on the clinical features    < from: CT Head No Cont (04.19.24 @ 08:15) >  IMPRESSION: No acute intracranial hemorrhage, mass effect, or shift of   the midline structures.    Chronic lacunar infarcts within the right centrum semiovale.    < from: CT Lumbar Spine No Cont (04.21.24 @ 14:27) >  IMPRESSION:    No acute fracture or traumatic malalignment.

## 2024-04-26 NOTE — CONSULT NOTE ADULT - ASSESSMENT
68yo female with hx of DM II, HTN, A fib s/p recent ablation 1 month ago, recent diagnosis of Splenic CA w/ plan for splenectomy on May 10th, presented to the ED w/ complaints of not feeling well as well as frequent falls this week w/ most recent episode today. Pt states she feels her legs are weak and unable to stand independently. States she fell twice this week. Denies numbness/tingling in her LE. Denies cp, palpitations, sob, abd pain, N/V, fever, chills.    In the ED, pt noted to be in A FIB w/ RVR unable to control w/ IV Cardizem. Started on Cardizem gtt w/ home Amiodarone to bring rate down to 100-120s.  (19 Apr 2024 12:42)    Nephrology  Above from chart  Discussed w Dr Hickey pt w recent hospitalization at [facility] creat 1.2 range, now admitted with falls, decline in health, h/o splenic cancer  Noted to come in with elevated creatinine level  Carson placed for obst uropathy , dc d early am today, repeat bladder scan w 340 ml, no urgency to urinate  Pt was on ARB/ SGLT2i as outpt , now on hold  NO ivc studies, creat in the 1.7-1.9 range    A/P  CKD III  JUSTINO on admission, was on SGLT2 i and ARB as outpt possibly w obstr uropathy  now carson dependent  No other identifiable factors, NSAIDS or IVC  May be ATN and require time to resolve to a new baseline  Obstructive uropathy- recommend DC w carson and outpt urology f/u

## 2024-04-26 NOTE — CONSULT NOTE ADULT - SUBJECTIVE AND OBJECTIVE BOX
NEPHROLOGY CONSULT  HPI:  70yo female with hx of DM II, HTN, A fib s/p recent ablation 1 month ago, recent diagnosis of Splenic CA w/ plan for splenectomy on May 10th, presented to the ED w/ complaints of not feeling well as well as frequent falls this week w/ most recent episode today. Pt states she feels her legs are weak and unable to stand independently. States she fell twice this week. Denies numbness/tingling in her LE. Denies cp, palpitations, sob, abd pain, N/V, fever, chills.    In the ED, pt noted to be in A FIB w/ RVR unable to control w/ IV Cardizem. Started on Cardizem gtt w/ home Amiodarone to bring rate down to 100-120s.  (2024 12:42)    Nephrology  Above from chart  Discussed w Dr Hickey pt w recent hospitalization at [facility] creat 1.2 range, now admitted with falls, decline in health, h/o splenic cancer  Noted to come in with elevated creatinine level  Marie placed for obst uropathy , dc d early am today, repeat bladder scan w 340 ml, no urgency to urinate  Pt was on ARB/ SGLT2i as outpt , now on hold  NO ivc studies, creat in the 1.7-1.9 range      PAST MEDICAL & SURGICAL HISTORY:  HTN (hypertension)      Hyperlipidemia      DM (diabetes mellitus)      Arthritis      Ovarian cancer  and uterine cancer-- surgery & chemo      Obesity, morbid, BMI 40.0-49.9      H/O umbilical hernia repair        S/P total hysterectomy  bilateral salpingectomy-oophorectomy -- ovarian & uterine cancer      H/O total knee replacement, right          FAMILY HISTORY:      MEDICATIONS  (STANDING):  aMIOdarone    Tablet 200 milliGRAM(s) Oral daily  apixaban 5 milliGRAM(s) Oral two times a day  aspirin enteric coated 81 milliGRAM(s) Oral daily  atorvastatin 40 milliGRAM(s) Oral at bedtime  cefuroxime   Tablet 250 milliGRAM(s) Oral every 12 hours  dextrose 10% Bolus 125 milliLiter(s) IV Bolus once  dextrose 5%. 1000 milliLiter(s) (50 mL/Hr) IV Continuous <Continuous>  dextrose 5%. 1000 milliLiter(s) (100 mL/Hr) IV Continuous <Continuous>  dextrose 50% Injectable 25 Gram(s) IV Push once  dextrose 50% Injectable 12.5 Gram(s) IV Push once  diltiazem    milliGRAM(s) Oral daily  glucagon  Injectable 1 milliGRAM(s) IntraMuscular once  insulin lispro (ADMELOG) corrective regimen sliding scale   SubCutaneous three times a day before meals  insulin lispro (ADMELOG) corrective regimen sliding scale   SubCutaneous at bedtime  lactated ringers. 1000 milliLiter(s) (75 mL/Hr) IV Continuous <Continuous>  metoprolol succinate  milliGRAM(s) Oral daily    MEDICATIONS  (PRN):  dextrose Oral Gel 15 Gram(s) Oral once PRN Blood Glucose LESS THAN 70 milliGRAM(s)/deciliter      Allergies    No Known Drug Allergies  Bee stings (Swelling)    Intolerances        Vital Signs Last 24 Hrs  T(C): 36.7 (2024 20:12), Max: 36.7 (2024 07:40)  T(F): 98 (2024 20:12), Max: 98 (2024 07:40)  HR: 58 (2024 20:12) (56 - 58)  BP: 152/65 (2024 20:12) (149/62 - 152/65)  BP(mean): 84 (2024 07:40) (84 - 84)  RR: 18 (2024 20:12) (18 - 18)  SpO2: 97% (2024 20:12) (97% - 99%)    Parameters below as of 2024 20:12  Patient On (Oxygen Delivery Method): room air        Daily     Daily Weight in k.7 (2024 06:50)    I&O's Summary    2024 07:  -  2024 07:00  --------------------------------------------------------  IN: 0 mL / OUT: 700 mL / NET: -700 mL    2024 07:01  -  2024 20:50  --------------------------------------------------------  IN: 0 mL / OUT: 300 mL / NET: -300 mL        PHYSICAL EXAM:    General:  Alert, No acute distress.    Neuro:  Alert and oriented to person, place, and time. Able to communicate  well.      HEENT:  No JVD, no masses, Eyes anicteric, ,    Cardiovascular:  Regular rate and rhythm, with normal S1 and S2.    Lungs:  clear. no rales, no wheezing, .    Abdomen:  Normoactive bowel sounds. Soft, flat, non-tender, and non-distended.  positive bowel sounds    Skin:  Warm, dry    Extremities:  warm,  no cyanosis    LABS:                        12.3   6.37  )-----------( 249      ( 2024 07:20 )             38.5       142    |  116    |  55     ----------------------------<  99        2024 07:20  5.3     |  23     |  1.90     142    |  116    |  61     ----------------------------<  104       2024 06:33  5.2     |  22     |  1.94     143    |  117    |  55     ----------------------------<  111       2024 06:45  5.1     |  20     |  1.76     Ca    10.7       2024 07:20  Ca    10.8       2024 06:33  Ca    10.8       2024 06:45    Phos  4.2       2024 06:45    Mg     2.2       2024 06:45        Urinalysis Basic - ( 2024 07:20 )    Color: x / Appearance: x / SG: x / pH: x  Gluc: 99 mg/dL / Ketone: x  / Bili: x / Urobili: x   Blood: x / Protein: x / Nitrite: x   Leuk Esterase: x / RBC: x / WBC x   Sq Epi: x / Non Sq Epi: x / Bacteria: x

## 2024-04-26 NOTE — PROGRESS NOTE ADULT - ASSESSMENT
70yo female with hx of DM II, HTN, A fib s/p recent ablation 1 month ago, recent diagnosis of Splenic CA w/ plan for splenectomy on May 10th, presented to the ED w/ complaints of not feeling well as well as frequent falls this week w/ most recent episode today, noted to have A FIB w/ RVR    #pAfib with RVR   -s/p DCCV  on 04/22/24  -currently in NSR   -Eliquis   -Cardizem 120mg   -Amiodarone 200mg PO daily   -Metoprolol Succinate 100mg   -May consider afib ablation ASAP and then cessation of amiodarone.  No ischaemia on nuclear stress testing.     -previously failed sotolol, per cards     -EP reccs noted   -management per primary cards/EP    #JUSTINO on ?CKD, with urinary retention ,possible ATN  -unsure if prerenal or post renal   -UA: reviewed   -UCx: Strep Gallolyticus and E.coli   -Ceftin 250mg PO   -TOV   -RBUS: reviewed   -monitor i&o's   -bladder scna   -avoid nephrotoxic medications: ARB/SGLT2i  -nephrology eval     #Recurrent falls, LLE weakness, Acute infarct in watershed areas anterior+posterior parasagittal distribution +remote infarctions age indeterminate on MRI brain  -ASA   -Atorvastatin   -less likely embolic in nature   -BP normal   -A1c: 6.1  -PT/OT   -TSH: noted   -Vitamin B-12: wnl   -neurology eval appreciated     #LBP; history of cancers,   -MRIs of the cervical, thoracic and lumbar spine revealed no spinal cord lesions or pathology, diffuse degenerative changes are noted  -imaging done w/o contrast due to impaired renal function     #Diabetes   -hold oral medications: Glipizide, Jardiance   -A1c: 6.1  -RISS    #Splenomegaly, 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 Dobbins Scheduled for abdominal CT with IV contrast   4/24/24  -Dr Artis her surg/onc physician to speak with Dr Zambrano for possible abdominal CT while currently inpatient  -family has all the recent imaging on lynette. imaging done in Jan reviewed including CT, PET     #Hyperkalemia   -resolved     #Hypercalcemia, ?unsure etiology. Pt with significant Hx of malignancy   -corrected Ca: 11.7  -monitor and trend   -gentle IV hydration     #VTE  -Eliquis    #Advanced Care Directives   -DNR/DNI     discussed with sister/HCP in great detail     discharge plan to [facility], likely over the weekend        70yo female with hx of DM II, HTN, A fib s/p recent ablation 1 month ago, recent diagnosis of Splenic CA w/ plan for splenectomy on May 10th, presented to the ED w/ complaints of not feeling well as well as frequent falls this week w/ most recent episode today, noted to have A FIB w/ RVR    #pAfib with RVR   -s/p DCCV  on 04/22/24  -currently in NSR   -Eliquis   -Cardizem 120mg   -Amiodarone 200mg PO daily   -Metoprolol Succinate 100mg   -May consider afib ablation ASAP and then cessation of amiodarone.  No ischaemia on nuclear stress testing.     -previously failed sotolol, per cards     -EP reccs noted   -management per primary cards/EP    #JUSTINO on ?CKD, with urinary retention ,possible ATN  -UA: reviewed   -UCx: Strep Gallolyticus and E.coli   -Ceftin 250mg PO   -TOV in process  -if fails: insert carson, f/u with urology as outpatient   -RBUS: reviewed   -monitor i&o's   -bladder scan  -avoid nephrotoxic medications: ARB/SGLT2i  -nephrology eval and reccs noted    #Recurrent falls, LLE weakness, Acute infarct in watershed areas anterior+posterior parasagittal distribution +remote infarctions age indeterminate on MRI brain  -ASA   -Atorvastatin   -less likely embolic in nature   -BP normal   -A1c: 6.1  -PT/OT   -TSH: noted   -Vitamin B-12: wnl   -neurology eval appreciated     #LBP; history of cancers,   -MRIs of the cervical, thoracic and lumbar spine revealed no spinal cord lesions or pathology, diffuse degenerative changes are noted  -imaging done w/o contrast due to impaired renal function     #Diabetes   -hold oral medications: Glipizide, Jardiance   -A1c: 6.1  -RISS    #Splenomegaly, 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 Dobbins Scheduled for abdominal CT with IV contrast   4/24/24  -Dr Artis her surg/onc physician to speak with Dr Zambrano for possible abdominal CT while currently inpatient  -family has all the recent imaging on lynette. imaging done in Jan reviewed including CT, PET     #Hyperkalemia   -resolved     #Hypercalcemia, ?unsure etiology. Pt with significant Hx of malignancy   -corrected Ca: 11.7  -monitor and trend   -gentle IV hydration     #VTE  -Eliquis    #Advanced Care Directives   -DNR/DNI     discussed with sister/HCP in great detail     discharge plan to [facility], likely over the weekend

## 2024-04-27 ENCOUNTER — TRANSCRIPTION ENCOUNTER (OUTPATIENT)
Age: 69
End: 2024-04-27

## 2024-04-27 VITALS
DIASTOLIC BLOOD PRESSURE: 52 MMHG | OXYGEN SATURATION: 98 % | SYSTOLIC BLOOD PRESSURE: 152 MMHG | TEMPERATURE: 97 F | RESPIRATION RATE: 18 BRPM | HEART RATE: 54 BPM

## 2024-04-27 LAB
ANION GAP SERPL CALC-SCNC: 2 MMOL/L — LOW (ref 5–17)
BUN SERPL-MCNC: 46 MG/DL — HIGH (ref 7–23)
CALCIUM SERPL-MCNC: 10.7 MG/DL — HIGH (ref 8.5–10.1)
CHLORIDE SERPL-SCNC: 116 MMOL/L — HIGH (ref 96–108)
CO2 SERPL-SCNC: 24 MMOL/L — SIGNIFICANT CHANGE UP (ref 22–31)
CREAT SERPL-MCNC: 1.54 MG/DL — HIGH (ref 0.5–1.3)
EGFR: 36 ML/MIN/1.73M2 — LOW
GLUCOSE BLDC GLUCOMTR-MCNC: 100 MG/DL — HIGH (ref 70–99)
GLUCOSE BLDC GLUCOMTR-MCNC: 102 MG/DL — HIGH (ref 70–99)
GLUCOSE BLDC GLUCOMTR-MCNC: 79 MG/DL — SIGNIFICANT CHANGE UP (ref 70–99)
GLUCOSE SERPL-MCNC: 87 MG/DL — SIGNIFICANT CHANGE UP (ref 70–99)
POTASSIUM SERPL-MCNC: 5.3 MMOL/L — SIGNIFICANT CHANGE UP (ref 3.5–5.3)
POTASSIUM SERPL-SCNC: 5.3 MMOL/L — SIGNIFICANT CHANGE UP (ref 3.5–5.3)
SODIUM SERPL-SCNC: 142 MMOL/L — SIGNIFICANT CHANGE UP (ref 135–145)

## 2024-04-27 PROCEDURE — 99239 HOSP IP/OBS DSCHRG MGMT >30: CPT

## 2024-04-27 PROCEDURE — 99232 SBSQ HOSP IP/OBS MODERATE 35: CPT

## 2024-04-27 RX ORDER — ZOLEDRONIC ACID 5 MG/100ML
4 INJECTION, SOLUTION INTRAVENOUS ONCE
Refills: 0 | Status: COMPLETED | OUTPATIENT
Start: 2024-04-27 | End: 2024-04-27

## 2024-04-27 RX ORDER — ASPIRIN/CALCIUM CARB/MAGNESIUM 324 MG
1 TABLET ORAL
Qty: 0 | Refills: 0 | DISCHARGE
Start: 2024-04-27

## 2024-04-27 RX ORDER — CEFUROXIME AXETIL 250 MG
1 TABLET ORAL
Qty: 0 | Refills: 0 | DISCHARGE
Start: 2024-04-27

## 2024-04-27 RX ORDER — SPIRONOLACTONE 25 MG/1
1 TABLET, FILM COATED ORAL
Refills: 0 | DISCHARGE

## 2024-04-27 RX ORDER — AZILSARTAN KAMEDOXOMIL 40 MG/1
1 TABLET ORAL
Qty: 0 | Refills: 0 | DISCHARGE

## 2024-04-27 RX ADMIN — AMIODARONE HYDROCHLORIDE 200 MILLIGRAM(S): 400 TABLET ORAL at 10:03

## 2024-04-27 RX ADMIN — ZOLEDRONIC ACID 420 MILLIGRAM(S): 5 INJECTION, SOLUTION INTRAVENOUS at 14:29

## 2024-04-27 RX ADMIN — Medication 250 MILLIGRAM(S): at 10:03

## 2024-04-27 RX ADMIN — Medication 81 MILLIGRAM(S): at 10:04

## 2024-04-27 RX ADMIN — APIXABAN 5 MILLIGRAM(S): 2.5 TABLET, FILM COATED ORAL at 10:03

## 2024-04-27 RX ADMIN — Medication 100 MILLIGRAM(S): at 10:03

## 2024-04-27 NOTE — DISCHARGE NOTE PROVIDER - NSDCFUSCHEDAPPT_GEN_ALL_CORE_FT
Nikhil Lin  [facility]  [address]  Scheduled Appointment: 05/24/2024

## 2024-04-27 NOTE — DISCHARGE NOTE PROVIDER - HOSPITAL COURSE
68yo female with hx of DM II, HTN, A fib s/p recent ablation 1 month ago, recent diagnosis of Splenic CA w/ plan for splenectomy on May 10th, presented to the ED w/ complaints of not feeling well as well as frequent falls this week w/ most recent episode today, noted to have A FIB w/ RVR    #pAfib with RVR   -s/p DCCV  on 04/22/24  -currently in NSR   -Eliquis   -Cardizem 120mg   -Amiodarone 200mg PO daily   -Metoprolol Succinate 100mg   -previously failed sotolol, per cards     -EP reccs noted: LA size moderately dilated may benefit from ablation procedure to control AF and better optimize for splenectomy. Pt would need to be on uninterrupted OAC for at least 4 weeks (ideally 8 weeks) post AF ablation before proceeding with splenectomy falls may be related to amiodarone use and plan to stop it 4 weeks post ablation (would ideally stop it after she has splenectomy)  -f/u with Cardiology and EP as outpatient        #JUSTINO on CKD, with urinary retention, possible ATN  -UA: reviewed   -UCx: Strep Gallolyticus and E.coli   -Ceftin 250mg PO for total course of 7 days (until 04/30/24)  -RBUS: reviewed: no hydronephrosis   -failed TOV, carson in place for urinary retention   -f/u with urologist as outpatient   -seen and evaluated by Nephrology: discussed with Dr. Carter      #Recurrent falls, LLE weakness, Acute infarct in watershed areas anterior+posterior parasagittal distribution +remote infarctions age indeterminate on MRI brain  -ASA   -Atorvastatin   -less likely embolic in nature   -BP normal   -A1c: 6.1  -TSH: noted   -Vitamin B-12: wnl   -neurology eval appreciated     #LBP; history of malignancy, with hypercalcemia   -MRIs of the cervical, thoracic and lumbar spine revealed no spinal cord lesions or pathology, diffuse degenerative changes are noted  -imaging done w/o contrast due to impaired renal function   -further imaging as outapt  -low back pain resolved   -Zometa for hypercalcemia     #Diabetes   -hold oral medications: Glipizide, Jardiance   -A1c: 6.1  -RISS    #Splenomegaly, 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 Pt with significant Hx of malignancy   -corrected Ca: 11.7  -monitor and trend   -gentle IV hydration   -Zometa    #Incidental Imaging finding   -enlarged thyroid with tracheal deviation   -renal lesion   -pulmonary nodule   -recently had PET scan done at [facility]   -f/u with PCP as outpateint       Patient needs to f/u with EP, Cardiology, Urologist, Heme/onc as outpatient     discharge to [facility].       Discussed with sister  in great detail who is in agreement with the current plan 70yo female with hx of DM II, HTN, A fib s/p recent ablation 1 month ago, recent diagnosis of Splenic CA w/ plan for splenectomy on May 10th, presented to the ED w/ complaints of not feeling well as well as frequent falls this week w/ most recent episode today, noted to have A FIB w/ RVR    Sub: Pt seen and evaluated. Patient denies fever, chills, headache, dizziness, change in vision, blurry vision, anosmia, ageusia, chest pain, palpitations, shortness of breath, abdominal pain, nausea, vomiting, diarrhea, constipation, hematochezia, dysuria, urinary frequency, urgency, hematuria, numbness, weakness or tingling. Bladder scan noted: 388mL, carson reinserted     Vital Signs Last 24 Hrs  T(C): 36.3 (27 Apr 2024 08:05), Max: 36.7 (26 Apr 2024 20:12)  T(F): 97.3 (27 Apr 2024 08:05), Max: 98 (26 Apr 2024 20:12)  HR: 54 (27 Apr 2024 08:05) (54 - 58)  BP: 152/52 (27 Apr 2024 08:05) (152/52 - 152/65)  RR: 18 (27 Apr 2024 08:05) (18 - 18)  SpO2: 98% (27 Apr 2024 08:05) (97% - 98%)        GEN: NAD   HEENT: EOMI,  moist mucous membranes  NECK : Soft and supple, no JVD  LUNG: CTABL, No wheezing, rales or rhonchi  CVS: S1S2+, RRR, no M/G/R  GI: BS+, soft, NT/ND, no guarding, no rebound, +carson in place   EXTREMITIES: No peripheral edema  VASCULAR: 2+ peripheral pulses  NEURO: AAOx3,   SKIN: No rashes        #pAfib with RVR   -s/p DCCV  on 04/22/24  -currently in NSR   -Eliquis   -Cardizem 120mg   -Amiodarone 200mg PO daily   -Metoprolol Succinate 100mg   -previously failed sotolol, per cards     -EP reccs noted: LA size moderately dilated may benefit from ablation procedure to control AF and better optimize for splenectomy. Pt would need to be on uninterrupted OAC for at least 4 weeks (ideally 8 weeks) post AF ablation before proceeding with splenectomy falls may be related to amiodarone use and plan to stop it 4 weeks post ablation (would ideally stop it after she has splenectomy)  -f/u with Cardiology and EP as outpatient        #JUSTINO on CKD, with urinary retention, possible ATN  -UA: reviewed   -UCx: Strep Gallolyticus and E.coli   -Ceftin 250mg PO for total course of 7 days (until 04/30/24)  -RBUS: reviewed: no hydronephrosis   -failed TOV, carson in place for urinary retention   -f/u with urologist as outpatient   -seen and evaluated by Nephrology: discussed with Dr. Carter      #Recurrent falls, LLE weakness, Acute infarct in watershed areas anterior+posterior parasagittal distribution +remote infarctions age indeterminate on MRI brain  -ASA   -Atorvastatin   -less likely embolic in nature   -BP normal   -A1c: 6.1  -TSH: noted   -Vitamin B-12: wnl   -neurology eval appreciated     #LBP; history of malignancy, with hypercalcemia   -MRIs of the cervical, thoracic and lumbar spine revealed no spinal cord lesions or pathology, diffuse degenerative changes are noted  -imaging done w/o contrast due to impaired renal function   -further imaging as outapt  -low back pain resolved   -Zometa for hypercalcemia     #Diabetes   -hold oral medications: Glipizide, Jardiance   -A1c: 6.1  -RISS    #Splenomegaly, 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 Pt with significant Hx of malignancy   -corrected Ca: 11.7  -monitor and trend   -gentle IV hydration   -Zometa    #Incidental Imaging finding   -enlarged thyroid with tracheal deviation   -renal lesion   -pulmonary nodule   -recently had PET scan done at [facility]   -f/u with PCP as outpateint       Patient needs to f/u with EP, Cardiology, Urologist, Heme/onc as outpatient     discharge to [facility].       Discussed with sister  in great detail who is in agreement with the current plan

## 2024-04-27 NOTE — PROGRESS NOTE ADULT - SUBJECTIVE AND OBJECTIVE BOX
NEPHROLOGY INTERVAL HPI/OVERNIGHT EVENTS:  24 @ 13:15      MEDICATIONS  (STANDING):  aMIOdarone    Tablet 200 milliGRAM(s) Oral daily  apixaban 5 milliGRAM(s) Oral two times a day  aspirin enteric coated 81 milliGRAM(s) Oral daily  atorvastatin 40 milliGRAM(s) Oral at bedtime  cefuroxime   Tablet 250 milliGRAM(s) Oral every 12 hours  dextrose 10% Bolus 125 milliLiter(s) IV Bolus once  dextrose 5%. 1000 milliLiter(s) (50 mL/Hr) IV Continuous <Continuous>  dextrose 5%. 1000 milliLiter(s) (100 mL/Hr) IV Continuous <Continuous>  dextrose 50% Injectable 25 Gram(s) IV Push once  dextrose 50% Injectable 12.5 Gram(s) IV Push once  diltiazem    milliGRAM(s) Oral daily  glucagon  Injectable 1 milliGRAM(s) IntraMuscular once  insulin lispro (ADMELOG) corrective regimen sliding scale   SubCutaneous three times a day before meals  insulin lispro (ADMELOG) corrective regimen sliding scale   SubCutaneous at bedtime  lactated ringers. 1000 milliLiter(s) (75 mL/Hr) IV Continuous <Continuous>  metoprolol succinate  milliGRAM(s) Oral daily    MEDICATIONS  (PRN):  dextrose Oral Gel 15 Gram(s) Oral once PRN Blood Glucose LESS THAN 70 milliGRAM(s)/deciliter      Allergies    No Known Drug Allergies  Bee stings (Swelling)    Intolerances        I&O's Detail    2024 07:01  -  2024 07:00  --------------------------------------------------------  IN:    Lactated Ringers: 900 mL  Total IN: 900 mL    OUT:    Voided (mL): 1300 mL  Total OUT: 1300 mL    Total NET: -400 mL          .    Patient was seen and evaluated on dialysis.   Patient is tolerating the procedure well.   Continue dialysis:   Dialyzer:          QB:        QD:   Goal UF ___ over ___ Hours       Vital Signs Last 24 Hrs  T(C): 36.3 (2024 08:05), Max: 36.7 (2024 20:12)  T(F): 97.3 (2024 08:05), Max: 98 (2024 20:12)  HR: 54 (2024 08:05) (54 - 58)  BP: 152/52 (2024 08:05) (152/52 - 152/65)  BP(mean): --  RR: 18 (2024 08:05) (18 - 18)  SpO2: 98% (2024 08:05) (97% - 98%)    Parameters below as of 2024 08:05  Patient On (Oxygen Delivery Method): room air      Daily     Daily Weight in k.8 (2024 06:00)    PHYSICAL EXAM:  General: alert. awake Ox3  HEENT: MMM  CV: s1s2 rrr  LUNGS: B/L CTA  EXT: no edema    LABS:                        12.3   6.37  )-----------( 249      ( 2024 07:20 )             38.5     04-27    142  |  116<H>  |  46<H>  ----------------------------<  87  5.3   |  24  |  1.54<H>    Ca    10.7<H>      2024 06:06    TPro  6.5  /  Alb  2.8<L>  /  TBili  0.5  /  DBili  x   /  AST  29  /  ALT  57  /  AlkPhos  68  04-26      Urinalysis Basic - ( 2024 06:06 )    Color: x / Appearance: x / SG: x / pH: x  Gluc: 87 mg/dL / Ketone: x  / Bili: x / Urobili: x   Blood: x / Protein: x / Nitrite: x   Leuk Esterase: x / RBC: x / WBC x   Sq Epi: x / Non Sq Epi: x / Bacteria: x           NEPHROLOGY INTERVAL HPI/OVERNIGHT EVENTS:  24 @ 13:15     for dc, tolerating po + uop +  HPI:  68yo female with hx of DM II, HTN, A fib s/p recent ablation 1 month ago, recent diagnosis of Splenic CA w/ plan for splenectomy on May 10th, presented to the ED w/ complaints of not feeling well as well as frequent falls this week w/ most recent episode today. Pt states she feels her legs are weak and unable to stand independently. States she fell twice this week. Denies numbness/tingling in her LE. Denies cp, palpitations, sob, abd pain, N/V, fever, chills.    In the ED, pt noted to be in A FIB w/ RVR unable to control w/ IV Cardizem. Started on Cardizem gtt w/ home Amiodarone to bring rate down to 100-120s.  (2024 12:42)    Nephrology  Above from chart  Discussed w Dr Hickey pt w recent hospitalization at [facility] creat 1.2 range, now admitted with falls, decline in health, h/o splenic cancer  Noted to come in with elevated creatinine level  Marie placed for obst uropathy , dc d early am today, repeat bladder scan w 340 ml, no urgency to urinate  Pt was on ARB/ SGLT2i as outpt , now on hold  NO ivc studies, creat in the 1.7-1.9 range      MEDICATIONS  (STANDING):  aMIOdarone    Tablet 200 milliGRAM(s) Oral daily  apixaban 5 milliGRAM(s) Oral two times a day  aspirin enteric coated 81 milliGRAM(s) Oral daily  atorvastatin 40 milliGRAM(s) Oral at bedtime  cefuroxime   Tablet 250 milliGRAM(s) Oral every 12 hours  dextrose 10% Bolus 125 milliLiter(s) IV Bolus once  dextrose 5%. 1000 milliLiter(s) (50 mL/Hr) IV Continuous <Continuous>  dextrose 5%. 1000 milliLiter(s) (100 mL/Hr) IV Continuous <Continuous>  dextrose 50% Injectable 25 Gram(s) IV Push once  dextrose 50% Injectable 12.5 Gram(s) IV Push once  diltiazem    milliGRAM(s) Oral daily  glucagon  Injectable 1 milliGRAM(s) IntraMuscular once  insulin lispro (ADMELOG) corrective regimen sliding scale   SubCutaneous three times a day before meals  insulin lispro (ADMELOG) corrective regimen sliding scale   SubCutaneous at bedtime  lactated ringers. 1000 milliLiter(s) (75 mL/Hr) IV Continuous <Continuous>  metoprolol succinate  milliGRAM(s) Oral daily    MEDICATIONS  (PRN):  dextrose Oral Gel 15 Gram(s) Oral once PRN Blood Glucose LESS THAN 70 milliGRAM(s)/deciliter      Allergies    No Known Drug Allergies  Bee stings (Swelling)    Intolerances        I&O's Detail    2024 07:01  -  2024 07:00  --------------------------------------------------------  IN:    Lactated Ringers: 900 mL  Total IN: 900 mL    OUT:    Voided (mL): 1300 mL  Total OUT: 1300 mL    Total NET: -400 mL          Vital Signs Last 24 Hrs  T(C): 36.3 (2024 08:05), Max: 36.7 (2024 20:12)  T(F): 97.3 (2024 08:05), Max: 98 (2024 20:12)  HR: 54 (2024 08:05) (54 - 58)  BP: 152/52 (2024 08:05) (152/52 - 152/65)  BP(mean): --  RR: 18 (2024 08:05) (18 - 18)  SpO2: 98% (2024 08:05) (97% - 98%)    Parameters below as of 2024 08:05  Patient On (Oxygen Delivery Method): room air      Daily     Daily Weight in k.8 (2024 06:00)    PHYSICAL EXAM:  General: alert. awake Ox3  HEENT: MMM  CV: s1s2 rrr  LUNGS: B/L CTA  EXT: no edema    LABS:                        12.3   6.37  )-----------( 249      ( 2024 07:20 )             38.5     04-    142  |  116<H>  |  46<H>  ----------------------------<  87  5.3   |  24  |  1.54<H>    Ca    10.7<H>      2024 06:06    TPro  6.5  /  Alb  2.8<L>  /  TBili  0.5  /  DBili  x   /  AST  29  /  ALT  57  /  AlkPhos  68  04-26      Urinalysis Basic - ( 2024 06:06 )    Color: x / Appearance: x / SG: x / pH: x  Gluc: 87 mg/dL / Ketone: x  / Bili: x / Urobili: x   Blood: x / Protein: x / Nitrite: x   Leuk Esterase: x / RBC: x / WBC x   Sq Epi: x / Non Sq Epi: x / Bacteria: x

## 2024-04-27 NOTE — DISCHARGE NOTE PROVIDER - NPI NUMBER (FOR SYSADMIN USE ONLY) :
[[id number]],[[id number]] [[id number]],[UNKNOWN]

## 2024-04-27 NOTE — PROGRESS NOTE ADULT - ASSESSMENT
68yo female with hx of DM II, HTN, A fib s/p recent ablation 1 month ago, recent diagnosis of Splenic CA w/ plan for splenectomy on May 10th, presented to the ED w/ complaints of not feeling well as well as frequent falls this week w/ most recent episode today. Pt states she feels her legs are weak and unable to stand independently. States she fell twice this week. Denies numbness/tingling in her LE. Denies cp, palpitations, sob, abd pain, N/V, fever, chills.    In the ED, pt noted to be in A FIB w/ RVR unable to control w/ IV Cardizem. Started on Cardizem gtt w/ home Amiodarone to bring rate down to 100-120s.  (19 Apr 2024 12:42)    Nephrology  Above from chart  Discussed w Dr Foley pt w recent hospitalization at [facility] creat 1.2 range, now admitted with falls, decline in health, h/o splenic cancer  Noted to come in with elevated creatinine level  Carson placed for obst uropathy , dc d early am today, repeat bladder scan w 340 ml, no urgency to urinate  Pt was on ARB/ SGLT2i as outpt , now on hold  NO ivc studies, creat in the 1.7-1.9 range    A/P  CKD III  JUSTINO on admission, was on SGLT2 i and ARB as outpt possibly w obstr uropathy  now carson dependent  No other identifiable factors, NSAIDS or IVC  May be ATN and require time to resolve to a new baseline  Obstructive uropathy- recommend DC w carson and outpt urology f/u    4/27 MK  - JUSTINO with undefined ckd at baseline     ? related to arb/sglt2i with obstruction     monitor on po intake    would not re-intro farxiga and arb  - hypercalcemia     ? malignancy related    dw dr rian brothers    will give zometa renally dosed    and will be fu as outpt for response  - ? splenic ca  on going dx and eval   dw dr gabriela floey

## 2024-04-27 NOTE — PROGRESS NOTE ADULT - PROVIDER SPECIALTY LIST ADULT
Cardiology
Electrophysiology
Hospitalist
Electrophysiology
Hospitalist
Infectious Disease
Nephrology
Electrophysiology
Hospitalist
Neurology
Neurology

## 2024-04-27 NOTE — DISCHARGE NOTE PROVIDER - NSDCMRMEDTOKEN_GEN_ALL_CORE_FT
amiodarone 200 mg oral tablet: 1 tab(s) orally once a day  aspirin 81 mg oral delayed release tablet: 1 tab(s) orally once a day  atorvastatin 40 mg oral tablet: 1 tab(s) orally once a day  cefuroxime 250 mg oral tablet: 1 tab(s) orally every 12 hours continue till 04/30/24  DilTIAZem (Eqv-Cardizem CD) 120 mg/24 hours oral capsule, extended release: 1 cap(s) orally once a day  Eliquis 5 mg oral tablet: 1 tab(s) orally 2 times a day  ezetimibe 10 mg oral tablet: 1 tab(s) orally once a day  Toprol- mg oral tablet, extended release: 1 tab(s) orally once a day  
,DirectAddress_Unknown

## 2024-04-27 NOTE — DISCHARGE NOTE PROVIDER - PROVIDER TOKENS
PROVIDER:[TOKEN:[[id number]],ESTABLISHEDPATIENT:[T]],PROVIDER:[TOKEN:[[id number]],FOLLOWUP:[1 week],ESTABLISHEDPATIENT:[T]],PROVIDER:[TOKEN:[[id number]],FOLLOWUP:[1 week],ESTABLISHEDPATIENT:[T]],FREE:[LAST:[Brown],FIRST:[Colby],PHONE:[(   )    -],FAX:[(   )    -],ADDRESS:[[address] [place] [address]],FOLLOWUP:[1 week],ESTABLISHEDPATIENT:[T]] PROVIDER:[TOKEN:[[id number]],ESTABLISHEDPATIENT:[T]],PROVIDER:[TOKEN:[[id number]],FOLLOWUP:[1 week],ESTABLISHEDPATIENT:[T]],PROVIDER:[TOKEN:[[id number]],FOLLOWUP:[1 week],ESTABLISHEDPATIENT:[T]],PROVIDER:[TOKEN:[[id number]],FOLLOWUP:[1 week]],FREE:[LAST:[Brown],FIRST:[oClby],PHONE:[(   )    -],FAX:[(   )    -],ADDRESS:[[address] [place] [address]],FOLLOWUP:[1 week]]

## 2024-04-27 NOTE — DISCHARGE NOTE PROVIDER - NSDCCPCAREPLAN_GEN_ALL_CORE_FT
PRINCIPAL DISCHARGE DIAGNOSIS  Diagnosis: Atrial fibrillation with rapid ventricular response  Assessment and Plan of Treatment: You underwent cardioversion. Please continue to take the following medications.  -s/p DCCV  on 04/22/24  -Eliquis   -Cardizem 120mg   -Amiodarone 200mg PO daily   -Metoprolol Succinate 100mg   -f/u with Cardiology and EP as outpatient      SECONDARY DISCHARGE DIAGNOSES  Diagnosis: Leg weakness  Assessment and Plan of Treatment: resolved    Diagnosis: Urinary tract infection  Assessment and Plan of Treatment: Please continue to take antibiotics as prescribed    Diagnosis: Splenic mass  Assessment and Plan of Treatment: Please follow up with Dr Weaver and Dr Madan Artis after DC    Diagnosis: Hypercalcemia  Assessment and Plan of Treatment:     Diagnosis: Abnormal finding on imaging  Assessment and Plan of Treatment: -enlarged thyroid with tracheal deviation   -renal lesion   -pulmonary nodule   -recently had PET scan done at [facility]   -f/u with PCP as outpateint    Diagnosis: Urinary retention  Assessment and Plan of Treatment: Marie in place. Please follow up with Urologist as outpatient    Diagnosis: Acute bilat watershed infarction  Assessment and Plan of Treatment: Please continue to take your medications as prescribed

## 2024-04-27 NOTE — DISCHARGE NOTE PROVIDER - CARE PROVIDER_API CALL
Nikhil Lin  Cardiac Electrophysiology  [address] [place] [address]  Phone: [phone]  Fax: [phone]  Established Patient  Follow Up Time:     Khoi Zambrano  Cardiovascular Disease  [address], [address]  [place] [address]  Phone: [phone]  Fax: [phone]  Established Patient  Follow Up Time: 1 week    Jose Maria Weaver  Medical Oncology  [address]  [place] [address]  Phone: [phone]  Fax: [phone]  Established Patient  Follow Up Time: 1 week    Colby Artis  [address], [place] [address]  Phone: (   )    -  Fax: (   )    -  Established Patient  Follow Up Time: 1 week   Nikhil Lin  Cardiac Electrophysiology  [address] [place] [address]  Phone: [phone]  Fax: [phone]  Established Patient  Follow Up Time:     Khoi Zambrano  Cardiovascular Disease  [address], [address]  [place] [address]  Phone: [phone]  Fax: [phone]  Established Patient  Follow Up Time: 1 week    Jose Maria Weaver  Medical Oncology  [address]  [place] [address]  Phone: [phone]  Fax: [phone]  Established Patient  Follow Up Time: 1 week    Stan Acosta  Urology  [address]  [place] [address]  Phone: [phone]  Fax: [phone]  Follow Up Time: 1 week    Colby Artis  [address], [place] [address]  Phone: (   )    -  Fax: (   )    -  Follow Up Time: 1 week

## 2024-04-27 NOTE — DISCHARGE NOTE PROVIDER - CARE PROVIDERS DIRECT ADDRESSES
,[email or web address],DirectAddress_Unknown ,[email or web address],DirectAddress_Unknown

## 2024-05-02 ENCOUNTER — APPOINTMENT (OUTPATIENT)
Dept: ELECTROPHYSIOLOGY | Facility: CLINIC | Age: 69
End: 2024-05-02

## 2024-05-03 ENCOUNTER — APPOINTMENT (OUTPATIENT)
Dept: ELECTROPHYSIOLOGY | Facility: CLINIC | Age: 69
End: 2024-05-03

## 2024-05-06 DIAGNOSIS — R33.9 RETENTION OF URINE, UNSPECIFIED: ICD-10-CM

## 2024-05-06 DIAGNOSIS — C26.1 MALIGNANT NEOPLASM OF SPLEEN: ICD-10-CM

## 2024-05-06 DIAGNOSIS — Z91.030 BEE ALLERGY STATUS: ICD-10-CM

## 2024-05-06 DIAGNOSIS — I48.0 PAROXYSMAL ATRIAL FIBRILLATION: ICD-10-CM

## 2024-05-06 DIAGNOSIS — E11.22 TYPE 2 DIABETES MELLITUS WITH DIABETIC CHRONIC KIDNEY DISEASE: ICD-10-CM

## 2024-05-06 DIAGNOSIS — B96.20 UNSPECIFIED ESCHERICHIA COLI [E. COLI] AS THE CAUSE OF DISEASES CLASSIFIED ELSEWHERE: ICD-10-CM

## 2024-05-06 DIAGNOSIS — Z79.01 LONG TERM (CURRENT) USE OF ANTICOAGULANTS: ICD-10-CM

## 2024-05-06 DIAGNOSIS — E83.52 HYPERCALCEMIA: ICD-10-CM

## 2024-05-06 DIAGNOSIS — Z85.42 PERSONAL HISTORY OF MALIGNANT NEOPLASM OF OTHER PARTS OF UTERUS: ICD-10-CM

## 2024-05-06 DIAGNOSIS — E11.42 TYPE 2 DIABETES MELLITUS WITH DIABETIC POLYNEUROPATHY: ICD-10-CM

## 2024-05-06 DIAGNOSIS — N18.30 CHRONIC KIDNEY DISEASE, STAGE 3 UNSPECIFIED: ICD-10-CM

## 2024-05-06 DIAGNOSIS — I12.9 HYPERTENSIVE CHRONIC KIDNEY DISEASE WITH STAGE 1 THROUGH STAGE 4 CHRONIC KIDNEY DISEASE, OR UNSPECIFIED CHRONIC KIDNEY DISEASE: ICD-10-CM

## 2024-05-06 DIAGNOSIS — R16.1 SPLENOMEGALY, NOT ELSEWHERE CLASSIFIED: ICD-10-CM

## 2024-05-06 DIAGNOSIS — N17.0 ACUTE KIDNEY FAILURE WITH TUBULAR NECROSIS: ICD-10-CM

## 2024-05-06 DIAGNOSIS — Z85.43 PERSONAL HISTORY OF MALIGNANT NEOPLASM OF OVARY: ICD-10-CM

## 2024-05-06 DIAGNOSIS — R29.6 REPEATED FALLS: ICD-10-CM

## 2024-05-06 DIAGNOSIS — I63.30 CEREBRAL INFARCTION DUE TO THROMBOSIS OF UNSPECIFIED CEREBRAL ARTERY: ICD-10-CM

## 2024-05-06 DIAGNOSIS — B95.2 ENTEROCOCCUS AS THE CAUSE OF DISEASES CLASSIFIED ELSEWHERE: ICD-10-CM

## 2024-05-06 DIAGNOSIS — E87.5 HYPERKALEMIA: ICD-10-CM

## 2024-05-06 DIAGNOSIS — N13.6 PYONEPHROSIS: ICD-10-CM

## 2024-05-06 DIAGNOSIS — Z79.84 LONG TERM (CURRENT) USE OF ORAL HYPOGLYCEMIC DRUGS: ICD-10-CM

## 2024-05-24 ENCOUNTER — APPOINTMENT (OUTPATIENT)
Dept: ELECTROPHYSIOLOGY | Facility: CLINIC | Age: 69
End: 2024-05-24

## 2024-06-20 ENCOUNTER — INPATIENT (INPATIENT)
Facility: HOSPITAL | Age: 69
LOS: 26 days | Discharge: NURSING, REHAB OR LONG-TERM CARE | DRG: 66 | End: 2024-07-17
Attending: STUDENT IN AN ORGANIZED HEALTH CARE EDUCATION/TRAINING PROGRAM | Admitting: HOSPITALIST
Payer: MEDICARE

## 2024-06-20 VITALS
DIASTOLIC BLOOD PRESSURE: 77 MMHG | HEIGHT: 65 IN | RESPIRATION RATE: 20 BRPM | OXYGEN SATURATION: 97 % | HEART RATE: 160 BPM | TEMPERATURE: 98 F | SYSTOLIC BLOOD PRESSURE: 124 MMHG

## 2024-06-20 DIAGNOSIS — Z98.890 OTHER SPECIFIED POSTPROCEDURAL STATES: Chronic | ICD-10-CM

## 2024-06-20 DIAGNOSIS — Z96.651 PRESENCE OF RIGHT ARTIFICIAL KNEE JOINT: Chronic | ICD-10-CM

## 2024-06-20 DIAGNOSIS — I63.9 CEREBRAL INFARCTION, UNSPECIFIED: ICD-10-CM

## 2024-06-20 DIAGNOSIS — Z90.710 ACQUIRED ABSENCE OF BOTH CERVIX AND UTERUS: Chronic | ICD-10-CM

## 2024-06-20 LAB
ALBUMIN SERPL ELPH-MCNC: 1.9 G/DL — LOW (ref 3.3–5)
ALP SERPL-CCNC: 40 U/L — SIGNIFICANT CHANGE UP (ref 40–120)
ALT FLD-CCNC: 15 U/L — SIGNIFICANT CHANGE UP (ref 12–78)
ANION GAP SERPL CALC-SCNC: 6 MMOL/L — SIGNIFICANT CHANGE UP (ref 5–17)
APPEARANCE UR: ABNORMAL
APTT BLD: 34 SEC — SIGNIFICANT CHANGE UP (ref 24.5–35.6)
AST SERPL-CCNC: 18 U/L — SIGNIFICANT CHANGE UP (ref 15–37)
BACTERIA # UR AUTO: ABNORMAL /HPF
BASOPHILS # BLD AUTO: 0.03 K/UL — SIGNIFICANT CHANGE UP (ref 0–0.2)
BASOPHILS NFR BLD AUTO: 0.3 % — SIGNIFICANT CHANGE UP (ref 0–2)
BILIRUB SERPL-MCNC: 0.4 MG/DL — SIGNIFICANT CHANGE UP (ref 0.2–1.2)
BILIRUB UR-MCNC: NEGATIVE — SIGNIFICANT CHANGE UP
BLD GP AB SCN SERPL QL: SIGNIFICANT CHANGE UP
BUN SERPL-MCNC: 35 MG/DL — HIGH (ref 7–23)
CALCIUM SERPL-MCNC: 8.3 MG/DL — LOW (ref 8.5–10.1)
CAST: 4 /LPF — SIGNIFICANT CHANGE UP (ref 0–4)
CHLORIDE SERPL-SCNC: 108 MMOL/L — SIGNIFICANT CHANGE UP (ref 96–108)
CO2 SERPL-SCNC: 22 MMOL/L — SIGNIFICANT CHANGE UP (ref 22–31)
COLOR SPEC: YELLOW — SIGNIFICANT CHANGE UP
COMMENT - URINE: SIGNIFICANT CHANGE UP
CREAT SERPL-MCNC: 1.46 MG/DL — HIGH (ref 0.5–1.3)
DIFF PNL FLD: ABNORMAL
EGFR: 38 ML/MIN/1.73M2 — LOW
EOSINOPHIL # BLD AUTO: 0.11 K/UL — SIGNIFICANT CHANGE UP (ref 0–0.5)
EOSINOPHIL NFR BLD AUTO: 1.2 % — SIGNIFICANT CHANGE UP (ref 0–6)
GLUCOSE BLDC GLUCOMTR-MCNC: 133 MG/DL — HIGH (ref 70–99)
GLUCOSE SERPL-MCNC: 115 MG/DL — HIGH (ref 70–99)
GLUCOSE UR QL: NEGATIVE MG/DL — SIGNIFICANT CHANGE UP
HCT VFR BLD CALC: 24.3 % — LOW (ref 34.5–45)
HCT VFR BLD CALC: 30.3 % — LOW (ref 34.5–45)
HGB BLD-MCNC: 7.6 G/DL — LOW (ref 11.5–15.5)
HGB BLD-MCNC: 9.8 G/DL — LOW (ref 11.5–15.5)
IMM GRANULOCYTES NFR BLD AUTO: 0.3 % — SIGNIFICANT CHANGE UP (ref 0–0.9)
INR BLD: 1.65 RATIO — HIGH (ref 0.85–1.18)
KETONES UR-MCNC: NEGATIVE MG/DL — SIGNIFICANT CHANGE UP
LEUKOCYTE ESTERASE UR-ACNC: ABNORMAL
LYMPHOCYTES # BLD AUTO: 0.68 K/UL — LOW (ref 1–3.3)
LYMPHOCYTES # BLD AUTO: 7.4 % — LOW (ref 13–44)
MAGNESIUM SERPL-MCNC: 2.3 MG/DL — SIGNIFICANT CHANGE UP (ref 1.6–2.6)
MCHC RBC-ENTMCNC: 29.1 PG — SIGNIFICANT CHANGE UP (ref 27–34)
MCHC RBC-ENTMCNC: 29.3 PG — SIGNIFICANT CHANGE UP (ref 27–34)
MCHC RBC-ENTMCNC: 31.3 GM/DL — LOW (ref 32–36)
MCHC RBC-ENTMCNC: 32.3 GM/DL — SIGNIFICANT CHANGE UP (ref 32–36)
MCV RBC AUTO: 90.7 FL — SIGNIFICANT CHANGE UP (ref 80–100)
MCV RBC AUTO: 93.1 FL — SIGNIFICANT CHANGE UP (ref 80–100)
MONOCYTES # BLD AUTO: 0.8 K/UL — SIGNIFICANT CHANGE UP (ref 0–0.9)
MONOCYTES NFR BLD AUTO: 8.7 % — SIGNIFICANT CHANGE UP (ref 2–14)
NEUTROPHILS # BLD AUTO: 7.57 K/UL — HIGH (ref 1.8–7.4)
NEUTROPHILS NFR BLD AUTO: 82.1 % — HIGH (ref 43–77)
NITRITE UR-MCNC: POSITIVE
PH UR: 5 — SIGNIFICANT CHANGE UP (ref 5–8)
PHOSPHATE SERPL-MCNC: 3 MG/DL — SIGNIFICANT CHANGE UP (ref 2.5–4.5)
PLATELET # BLD AUTO: 311 K/UL — SIGNIFICANT CHANGE UP (ref 150–400)
PLATELET # BLD AUTO: 328 K/UL — SIGNIFICANT CHANGE UP (ref 150–400)
POTASSIUM SERPL-MCNC: 5.2 MMOL/L — SIGNIFICANT CHANGE UP (ref 3.5–5.3)
POTASSIUM SERPL-SCNC: 5.2 MMOL/L — SIGNIFICANT CHANGE UP (ref 3.5–5.3)
PROT SERPL-MCNC: 4.9 GM/DL — LOW (ref 6–8.3)
PROT UR-MCNC: SIGNIFICANT CHANGE UP MG/DL
PROTHROM AB SERPL-ACNC: 18.4 SEC — HIGH (ref 9.5–13)
RBC # BLD: 2.61 M/UL — LOW (ref 3.8–5.2)
RBC # BLD: 3.34 M/UL — LOW (ref 3.8–5.2)
RBC # FLD: 15.2 % — HIGH (ref 10.3–14.5)
RBC # FLD: 15.4 % — HIGH (ref 10.3–14.5)
RBC CASTS # UR COMP ASSIST: 6 /HPF — HIGH (ref 0–4)
SODIUM SERPL-SCNC: 136 MMOL/L — SIGNIFICANT CHANGE UP (ref 135–145)
SP GR SPEC: >1.03 — HIGH (ref 1–1.03)
SQUAMOUS # UR AUTO: 7 /HPF — HIGH (ref 0–5)
TROPONIN I, HIGH SENSITIVITY RESULT: 15.46 NG/L — SIGNIFICANT CHANGE UP
TROPONIN I, HIGH SENSITIVITY RESULT: 24.03 NG/L — SIGNIFICANT CHANGE UP
UROBILINOGEN FLD QL: 0.2 MG/DL — SIGNIFICANT CHANGE UP (ref 0.2–1)
WBC # BLD: 8.72 K/UL — SIGNIFICANT CHANGE UP (ref 3.8–10.5)
WBC # BLD: 9.22 K/UL — SIGNIFICANT CHANGE UP (ref 3.8–10.5)
WBC # FLD AUTO: 8.72 K/UL — SIGNIFICANT CHANGE UP (ref 3.8–10.5)
WBC # FLD AUTO: 9.22 K/UL — SIGNIFICANT CHANGE UP (ref 3.8–10.5)
WBC UR QL: 13 /HPF — HIGH (ref 0–5)

## 2024-06-20 PROCEDURE — 71275 CT ANGIOGRAPHY CHEST: CPT | Mod: MC

## 2024-06-20 PROCEDURE — 36415 COLL VENOUS BLD VENIPUNCTURE: CPT

## 2024-06-20 PROCEDURE — C1894: CPT

## 2024-06-20 PROCEDURE — 70450 CT HEAD/BRAIN W/O DYE: CPT | Mod: 26,XU,MC

## 2024-06-20 PROCEDURE — 86901 BLOOD TYPING SEROLOGIC RH(D): CPT

## 2024-06-20 PROCEDURE — 80053 COMPREHEN METABOLIC PANEL: CPT

## 2024-06-20 PROCEDURE — C1769: CPT

## 2024-06-20 PROCEDURE — 97110 THERAPEUTIC EXERCISES: CPT | Mod: GO

## 2024-06-20 PROCEDURE — 87040 BLOOD CULTURE FOR BACTERIA: CPT

## 2024-06-20 PROCEDURE — 0042T: CPT | Mod: MC

## 2024-06-20 PROCEDURE — 97112 NEUROMUSCULAR REEDUCATION: CPT | Mod: GO

## 2024-06-20 PROCEDURE — 83036 HEMOGLOBIN GLYCOSYLATED A1C: CPT

## 2024-06-20 PROCEDURE — 82962 GLUCOSE BLOOD TEST: CPT

## 2024-06-20 PROCEDURE — 82728 ASSAY OF FERRITIN: CPT

## 2024-06-20 PROCEDURE — 70551 MRI BRAIN STEM W/O DYE: CPT | Mod: MC

## 2024-06-20 PROCEDURE — 83540 ASSAY OF IRON: CPT

## 2024-06-20 PROCEDURE — 83880 ASSAY OF NATRIURETIC PEPTIDE: CPT

## 2024-06-20 PROCEDURE — 71045 X-RAY EXAM CHEST 1 VIEW: CPT

## 2024-06-20 PROCEDURE — 93650 ICAR CATH ABLTJ AV NODE FUNC: CPT

## 2024-06-20 PROCEDURE — 73700 CT LOWER EXTREMITY W/O DYE: CPT | Mod: MC,50

## 2024-06-20 PROCEDURE — 87086 URINE CULTURE/COLONY COUNT: CPT

## 2024-06-20 PROCEDURE — 93306 TTE W/DOPPLER COMPLETE: CPT

## 2024-06-20 PROCEDURE — 97535 SELF CARE MNGMENT TRAINING: CPT | Mod: GO

## 2024-06-20 PROCEDURE — 83550 IRON BINDING TEST: CPT

## 2024-06-20 PROCEDURE — 86923 COMPATIBILITY TEST ELECTRIC: CPT

## 2024-06-20 PROCEDURE — 81001 URINALYSIS AUTO W/SCOPE: CPT

## 2024-06-20 PROCEDURE — 97530 THERAPEUTIC ACTIVITIES: CPT | Mod: GP

## 2024-06-20 PROCEDURE — 80061 LIPID PANEL: CPT

## 2024-06-20 PROCEDURE — 99223 1ST HOSP IP/OBS HIGH 75: CPT

## 2024-06-20 PROCEDURE — 80048 BASIC METABOLIC PNL TOTAL CA: CPT

## 2024-06-20 PROCEDURE — 87186 SC STD MICRODIL/AGAR DIL: CPT

## 2024-06-20 PROCEDURE — 84100 ASSAY OF PHOSPHORUS: CPT

## 2024-06-20 PROCEDURE — P9016: CPT

## 2024-06-20 PROCEDURE — 97163 PT EVAL HIGH COMPLEX 45 MIN: CPT | Mod: GP

## 2024-06-20 PROCEDURE — 99497 ADVNCD CARE PLAN 30 MIN: CPT | Mod: 25

## 2024-06-20 PROCEDURE — 86900 BLOOD TYPING SEROLOGIC ABO: CPT

## 2024-06-20 PROCEDURE — 85025 COMPLETE CBC W/AUTO DIFF WBC: CPT

## 2024-06-20 PROCEDURE — 85730 THROMBOPLASTIN TIME PARTIAL: CPT

## 2024-06-20 PROCEDURE — 84484 ASSAY OF TROPONIN QUANT: CPT

## 2024-06-20 PROCEDURE — C1733: CPT

## 2024-06-20 PROCEDURE — 85027 COMPLETE CBC AUTOMATED: CPT

## 2024-06-20 PROCEDURE — 99291 CRITICAL CARE FIRST HOUR: CPT

## 2024-06-20 PROCEDURE — 83735 ASSAY OF MAGNESIUM: CPT

## 2024-06-20 PROCEDURE — 97164 PT RE-EVAL EST PLAN CARE: CPT | Mod: GP

## 2024-06-20 PROCEDURE — C1786: CPT

## 2024-06-20 PROCEDURE — C1889: CPT | Mod: Q0

## 2024-06-20 PROCEDURE — 93005 ELECTROCARDIOGRAM TRACING: CPT

## 2024-06-20 PROCEDURE — 97116 GAIT TRAINING THERAPY: CPT | Mod: GP

## 2024-06-20 PROCEDURE — P9040: CPT

## 2024-06-20 PROCEDURE — 74177 CT ABD & PELVIS W/CONTRAST: CPT | Mod: MC

## 2024-06-20 PROCEDURE — 73700 CT LOWER EXTREMITY W/O DYE: CPT | Mod: 26,50

## 2024-06-20 PROCEDURE — 70551 MRI BRAIN STEM W/O DYE: CPT | Mod: 26

## 2024-06-20 PROCEDURE — 85610 PROTHROMBIN TIME: CPT

## 2024-06-20 PROCEDURE — 86850 RBC ANTIBODY SCREEN: CPT

## 2024-06-20 PROCEDURE — C1887: CPT

## 2024-06-20 PROCEDURE — 70496 CT ANGIOGRAPHY HEAD: CPT | Mod: 26,MC

## 2024-06-20 PROCEDURE — 33274 TCAT INSJ/RPL PERM LDLS PM: CPT

## 2024-06-20 PROCEDURE — 36430 TRANSFUSION BLD/BLD COMPNT: CPT

## 2024-06-20 PROCEDURE — 87077 CULTURE AEROBIC IDENTIFY: CPT

## 2024-06-20 PROCEDURE — 70498 CT ANGIOGRAPHY NECK: CPT | Mod: 26,MC

## 2024-06-20 PROCEDURE — 97166 OT EVAL MOD COMPLEX 45 MIN: CPT | Mod: GO

## 2024-06-20 RX ORDER — EZETIMIBE 10 MG/1
1 TABLET ORAL
Refills: 0 | DISCHARGE

## 2024-06-20 RX ORDER — ASPIRIN 325 MG/1
81 TABLET, FILM COATED ORAL ONCE
Refills: 0 | Status: COMPLETED | OUTPATIENT
Start: 2024-06-20 | End: 2024-06-20

## 2024-06-20 RX ORDER — EZETIMIBE 10 MG/1
10 TABLET ORAL DAILY
Refills: 0 | Status: DISCONTINUED | OUTPATIENT
Start: 2024-06-20 | End: 2024-07-17

## 2024-06-20 RX ORDER — DILTIAZEM HCL 120 MG
1 CAPSULE, EXT RELEASE 24 HR ORAL
Refills: 0 | DISCHARGE

## 2024-06-20 RX ORDER — AMIODARONE HYDROCHLORIDE 400 MG/1
1 TABLET ORAL
Refills: 0 | DISCHARGE

## 2024-06-20 RX ORDER — DILTIAZEM HYDROCHLORIDE 240 MG/1
15 CAPSULE, EXTENDED RELEASE ORAL
Qty: 125 | Refills: 0 | Status: DISCONTINUED | OUTPATIENT
Start: 2024-06-20 | End: 2024-06-22

## 2024-06-20 RX ORDER — TRAMADOL HYDROCHLORIDE 50 MG/1
0.5 TABLET, FILM COATED ORAL
Refills: 0 | DISCHARGE

## 2024-06-20 RX ORDER — APIXABAN 5 MG/1
5 TABLET, FILM COATED ORAL
Refills: 0 | Status: DISCONTINUED | OUTPATIENT
Start: 2024-06-21 | End: 2024-06-28

## 2024-06-20 RX ORDER — BISACODYL 5 MG
1 TABLET, DELAYED RELEASE (ENTERIC COATED) ORAL
Refills: 0 | DISCHARGE

## 2024-06-20 RX ORDER — ATORVASTATIN CALCIUM 20 MG/1
40 TABLET, FILM COATED ORAL AT BEDTIME
Refills: 0 | Status: DISCONTINUED | OUTPATIENT
Start: 2024-06-20 | End: 2024-06-20

## 2024-06-20 RX ORDER — ACETAMINOPHEN 325 MG
650 TABLET ORAL EVERY 6 HOURS
Refills: 0 | Status: DISCONTINUED | OUTPATIENT
Start: 2024-06-20 | End: 2024-07-17

## 2024-06-20 RX ORDER — DILTIAZEM HYDROCHLORIDE 240 MG/1
10 CAPSULE, EXTENDED RELEASE ORAL ONCE
Refills: 0 | Status: COMPLETED | OUTPATIENT
Start: 2024-06-20 | End: 2024-06-20

## 2024-06-20 RX ORDER — LABETALOL HYDROCHLORIDE 300 MG/1
10 TABLET ORAL ONCE
Refills: 0 | Status: COMPLETED | OUTPATIENT
Start: 2024-06-20 | End: 2024-06-20

## 2024-06-20 RX ORDER — EZETIMIBE 10 MG/1
1 TABLET ORAL
Qty: 0 | Refills: 0 | DISCHARGE

## 2024-06-20 RX ORDER — ATORVASTATIN CALCIUM 20 MG/1
80 TABLET, FILM COATED ORAL ONCE
Refills: 0 | Status: COMPLETED | OUTPATIENT
Start: 2024-06-20 | End: 2024-06-20

## 2024-06-20 RX ORDER — ATORVASTATIN CALCIUM 20 MG/1
80 TABLET, FILM COATED ORAL AT BEDTIME
Refills: 0 | Status: DISCONTINUED | OUTPATIENT
Start: 2024-06-20 | End: 2024-07-17

## 2024-06-20 RX ORDER — ACETAMINOPHEN 325 MG
2 TABLET ORAL
Refills: 0 | DISCHARGE

## 2024-06-20 RX ORDER — DILTIAZEM HYDROCHLORIDE 240 MG/1
10 CAPSULE, EXTENDED RELEASE ORAL
Qty: 125 | Refills: 0 | Status: DISCONTINUED | OUTPATIENT
Start: 2024-06-20 | End: 2024-06-20

## 2024-06-20 RX ORDER — ASPIRIN 325 MG/1
81 TABLET, FILM COATED ORAL DAILY
Refills: 0 | Status: DISCONTINUED | OUTPATIENT
Start: 2024-06-20 | End: 2024-06-21

## 2024-06-20 RX ORDER — MIRTAZAPINE 15 MG/1
1 TABLET, FILM COATED ORAL
Refills: 0 | DISCHARGE

## 2024-06-20 RX ORDER — GABAPENTIN
1 POWDER (GRAM) MISCELLANEOUS
Refills: 0 | DISCHARGE

## 2024-06-20 RX ORDER — DILTIAZEM HYDROCHLORIDE 240 MG/1
120 CAPSULE, EXTENDED RELEASE ORAL DAILY
Refills: 0 | Status: DISCONTINUED | OUTPATIENT
Start: 2024-06-21 | End: 2024-06-21

## 2024-06-20 RX ORDER — INSULIN LISPRO 100 [IU]/ML
0 INJECTION, SOLUTION SUBCUTANEOUS
Refills: 0 | DISCHARGE

## 2024-06-20 RX ORDER — FLUCONAZOLE 200 MG
150 TABLET ORAL ONCE
Refills: 0 | Status: COMPLETED | OUTPATIENT
Start: 2024-06-20 | End: 2024-06-20

## 2024-06-20 RX ORDER — CALCIUM CARBONATE 500(1250)
1 TABLET,CHEWABLE ORAL
Refills: 0 | DISCHARGE

## 2024-06-20 RX ORDER — AMIODARONE HYDROCHLORIDE 50 MG/ML
200 INJECTION, SOLUTION INTRAVENOUS DAILY
Refills: 0 | Status: DISCONTINUED | OUTPATIENT
Start: 2024-06-20 | End: 2024-07-17

## 2024-06-20 RX ORDER — ATORVASTATIN CALCIUM 80 MG/1
1 TABLET, FILM COATED ORAL
Qty: 0 | Refills: 0 | DISCHARGE

## 2024-06-20 RX ORDER — ONDANSETRON HYDROCHLORIDE 2 MG/ML
4 INJECTION INTRAMUSCULAR; INTRAVENOUS EVERY 6 HOURS
Refills: 0 | Status: DISCONTINUED | OUTPATIENT
Start: 2024-06-20 | End: 2024-07-17

## 2024-06-20 RX ORDER — CEFTRIAXONE SODIUM 500 MG
1000 VIAL (EA) INJECTION EVERY 24 HOURS
Refills: 0 | Status: COMPLETED | OUTPATIENT
Start: 2024-06-20 | End: 2024-06-22

## 2024-06-20 RX ORDER — MICONAZOLE NITRATE
1 POWDER (GRAM) MISCELLANEOUS
Refills: 0 | DISCHARGE

## 2024-06-20 RX ADMIN — ATORVASTATIN CALCIUM 80 MILLIGRAM(S): 20 TABLET, FILM COATED ORAL at 23:32

## 2024-06-20 RX ADMIN — DILTIAZEM HYDROCHLORIDE 10 MG/HR: 240 CAPSULE, EXTENDED RELEASE ORAL at 12:46

## 2024-06-20 RX ADMIN — ASPIRIN 81 MILLIGRAM(S): 325 TABLET, FILM COATED ORAL at 12:44

## 2024-06-20 RX ADMIN — DILTIAZEM HYDROCHLORIDE 10 MILLIGRAM(S): 240 CAPSULE, EXTENDED RELEASE ORAL at 12:44

## 2024-06-20 RX ADMIN — DILTIAZEM HYDROCHLORIDE 20 MG/HR: 240 CAPSULE, EXTENDED RELEASE ORAL at 23:50

## 2024-06-20 RX ADMIN — ATORVASTATIN CALCIUM 80 MILLIGRAM(S): 20 TABLET, FILM COATED ORAL at 13:06

## 2024-06-20 RX ADMIN — LABETALOL HYDROCHLORIDE 10 MILLIGRAM(S): 300 TABLET ORAL at 15:24

## 2024-06-20 NOTE — H&P ADULT - HISTORY OF PRESENT ILLNESS
68 yo female with hx of learning disability, JUSTINO on CKD, DM II, HTN, A-fib s/p DCCV with h/o poor response to medications, also has ovarian and uterine CA, recent diagnosis of Splenic CA, CT Lumbar shows moderate stenosis and DDD, recent UTI, who presents as a code stroke due to sudden onset worsening of left sided weakness of LLE and LUE during PT also associated with slurred speech which has sinced resolved. Arrived with NIHSS of 8 with unknown LKW. Pt is a poor historian. History taken from EMR, ED MD and pt sister at bedside.

## 2024-06-20 NOTE — H&P ADULT - SENSORY
unable to assess pt poor historian, states can feel however cannot determine if sensation is equal throughout

## 2024-06-20 NOTE — ED ADULT NURSE REASSESSMENT NOTE - NS ED NURSE REASSESS COMMENT FT1
pt care assumed from previous RnJessica. Neuro assessment documented. cardizem infusing, see MAR. VS stable. Daughter at bedside. Pt has no complaints at this time.

## 2024-06-20 NOTE — ED PROVIDER NOTE - CLINICAL SUMMARY MEDICAL DECISION MAKING FREE TEXT BOX
Code stroke called for stat Ct, CTA and perfusion, pt on Eliquis which was most likely given to her at facility, unlikely candidate for tpa, neuro at bedside evaluating pt. Pt also appears to be in afib with RVR will start rate control with Cardizem drip

## 2024-06-20 NOTE — ED ADULT NURSE REASSESSMENT NOTE - NS ED NURSE REASSESS COMMENT FT1
pt went to MRI and CT, Cardizem infusing still. Pt turned and repositioned for comfort. Pt has no complaints at this time

## 2024-06-20 NOTE — ED PROVIDER NOTE - OBJECTIVE STATEMENT
70 yo female w/PMHx DM HTN afib s/p ablation on Eliquis, recent ischemic stroke non hemorrhagic presents to the ED from skilled nursing facility due to sudden onset worsening L sided weakness in LUE and LLE during PT at 1030am, also had some slurred speech at that time. Slurred speech has now mostly resolved.

## 2024-06-20 NOTE — ED PROVIDER NOTE - CARE PLAN
Principal Discharge DX:	Cerebrovascular accident (CVA)  Secondary Diagnosis:	Atrial fibrillation with RVR  Secondary Diagnosis:	Symptomatic anemia   1

## 2024-06-20 NOTE — H&P ADULT - SKIN COMMENTS
scattered eccymoses throughout thighs and calves, light blue/green in nauture, no swelling or erythema or open wounds or lesions on exam excluding exam of sacrum/buttocks which will be performed by RN at time of diaper change as pt difficult to move due to body habitus and weakness

## 2024-06-20 NOTE — ED ADULT TRIAGE NOTE - CHIEF COMPLAINT QUOTE
PT BIBEMS from [facility]. As per ems " pt was at PT and staff noticed a left sided facial droop, and left sided arm and leg weakness." on eliquis. EMS ekg read Rapid afib. HX cva in april 2024. L18g placed, 700ml NS given. . Code stroke called by md kidd @ 8315. Pt taken straight to ct

## 2024-06-20 NOTE — ED PROVIDER NOTE - PROGRESS NOTE DETAILS
Scrhank Buck for ED attending, Dr. Miller: pt with drop in H&H, Guaiac negative, + light brown stool, lot [id number], exp 9/30/24. + QC.    I Kael Buck attest that this documentation has been prepared under the direction and in the presence of Doctor Paul

## 2024-06-20 NOTE — H&P ADULT - CONVERSATION DETAILS
16 min spent discussing advanced care planning and pt is a DNR/DNI/NIV. Confirmed by sister at bedside who is HCP. SARAH updated.

## 2024-06-20 NOTE — H&P ADULT - NSHPSOCIALHISTORY_GEN_ALL_CORE
ADL dependent  currently at [facility]  cannot ambulate w/o walker and assistance

## 2024-06-20 NOTE — H&P ADULT - ASSESSMENT
68 yo female with hx of learning disability, JUSTINO on CKD, DM II, HTN, A-fib s/p DCCV with h/o poor response to medications, also has ovarian and uterine CA, recent diagnosis of Splenic CA, CT Lumbar shows moderate stenosis and DDD, recent UTI, who presents as a code stroke due to sudden onset worsening of left sided weakness of LLE and LUE during PT also associated with slurred speech which has sinced resolved. Arrived with NIHSS of 8 with unknown LKW. Pt is a poor historian. History taken from EMR, ED MD and pt sister at bedside.     #CVA  -admit to telemetry  -allow for permissive HTN  -Increase atorvastatin  -c/w ASA  -c/w eliquis  -passed beside dysphagia screen  -imaging reviewed, lesions c/w acute infarction, and others that are likely recent/indeterminate with ischemic etiology suspected, imaging without acute bleed  -NIHSS 8  -PT consult for weakness  -SW consult  -NPO until GIB/CTA abd/pelvis resulted  -TTE ordered  -Neurochecks and VS q 4 hrs  -MRI brain ordered  -OOB and ambulate with walker with assistance  -Turn and position q 2 hrs  -fall and aspiration precautions    #Afib with RVR  -s/p DCCV  -hold metoprolol while on IV cardizem  -c/w cardizem and cardizem gtt  -c/w amiodarone  -EP consult  -Cardiology consult  -c/w eliquis  -trend trop, first wnl  -obtain mg, phos  -keep Mg>2, K>4    #Anemia, acute  -likely from small intramuscular thigh hematoma in left upper thigh medial compartment  -No recent falls per sister at bedside  -bilateral LE CT w/o contrast ordered, pt with CKD, will hold additional contrast due to concern for JUSTINO  -guiac negative  -CTA chest/abd/pelvis pending read  -Transfuse 1U PRBC  -Trend cbc  -Will c/w Eliquis given suspected ischemic CVA in setting of afib rvr and as pt with VSS and no active bleeding on exam  -INR 1.65    #Protein calorie malnutrition  -Albumin 1.9  -Nutrition consult    #Weakness  #h/o CVA  -Turn and position q 2 hrs  -PT consult    #HTN/HLD  -c/w ezetimibe  -c/w statin, increased   -DASH/TLC diet when advanced    #Ovarian/Splenic/Thyroid CA  -heme/onc consult    #Recent COVID  -ground glass opacities on CT chest  -asymptomatic, VSS  -s/p 10 day isolation

## 2024-06-20 NOTE — ED ADULT NURSE NOTE - CHIEF COMPLAINT QUOTE
PT BIBEMS from [facility]. As per ems " pt was at PT and staff noticed a left sided facial droop, and left sided arm and leg weakness." on eliquis. EMS ekg read Rapid afib. HX cva in april 2024. L18g placed, 700ml NS given. . Code stroke called by md kidd @ 7572. Pt taken straight to ct

## 2024-06-20 NOTE — ED ADULT NURSE NOTE - NSFALLHARMRISKINTERV_ED_ALL_ED
Assistance OOB with selected safe patient handling equipment if applicable/Assistance with ambulation/Communicate risk of Fall with Harm to all staff, patient, and family/Monitor gait and stability/Provide visual cue: red socks, yellow wristband, yellow gown, etc/Reinforce activity limits and safety measures with patient and family/Bed in lowest position, wheels locked, appropriate side rails in place/Call bell, personal items and telephone in reach/Instruct patient to call for assistance before getting out of bed/chair/stretcher/Non-slip footwear applied when patient is off stretcher/[place] to call system/Physically safe environment - no spills, clutter or unnecessary equipment/Purposeful Proactive Rounding/Room/bathroom lighting operational, light cord in reach

## 2024-06-20 NOTE — ED PROVIDER NOTE - NS ED ATTENDING STATEMENT MOD
Progress Note  Critical Care    Admit Date: 3/7/2017   LOS: 3 days     Follow-up For: Resp Failure and Coma post Cardiac arrest     SUBJECTIVE:   Change over last 24 hours: Now hypotensive on Levophed and with REGINALD.  Worsening neuro exam but no seizures off Propofol infusion.     ROS:  Review of Systems   Unable to perform ROS: Intubated       Continuous Infusions:   amiodarone 0.5 mg/min (03/10/17 0900)    heparin (porcine) in D5W 11.025 Units/kg/hr (03/10/17 0900)    norepinephrine bitartrate-D5W 0.3 mcg/kg/min (03/10/17 0814)     Review of patient's allergies indicates:   Allergen Reactions    Pcn [penicillins]        OBJECTIVE:     Vital Signs (Most Recent)  Temp: 100 °F (37.8 °C) (03/10/17 0700)  Pulse: (!) 112 (03/10/17 0931)  Resp: (!) 26 (03/10/17 0931)  BP: 128/66 (03/10/17 0900)  SpO2: (!) 92 % (03/10/17 0931)     03/10/17 0700  100 °F (37.8 °C)  88   27  --  --  --   89 %  --  --  50  --  -- DH        03/10/17 0605  --  96   29   77/23  --  41                       Vital Signs Range (Last 24H):  Temp:  [98.2 °F (36.8 °C)-101.1 °F (38.4 °C)]   Pulse:  []   Resp:  [17-44]   BP: ()/(23-68)   SpO2:  [86 %-100 %]     I & O (Last 24H):  Intake/Output Summary (Last 24 hours) at 03/10/17 1018  Last data filed at 03/10/17 0900   Gross per 24 hour   Intake          3755.99 ml   Output              132 ml   Net          3623.99 ml       Ventilator Data (Last 24H):     Vent Mode: A/C  Oxygen Concentration (%):  [30-70] 70  Resp Rate Total:  [20 br/min-42 br/min] 26 br/min  Vt Set:  [450 mL] 450 mL  PEEP/CPAP:  [5 cmH20-10 cmH20] 10 cmH20  Pressure Support:  [0 cmH20] 0 cmH20  Mean Airway Pressure:  [9.6 lnV22-01 cmH20] 12 cmH20    Physical Exam:  Physical Exam   Constitutional: She appears lethargic. She appears to not be writhing in pain and not malnourished. She appears unhealthy. She has a sickly appearance. No distress. She is intubated.   HENT:   Head: Normocephalic and atraumatic.   Mouth/Throat:  Oropharynx is clear and moist.   Eyes: Lids are normal. Pupils are equal, round, and reactive to light.   Sluggish pupil reaction to light   Neck: Neck supple. Carotid bruit is not present.       Cardiovascular: Regular rhythm.  Tachycardia present.    Pulses:       Radial pulses are 1+ on the right side, and 1+ on the left side.        Dorsalis pedis pulses are 0 on the right side, and 0 on the left side.   Capillary refill < 3 seconds   Pulmonary/Chest: Accessory muscle usage (mild to moderate) present. She is intubated. She is in respiratory distress (mild). She has rhonchi. She has rales.   Abdominal: Soft. She exhibits no distension. Bowel sounds are hypoactive (absent). There is no tenderness.   Genitourinary:   Genitourinary Comments: Perez    Musculoskeletal: Normal range of motion.   Mild edema   Lymphadenopathy:     She has no cervical adenopathy.   Neurological: She appears lethargic.   PERRLA sluggish 2mm.  No corneal reflex and no withdrawal to pain on any extrem.  No spont or purposeful movements.  No seizure activity   Skin: Skin is warm and dry. She is not diaphoretic. No cyanosis.            Laboratory (Last 24H):  CBC:    Recent Labs  Lab 03/10/17  0341   WBC 7.38   HGB 11.9*   HCT 35.4*        CMP:    Recent Labs  Lab 03/10/17  0341   CALCIUM 9.0      K 3.9   CO2 21*   CL 99   BUN 26*   CREATININE 2.3*       Coagulation:   Recent Labs  Lab 03/09/17  0337 03/10/17  0341   INR 1.0  --    APTT 54.5* 37.8*     ABGs:   Recent Labs  Lab 03/10/17  0525   PH 7.219*   PCO2 47.8*   HCO3 19.5*   POCSATURATED 71*   BE -8     Microbiology Results (last 7 days)     Procedure Component Value Units Date/Time    Culture, Respiratory [[id number]] Collected:  03/10/17 0741    Order Status:  Sent Specimen:  Respiratory from Endotracheal Aspirate Updated:  03/10/17 0741    Culture, Respiratory with Gram Stain [[id number]]     Order Status:  No result Specimen:  Respiratory from Endotracheal Aspirate      Blood culture [[id number]] Collected:  03/07/17 1008    Order Status:  Completed Specimen:  Blood from Line, Jugular, Internal Right Updated:  03/10/17 0622     Blood Culture, Routine No Growth to date     Blood Culture, Routine No Growth to date     Blood Culture, Routine No Growth to date    Blood culture [[id number]] Collected:  03/07/17 0922    Order Status:  Completed Specimen:  Blood from Line, Subclavian, Right Updated:  03/09/17 2312     Blood Culture, Routine No Growth to date     Blood Culture, Routine No Growth to date     Blood Culture, Routine No Growth to date    Culture, Respiratory with Gram Stain [[id number]]     Order Status:  No result Specimen:  Respiratory from Endotracheal Aspirate     Urine culture [[id number]] Collected:  03/07/17 1140    Order Status:  Sent Specimen:  Urine from Urine, Catheterized Updated:  03/07/17 1143    Blood culture [[id number]]     Order Status:  Canceled Specimen:  Blood     Blood culture [[id number]]     Order Status:  Canceled Specimen:  Blood     Culture, Respiratory with Gram Stain [[id number]]     Order Status:  Canceled Specimen:  Respiratory from Sputum           Chest X-Ray:         Film and report reviewed:  Worsening bilateral pulmonary infiltrates.    ASSESSMENT/PLAN:     Problem   Septic Shock   Acute Respiratory Failure With Hypercapnia   Anoxic Brain Injury   Aspiration Pneumonia of Right Lower Lobe   Benedicto (Acute Kidney Injury)   Cad, Multiple Vessel   Epilepsy With Partial Complex Seizures, With Status Epilepticus   Type 2 Diabetes Mellitus With Hyperglycemia   Cardiac Arrest   Ventricular Fibrillation   Uncontrolled Hypertension (Resolved)       PLAN:    1. Neuro: neuro following, gives poor prognosis for meaningful recovery. Cont Neuro monitoring. Seizures resolved off Propofol but will cont Keppra     2. Pulmonary: Vent settings reviewed and adjusted. Inc PEEP.  Cont Duonebs and OET suctioning. No SBT given neuro injury and inability to protect airway.       3. Cardiac: Cards following, no intervention planned. Cont Heparin infusion, Amiodarone infusion, ASA and statin.  Off Lopressor due to shock and now on Levophed infusion will wean as tolerated.      4. Renal: accurate I/O;      5. Infectious Disease: Likely PNA probable aspiration.  Add Merrem and Zyvox.  Blood and Urine cultures NGTD.  Check Sputum culture. Tmax 101.1 last 24 hours and worsening pulm congestion and CXR.     6. Hematology/Oncology: monitor for bleeding     7. Endocrine: Cont Detemir and SSI     8. Fluids/Electrolytes/Nutrition/GI: cont attempt TF and add IVFs.  Monitor and replace electrolytes as needed.      9. Musculoskeletal: ROM     10. Pain Management: Avoid narcotics for neuro monitoring     11. Discharge and Palliative Care: Poor prognosis for meaningful neuro recovery. Will discuss with family again today. DNR per family wishes.       Preventive Measures:   Nutrition: Goal: Tolerate oral diet and meet caloric needs  Stress Ulcer: Pepcid  DVT: heparin infusion  Head of Bed/Reposition: Elevate HOB and turn Q1-2 hours   Physical Therapy: consult once stable   Sedation Interruption: off sedation  Vent Day: #4  OG Day: #4  Central Line Right IJ Day: #4  Arterial Line Left Radial Day: #4  Perez Day: #4  Code Status: DNR     Counseling/Consultation: I have discussed the care of this patient in detail with multidisciplinary team during rounds, bedside nursing staff and Dr. Benoit and Dr. Mitchell and Dr. Brennan    Critical Care Time greater than: 58 minutes    Critical care was time spent personally by me on the following activities: development of treatment plan with patient or surrogate and bedside caregivers, discussions with consultants, evaluation of patient's response to treatment, examination of patient, ordering and performing treatments and interventions, ordering and review of laboratory studies, ordering and review of radiographic studies, pulse oximetry, re-evaluation of patient's  condition.  This critical care time did not overlap with that of any other provider or involve time of any procedures.    CASSY Olivo [facility]     I have personally provided the amount of critical care time documented below excluding time spent on separate procedures.

## 2024-06-20 NOTE — H&P ADULT - TIME BILLING
A minimum of 75 min was spent completing this admission not including time spent discussing advanced care planning or discussing goals of care with a minimum of 36 min spent face to face with patient and her sister who is HCP while in ED unit on admission, counseling patient on current acute on chronic conditions and discussing plan and coordination of care including diagnostic imaging such as need for CTA's and CTs to evaluate for source of bleeding, blood tests to evaluate h/h, electrolytes, and cardiac function and need for consultants including EP, cardiology, PT, hematology.

## 2024-06-20 NOTE — STROKE CODE NOTE - NSMDCONSULT QTN_Y FT
cc: code stroke    HPI:   70 yo female w/PMHx DM HTN afib s/p ablation on Eliquis, recent ischemic stroke non hemorrhagic presents to the ED from skilled nursing facility due to sudden onset worsening L sided weakness in LUE and LLE during PT at 1030am, also had some slurred speech at that time. Slurred speech has now mostly resolved.    Code stroke called for stat Ct, CTA and perfusion, pt on Eliquis which was most likely given to her at facility, unlikely candidate for tpa, neuro at bedside evaluating pt. Pt also appears to be in afib with RVR will start rate control with Cardizem drip        PAST MEDICAL & SURGICAL HISTORY:  HTN (hypertension)      Hyperlipidemia      DM (diabetes mellitus)      Arthritis      Ovarian cancer  and uterine cancer--2004 surgery & chemo      Obesity, morbid, BMI 40.0-49.9      H/O umbilical hernia repair  2005      S/P total hysterectomy  bilateral salpingectomy-oophorectomy --2004 ovarian & uterine cancer      H/O total knee replacement, right          FAMILY HISTORY: Noncontributory      Social Hx:  Nonsmoker, no drug or alcohol use    MEDICATIONS  (STANDING):  atorvastatin 80 milliGRAM(s) Oral once  diltiazem Infusion 10 mG/Hr (10 mL/Hr) IV Continuous <Continuous>       Allergies  Bee stings (Swelling)  No Known Drug Allergies        ROS: Pertinent positives in HPI, all other ROS were reviewed and are negative.      Vital Signs Last 24 Hrs  T(C): 36.4 (20 Jun 2024 11:55), Max: 36.4 (20 Jun 2024 11:55)  T(F): 97.5 (20 Jun 2024 11:55), Max: 97.5 (20 Jun 2024 11:55)  HR: 160 (20 Jun 2024 11:55) (160 - 160)  BP: 124/77 (20 Jun 2024 11:55) (124/77 - 124/77)  BP(mean): --  RR: 20 (20 Jun 2024 11:55) (20 - 20)  SpO2: 97% (20 Jun 2024 11:55) (97% - 97%)    Parameters below as of 20 Jun 2024 11:55  Patient On (Oxygen Delivery Method): room air      Constitutional: awake, NAD  HEAD: Normocephalic  Extremities:  no edema  Musculoskeletal: no abnormal movements  Skin: No rashes    Neurological exam:  HF: A x O x 3. Appropriately interactive, normal affect. Speech fluent, No Aphasia or paraphasic errors. Naming /repetition intact   CN: SHANA, EOMI, VFF, facial sensation normal, no NLFD, tongue midline, Palate moves equally, SCM equal bilaterally  Motor: No pronator drift, Strength 5/5 in all 4 ext  Sens: Decreased to light touch LLE.  +L side extinction  Reflexes: BJ 1+, BR 1+, KJ trace+, AJ 0+, downgoing toes b/l  Coord:  No FNFA, dysmetria on Right, unable to check on the left  Gait/Balance: Cannot test    NIHSS: 8          Labs:   06-20    136  |  108  |  35<H>  ----------------------------<  115<H>  5.2   |  22  |  1.46<H>    Ca    8.3<L>      20 Jun 2024 12:15    TPro  4.9<L>  /  Alb  1.9<L>  /  TBili  0.4  /  DBili  x   /  AST  18  /  ALT  15  /  AlkPhos  40  06-20                              7.6    9.22  )-----------( 311      ( 20 Jun 2024 12:15 )             24.3       Radiology:       A/P:    No IV tpa given because…    -Continue Eliquis as before for AFib, add baby ASA QD  -Increase Atorvastatin to 80mg QD  -DVT prophylaxis  -Dysphagia screen  -Speech and swallow eval  -PT eval/OT/SLP/ Acute rehab rehab eval  -TTE  -monitor on tele  -Neurochecks/VSS q 4  -MRI brain cc: code stroke    HPI:   70 yo female w/PMHx CVA 4/2024 ( anterior to posterior parasagittal distribution that suggests low flow/watershed mechanism infarction. These include lesions consistent with acute infarction, additional lesions consistent with remote infarction and other lesions that are age indeterminate likely recent), DM HTN afib Eliquis, recent ischemic stroke non hemorrhagic presents to the ED from skilled nursing facility due to sudden onset worsening L sided weakness LLE and new LUE weakness during PT at 1030am, also had some slurred speech at that time apparently which resolved, and rapid AFib.  Code stroke called in ED.  NIHSS 8.  LKW not known as patient does not remember if she woke up with sx's.    CT head was neg for acute bleed, multiple chronic right frontal infarcts as well as findings suspicious for acute ischemia right superior frontal gyrus, as above.  CT perfusion was neg for core infarct, evidence of active ischemia-tissue at risk RIGHT posterior frontal-parietal region.  CTA showed severe focal stenosis right P3 segment origin. Moderate stenosis distal cavernous and supraclinoid segments right internal carotid artery. 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 Focal occlusion with reconstitution left proximal A2 segment centered 1-2 cm distal to its origin.  The patient was not a candidate for IV thrombolytics 2/2 OOTW, and patient took Eliquis today.               PAST MEDICAL & SURGICAL HISTORY:  HTN (hypertension)      Hyperlipidemia      DM (diabetes mellitus)      Arthritis      Ovarian cancer  and uterine cancer--2004 surgery & chemo      Obesity, morbid, BMI 40.0-49.9      H/O umbilical hernia repair  2005      S/P total hysterectomy  bilateral salpingectomy-oophorectomy --2004 ovarian & uterine cancer      H/O total knee replacement, right          FAMILY HISTORY: Noncontributory      Social Hx:  Nonsmoker, no drug or alcohol use    MEDICATIONS  (STANDING):  atorvastatin 80 milliGRAM(s) Oral once  diltiazem Infusion 10 mG/Hr (10 mL/Hr) IV Continuous <Continuous>       Allergies  Bee stings (Swelling)  No Known Drug Allergies        ROS: Pertinent positives in HPI, all other ROS were reviewed and are negative.      Vital Signs Last 24 Hrs  T(C): 36.4 (20 Jun 2024 11:55), Max: 36.4 (20 Jun 2024 11:55)  T(F): 97.5 (20 Jun 2024 11:55), Max: 97.5 (20 Jun 2024 11:55)  HR: 160 (20 Jun 2024 11:55) (160 - 160)  BP: 124/77 (20 Jun 2024 11:55) (124/77 - 124/77)  BP(mean): --  RR: 20 (20 Jun 2024 11:55) (20 - 20)  SpO2: 97% (20 Jun 2024 11:55) (97% - 97%)    Parameters below as of 20 Jun 2024 11:55  Patient On (Oxygen Delivery Method): room air      Constitutional: awake, NAD  HEAD: Normocephalic  Extremities:  no edema  Musculoskeletal: no abnormal movements  Skin: No rashes    Neurological exam:  HF: A x O x 3. Appropriately interactive, normal affect. Speech fluent, No Aphasia or paraphasic errors. Naming /repetition intact   CN: SHANA, EOMI, VFF, facial sensation normal, no NLFD, tongue midline, Palate moves equally, SCM equal bilaterally  Motor: No pronator drift, Strength 5/5 in all 4 ext  Sens: Decreased to light touch LLE.  +L side extinction  Reflexes: BJ 1+, BR 1+, KJ trace+, AJ 0+, downgoing toes b/l  Coord:  No FNFA, dysmetria on Right, unable to check on the left  Gait/Balance: Cannot test    NIHSS: 8          Labs:   06-20    136  |  108  |  35<H>  ----------------------------<  115<H>  5.2   |  22  |  1.46<H>    Ca    8.3<L>      20 Jun 2024 12:15    TPro  4.9<L>  /  Alb  1.9<L>  /  TBili  0.4  /  DBili  x   /  AST  18  /  ALT  15  /  AlkPhos  40  06-20                              7.6    9.22  )-----------( 311      ( 20 Jun 2024 12:15 )             24.3       Radiology:   < from: CT Angio Neck Stroke Protocol w/ IV Cont (06.20.24 @ 12:12) >    IMPRESSION:    CT BRAIN  1. Compared with 4/19/2024.  2. No evidence of acute hemorrhage, extra-axial collection or mass effect.  3. Multiple chronic right frontal infarcts as well as findings suspicious   for acute ischemia right superior frontal gyrus, as above.    CT PERFUSION  1. No predicted core infarct.  2. Evidence of active ischemia-tissue at risk RIGHT posterior   frontal-parietal region.    CTA  1. Bilateral vertebral arteries patent.  2. No significant stenosis, occlusion or dissection bilateral   extracranial carotid arteries.  3. Severe focal stenosis right P3 segment origin. Moderate stenosis   distal cavernous and supraclinoid segments right internal carotid artery.   Hypoplastic right A1 segment with focal occlusion distally,   reconstituting at the right A2 origin, likely via a patent anterior   communicating artery. Focal occlusion with reconstitution left proximal   A2 segment centered 1-2 cm distal to its origin.  4. Additional findings, as above, including an approximate 6 cm left   thyroid mass, resulting in regional mass effect. Recommend nonemergent   thyroid ultrasound.    Recommend noncontrast MRI of the brain for further evaluation, provided   there are no medical contraindications. Results of CT brain and CT   perfusion discussed with Dr. Miller at approximately 12:20 PM the day of   this exam. Remainder findings and recommendations discussed with him at   approximately 1:20 PM    < from: MR Head No Cont (04.25.24 @ 11:12) >    IMPRESSION:    1. BRAIN:  Cerebral hemispheric white matter lesions demonstrate anterior   to posterior parasagittal distribution that suggests low flow/watershed   mechanism infarction. These include lesions consistent with acute   infarction, additional lesions consistent with remote infarction and   other lesions that are age indeterminate likely recent. The pattern   suggests stuttering infarction from low flow in the same anatomic   distribution. Ischemic white matter disease and atrophy typical for age.          A/P:  70 yo female w/PMHx CVA 4/2024 ( anterior to posterior parasagittal distribution that suggests low flow/watershed mechanism infarction. These include lesions consistent with acute infarction, additional lesions consistent with remote infarction and other lesions that are age indeterminate likely recent), DM HTN afib Eliquis, recent ischemic stroke non hemorrhagic presents to the ED from skilled nursing facility due to sudden onset worsening L sided weakness LLE and new LUE weakness during PT at 1030am, also had some slurred speech at that time apparently which resolved, and rapid AFib.  Code stroke called in ED.  NIHSS 8.  LKW not known as patient does not remember if she woke up with sx's.    CT head was neg for acute bleed, multiple chronic right frontal infarcts as well as findings suspicious for acute ischemia right superior frontal gyrus, as above.  CT perfusion was neg for core infarct, evidence of active ischemia-tissue at risk RIGHT posterior frontal-parietal region.  CTA showed severe focal stenosis right P3 segment origin. Moderate stenosis distal cavernous and supraclinoid segments right internal carotid artery. 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 Focal occlusion with reconstitution left proximal A2 segment centered 1-2 cm distal to its origin.  The patient was not a candidate for IV thrombolytics 2/2 OOTW, and patient took Eliquis today.       #possible R hemispheric CVA-sx's do not correlate with severe P3 segment focal stenosis      Recommendations  -permissive HTN up to 190-220/110  -Continue Eliquis as before for AFib, add baby ASA QD  -Increase Atorvastatin to 80mg QD  -DVT prophylaxis  -Dysphagia screen  -Speech and swallow eval  -PT eval/OT/SLP/ Acute rehab rehab eval  -TTE  -monitor on tele  -Neurochecks/VSS q 4  -MRI brain  -will follow    D/W Dr. Charlton cc: code stroke    HPI:   70 yo female w/PMHx CVA 4/2024 ( anterior to posterior parasagittal distribution that suggests low flow/watershed mechanism infarction. These include lesions consistent with acute infarction, additional lesions consistent with remote infarction and other lesions that are age indeterminate likely recent), DM HTN afib Eliquis, recent ischemic stroke non hemorrhagic presents to the ED from skilled nursing facility due to sudden onset worsening L sided weakness LLE and new LUE weakness during PT at 1030am, also had some slurred speech at that time apparently which resolved, and rapid AFib.  Code stroke called in ED.  NIHSS 8.  LKW not known as patient does not remember if she woke up with sx's.    CT head was neg for acute bleed, multiple chronic right frontal infarcts as well as findings suspicious for acute ischemia right superior frontal gyrus, as above.  CT perfusion was neg for core infarct, evidence of active ischemia-tissue at risk RIGHT posterior frontal-parietal region.  CTA showed severe focal stenosis right P3 segment origin. Moderate stenosis distal cavernous and supraclinoid segments right internal carotid artery. 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 Focal occlusion with reconstitution left proximal A2 segment centered 1-2 cm distal to its origin.  The patient was not a candidate for IV thrombolytics 2/2 OOTW, and patient took Eliquis today.               PAST MEDICAL & SURGICAL HISTORY:  HTN (hypertension)      Hyperlipidemia      DM (diabetes mellitus)      Arthritis      Ovarian cancer  and uterine cancer--2004 surgery & chemo      Obesity, morbid, BMI 40.0-49.9      H/O umbilical hernia repair  2005      S/P total hysterectomy  bilateral salpingectomy-oophorectomy --2004 ovarian & uterine cancer      H/O total knee replacement, right          FAMILY HISTORY: Noncontributory      Social Hx:  Nonsmoker, no drug or alcohol use    MEDICATIONS  (STANDING):  atorvastatin 80 milliGRAM(s) Oral once  diltiazem Infusion 10 mG/Hr (10 mL/Hr) IV Continuous <Continuous>       Allergies  Bee stings (Swelling)  No Known Drug Allergies        ROS: Pertinent positives in HPI, all other ROS were reviewed and are negative.      Vital Signs Last 24 Hrs  T(C): 36.4 (20 Jun 2024 11:55), Max: 36.4 (20 Jun 2024 11:55)  T(F): 97.5 (20 Jun 2024 11:55), Max: 97.5 (20 Jun 2024 11:55)  HR: 160 (20 Jun 2024 11:55) (160 - 160)  BP: 124/77 (20 Jun 2024 11:55) (124/77 - 124/77)  BP(mean): --  RR: 20 (20 Jun 2024 11:55) (20 - 20)  SpO2: 97% (20 Jun 2024 11:55) (97% - 97%)    Parameters below as of 20 Jun 2024 11:55  Patient On (Oxygen Delivery Method): room air      Constitutional: awake, NAD  HEAD: Normocephalic  Extremities:  no edema  Musculoskeletal: no abnormal movements  Skin: No rashes    Neurological exam:  HF: A x O x 3. Appropriately interactive, normal affect. Speech fluent, No Aphasia or paraphasic errors. Naming /repetition intact   CN: SHANA, EOMI, VFF, facial sensation normal, no NLFD, tongue midline, Palate moves equally, SCM equal bilaterally  Motor: No pronator drift, Strength 5/5 in all 4 ext  Sens: Decreased to light touch LLE.  +L side extinction  Reflexes: BJ 1+, BR 1+, KJ trace+, AJ 0+, downgoing toes b/l  Coord:  No FNFA, dysmetria on Right, unable to check on the left  Gait/Balance: Cannot test    NIHSS: 8          Labs:   06-20    136  |  108  |  35<H>  ----------------------------<  115<H>  5.2   |  22  |  1.46<H>    Ca    8.3<L>      20 Jun 2024 12:15    TPro  4.9<L>  /  Alb  1.9<L>  /  TBili  0.4  /  DBili  x   /  AST  18  /  ALT  15  /  AlkPhos  40  06-20                              7.6    9.22  )-----------( 311      ( 20 Jun 2024 12:15 )             24.3       Radiology:   < from: CT Angio Neck Stroke Protocol w/ IV Cont (06.20.24 @ 12:12) >    IMPRESSION:    CT BRAIN  1. Compared with 4/19/2024.  2. No evidence of acute hemorrhage, extra-axial collection or mass effect.  3. Multiple chronic right frontal infarcts as well as findings suspicious   for acute ischemia right superior frontal gyrus, as above.    CT PERFUSION  1. No predicted core infarct.  2. Evidence of active ischemia-tissue at risk RIGHT posterior   frontal-parietal region.    CTA  1. Bilateral vertebral arteries patent.  2. No significant stenosis, occlusion or dissection bilateral   extracranial carotid arteries.  3. Severe focal stenosis right P3 segment origin. Moderate stenosis   distal cavernous and supraclinoid segments right internal carotid artery.   Hypoplastic right A1 segment with focal occlusion distally,   reconstituting at the right A2 origin, likely via a patent anterior   communicating artery. Focal occlusion with reconstitution left proximal   A2 segment centered 1-2 cm distal to its origin.  4. Additional findings, as above, including an approximate 6 cm left   thyroid mass, resulting in regional mass effect. Recommend nonemergent   thyroid ultrasound.    Recommend noncontrast MRI of the brain for further evaluation, provided   there are no medical contraindications. Results of CT brain and CT   perfusion discussed with Dr. Miller at approximately 12:20 PM the day of   this exam. Remainder findings and recommendations discussed with him at   approximately 1:20 PM    < from: MR Head No Cont (04.25.24 @ 11:12) >    IMPRESSION:    1. BRAIN:  Cerebral hemispheric white matter lesions demonstrate anterior   to posterior parasagittal distribution that suggests low flow/watershed   mechanism infarction. These include lesions consistent with acute   infarction, additional lesions consistent with remote infarction and   other lesions that are age indeterminate likely recent. The pattern   suggests stuttering infarction from low flow in the same anatomic   distribution. Ischemic white matter disease and atrophy typical for age.          A/P: 70 yo female w/PMHx CVA 4/2024, DM HTN afib Eliquis, recent ischemic stroke non hemorrhagic presents to the ED from skilled nursing facility due to sudden onset worsening L sided weakness LLE and new LUE weakness.  Code stroke called in ED.  NIHSS 8.  LKW not known as patient does not remember if she woke up with sx's.    CT head was neg for acute bleed, multiple chronic right frontal infarcts as well as findings suspicious for acute ischemia right superior frontal gyrus, as above.  CT perfusion was neg for core infarct, evidence of active ischemia-tissue at risk RIGHT posterior frontal-parietal region.  CTA showed severe focal stenosis right P3 segment origin. Moderate stenosis distal cavernous and supraclinoid segments right internal carotid artery. 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 Focal occlusion with reconstitution left proximal A2 segment centered 1-2 cm distal to its origin.  The patient was not a candidate for IV thrombolytics 2/2 OOTW, and patient took Eliquis today.         Recommendations  -permissive HTN up to 190-220/110  -Continue Eliquis as before for AFib, add baby ASA QD  -Increase Atorvastatin to 80mg QD  -DVT prophylaxis  -Dysphagia screen  -Speech and swallow eval  -PT eval/OT/SLP/ Acute rehab rehab eval  -TTE  -monitor on tele  -Neurochecks/VSS q 4  -MRI brain  -will follow    D/W Dr. Charlton  69 yr old woman hx of a-fib, past CVA comes to ED with left hemiparesis, not TNK candidate. agree with above.    francheska Charlton MD cc: code stroke    HPI:   68 yo female w/PMHx CVA 4/2024 ( anterior to posterior parasagittal distribution that suggests low flow/watershed mechanism infarction. These include lesions consistent with acute infarction, additional lesions consistent with remote infarction and other lesions that are age indeterminate likely recent), DM HTN afib Eliquis, recent ischemic stroke non hemorrhagic presents to the ED from skilled nursing facility due to sudden onset worsening L sided weakness LLE and new LUE weakness during PT at 1030am, also had some slurred speech at that time apparently which resolved, and rapid AFib.  Code stroke called in ED.  NIHSS 8.  LKW not known as patient does not remember if she woke up with sx's.    CT head was neg for acute bleed, multiple chronic right frontal infarcts as well as findings suspicious for acute ischemia right superior frontal gyrus, as above.  CT perfusion was neg for core infarct, evidence of active ischemia-tissue at risk RIGHT posterior frontal-parietal region.  CTA showed severe focal stenosis right P3 segment origin. Moderate stenosis distal cavernous and supraclinoid segments right internal carotid artery. 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 Focal occlusion with reconstitution left proximal A2 segment centered 1-2 cm distal to its origin.  The patient was not a candidate for IV thrombolytics 2/2 OOTW, and patient took Eliquis today.               PAST MEDICAL & SURGICAL HISTORY:  HTN (hypertension)      Hyperlipidemia      DM (diabetes mellitus)      Arthritis      Ovarian cancer  and uterine cancer--2004 surgery & chemo      Obesity, morbid, BMI 40.0-49.9      H/O umbilical hernia repair  2005      S/P total hysterectomy  bilateral salpingectomy-oophorectomy --2004 ovarian & uterine cancer      H/O total knee replacement, right          FAMILY HISTORY: Noncontributory      Social Hx:  Nonsmoker, no drug or alcohol use    MEDICATIONS  (STANDING):  atorvastatin 80 milliGRAM(s) Oral once  diltiazem Infusion 10 mG/Hr (10 mL/Hr) IV Continuous <Continuous>       Allergies  Bee stings (Swelling)  No Known Drug Allergies        ROS: Pertinent positives in HPI, all other ROS were reviewed and are negative.      Vital Signs Last 24 Hrs  T(C): 36.4 (20 Jun 2024 11:55), Max: 36.4 (20 Jun 2024 11:55)  T(F): 97.5 (20 Jun 2024 11:55), Max: 97.5 (20 Jun 2024 11:55)  HR: 160 (20 Jun 2024 11:55) (160 - 160)  BP: 124/77 (20 Jun 2024 11:55) (124/77 - 124/77)  BP(mean): --  RR: 20 (20 Jun 2024 11:55) (20 - 20)  SpO2: 97% (20 Jun 2024 11:55) (97% - 97%)    Parameters below as of 20 Jun 2024 11:55  Patient On (Oxygen Delivery Method): room air      Constitutional: awake, NAD  HEAD: Normocephalic  Extremities:  no edema  Musculoskeletal: no abnormal movements  Skin: No rashes    Neurological exam:  HF: A x O x 3. Appropriately interactive, normal affect. Speech fluent, No Aphasia or paraphasic errors. Naming /repetition intact   CN: SHANA, EOMI, VFF, facial sensation normal, no NLFD, tongue midline, Palate moves equally, SCM equal bilaterally  Motor: No pronator drift, L hemiparesis, 4/5 R side-poor effort  Sens: Decreased to light touch LLE.  +L side extinction  Reflexes: BJ 1+, BR 1+, KJ trace+, AJ 0+, downgoing toes b/l  Coord:  No FNFA, dysmetria on Right, unable to check on the left  Gait/Balance: Cannot test    NIHSS: 8          Labs:   06-20    136  |  108  |  35<H>  ----------------------------<  115<H>  5.2   |  22  |  1.46<H>    Ca    8.3<L>      20 Jun 2024 12:15    TPro  4.9<L>  /  Alb  1.9<L>  /  TBili  0.4  /  DBili  x   /  AST  18  /  ALT  15  /  AlkPhos  40  06-20                              7.6    9.22  )-----------( 311      ( 20 Jun 2024 12:15 )             24.3       Radiology:   < from: CT Angio Neck Stroke Protocol w/ IV Cont (06.20.24 @ 12:12) >    IMPRESSION:    CT BRAIN  1. Compared with 4/19/2024.  2. No evidence of acute hemorrhage, extra-axial collection or mass effect.  3. Multiple chronic right frontal infarcts as well as findings suspicious   for acute ischemia right superior frontal gyrus, as above.    CT PERFUSION  1. No predicted core infarct.  2. Evidence of active ischemia-tissue at risk RIGHT posterior   frontal-parietal region.    CTA  1. Bilateral vertebral arteries patent.  2. No significant stenosis, occlusion or dissection bilateral   extracranial carotid arteries.  3. Severe focal stenosis right P3 segment origin. Moderate stenosis   distal cavernous and supraclinoid segments right internal carotid artery.   Hypoplastic right A1 segment with focal occlusion distally,   reconstituting at the right A2 origin, likely via a patent anterior   communicating artery. Focal occlusion with reconstitution left proximal   A2 segment centered 1-2 cm distal to its origin.  4. Additional findings, as above, including an approximate 6 cm left   thyroid mass, resulting in regional mass effect. Recommend nonemergent   thyroid ultrasound.    Recommend noncontrast MRI of the brain for further evaluation, provided   there are no medical contraindications. Results of CT brain and CT   perfusion discussed with Dr. Miller at approximately 12:20 PM the day of   this exam. Remainder findings and recommendations discussed with him at   approximately 1:20 PM    < from: MR Head No Cont (04.25.24 @ 11:12) >    IMPRESSION:    1. BRAIN:  Cerebral hemispheric white matter lesions demonstrate anterior   to posterior parasagittal distribution that suggests low flow/watershed   mechanism infarction. These include lesions consistent with acute   infarction, additional lesions consistent with remote infarction and   other lesions that are age indeterminate likely recent. The pattern   suggests stuttering infarction from low flow in the same anatomic   distribution. Ischemic white matter disease and atrophy typical for age.          A/P: 68 yo female w/PMHx CVA 4/2024, DM HTN afib Eliquis, recent ischemic stroke non hemorrhagic presents to the ED from skilled nursing facility due to sudden onset worsening L sided weakness LLE and new LUE weakness.  Code stroke called in ED.  NIHSS 8.  LKW not known as patient does not remember if she woke up with sx's.    CT head was neg for acute bleed, multiple chronic right frontal infarcts as well as findings suspicious for acute ischemia right superior frontal gyrus, as above.  CT perfusion was neg for core infarct, evidence of active ischemia-tissue at risk RIGHT posterior frontal-parietal region.  CTA showed severe focal stenosis right P3 segment origin. Moderate stenosis distal cavernous and supraclinoid segments right internal carotid artery. 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 Focal occlusion with reconstitution left proximal A2 segment centered 1-2 cm distal to its origin.  The patient was not a candidate for IV thrombolytics 2/2 OOTW, and patient took Eliquis today.         Recommendations  -permissive HTN up to 190-220/110  -Continue Eliquis as before for AFib, add baby ASA QD  -Increase Atorvastatin to 80mg QD  -DVT prophylaxis  -Dysphagia screen  -Speech and swallow eval  -PT eval/OT/SLP/ Acute rehab rehab eval  -TTE  -monitor on tele  -Neurochecks/VSS q 4  -MRI brain  -will follow    D/W Dr. Charlton  69 yr old woman hx of a-fib, past CVA comes to ED with left hemiparesis, not TNK candidate. agree with above.    francheska Charlton MD cc: code stroke    HPI:   68 yo female w/PMHx CVA 4/2024 ( anterior to posterior parasagittal distribution that suggests low flow/watershed mechanism infarction. These include lesions consistent with acute infarction, additional lesions consistent with remote infarction and other lesions that are age indeterminate likely recent), DM HTN afib Eliquis, recent ischemic stroke non hemorrhagic presents to the ED from skilled nursing facility due to sudden onset worsening L sided weakness LLE and new LUE weakness during PT at 1030am, also had some slurred speech at that time apparently which resolved, and rapid AFib.  Code stroke called in ED.  NIHSS 8.  LKW not known as patient does not remember if she woke up with sx's.    CT head was neg for acute bleed, multiple chronic right frontal infarcts as well as findings suspicious for acute ischemia right superior frontal gyrus, as above.  CT perfusion was neg for core infarct, evidence of active ischemia-tissue at risk RIGHT posterior frontal-parietal region.  CTA showed severe focal stenosis right P3 segment origin. Moderate stenosis distal cavernous and supraclinoid segments right internal carotid artery. 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 Focal occlusion with reconstitution left proximal A2 segment centered 1-2 cm distal to its origin.  The patient was not a candidate for IV thrombolytics 2/2 OOTW, and patient took Eliquis today.               PAST MEDICAL & SURGICAL HISTORY:  HTN (hypertension)      Hyperlipidemia      DM (diabetes mellitus)      Arthritis      Ovarian cancer  and uterine cancer--2004 surgery & chemo      Obesity, morbid, BMI 40.0-49.9      H/O umbilical hernia repair  2005      S/P total hysterectomy  bilateral salpingectomy-oophorectomy --2004 ovarian & uterine cancer      H/O total knee replacement, right          FAMILY HISTORY: Noncontributory      Social Hx:  Nonsmoker, no drug or alcohol use    MEDICATIONS  (STANDING):  atorvastatin 80 milliGRAM(s) Oral once  diltiazem Infusion 10 mG/Hr (10 mL/Hr) IV Continuous <Continuous>       Allergies  Bee stings (Swelling)  No Known Drug Allergies        ROS: Pertinent positives in HPI, all other ROS were reviewed and are negative.      Vital Signs Last 24 Hrs  T(C): 36.4 (20 Jun 2024 11:55), Max: 36.4 (20 Jun 2024 11:55)  T(F): 97.5 (20 Jun 2024 11:55), Max: 97.5 (20 Jun 2024 11:55)  HR: 160 (20 Jun 2024 11:55) (160 - 160)  BP: 124/77 (20 Jun 2024 11:55) (124/77 - 124/77)  BP(mean): --  RR: 20 (20 Jun 2024 11:55) (20 - 20)  SpO2: 97% (20 Jun 2024 11:55) (97% - 97%)    Parameters below as of 20 Jun 2024 11:55  Patient On (Oxygen Delivery Method): room air      Constitutional: awake, NAD  HEAD: Normocephalic  Extremities:  no edema  Musculoskeletal: no abnormal movements  Skin: No rashes    Neurological exam:  HF: A x O x 3. Appropriately interactive, normal affect. Speech fluent, No Aphasia or paraphasic errors. Naming /repetition intact   CN: SHANA, EOMI, VFF, facial sensation normal, no NLFD, tongue midline, Palate moves equally, SCM equal bilaterally  Motor: No pronator drift, L hemiparesis, 4/5 R side-poor effort  Sens: Decreased to light touch LLE.  +L side extinction  Reflexes: BJ 1+, BR 1+, KJ trace+, AJ 0+, downgoing toes b/l  Coord:  No FNFA, dysmetria on Right, unable to check on the left  Gait/Balance: Cannot test    NIHSS: 8          Labs:   06-20    136  |  108  |  35<H>  ----------------------------<  115<H>  5.2   |  22  |  1.46<H>    Ca    8.3<L>      20 Jun 2024 12:15    TPro  4.9<L>  /  Alb  1.9<L>  /  TBili  0.4  /  DBili  x   /  AST  18  /  ALT  15  /  AlkPhos  40  06-20                              7.6    9.22  )-----------( 311      ( 20 Jun 2024 12:15 )             24.3       Radiology:   < from: CT Angio Neck Stroke Protocol w/ IV Cont (06.20.24 @ 12:12) >    IMPRESSION:    CT BRAIN  1. Compared with 4/19/2024.  2. No evidence of acute hemorrhage, extra-axial collection or mass effect.  3. Multiple chronic right frontal infarcts as well as findings suspicious   for acute ischemia right superior frontal gyrus, as above.    CT PERFUSION  1. No predicted core infarct.  2. Evidence of active ischemia-tissue at risk RIGHT posterior   frontal-parietal region.    CTA  1. Bilateral vertebral arteries patent.  2. No significant stenosis, occlusion or dissection bilateral   extracranial carotid arteries.  3. Severe focal stenosis right P3 segment origin. Moderate stenosis   distal cavernous and supraclinoid segments right internal carotid artery.   Hypoplastic right A1 segment with focal occlusion distally,   reconstituting at the right A2 origin, likely via a patent anterior   communicating artery. Focal occlusion with reconstitution left proximal   A2 segment centered 1-2 cm distal to its origin.  4. Additional findings, as above, including an approximate 6 cm left   thyroid mass, resulting in regional mass effect. Recommend nonemergent   thyroid ultrasound.    Recommend noncontrast MRI of the brain for further evaluation, provided   there are no medical contraindications. Results of CT brain and CT   perfusion discussed with Dr. Miller at approximately 12:20 PM the day of   this exam. Remainder findings and recommendations discussed with him at   approximately 1:20 PM    < from: MR Head No Cont (04.25.24 @ 11:12) >    IMPRESSION:    1. BRAIN:  Cerebral hemispheric white matter lesions demonstrate anterior   to posterior parasagittal distribution that suggests low flow/watershed   mechanism infarction. These include lesions consistent with acute   infarction, additional lesions consistent with remote infarction and   other lesions that are age indeterminate likely recent. The pattern   suggests stuttering infarction from low flow in the same anatomic   distribution. Ischemic white matter disease and atrophy typical for age.          A/P: 68 yo female w/PMHx CVA 4/2024, DM HTN afib Eliquis, recent ischemic stroke non hemorrhagic presents to the ED from skilled nursing facility due to sudden onset worsening L sided weakness LLE and new LUE weakness.  Code stroke called in ED.  NIHSS 8.  LKW not known as patient does not remember if she woke up with sx's.    CT head was neg for acute bleed, multiple chronic right frontal infarcts as well as findings suspicious for acute ischemia right superior frontal gyrus, as above.  CT perfusion was neg for core infarct, evidence of active ischemia-tissue at risk RIGHT posterior frontal-parietal region.  CTA showed severe focal stenosis right P3 segment origin. Moderate stenosis distal cavernous and supraclinoid segments right internal carotid artery. 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 Focal occlusion with reconstitution left proximal A2 segment centered 1-2 cm distal to its origin.  The patient was not a candidate for IV thrombolytics 2/2 OOTW, and patient took Eliquis today.         Recommendations  -Continue Eliquis as before for AFib, add baby ASA QD  -Increase Atorvastatin to 80mg QD  -DVT prophylaxis  -Dysphagia screen  -Speech and swallow eval  -PT eval/OT/SLP/ Acute rehab rehab eval  -TTE  -monitor on tele  -Neurochecks/VSS q 4  -MRI brain  -will follow    D/W Dr. Charlton  69 yr old woman hx of a-fib, past CVA comes to ED with left hemiparesis, not TNK candidate. agree with above.    francheska Charlton MD cc: code stroke    HPI:   68 yo female w/PMHx CVA 4/2024 ( anterior to posterior parasagittal distribution that suggests low flow/watershed mechanism infarction. These include lesions consistent with acute infarction, additional lesions consistent with remote infarction and other lesions that are age indeterminate likely recent), DM HTN afib Eliquis, recent ischemic stroke non hemorrhagic presents to the ED from skilled nursing facility due to sudden onset worsening L sided weakness LLE and new LUE weakness during PT at 1030am, also had some slurred speech at that time apparently which resolved, and rapid AFib.  Code stroke called in ED.  NIHSS 8.  LKW not known as patient does not remember if she woke up with sx's.    CT head was neg for acute bleed, multiple chronic right frontal infarcts as well as findings suspicious for acute ischemia right superior frontal gyrus, as above.  CT perfusion was neg for core infarct, evidence of active ischemia-tissue at risk RIGHT posterior frontal-parietal region.  CTA showed severe focal stenosis right P3 segment origin. Moderate stenosis distal cavernous and supraclinoid segments right internal carotid artery. 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 Focal occlusion with reconstitution left proximal A2 segment centered 1-2 cm distal to its origin.  The patient was not a candidate for IV thrombolytics 2/2 OOTW, and patient took Eliquis today.               PAST MEDICAL & SURGICAL HISTORY:  HTN (hypertension)      Hyperlipidemia      DM (diabetes mellitus)      Arthritis      Ovarian cancer  and uterine cancer--2004 surgery & chemo      Obesity, morbid, BMI 40.0-49.9      H/O umbilical hernia repair  2005      S/P total hysterectomy  bilateral salpingectomy-oophorectomy --2004 ovarian & uterine cancer      H/O total knee replacement, right          FAMILY HISTORY: Noncontributory      Social Hx:  Nonsmoker, no drug or alcohol use    MEDICATIONS  (STANDING):  atorvastatin 80 milliGRAM(s) Oral once  diltiazem Infusion 10 mG/Hr (10 mL/Hr) IV Continuous <Continuous>       Allergies  Bee stings (Swelling)  No Known Drug Allergies        ROS: Pertinent positives in HPI, all other ROS were reviewed and are negative.      Vital Signs Last 24 Hrs  T(C): 36.4 (20 Jun 2024 11:55), Max: 36.4 (20 Jun 2024 11:55)  T(F): 97.5 (20 Jun 2024 11:55), Max: 97.5 (20 Jun 2024 11:55)  HR: 160 (20 Jun 2024 11:55) (160 - 160)  BP: 124/77 (20 Jun 2024 11:55) (124/77 - 124/77)  BP(mean): --  RR: 20 (20 Jun 2024 11:55) (20 - 20)  SpO2: 97% (20 Jun 2024 11:55) (97% - 97%)    Parameters below as of 20 Jun 2024 11:55  Patient On (Oxygen Delivery Method): room air      Constitutional: awake, NAD  HEAD: Normocephalic  Extremities:  no edema  Musculoskeletal: no abnormal movements  Skin: No rashes    Neurological exam:  HF: A x O x 3. Appropriately interactive, normal affect. Speech fluent, No Aphasia or paraphasic errors. Naming /repetition intact   CN: SHANA, EOMI, VFF, facial sensation normal, no NLFD, tongue midline, Palate moves equally, SCM equal bilaterally  Motor: No pronator drift, L hemiparesis, 4/5 RUE side-poor effort, cannot lift RLE  Sens: Decreased to light touch LLE.  +L side extinction  Reflexes: BJ 1+, BR 1+, KJ trace+, AJ 0+, downgoing toes b/l  Coord:  No FNFA, unable to do HTS  Gait/Balance: Cannot test    NIHSS: 8          Labs:   06-20    136  |  108  |  35<H>  ----------------------------<  115<H>  5.2   |  22  |  1.46<H>    Ca    8.3<L>      20 Jun 2024 12:15    TPro  4.9<L>  /  Alb  1.9<L>  /  TBili  0.4  /  DBili  x   /  AST  18  /  ALT  15  /  AlkPhos  40  06-20                              7.6    9.22  )-----------( 311      ( 20 Jun 2024 12:15 )             24.3       Radiology:   < from: CT Angio Neck Stroke Protocol w/ IV Cont (06.20.24 @ 12:12) >    IMPRESSION:    CT BRAIN  1. Compared with 4/19/2024.  2. No evidence of acute hemorrhage, extra-axial collection or mass effect.  3. Multiple chronic right frontal infarcts as well as findings suspicious   for acute ischemia right superior frontal gyrus, as above.    CT PERFUSION  1. No predicted core infarct.  2. Evidence of active ischemia-tissue at risk RIGHT posterior   frontal-parietal region.    CTA  1. Bilateral vertebral arteries patent.  2. No significant stenosis, occlusion or dissection bilateral   extracranial carotid arteries.  3. Severe focal stenosis right P3 segment origin. Moderate stenosis   distal cavernous and supraclinoid segments right internal carotid artery.   Hypoplastic right A1 segment with focal occlusion distally,   reconstituting at the right A2 origin, likely via a patent anterior   communicating artery. Focal occlusion with reconstitution left proximal   A2 segment centered 1-2 cm distal to its origin.  4. Additional findings, as above, including an approximate 6 cm left   thyroid mass, resulting in regional mass effect. Recommend nonemergent   thyroid ultrasound.    Recommend noncontrast MRI of the brain for further evaluation, provided   there are no medical contraindications. Results of CT brain and CT   perfusion discussed with Dr. Miller at approximately 12:20 PM the day of   this exam. Remainder findings and recommendations discussed with him at   approximately 1:20 PM    < from: MR Head No Cont (04.25.24 @ 11:12) >    IMPRESSION:    1. BRAIN:  Cerebral hemispheric white matter lesions demonstrate anterior   to posterior parasagittal distribution that suggests low flow/watershed   mechanism infarction. These include lesions consistent with acute   infarction, additional lesions consistent with remote infarction and   other lesions that are age indeterminate likely recent. The pattern   suggests stuttering infarction from low flow in the same anatomic   distribution. Ischemic white matter disease and atrophy typical for age.          A/P: 68 yo female w/PMHx CVA 4/2024, DM HTN afib Eliquis, recent ischemic stroke non hemorrhagic presents to the ED from skilled nursing facility due to sudden onset worsening L sided weakness LLE and new LUE weakness.  Code stroke called in ED.  NIHSS 8.  LKW not known as patient does not remember if she woke up with sx's.    CT head was neg for acute bleed, multiple chronic right frontal infarcts as well as findings suspicious for acute ischemia right superior frontal gyrus, as above.  CT perfusion was neg for core infarct, evidence of active ischemia-tissue at risk RIGHT posterior frontal-parietal region.  CTA showed severe focal stenosis right P3 segment origin. Moderate stenosis distal cavernous and supraclinoid segments right internal carotid artery. 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 Focal occlusion with reconstitution left proximal A2 segment centered 1-2 cm distal to its origin.  The patient was not a candidate for IV thrombolytics 2/2 OOTW, and patient took Eliquis today.         Recommendations  -Continue Eliquis as before for AFib, add baby ASA QD  -Increase Atorvastatin to 80mg QD  -DVT prophylaxis  -Dysphagia screen  -Speech and swallow eval  -PT eval/OT/SLP/ Acute rehab rehab eval  -TTE  -monitor on tele  -Neurochecks/VSS q 4  -MRI brain  -will follow    D/W Dr. Charlton  69 yr old woman hx of a-fib, past CVA comes to ED with left hemiparesis, not TNK candidate. agree with above.    francheska Charlton MD

## 2024-06-20 NOTE — ED ADULT NURSE NOTE - OBJECTIVE STATEMENT
Report received from gilma RN. Pt A&Ox3, presents to ED c/o left sided weakness, pt states she normally is able to feel her left leg even though she has weakness at baseline but today she was unable to move her left arm as well. IV established in left arm with a 18G, labs drawn and sent, call bell in reach, warm blanket provided, bed in lowest position, side rails up x2, MD evaluation in progress, pt on telemetry.

## 2024-06-21 ENCOUNTER — RESULT REVIEW (OUTPATIENT)
Age: 69
End: 2024-06-21

## 2024-06-21 DIAGNOSIS — I63.9 CEREBRAL INFARCTION, UNSPECIFIED: ICD-10-CM

## 2024-06-21 DIAGNOSIS — I48.20 CHRONIC ATRIAL FIBRILLATION, UNSPECIFIED: ICD-10-CM

## 2024-06-21 LAB
A1C WITH ESTIMATED AVERAGE GLUCOSE RESULT: 5.9 % — HIGH (ref 4–5.6)
ALBUMIN SERPL ELPH-MCNC: 2.1 G/DL — LOW (ref 3.3–5)
ALP SERPL-CCNC: 44 U/L — SIGNIFICANT CHANGE UP (ref 40–120)
ALT FLD-CCNC: 15 U/L — SIGNIFICANT CHANGE UP (ref 12–78)
ANION GAP SERPL CALC-SCNC: 6 MMOL/L — SIGNIFICANT CHANGE UP (ref 5–17)
APTT BLD: 33.2 SEC — SIGNIFICANT CHANGE UP (ref 24.5–35.6)
AST SERPL-CCNC: 16 U/L — SIGNIFICANT CHANGE UP (ref 15–37)
BASOPHILS # BLD AUTO: 0.02 K/UL — SIGNIFICANT CHANGE UP (ref 0–0.2)
BASOPHILS NFR BLD AUTO: 0.2 % — SIGNIFICANT CHANGE UP (ref 0–2)
BILIRUB SERPL-MCNC: 0.5 MG/DL — SIGNIFICANT CHANGE UP (ref 0.2–1.2)
BUN SERPL-MCNC: 33 MG/DL — HIGH (ref 7–23)
CALCIUM SERPL-MCNC: 8.8 MG/DL — SIGNIFICANT CHANGE UP (ref 8.5–10.1)
CHLORIDE SERPL-SCNC: 112 MMOL/L — HIGH (ref 96–108)
CHOLEST SERPL-MCNC: 123 MG/DL — SIGNIFICANT CHANGE UP
CO2 SERPL-SCNC: 23 MMOL/L — SIGNIFICANT CHANGE UP (ref 22–31)
CREAT SERPL-MCNC: 1.41 MG/DL — HIGH (ref 0.5–1.3)
EGFR: 40 ML/MIN/1.73M2 — LOW
EOSINOPHIL # BLD AUTO: 0.21 K/UL — SIGNIFICANT CHANGE UP (ref 0–0.5)
EOSINOPHIL NFR BLD AUTO: 2.5 % — SIGNIFICANT CHANGE UP (ref 0–6)
ESTIMATED AVERAGE GLUCOSE: 123 MG/DL — HIGH (ref 68–114)
GLUCOSE SERPL-MCNC: 124 MG/DL — HIGH (ref 70–99)
HCT VFR BLD CALC: 28.5 % — LOW (ref 34.5–45)
HCT VFR BLD CALC: 29.1 % — LOW (ref 34.5–45)
HDLC SERPL-MCNC: 38 MG/DL — LOW
HGB BLD-MCNC: 9.1 G/DL — LOW (ref 11.5–15.5)
HGB BLD-MCNC: 9.5 G/DL — LOW (ref 11.5–15.5)
IMM GRANULOCYTES NFR BLD AUTO: 0.5 % — SIGNIFICANT CHANGE UP (ref 0–0.9)
INR BLD: 1.32 RATIO — HIGH (ref 0.85–1.18)
LIPID PNL WITH DIRECT LDL SERPL: 60 MG/DL — SIGNIFICANT CHANGE UP
LYMPHOCYTES # BLD AUTO: 1.23 K/UL — SIGNIFICANT CHANGE UP (ref 1–3.3)
LYMPHOCYTES # BLD AUTO: 14.5 % — SIGNIFICANT CHANGE UP (ref 13–44)
MAGNESIUM SERPL-MCNC: 2.4 MG/DL — SIGNIFICANT CHANGE UP (ref 1.6–2.6)
MCHC RBC-ENTMCNC: 28.9 PG — SIGNIFICANT CHANGE UP (ref 27–34)
MCHC RBC-ENTMCNC: 29.5 PG — SIGNIFICANT CHANGE UP (ref 27–34)
MCHC RBC-ENTMCNC: 31.9 GM/DL — LOW (ref 32–36)
MCHC RBC-ENTMCNC: 32.6 GM/DL — SIGNIFICANT CHANGE UP (ref 32–36)
MCV RBC AUTO: 90.4 FL — SIGNIFICANT CHANGE UP (ref 80–100)
MCV RBC AUTO: 90.5 FL — SIGNIFICANT CHANGE UP (ref 80–100)
MONOCYTES # BLD AUTO: 1.31 K/UL — HIGH (ref 0–0.9)
MONOCYTES NFR BLD AUTO: 15.4 % — HIGH (ref 2–14)
NEUTROPHILS # BLD AUTO: 5.69 K/UL — SIGNIFICANT CHANGE UP (ref 1.8–7.4)
NEUTROPHILS NFR BLD AUTO: 66.9 % — SIGNIFICANT CHANGE UP (ref 43–77)
NON HDL CHOLESTEROL: 85 MG/DL — SIGNIFICANT CHANGE UP
PHOSPHATE SERPL-MCNC: 3.3 MG/DL — SIGNIFICANT CHANGE UP (ref 2.5–4.5)
PLATELET # BLD AUTO: 289 K/UL — SIGNIFICANT CHANGE UP (ref 150–400)
PLATELET # BLD AUTO: 315 K/UL — SIGNIFICANT CHANGE UP (ref 150–400)
POTASSIUM SERPL-MCNC: 4.5 MMOL/L — SIGNIFICANT CHANGE UP (ref 3.5–5.3)
POTASSIUM SERPL-SCNC: 4.5 MMOL/L — SIGNIFICANT CHANGE UP (ref 3.5–5.3)
PROT SERPL-MCNC: 5.4 GM/DL — LOW (ref 6–8.3)
PROTHROM AB SERPL-ACNC: 14.8 SEC — HIGH (ref 9.5–13)
RBC # BLD: 3.15 M/UL — LOW (ref 3.8–5.2)
RBC # BLD: 3.22 M/UL — LOW (ref 3.8–5.2)
RBC # FLD: 15.4 % — HIGH (ref 10.3–14.5)
RBC # FLD: 15.4 % — HIGH (ref 10.3–14.5)
SODIUM SERPL-SCNC: 141 MMOL/L — SIGNIFICANT CHANGE UP (ref 135–145)
TRIGL SERPL-MCNC: 141 MG/DL — SIGNIFICANT CHANGE UP
TROPONIN I, HIGH SENSITIVITY RESULT: 24.65 NG/L — SIGNIFICANT CHANGE UP
WBC # BLD: 10.18 K/UL — SIGNIFICANT CHANGE UP (ref 3.8–10.5)
WBC # BLD: 8.5 K/UL — SIGNIFICANT CHANGE UP (ref 3.8–10.5)
WBC # FLD AUTO: 10.18 K/UL — SIGNIFICANT CHANGE UP (ref 3.8–10.5)
WBC # FLD AUTO: 8.5 K/UL — SIGNIFICANT CHANGE UP (ref 3.8–10.5)

## 2024-06-21 PROCEDURE — 99233 SBSQ HOSP IP/OBS HIGH 50: CPT

## 2024-06-21 PROCEDURE — 99222 1ST HOSP IP/OBS MODERATE 55: CPT

## 2024-06-21 PROCEDURE — 93306 TTE W/DOPPLER COMPLETE: CPT | Mod: 26

## 2024-06-21 RX ORDER — CLOPIDOGREL BISULFATE 75 MG/1
75 TABLET, FILM COATED ORAL DAILY
Refills: 0 | Status: DISCONTINUED | OUTPATIENT
Start: 2024-06-21 | End: 2024-07-17

## 2024-06-21 RX ORDER — TRAMADOL HYDROCHLORIDE 50 MG/1
25 TABLET, FILM COATED ORAL EVERY 8 HOURS
Refills: 0 | Status: DISCONTINUED | OUTPATIENT
Start: 2024-06-21 | End: 2024-06-28

## 2024-06-21 RX ADMIN — APIXABAN 5 MILLIGRAM(S): 5 TABLET, FILM COATED ORAL at 10:39

## 2024-06-21 RX ADMIN — DILTIAZEM HYDROCHLORIDE 20 MG/HR: 240 CAPSULE, EXTENDED RELEASE ORAL at 10:40

## 2024-06-21 RX ADMIN — APIXABAN 5 MILLIGRAM(S): 5 TABLET, FILM COATED ORAL at 21:51

## 2024-06-21 RX ADMIN — AMIODARONE HYDROCHLORIDE 200 MILLIGRAM(S): 50 INJECTION, SOLUTION INTRAVENOUS at 10:39

## 2024-06-21 RX ADMIN — TRAMADOL HYDROCHLORIDE 25 MILLIGRAM(S): 50 TABLET, FILM COATED ORAL at 21:50

## 2024-06-21 RX ADMIN — EZETIMIBE 10 MILLIGRAM(S): 10 TABLET ORAL at 10:39

## 2024-06-21 RX ADMIN — Medication 1000 MILLIGRAM(S): at 01:23

## 2024-06-21 RX ADMIN — Medication 150 MILLIGRAM(S): at 01:19

## 2024-06-21 RX ADMIN — ASPIRIN 81 MILLIGRAM(S): 325 TABLET, FILM COATED ORAL at 10:39

## 2024-06-21 RX ADMIN — Medication 1000 MILLIGRAM(S): at 21:52

## 2024-06-21 RX ADMIN — TRAMADOL HYDROCHLORIDE 25 MILLIGRAM(S): 50 TABLET, FILM COATED ORAL at 17:10

## 2024-06-21 RX ADMIN — DILTIAZEM HYDROCHLORIDE 120 MILLIGRAM(S): 240 CAPSULE, EXTENDED RELEASE ORAL at 10:39

## 2024-06-21 RX ADMIN — TRAMADOL HYDROCHLORIDE 25 MILLIGRAM(S): 50 TABLET, FILM COATED ORAL at 13:55

## 2024-06-21 RX ADMIN — Medication 650 MILLIGRAM(S): at 01:21

## 2024-06-21 RX ADMIN — ATORVASTATIN CALCIUM 80 MILLIGRAM(S): 20 TABLET, FILM COATED ORAL at 21:50

## 2024-06-21 NOTE — DIETITIAN INITIAL EVALUATION ADULT - SIGNS/SYMPTOMS
Detail Level: Detailed Quality 110: Preventive Care And Screening: Influenza Immunization: Influenza immunization was not ordered or administered, reason not given Quality 226: Preventive Care And Screening: Tobacco Use: Screening And Cessation Intervention: Patient screened for tobacco use and is an ex/non-smoker Quality 130: Documentation Of Current Medications In The Medical Record: Current Medications Documented Quality 431: Preventive Care And Screening: Unhealthy Alcohol Use - Screening: Patient not identified as an unhealthy alcohol user when screened for unhealthy alcohol use using a systematic screening method AEB ENN revealing increased protein needs

## 2024-06-21 NOTE — PROGRESS NOTE ADULT - ASSESSMENT
70 yo female with hx of learning disability, JUSTINO on CKD, DM II, HTN, A-fib s/p DCCV with h/o poor response to medications, also has ovarian and uterine CA, recent diagnosis of Splenic CA, CT Lumbar shows moderate stenosis and DDD, recent UTI, who presents as a code stroke due to sudden onset worsening of left sided weakness of LLE and LUE during PT also associated with slurred speech which has sinced resolved. Arrived with NIHSS of 8 with unknown LKW. Pt is a poor historian. History taken from EMR, ED MD and pt sister at bedside.     #Acute CVA  -admit to telemetry  -allow for permissive HTN  -Increase atorvastatin  -c/w ASA  -c/w eliquis  -passed beside dysphagia screen  -imaging reviewed, lesions c/w acute infarction, and others that are likely recent/indeterminate with ischemic etiology suspected, imaging without acute bleed  -NIHSS 8  -PT consult for weakness  -SW consult  -TTE ordered  -Neurochecks and VS q 4 hrs  -MRI brain noted  -OOB and ambulate with walker with assistance  -Turn and position q 2 hrs  -fall and aspiration precautions    #Afib with RVR  -s/p DCCV  -hold metoprolol while on IV cardizem  -c/w cardizem and cardizem gtt  -c/w amiodarone  -EP consult  -Cardiology consult  -c/w eliquis  -trend trop, first wnl  -obtain mg, phos  -keep Mg>2, K>4    #Anemia, acute  -likely from small intramuscular thigh hematoma in left upper thigh medial compartment  -No recent falls per sister at bedside  -bilateral LE CT w/o contrast ordered, pt with CKD, will hold additional contrast due to concern for JUSTINO  -guiac negative  -CTA chest/abd/pelvis pending read  -Transfuse 1U PRBC  -Trend cbc  -Will c/w Eliquis given suspected ischemic CVA in setting of afib rvr and as pt with VSS and no active bleeding on exam  -INR 1.65    #Protein calorie malnutrition  -Albumin 1.9  -Nutrition consult    #Weakness  #h/o CVA  -Turn and position q 2 hrs  -PT consult    #HTN/HLD  -c/w ezetimibe  -c/w statin, increased   -DASH/TLC diet when advanced    #Ovarian/Splenic/Thyroid CA  -heme/onc consult    #Recent COVID  -ground glass opacities on CT chest  -asymptomatic, VSS  -s/p 10 day isolation 70 yo female with hx of learning disability, JUSTINO on CKD, DM II, HTN, A-fib s/p DCCV with h/o poor response to medications, also has ovarian and uterine CA, recent diagnosis of Splenic CA, CT Lumbar shows moderate stenosis and DDD, recent UTI, who presents as a code stroke due to sudden onset worsening of left sided weakness of LLE and LUE during PT also associated with slurred speech which has sinced resolved. Arrived with NIHSS of 8 with unknown LKW. Pt is a poor historian. History taken from EMR, ED MD and pt sister at bedside.     #Acute CVA  -admit to telemetry  -allow for permissive HTN  -Increase atorvastatin  -Switch to Plavix per Neuro   -c/w eliquis  -passed beside dysphagia screen  -NIHSS 8  -PT consult for weakness  -SW consult  -TTE ordered, pending results  -Neurochecks and VS q 4 hrs  -MRI brain noted  -OOB and ambulate with walker with assistance  -Turn and position q 2 hrs  -fall and aspiration precautions    #Afib with RVR  -s/p DCCV  -keep Mg>2, K>4  -Continue IV cardizem  -Per EP, discontinue PO Cardizem. Can start BB if needed   -c/w amiodarone  -Cardiology consult appreciated  -c/w eliquis  -EP consult appreciated; recommending AVJ ablation with leadless PPM implant later next week. Patient would need to be off anticoagulation for 24 hours post PPM. Spoke to Neuro - would need clearance to hold AC after PPM.  -Transition from Eliquis to Heparin over the weekend if pt and family agree to PPM and Neuro clears patient. Follow up tomorrow     #Anemia, acute  -likely from small intramuscular thigh hematoma in left upper thigh medial compartment  -No recent falls per sister at bedside  -bilateral LE CT w/o contrast ordered and reviewed with sister and pt. Repeat imaging in 8-12 weeks  -guiac negative  -CTA chest/abd/pelvis completed  -Transfuse 1U PRBC  -CBC stable this morning - monitor PM CBC  -Iron studies ordered    #Protein calorie malnutrition  -Albumin 1.9  -Nutrition consult    #UTI  Started on Ceftriaxone, culture was not collected on admission    #Weakness  #h/o CVA  -Turn and position q 2 hrs  -PT consult    #HTN/HLD  -c/w ezetimibe  -c/w statin, increased   -DASH/TLC    #CKD  Cr at baseline    #Ovarian/Splenic/Thyroid CA  -CT with nodular masslike spleen. Per notes from April, splenic mass is known and pt had planned for splenectomy at [facility]  -Follows with Dr Weaver and Dr Madan Artis    #Recent COVID  -ground glass opacities on CT chest  -asymptomatic, VSS  -s/p 10 day isolation    DISPO: Tentative plan for AVJ ablation with leadless PPM implant later next week as above    POC discussed with Sister at bedside

## 2024-06-21 NOTE — CONSULT NOTE ADULT - ASSESSMENT
patient with failed DC/CV with afib, severely elevated roby vasc who's meds at home include eliquis but compliance has always been an issue  1-neuro-now on ASA and eliquis-should consider plavix in place of asa  2-afib/RVR-consult EPS-already on amio and diltiazem.   Should be considered for ablation.   In addition, KRISTEN for evaluation of MONIK  3-no evidence of CAD or CHF-get ECHO to make sure no change in cardiac function

## 2024-06-21 NOTE — OCCUPATIONAL THERAPY INITIAL EVALUATION ADULT - STRENGTHENING, PT EVAL
Pt will improve strength by 1/2 grade in order to promote increased ease w/ fxl transfers in 2 weeks.

## 2024-06-21 NOTE — CONSULT NOTE ADULT - ASSESSMENT
68 yo F with above PMHx, presented with Lt side weakness/slurred speech, has acute CVA.  PAF on eliquis/amio/cardizem/toprol, s/p DCCV on 4/1/24, 4/22/24, found to have AF w.RVR on IV cardizem drip.  Also has acute anemia due to thigh hematoma, has UTI on antibiotics 68 yo F with above PMHx, presented with Lt side weakness/slurred speech, has acute CVA.  PAF on eliquis/amio/cardizem/toprol, s/p DCCV on 4/1/24, 4/22/24, found to have AF w.RVR on IV cardizem drip.  Also has acute anemia due to thigh hematoma, has UTI on antibiotics   PAF w/RVR, on amiodarone, acute CVA with Lt hemiparesis  - pt failed rhythm control, also due to multiple comorbidities, pt is not a candidate for AFib ablation  - recommend AVJ ablation with leadless PPM implant for rate control next week  - d/w pt, waiting to d/w family  - Neurology f/u , will consider to switch eliquis to full dose IV heparin, pt will be off anticoagulation for 24 hrs post PPM   -continue IV cardizem drip/po amiodarone for rate control  Plan d/w pt//hospitalist   70 yo F with above PMHx, presented with Lt side weakness/slurred speech, has acute CVA.  PAF on eliquis/amio/cardizem/toprol, s/p DCCV on 4/1/24, 4/22/24, found to have AF w.RVR on IV cardizem drip.  Also has acute anemia due to thigh hematoma, has UTI on antibiotics   PAF w/RVR, on amiodarone, acute CVA with Lt hemiparesis  - pt failed rhythm control, also due to multiple comorbidities, pt is not a candidate for AFib ablation  - recommend AVJ ablation with leadless PPM implant for rate control next week  - r/b/i/a d/w pt & family (Rosalinda), to decide  - Neurology f/u , will consider to switch eliquis to full dose IV heparin, pt will be off anticoagulation for 24 hrs post PPM   -continue IV cardizem drip/po amiodarone for rate control meantime  Plan d/w pt/family//hospitalist

## 2024-06-21 NOTE — DIETITIAN INITIAL EVALUATION ADULT - ADD RECOMMEND
1. Recommend c/w DASH/TLC diet 2/2 CVA  2. Recommend Premier protein shake QD to optimize nutritional needs (provides 160 kcal, 30 g protein/ shake) 2/2 CA dx  3. MVI w/ minerals daily to ensure 100% RDA met   4. Encourage protein-rich foods, maximize food preferences   5. Monitor bowel movements, if no BM for >3 days, consider implementing bowel regimen.   6. Obtain vitamin D 25OH level to assess nutriture   7. Please obtain weekly weights   8. Confirm goals of care regarding nutrition support   RD will continue to monitor PO intake, labs, hydration, and wt prn.

## 2024-06-21 NOTE — PHYSICAL THERAPY INITIAL EVALUATION ADULT - ADDITIONAL COMMENTS
The pt reports having a 3 steps to enter the home with rails and no steps inside. The pt acknowledges having a few falls in the past week, has RW at home. -information from April 2024. The pt reports having a 3 steps to enter the home with rails and no steps inside. -information from April 2024. Pt from [facility] this admission ([facility]?) Pt stating she hasn't walked in awhile however then stating she is indep with RW.

## 2024-06-21 NOTE — PROGRESS NOTE ADULT - ASSESSMENT
70 yo female w/PMHx CVA 4/2024, DM HTN afib Eliquis, recent ischemic stroke non hemorrhagic presents to the ED from skilled nursing facility due to sudden onset worsening L sided weakness LLE and new LUE weakness.  Code stroke called in ED.  NIHSS 8.  LKW not known as patient does not remember if she woke up with sx's.    CT head was neg for acute bleed, multiple chronic right frontal infarcts as well as findings suspicious for acute ischemia right superior frontal gyrus, as above.  CT perfusion was neg for core infarct, evidence of active ischemia-tissue at risk RIGHT posterior frontal-parietal region.  CTA showed severe focal stenosis right P3 segment origin. Moderate stenosis distal cavernous and supraclinoid segments right internal carotid artery. 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 Focal occlusion with reconstitution left proximal A2 segment centered 1-2 cm distal to its origin.  The patient was not a candidate for IV thrombolytics 2/2 OOTW, and patient took Eliquis today.     # acute infarcts in the R hemisphere and possible L.  Likely embolic        Recommendations  -permissive HTN up to 190-220/110 till tonight 8pm then gradual normotension.  Goal BP should be <140  -Continue Eliquis as before for AFib, and ok to switch to Plavix 75mg QD.  Cardiology also prefers  -Continue Atorvastatin to 80mg QD (goal LDL should be <70)  -DVT prophylaxis  -PT eval/OT/SLP/ Acute rehab rehab eval  -TTE with bubble study  -monitor on tele  -Neurochecks/VSS q 4  -f/u with Neurology outpatient     D/W Dr. Charlton, Dr. Jarrett  68 yo female w/PMHx CVA 4/2024, DM HTN afib Eliquis, recent ischemic stroke non hemorrhagic presents to the ED from skilled nursing facility due to sudden onset worsening L sided weakness LLE and new LUE weakness.  Code stroke called in ED.  NIHSS 8.  LKW not known as patient does not remember if she woke up with sx's.    CT head was neg for acute bleed, multiple chronic right frontal infarcts as well as findings suspicious for acute ischemia right superior frontal gyrus, as above.  CT perfusion was neg for core infarct, evidence of active ischemia-tissue at risk RIGHT posterior frontal-parietal region.  CTA showed severe focal stenosis right P3 segment origin. Moderate stenosis distal cavernous and supraclinoid segments right internal carotid artery. 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 Focal occlusion with reconstitution left proximal A2 segment centered 1-2 cm distal to its origin.  The patient was not a candidate for IV thrombolytics 2/2 OOTW, and patient took Eliquis today.     # acute infarcts in the R hemisphere and possible L.  Likely embolic        Recommendations  -normotension  -Continue Eliquis as before for AFib, and ok to switch to Plavix 75mg QD.  Cardiology also prefers  -Continue Atorvastatin to 80mg QD (goal LDL should be <70)  -DVT prophylaxis  -PT eval/OT/SLP/ Acute rehab rehab eval  -TTE with bubble study  -monitor on tele  -Neurochecks/VSS q 4  -f/u with Neurology outpatient     D/W Dr. Charlton, Dr. Jarrett

## 2024-06-21 NOTE — CONSULT NOTE ADULT - SUBJECTIVE AND OBJECTIVE BOX
HPI:  68 yo female with hx of learning disability, JUSTINO on CKD, DM II, HTN, A-fib s/p DCCV on  with h/o poor response to medications, also has ovarian and uterine CA, recent diagnosis of Splenic CA, CT Lumbar shows moderate stenosis and DDD, recent UTI, who presents as a code stroke due to sudden onset worsening of left sided weakness of LLE and LUE during PT also associated with slurred speech which has sinced resolved. Arrived with NIHSS of 8 with unknown LKW. Pt is a poor historian. History taken from EMR, ED MD and pt sister at bedside.  (20 Jun 2024 21:01)  Pt has acute CVA on previous CVA on CT scan.  Also acute anemia due to B/L thigh hematoma, s/p PRBC transfusion  Has UTI on antibiotics    Pt is resting in the bed, denies chest pain/SOB/palpitations,   EKG:  AFib 135bpm QRS 84ms QTC 459ms  tele; AFib 's bpm       Cardiac PET ST (March 2024) no ischemia, LVEF 45%  echo (Jan 2024) LVEF 63%, mildly dilated LA        PAST MEDICAL & SURGICAL HISTORY:  HTN (hypertension)      Hyperlipidemia      DM (diabetes mellitus)      Arthritis      Ovarian cancer  and uterine cancer--2004 surgery & chemo      Obesity, morbid, BMI 40.0-49.9      H/O umbilical hernia repair  2005      S/P total hysterectomy  bilateral salpingectomy-oophorectomy --2004 ovarian & uterine cancer      H/O total knee replacement, right          FAMILY HISTORY:  Patient unable to provide medical history        SOCIAL HISTORY:  Allergies    Bee stings (Swelling)  No Known Drug Allergies          MEDICATIONS  (STANDING):  aMIOdarone    Tablet 200 milliGRAM(s) Oral daily  apixaban 5 milliGRAM(s) Oral two times a day  aspirin enteric coated 81 milliGRAM(s) Oral daily  atorvastatin 80 milliGRAM(s) Oral at bedtime  cefTRIAXone Injectable. 1000 milliGRAM(s) IV Push every 24 hours  diltiazem    milliGRAM(s) Oral daily  diltiazem Infusion 20 mG/Hr (20 mL/Hr) IV Continuous <Continuous>  ezetimibe 10 milliGRAM(s) Oral daily    MEDICATIONS  (PRN):  acetaminophen     Tablet .. 650 milliGRAM(s) Oral every 6 hours PRN Mild Pain (1 - 3)  ondansetron Injectable 4 milliGRAM(s) IV Push every 6 hours PRN Nausea and/or Vomiting    ROS: All other ROS is negative unless indicated above.      Physical Exam:  Vital Signs Last 24 Hrs  T(C): 36.9 (21 Jun 2024 08:10), Max: 37.2 (20 Jun 2024 19:33)  T(F): 98.4 (21 Jun 2024 08:10), Max: 99 (21 Jun 2024 02:00)  HR: 87 (21 Jun 2024 08:10) (87 - 160)  BP: 121/49 (21 Jun 2024 08:10) (110/82 - 148/73)  BP(mean): 70 (21 Jun 2024 08:10) (70 - 108)  RR: 18 (21 Jun 2024 08:10) (18 - 22)  SpO2: 99% (21 Jun 2024 08:10) (95% - 100%)    Parameters below as of 21 Jun 2024 08:10  Patient On (Oxygen Delivery Method): room air               Constitutional: well developed,  no deformities and no acute distress    Neurological: Alert & Oriented x 3, JERRY, no focal deficits    HEENT: NC/AT, PERRLA, EOMI,  Neck supple.    Respiratory: CTA B/L, No wheezing/crackles/rhonchi    Cardiovascular: (+) S1 & S2, RRR, No m/r/g    Gastrointestinal: soft, NT, nondistended, (+) BS    Genitourinary: non distended bladder, voiding freely    Extremities: No pedal edema, No clubbing, No cyanosis    Skin:  normal skin color and pigmentation, no skin lesions          LABS:                        9.1    8.50  )-----------( 289      ( 21 Jun 2024 08:42 )             28.5     06-21    141  |  112<H>  |  33<H>  ----------------------------<  124<H>  4.5   |  23  |  1.41<H>    Ca    8.8      21 Jun 2024 08:42  Phos  3.3     06-21  Mg     2.4     06-21    TPro  5.4<L>  /  Alb  2.1<L>  /  TBili  0.5  /  DBili  x   /  AST  16  /  ALT  15  /  AlkPhos  44  06-21    PT/INR - ( 21 Jun 2024 08:42 )   PT: 14.8 sec;   INR: 1.32 ratio         PTT - ( 21 Jun 2024 08:42 )  PTT:33.2 sec  Urinalysis Basic - ( 21 Jun 2024 08:42 )    Color: x / Appearance: x / SG: x / pH: x  Gluc: 124 mg/dL / Ketone: x  / Bili: x / Urobili: x   Blood: x / Protein: x / Nitrite: x   Leuk Esterase: x / RBC: x / WBC x   Sq Epi: x / Non Sq Epi: x / Bacteria: x       HPI:  68 yo female with hx of learning disability, JUSTINO on CKD, DM II, HTN, A-fib s/p DCCV on  with h/o poor response to medications, also has ovarian and uterine CA, recent diagnosis of Splenic CA, CT Lumbar shows moderate stenosis and DDD, recent UTI, who presents as a code stroke due to sudden onset worsening of left sided weakness of LLE and LUE during PT also associated with slurred speech which has sinced resolved. Arrived with NIHSS of 8 with unknown LKW. Pt is a poor historian. History taken from EMR, ED MD and pt sister at bedside.  (20 Jun 2024 21:01)  Pt has acute CVA on previous CVA on CT scan.  Also acute anemia due to B/L thigh hematoma, s/p PRBC transfusion  Has UTI on antibiotics    Pt is resting in the bed, denies chest pain/SOB/palpitations,   EKG:  AFib 135bpm QRS 84ms QTC 459ms  tele; AFib 's bpm       Cardiac PET ST (March 2024) no ischemia, LVEF 45%  echo (Jan 2024) LVEF 63%, mildly dilated LA        PAST MEDICAL & SURGICAL HISTORY:  HTN (hypertension)      Hyperlipidemia      DM (diabetes mellitus)      Arthritis      Ovarian cancer  and uterine cancer--2004 surgery & chemo      Obesity, morbid, BMI 40.0-49.9      H/O umbilical hernia repair  2005      S/P total hysterectomy  bilateral salpingectomy-oophorectomy --2004 ovarian & uterine cancer      H/O total knee replacement, right          FAMILY HISTORY:  Patient unable to provide medical history        SOCIAL HISTORY: pt came from [facility]  Allergies    Bee stings (Swelling)  No Known Drug Allergies          MEDICATIONS  (STANDING):  aMIOdarone    Tablet 200 milliGRAM(s) Oral daily  apixaban 5 milliGRAM(s) Oral two times a day  aspirin enteric coated 81 milliGRAM(s) Oral daily  atorvastatin 80 milliGRAM(s) Oral at bedtime  cefTRIAXone Injectable. 1000 milliGRAM(s) IV Push every 24 hours  diltiazem    milliGRAM(s) Oral daily  diltiazem Infusion 20 mG/Hr (20 mL/Hr) IV Continuous <Continuous>  ezetimibe 10 milliGRAM(s) Oral daily    MEDICATIONS  (PRN):  acetaminophen     Tablet .. 650 milliGRAM(s) Oral every 6 hours PRN Mild Pain (1 - 3)  ondansetron Injectable 4 milliGRAM(s) IV Push every 6 hours PRN Nausea and/or Vomiting    ROS: All other ROS is negative unless indicated above.      Physical Exam:  Vital Signs Last 24 Hrs  T(C): 36.9 (21 Jun 2024 08:10), Max: 37.2 (20 Jun 2024 19:33)  T(F): 98.4 (21 Jun 2024 08:10), Max: 99 (21 Jun 2024 02:00)  HR: 87 (21 Jun 2024 08:10) (87 - 160)  BP: 121/49 (21 Jun 2024 08:10) (110/82 - 148/73)  BP(mean): 70 (21 Jun 2024 08:10) (70 - 108)  RR: 18 (21 Jun 2024 08:10) (18 - 22)  SpO2: 99% (21 Jun 2024 08:10) (95% - 100%)    Parameters below as of 21 Jun 2024 08:10  Patient On (Oxygen Delivery Method): room air               Constitutional: well developed,  no deformities and no acute distress    Neurological: Alert & Oriented x 3, (+)LT side hemiparesis    HEENT: NC/AT, PERRLA, EOMI,  Neck supple.    Respiratory: CTA B/L, No wheezing/crackles/rhonchi    Cardiovascular: (+) irregular S1 & S2, RRR,     Gastrointestinal: soft, NT, nondistended, (+) BS    Genitourinary: non distended bladder, voiding freely    Extremities: No pedal edema, No clubbing, No cyanosis        LABS:                        9.1    8.50  )-----------( 289      ( 21 Jun 2024 08:42 )             28.5     06-21    141  |  112<H>  |  33<H>  ----------------------------<  124<H>  4.5   |  23  |  1.41<H>    Ca    8.8      21 Jun 2024 08:42  Phos  3.3     06-21  Mg     2.4     06-21    TPro  5.4<L>  /  Alb  2.1<L>  /  TBili  0.5  /  DBili  x   /  AST  16  /  ALT  15  /  AlkPhos  44  06-21    PT/INR - ( 21 Jun 2024 08:42 )   PT: 14.8 sec;   INR: 1.32 ratio         PTT - ( 21 Jun 2024 08:42 )  PTT:33.2 sec

## 2024-06-21 NOTE — PROGRESS NOTE ADULT - SUBJECTIVE AND OBJECTIVE BOX
NO acute events overnight, no new complaints.  MRI + small subacute infarcts in the medial posterior R frontal parietal region, R parietal lobe, and the watershed territory of R NOMI and MCA in the R frontal and parietal lobes.  Possible small acute infarct anterior corpus callosum.  Medial left frontal lobe possibly related to subacute infarcts.      ROS: As stated above otherwise negative    MEDICATIONS  (STANDING):  aMIOdarone    Tablet 200 milliGRAM(s) Oral daily  apixaban 5 milliGRAM(s) Oral two times a day  aspirin enteric coated 81 milliGRAM(s) Oral daily  atorvastatin 80 milliGRAM(s) Oral at bedtime  cefTRIAXone Injectable. 1000 milliGRAM(s) IV Push every 24 hours  diltiazem    milliGRAM(s) Oral daily  diltiazem Infusion 20 mG/Hr (20 mL/Hr) IV Continuous <Continuous>  ezetimibe 10 milliGRAM(s) Oral daily      Vital Signs Last 24 Hrs  T(C): 36.9 (21 Jun 2024 08:10), Max: 37.2 (20 Jun 2024 19:33)  T(F): 98.4 (21 Jun 2024 08:10), Max: 99 (21 Jun 2024 02:00)  HR: 87 (21 Jun 2024 08:10) (87 - 160)  BP: 121/49 (21 Jun 2024 08:10) (110/82 - 148/73)  BP(mean): 70 (21 Jun 2024 08:10) (70 - 108)  RR: 18 (21 Jun 2024 08:10) (18 - 22)  SpO2: 99% (21 Jun 2024 08:10) (95% - 100%)    Parameters below as of 21 Jun 2024 08:10  Patient On (Oxygen Delivery Method): room air        Neurological exam:  HF: A x O x 3. Appropriately interactive, normal affect. Speech fluent, No Aphasia or paraphasic errors. Naming /repetition intact   CN: SHANA, EOMI, VFF, facial sensation normal, no NLFD, tongue midline, Palate moves equally, SCM equal bilaterally  Motor: No pronator drift, Strength 4/5 R side, L side hemiparesis  Sens: Decreased to light touch LLE.  +L side extinction  Reflexes: BJ 1+, BR 1+, KJ trace+, AJ 0+, downgoing toes b/l  Coord:  No FNFA, dysmetria on Right, unable to check on the left  Gait/Balance: Cannot test    NIHSS: 8     LABS:                       9.1    8.50  )-----------( 289      ( 21 Jun 2024 08:42 )             28.5     06-21    141  |  112<H>  |  33<H>  ----------------------------<  124<H>  4.5   |  23  |  1.41<H>    Ca    8.8      21 Jun 2024 08:42  Phos  3.3     06-21  Mg     2.4     06-21    TPro  5.4<L>  /  Alb  2.1<L>  /  TBili  0.5  /  DBili  x   /  AST  16  /  ALT  15  /  AlkPhos  44  06-21          Radiology report:  < from: MR Head No Cont (06.20.24 @ 21:39) >  FINDINGS:  There is mild diffuse parenchymal volume loss.    There are nonspecific T2 prolongation signal abnormalities in the   periventricular subcortical white matter likely related to mild chronic   microvascular ischemic changes.    Small chronic infarcts noted in the right MCA territory    There are acute infarcts in the medial posterior right frontal parietal   region, right parietal lobe and the watershed territory of right anterior   cerebral artery and middle cerebral artery in the right frontal and   parietal lobes. Largest infarct measures approximately 1.4 x 0.8 cm in   the right parietal lobe. Additional nonspecific subcentimeter high   diffusion signal with ADC map restriction noted in the anterior corpus   callosum just right of midline. This could be related to small acute   infarct.    Subcentimeter high diffusion signals also noted in the medial left   frontal lobe possibly related to subacute infarcts.       NO acute events overnight, no new complaints.  MRI + small subacute infarcts in the medial posterior R frontal parietal region, R parietal lobe, and the watershed territory of R NOMI and MCA in the R frontal and parietal lobes.  Possible small acute infarct anterior corpus callosum.  Medial left frontal lobe possibly related to subacute infarcts.      ROS: As stated above otherwise negative    MEDICATIONS  (STANDING):  aMIOdarone    Tablet 200 milliGRAM(s) Oral daily  apixaban 5 milliGRAM(s) Oral two times a day  aspirin enteric coated 81 milliGRAM(s) Oral daily  atorvastatin 80 milliGRAM(s) Oral at bedtime  cefTRIAXone Injectable. 1000 milliGRAM(s) IV Push every 24 hours  diltiazem    milliGRAM(s) Oral daily  diltiazem Infusion 20 mG/Hr (20 mL/Hr) IV Continuous <Continuous>  ezetimibe 10 milliGRAM(s) Oral daily      Vital Signs Last 24 Hrs  T(C): 36.9 (21 Jun 2024 08:10), Max: 37.2 (20 Jun 2024 19:33)  T(F): 98.4 (21 Jun 2024 08:10), Max: 99 (21 Jun 2024 02:00)  HR: 87 (21 Jun 2024 08:10) (87 - 160)  BP: 121/49 (21 Jun 2024 08:10) (110/82 - 148/73)  BP(mean): 70 (21 Jun 2024 08:10) (70 - 108)  RR: 18 (21 Jun 2024 08:10) (18 - 22)  SpO2: 99% (21 Jun 2024 08:10) (95% - 100%)    Parameters below as of 21 Jun 2024 08:10  Patient On (Oxygen Delivery Method): room air        Neurological exam:  HF: A x O x 3. Appropriately interactive, normal affect. Speech fluent, No Aphasia or paraphasic errors. Naming /repetition intact   CN: SHANA, EOMI, VFF, facial sensation normal, no NLFD, tongue midline, Palate moves equally, SCM equal bilaterally  Motor: No pronator drift, Strength 4/5 R UE, cannot lift RLE, L side hemiparesis  Sens: Decreased to light touch LLE.  +L side extinction  Reflexes: BJ 1+, BR 1+, KJ trace+, AJ 0+, downgoing toes b/l  Coord:  No FNFA, unable to do HTS  Gait/Balance: Cannot test    NIHSS: 8     LABS:                       9.1    8.50  )-----------( 289      ( 21 Jun 2024 08:42 )             28.5     06-21    141  |  112<H>  |  33<H>  ----------------------------<  124<H>  4.5   |  23  |  1.41<H>    Ca    8.8      21 Jun 2024 08:42  Phos  3.3     06-21  Mg     2.4     06-21    TPro  5.4<L>  /  Alb  2.1<L>  /  TBili  0.5  /  DBili  x   /  AST  16  /  ALT  15  /  AlkPhos  44  06-21          Radiology report:  < from: MR Head No Cont (06.20.24 @ 21:39) >  FINDINGS:  There is mild diffuse parenchymal volume loss.    There are nonspecific T2 prolongation signal abnormalities in the   periventricular subcortical white matter likely related to mild chronic   microvascular ischemic changes.    Small chronic infarcts noted in the right MCA territory    There are acute infarcts in the medial posterior right frontal parietal   region, right parietal lobe and the watershed territory of right anterior   cerebral artery and middle cerebral artery in the right frontal and   parietal lobes. Largest infarct measures approximately 1.4 x 0.8 cm in   the right parietal lobe. Additional nonspecific subcentimeter high   diffusion signal with ADC map restriction noted in the anterior corpus   callosum just right of midline. This could be related to small acute   infarct.    Subcentimeter high diffusion signals also noted in the medial left   frontal lobe possibly related to subacute infarcts.

## 2024-06-21 NOTE — DIETITIAN INITIAL EVALUATION ADULT - ORAL INTAKE PTA/DIET HISTORY
Pt w/ learning disability, limited diet/wt hx obtained. Reports living at home w/ sister and brother-in-law who cook and grocery shop and consumes 2 meals/day.

## 2024-06-21 NOTE — PROGRESS NOTE ADULT - SUBJECTIVE AND OBJECTIVE BOX
incomplete note    HOSPITALIST PROGRESS NOTE    SUBJECTIVE / INTERVAL HPI: Patient seen and examined at bedside.     ROS: All 10 systems reviewed and found to be negative with the exception of what has been described above.     VITAL SIGNS:  Vital Signs Last 24 Hrs  T(C): 37.1 (21 Jun 2024 06:00), Max: 37.2 (20 Jun 2024 19:33)  T(F): 98.7 (21 Jun 2024 06:00), Max: 99 (21 Jun 2024 02:00)  HR: 102 (21 Jun 2024 06:00) (100 - 160)  BP: 131/50 (21 Jun 2024 06:00) (110/82 - 148/73)  BP(mean): 93 (21 Jun 2024 00:56) (85 - 108)  RR: 18 (21 Jun 2024 06:00) (18 - 22)  SpO2: 98% (21 Jun 2024 06:00) (95% - 100%)    Parameters below as of 21 Jun 2024 06:00  Patient On (Oxygen Delivery Method): room air    PHYSICAL EXAM:  General: No acute distress  HEENT: NC/AT; PERRL, anicteric sclera; MMM  Neck: Supple  Cardiovascular: +S1/S2, RRR, no murmurs, rubs, gallops  Respiratory: CTA B/L; no W/R/R  Gastrointestinal: soft, NT/ND; +BSx4  Extremities: WWP; no edema, clubbing or cyanosis  Vascular: 2+ radial, DP/PT pulses B/L  Neurological: AAOx3; no focal deficits    MEDICATIONS:  MEDICATIONS  (STANDING):  aMIOdarone    Tablet 200 milliGRAM(s) Oral daily  apixaban 5 milliGRAM(s) Oral two times a day  aspirin enteric coated 81 milliGRAM(s) Oral daily  atorvastatin 80 milliGRAM(s) Oral at bedtime  cefTRIAXone Injectable. 1000 milliGRAM(s) IV Push every 24 hours  diltiazem    milliGRAM(s) Oral daily  diltiazem Infusion 20 mG/Hr (20 mL/Hr) IV Continuous <Continuous>  ezetimibe 10 milliGRAM(s) Oral daily    MEDICATIONS  (PRN):  acetaminophen     Tablet .. 650 milliGRAM(s) Oral every 6 hours PRN Mild Pain (1 - 3)  ondansetron Injectable 4 milliGRAM(s) IV Push every 6 hours PRN Nausea and/or Vomiting      ALLERGIES:  Allergies    Bee stings (Swelling)  No Known Drug Allergies    Intolerances        LABS:                        9.8    8.72  )-----------( 328      ( 20 Jun 2024 22:07 )             30.3     06-20    136  |  108  |  35<H>  ----------------------------<  115<H>  5.2   |  22  |  1.46<H>    Ca    8.3<L>      20 Jun 2024 12:15  Phos  3.0     06-20  Mg     2.3     06-20    TPro  4.9<L>  /  Alb  1.9<L>  /  TBili  0.4  /  DBili  x   /  AST  18  /  ALT  15  /  AlkPhos  40  06-20    PT/INR - ( 20 Jun 2024 12:15 )   PT: 18.4 sec;   INR: 1.65 ratio         PTT - ( 20 Jun 2024 12:15 )  PTT:34.0 sec  Urinalysis Basic - ( 20 Jun 2024 22:39 )    Color: Yellow / Appearance: Cloudy / SG: >1.030 / pH: x  Gluc: x / Ketone: Negative mg/dL  / Bili: Negative / Urobili: 0.2 mg/dL   Blood: x / Protein: Trace mg/dL / Nitrite: Positive   Leuk Esterase: Moderate / RBC: 6 /HPF / WBC 13 /HPF   Sq Epi: x / Non Sq Epi: 7 /HPF / Bacteria: Many /HPF      CAPILLARY BLOOD GLUCOSE  116 (20 Jun 2024 12:55)      POCT Blood Glucose.: 133 mg/dL (20 Jun 2024 23:56)    Blood, Urine: Moderate (06-20 @ 22:39)      RADIOLOGY & ADDITIONAL TESTS: Reviewed. HOSPITALIST PROGRESS NOTE    SUBJECTIVE / INTERVAL HPI: Patient seen and examined at bedside. Reports muscle pain in back/hips. Otherwise no new complaints. POC discussed with daughter at bedside.     ROS: All 10 systems reviewed and found to be negative with the exception of what has been described above.     VITAL SIGNS:  Vital Signs Last 24 Hrs  T(C): 37.1 (21 Jun 2024 06:00), Max: 37.2 (20 Jun 2024 19:33)  T(F): 98.7 (21 Jun 2024 06:00), Max: 99 (21 Jun 2024 02:00)  HR: 102 (21 Jun 2024 06:00) (100 - 160)  BP: 131/50 (21 Jun 2024 06:00) (110/82 - 148/73)  BP(mean): 93 (21 Jun 2024 00:56) (85 - 108)  RR: 18 (21 Jun 2024 06:00) (18 - 22)  SpO2: 98% (21 Jun 2024 06:00) (95% - 100%)    Parameters below as of 21 Jun 2024 06:00  Patient On (Oxygen Delivery Method): room air    PHYSICAL EXAM:  General: No acute distress  HEENT: NC/AT; PERRL, anicteric sclera; MMM  Neck: Supple  Cardiovascular: Irregularly irregular  Respiratory: CTA B/L; no W/R/R  Gastrointestinal: soft, NT/ND; +BSx4  Extremities: WWP; no edema, clubbing or cyanosis  Vascular: 2+ radial, DP/PT pulses B/L  Neurological: AAOx3, L side hemiparesis  Skin: Small scattered bruising on LE    MEDICATIONS:  MEDICATIONS  (STANDING):  aMIOdarone    Tablet 200 milliGRAM(s) Oral daily  apixaban 5 milliGRAM(s) Oral two times a day  aspirin enteric coated 81 milliGRAM(s) Oral daily  atorvastatin 80 milliGRAM(s) Oral at bedtime  cefTRIAXone Injectable. 1000 milliGRAM(s) IV Push every 24 hours  diltiazem    milliGRAM(s) Oral daily  diltiazem Infusion 20 mG/Hr (20 mL/Hr) IV Continuous <Continuous>  ezetimibe 10 milliGRAM(s) Oral daily    MEDICATIONS  (PRN):  acetaminophen     Tablet .. 650 milliGRAM(s) Oral every 6 hours PRN Mild Pain (1 - 3)  ondansetron Injectable 4 milliGRAM(s) IV Push every 6 hours PRN Nausea and/or Vomiting      ALLERGIES:  Allergies    Bee stings (Swelling)  No Known Drug Allergies    Intolerances        LABS:                        9.8    8.72  )-----------( 328      ( 20 Jun 2024 22:07 )             30.3     06-20    136  |  108  |  35<H>  ----------------------------<  115<H>  5.2   |  22  |  1.46<H>    Ca    8.3<L>      20 Jun 2024 12:15  Phos  3.0     06-20  Mg     2.3     06-20    TPro  4.9<L>  /  Alb  1.9<L>  /  TBili  0.4  /  DBili  x   /  AST  18  /  ALT  15  /  AlkPhos  40  06-20    PT/INR - ( 20 Jun 2024 12:15 )   PT: 18.4 sec;   INR: 1.65 ratio         PTT - ( 20 Jun 2024 12:15 )  PTT:34.0 sec  Urinalysis Basic - ( 20 Jun 2024 22:39 )    Color: Yellow / Appearance: Cloudy / SG: >1.030 / pH: x  Gluc: x / Ketone: Negative mg/dL  / Bili: Negative / Urobili: 0.2 mg/dL   Blood: x / Protein: Trace mg/dL / Nitrite: Positive   Leuk Esterase: Moderate / RBC: 6 /HPF / WBC 13 /HPF   Sq Epi: x / Non Sq Epi: 7 /HPF / Bacteria: Many /HPF      CAPILLARY BLOOD GLUCOSE  116 (20 Jun 2024 12:55)      POCT Blood Glucose.: 133 mg/dL (20 Jun 2024 23:56)    Blood, Urine: Moderate (06-20 @ 22:39)      RADIOLOGY & ADDITIONAL TESTS: Reviewed.

## 2024-06-21 NOTE — DIETITIAN INITIAL EVALUATION ADULT - OTHER INFO
70 yo female with hx of learning disability, JUSTINO on CKD, DM II, HTN, A-fib s/p DCCV with h/o poor response to medications, also has ovarian and uterine CA, recent diagnosis of Splenic CA, CT Lumbar shows moderate stenosis and DDD, recent UTI, who presents as a code stroke due to sudden onset worsening of left sided weakness of LLE and LUE during PT also associated with slurred speech which has sinced resolved. Arrived with NIHSS of 8 with unknown LKW. Pt is a poor historian. History taken from EMR, ED MD and pt sister at bedside. Adm w/ CVA.     Pt w/ learning disability as per H&P - limited diet/wt hx obtained. Pt reports fair appetite at present, endorses some nausea. Endorses UBW of 185# however unsure of time frame. RD obtained bed scale wt of 184# 6/21 - appears accurate. NFPE reveals no muscle/fat wasting at this time. Pt w/ T2DM - POCTs x 24 hrs of 133, 116 (WDL), HgbA1c of 6.1% 6/20 indicative of good glycemic control for age. Recommend c/w DASH/TLC diet 2/2 CVA. Diet education inappropriate 2/2 learning disability. See below for further recommendations.

## 2024-06-21 NOTE — OCCUPATIONAL THERAPY INITIAL EVALUATION ADULT - LEVEL OF INDEPENDENCE: SUPINE/SIT, REHAB EVAL
deferred 2* pt w/ c/o R sided hip pain that she was fixated on t/o IE, notified RN deferred 2* pt frustrated, w/ c/o R sided hip pain that she was fixated on t/o IE, notified RN

## 2024-06-21 NOTE — PHYSICAL THERAPY INITIAL EVALUATION ADULT - PERTINENT HX OF CURRENT PROBLEM, REHAB EVAL
70yo female with hx of learning disability, JUSTINO on CKD, DM II, HTN, A-fib s/p DCCV with h/o poor response to medications, also has ovarian and uterine CA, recent diagnosis of Splenic CA, moderate stenosis and DDD, who presents as a code stroke due to sudden onset worsening of left sided weakness of LLE and LUE during PT also associated with slurred speech which has since resolved. MRI +Acute Rt infarct, subacute left infarcts.     CT BRAIN: Compared with 4/19/2024. No evidence of acute hemorrhage, extra-axial collection or mass effect. Multiple chronic right frontal infarcts as well as findings suspicious for acute ischemia right superior frontal gyrus, as above.  CT PERFUSION: No predicted core infarct. Evidence of active ischemia-tissue at risk RIGHT posterior frontal-parietal region.  CTA HEAD/NECK: Bilateral vertebral arteries patent. No significant stenosis, occlusion or dissection bilateral extracranial carotid arteries. Severe focal stenosis right P3 segment origin. Moderate stenosis distal cavernous and supraclinoid segments right internal carotid artery. 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 Focal occlusion with reconstitution left proximal A2 segment centered 1-2 cm distal to its origin. Additional findings, as above, including an approximate 6 cm left thyroid mass, resulting in regional mass effect. Recommend nonemergent thyroid ultrasound.  CTAP/CTA CHEST: No acute pulmonary embolism to the lobar level. Limited assessment of segmental and subsegmental branches. Cardiomegaly with mild perihilar groundglass opacities which may represent mild pulmonary edema or an infectious process. Nodular masslike appearance of the spleen. Follow-up MRI of the abdomen with and without contrast recommended. There is a small ill-defined intramuscular hematoma in the left upper thigh medial compartment.  MRI HEAD: Right-sided acute infarcts as described above. Small subacute infarcts in the medial left frontal lobe. No acute intracranial hemorrhage 70yo female with hx of learning disability, JUSTINO on CKD, DM II, HTN, A-fib s/p DCCV with h/o poor response to medications, also has ovarian and uterine CA, recent diagnosis of Splenic CA, moderate stenosis and DDD, who presents as a code stroke due to sudden onset worsening of left sided weakness of LLE and LUE during PT also associated with slurred speech which has since resolved. MRI +Acute Rt infarct, subacute left infarcts, +LLE hematoma.     CT LLE: Acute-appearing intramuscular and subcutaneous hematomas within the proximal LEFT thigh, described above. Indeterminate more chronic appearing mixed attenuation lesion along the lateral margin of the RIGHT hamstring musculature within the mid right thigh, possibly related to a subacute to chronic hematoma.   CT BRAIN: Compared with 4/19/2024. No evidence of acute hemorrhage, extra-axial collection or mass effect. Multiple chronic right frontal infarcts as well as findings suspicious for acute ischemia right superior frontal gyrus, as above.  CT PERFUSION: No predicted core infarct. Evidence of active ischemia-tissue at risk RIGHT posterior frontal-parietal region.  CTA HEAD/NECK: Bilateral vertebral arteries patent. No significant stenosis, occlusion or dissection bilateral extracranial carotid arteries. Severe focal stenosis right P3 segment origin. Moderate stenosis distal cavernous and supraclinoid segments right internal carotid artery. 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 Focal occlusion with reconstitution left proximal A2 segment centered 1-2 cm distal to its origin. Additional findings, as above, including an approximate 6 cm left thyroid mass, resulting in regional mass effect. Recommend nonemergent thyroid ultrasound.  CTAP/CTA CHEST: No acute pulmonary embolism to the lobar level. Limited assessment of segmental and subsegmental branches. Cardiomegaly with mild perihilar groundglass opacities which may represent mild pulmonary edema or an infectious process. Nodular masslike appearance of the spleen. Follow-up MRI of the abdomen with and without contrast recommended. There is a small ill-defined intramuscular hematoma in the left upper thigh medial compartment.  MRI HEAD: Right-sided acute infarcts as described above. Small subacute infarcts in the medial left frontal lobe. No acute intracranial hemorrhage

## 2024-06-21 NOTE — OCCUPATIONAL THERAPY INITIAL EVALUATION ADULT - MD ORDER
"OT Evaluate and Treat"- MD orders received. Chart reviewed, contents noted, conferred with RN "OT Evaluate and Treat"- MD orders received. Chart reviewed, contents noted, conferred with RN Jadyni

## 2024-06-21 NOTE — DIETITIAN INITIAL EVALUATION ADULT - PERTINENT MEDS FT
MEDICATIONS  (STANDING):  aMIOdarone    Tablet 200 milliGRAM(s) Oral daily  apixaban 5 milliGRAM(s) Oral two times a day  aspirin enteric coated 81 milliGRAM(s) Oral daily  atorvastatin 80 milliGRAM(s) Oral at bedtime  cefTRIAXone Injectable. 1000 milliGRAM(s) IV Push every 24 hours  diltiazem    milliGRAM(s) Oral daily  diltiazem Infusion 20 mG/Hr (20 mL/Hr) IV Continuous <Continuous>  ezetimibe 10 milliGRAM(s) Oral daily    MEDICATIONS  (PRN):  acetaminophen     Tablet .. 650 milliGRAM(s) Oral every 6 hours PRN Mild Pain (1 - 3)  ondansetron Injectable 4 milliGRAM(s) IV Push every 6 hours PRN Nausea and/or Vomiting

## 2024-06-21 NOTE — CONSULT NOTE ADULT - NS ATTEND AMEND GEN_ALL_CORE FT
69-year-old female with CKD diabetes hypertension persistent A-fib recurrence of A-fib despite Prior cardioversion and amiodarone multiple cancer as above unfortunately patient has acute CVA left-sided weakness.  Patient also has acute anemia likely related to hematoma status post RBC transfusion.  Echo from January 2024 shows normal LVEF.  Considering multiple comorbidities CVA and difficult to rhythm control likely ablation will not be effective to control A-fib as initially planned.  Difficult to rate control despite being on amiodarone metoprolol Cardizem as well as sinus bradycardia post cardioversion.  I think at this time best therapy would be pace and ablate for rate control and stop amiodarone  Transitioning from Eliquis to heparin.  Would have to hold anticoagulation 24 hours post leadless pacemaker and resume Eliquis after.  Follow-up with cardiology and neurology    I spent a total of 55 minutes on the encounter, including chart review, evaluating and discussing treatment options with the patient, as well as counseling and coordination as stated above.

## 2024-06-21 NOTE — PATIENT PROFILE ADULT - FALL HARM RISK - HARM RISK INTERVENTIONS

## 2024-06-21 NOTE — CONSULT NOTE ADULT - SUBJECTIVE AND OBJECTIVE BOX
CHIEF COMPLAINT: Patient is a 69y old  Female who presents with a chief complaint of CVA, afib with RVR, Acute anemia with thigh hematoma (20 Jun 2024 21:01)      HPI:  68 yo female with hx of learning disability, JUSTINO on CKD, DM II, HTN, A-fib s/p DCCV with h/o poor response to medications, also has ovarian and uterine CA, recent diagnosis of Splenic CA, CT Lumbar shows moderate stenosis and DDD, recent UTI, who presents as a code stroke due to sudden onset worsening of left sided weakness of LLE and LUE during PT also associated with slurred speech which has sinced resolved. Arrived with NIHSS of 8 with unknown LKW. Pt is a poor historian. History taken from EMR, ED MD and pt sister at bedside.  (20 Jun 2024 21:01)    6/21-patient wtih hx of Afib-poor response to medications for rate/rhythm control with recent recurrence of ovarian/uterine CA with hypercoagulable state-splenic infarct-s/p DC/CV; now s/p TIA.  CT of head with multiple small infarcts prior with new ischemic event.  In addition severe intracranial stenosis and thyroid mass.  Ct of abd/pelvis with splenic mass and thigh hematoma       on review, past history here.....4/22-patient well known to me in with fall; above history-however did not have ablation, but rather underwent DC/CV for afib by Dr. Haider due to poor response from medications.  Patient underwent procedure to control heart rate for needed surgery for splenic cancer.  PAtient was in office last week and was in NSR, but started to feel poorly last week after office visit and fell multiple times; pt bibems from home. As per ems, " witnessed fall, - head strike, - loc, witnessed by family and slid down wall". Pt remembers sliding down wall. Pt A&ox4 and endorses falling a lot more recently.   Pt reportedly fell on Sunday, and fell again this evening. Pt states she had "jelly legs" and they just gave out under her. In the ED, pt is in Afib in RVR in 130's.   Came to ER-noted to be in Afib/RVR and difficult to control.  Given amiodarone in ER and currently HR in the 80's and BP in the 130/80's.          68 yo F with above PMHx including AFib s/p DCCV on 4/1/24, compliant with meds- eliquis, amio, cardizem 120mg, toprol 100mg presented with recurrent falls d/t worsening leg weakness, denies syncope, found to have AF w/RVR, denies any symptoms from AFib.      PMHx: PAST MEDICAL & SURGICAL HISTORY:  HTN (hypertension)      Hyperlipidemia      DM (diabetes mellitus)      Arthritis      Ovarian cancer  and uterine cancer--2004 surgery & chemo      Obesity, morbid, BMI 40.0-49.9      H/O umbilical hernia repair  2005      S/P total hysterectomy  bilateral salpingectomy-oophorectomy --2004 ovarian & uterine cancer      H/O total knee replacement, right            Soc Hx:       Allergies: Allergies    Bee stings (Swelling)  No Known Drug Allergies    Intolerances          REVIEW OF SYSTEMS:    CONSTITUTIONAL: No weakness, fevers or chills  EYES/ENT: No visual changes;  No vertigo or throat pain   NECK: No pain or stiffness  RESPIRATORY: No cough, wheezing, hemoptysis; No shortness of breath  CARDIOVASCULAR: No chest pain or palpitations  GASTROINTESTINAL: No abdominal or epigastric pain. No nausea, vomiting, or hematemesis; No diarrhea or constipation. No melena or hematochezia.  GENITOURINARY: No dysuria, frequency or hematuria    Vital Signs Last 24 Hrs  T(C): 37.1 (21 Jun 2024 06:00), Max: 37.2 (20 Jun 2024 19:33)  T(F): 98.7 (21 Jun 2024 06:00), Max: 99 (21 Jun 2024 02:00)  HR: 102 (21 Jun 2024 06:00) (100 - 160)  BP: 131/50 (21 Jun 2024 06:00) (110/82 - 148/73)  BP(mean): 93 (21 Jun 2024 00:56) (85 - 108)  RR: 18 (21 Jun 2024 06:00) (18 - 22)  SpO2: 98% (21 Jun 2024 06:00) (95% - 100%)    Parameters below as of 21 Jun 2024 06:00  Patient On (Oxygen Delivery Method): room air        I&O's Summary      CAPILLARY BLOOD GLUCOSE  116 (20 Jun 2024 12:55)      POCT Blood Glucose.: 133 mg/dL (20 Jun 2024 23:56)  POCT Blood Glucose.: 116 mg/dL (20 Jun 2024 11:55)      PHYSICAL EXAM:   Constitutional: NAD, awake and alert, well-developed  HEENT: PERR, EOMI, Normal Hearing, MMM  Neck: JVD  Respiratory: Breath sounds are clear bilaterally, No wheezing, rales or rhonchi  Cardiovascular: S1 and S2, regular rate and rhythm, no Murmurs, gallops or rubs  Gastrointestinal: Bowel Sounds present, soft, nontender, nondistended, no guarding, no rebound      MEDICATIONS:  MEDICATIONS  (STANDING):  aMIOdarone    Tablet 200 milliGRAM(s) Oral daily  apixaban 5 milliGRAM(s) Oral two times a day  aspirin enteric coated 81 milliGRAM(s) Oral daily  atorvastatin 80 milliGRAM(s) Oral at bedtime  cefTRIAXone Injectable. 1000 milliGRAM(s) IV Push every 24 hours  diltiazem    milliGRAM(s) Oral daily  diltiazem Infusion 20 mG/Hr (20 mL/Hr) IV Continuous <Continuous>  ezetimibe 10 milliGRAM(s) Oral daily      LABS: All Labs Reviewed:                        9.8    8.72  )-----------( 328      ( 20 Jun 2024 22:07 )             30.3     06-20    136  |  108  |  35<H>  ----------------------------<  115<H>  5.2   |  22  |  1.46<H>    Ca    8.3<L>      20 Jun 2024 12:15  Phos  3.0     06-20  Mg     2.3     06-20    TPro  4.9<L>  /  Alb  1.9<L>  /  TBili  0.4  /  DBili  x   /  AST  18  /  ALT  15  /  AlkPhos  40  06-20    PT/INR - ( 20 Jun 2024 12:15 )   PT: 18.4 sec;   INR: 1.65 ratio         PTT - ( 20 Jun 2024 12:15 )  PTT:34.0 sec        Blood Culture:   lipid profile       RADIOLOGY:    EKG:  afib/RVR    Telemetry:    ECHO:

## 2024-06-21 NOTE — OCCUPATIONAL THERAPY INITIAL EVALUATION ADULT - LEVEL OF CONSCIOUSNESS, OT EVAL
alert/confused periods of mild agitation/frustration noted w/ questions, able to be redirected/alert

## 2024-06-21 NOTE — DIETITIAN INITIAL EVALUATION ADULT - PERTINENT LABORATORY DATA
06-21    141  |  112<H>  |  33<H>  ----------------------------<  124<H>  4.5   |  23  |  1.41<H>    Ca    8.8      21 Jun 2024 08:42  Phos  3.3     06-21  Mg     2.4     06-21    TPro  5.4<L>  /  Alb  2.1<L>  /  TBili  0.5  /  DBili  x   /  AST  16  /  ALT  15  /  AlkPhos  44  06-21  POCT Blood Glucose.: 133 mg/dL (06-20-24 @ 23:56)  A1C with Estimated Average Glucose Result: 6.1 % (04-20-24 @ 07:26)

## 2024-06-21 NOTE — OCCUPATIONAL THERAPY INITIAL EVALUATION ADULT - ADDITIONAL COMMENTS
Pt is a questionable historian at times, per [facility]- "patient was living with her sister and BRENDA prior to her admission in April, after which she was discharged to [facility]". Per PT IE- "the pt reports having a 3 steps to enter the home with rails and no steps inside. -information from April 2024. Pt from [facility] this admission ([facility] vs [facility]?) Pt stating she hasn't walked in awhile however then stating she is indep with RW".

## 2024-06-21 NOTE — PHYSICAL THERAPY INITIAL EVALUATION ADULT - TRANSFER TRAINING, PT EVAL
GOAL: Pt will perform sit to/from stand transfers with Shama x1 with/without AD as needed within 4weeks.

## 2024-06-21 NOTE — OCCUPATIONAL THERAPY INITIAL EVALUATION ADULT - RANGE OF MOTION EXAMINATION, UPPER EXTREMITY
ADAM PERRY, HENRIETTAE: no AROM noted at the shoulder/elbow ADAM TINAJEROL, HENRIETTAE: no AROM noted at the shoulder/elbow/wrist, PROM shoulder assessed to 90 degrees for joint protection, less than full PROM at elbow, hand with some AROM noted- less than full

## 2024-06-22 LAB
ADD ON TEST-SPECIMEN IN LAB: SIGNIFICANT CHANGE UP
ANION GAP SERPL CALC-SCNC: 8 MMOL/L — SIGNIFICANT CHANGE UP (ref 5–17)
BUN SERPL-MCNC: 41 MG/DL — HIGH (ref 7–23)
CALCIUM SERPL-MCNC: 8.7 MG/DL — SIGNIFICANT CHANGE UP (ref 8.5–10.1)
CHLORIDE SERPL-SCNC: 110 MMOL/L — HIGH (ref 96–108)
CO2 SERPL-SCNC: 20 MMOL/L — LOW (ref 22–31)
CREAT SERPL-MCNC: 1.99 MG/DL — HIGH (ref 0.5–1.3)
EGFR: 27 ML/MIN/1.73M2 — LOW
FERRITIN SERPL-MCNC: 672 NG/ML — HIGH (ref 13–330)
GLUCOSE SERPL-MCNC: 139 MG/DL — HIGH (ref 70–99)
HCT VFR BLD CALC: 27.7 % — LOW (ref 34.5–45)
HGB BLD-MCNC: 9.1 G/DL — LOW (ref 11.5–15.5)
IRON SATN MFR SERPL: 11 % — LOW (ref 14–50)
IRON SATN MFR SERPL: 21 UG/DL — LOW (ref 30–160)
MAGNESIUM SERPL-MCNC: 2.5 MG/DL — SIGNIFICANT CHANGE UP (ref 1.6–2.6)
MCHC RBC-ENTMCNC: 29.6 PG — SIGNIFICANT CHANGE UP (ref 27–34)
MCHC RBC-ENTMCNC: 32.9 GM/DL — SIGNIFICANT CHANGE UP (ref 32–36)
MCV RBC AUTO: 90.2 FL — SIGNIFICANT CHANGE UP (ref 80–100)
NT-PROBNP SERPL-SCNC: 1321 PG/ML — HIGH (ref 0–125)
PLATELET # BLD AUTO: 333 K/UL — SIGNIFICANT CHANGE UP (ref 150–400)
POTASSIUM SERPL-MCNC: 4.8 MMOL/L — SIGNIFICANT CHANGE UP (ref 3.5–5.3)
POTASSIUM SERPL-SCNC: 4.8 MMOL/L — SIGNIFICANT CHANGE UP (ref 3.5–5.3)
RBC # BLD: 3.07 M/UL — LOW (ref 3.8–5.2)
RBC # FLD: 15.4 % — HIGH (ref 10.3–14.5)
SODIUM SERPL-SCNC: 138 MMOL/L — SIGNIFICANT CHANGE UP (ref 135–145)
TIBC SERPL-MCNC: 191 UG/DL — LOW (ref 220–430)
UIBC SERPL-MCNC: 170 UG/DL — SIGNIFICANT CHANGE UP (ref 110–370)
WBC # BLD: 11.29 K/UL — HIGH (ref 3.8–10.5)
WBC # FLD AUTO: 11.29 K/UL — HIGH (ref 3.8–10.5)

## 2024-06-22 PROCEDURE — 99232 SBSQ HOSP IP/OBS MODERATE 35: CPT

## 2024-06-22 PROCEDURE — 99233 SBSQ HOSP IP/OBS HIGH 50: CPT

## 2024-06-22 RX ORDER — DILTIAZEM HYDROCHLORIDE 240 MG/1
60 CAPSULE, EXTENDED RELEASE ORAL EVERY 8 HOURS
Refills: 0 | Status: DISCONTINUED | OUTPATIENT
Start: 2024-06-22 | End: 2024-06-23

## 2024-06-22 RX ORDER — DILTIAZEM HYDROCHLORIDE 240 MG/1
10 CAPSULE, EXTENDED RELEASE ORAL
Qty: 125 | Refills: 0 | Status: DISCONTINUED | OUTPATIENT
Start: 2024-06-22 | End: 2024-06-25

## 2024-06-22 RX ADMIN — TRAMADOL HYDROCHLORIDE 25 MILLIGRAM(S): 50 TABLET, FILM COATED ORAL at 11:06

## 2024-06-22 RX ADMIN — DILTIAZEM HYDROCHLORIDE 60 MILLIGRAM(S): 240 CAPSULE, EXTENDED RELEASE ORAL at 13:58

## 2024-06-22 RX ADMIN — DILTIAZEM HYDROCHLORIDE 15 MG/HR: 240 CAPSULE, EXTENDED RELEASE ORAL at 04:18

## 2024-06-22 RX ADMIN — Medication 1000 MILLIGRAM(S): at 22:04

## 2024-06-22 RX ADMIN — EZETIMIBE 10 MILLIGRAM(S): 10 TABLET ORAL at 09:56

## 2024-06-22 RX ADMIN — APIXABAN 5 MILLIGRAM(S): 5 TABLET, FILM COATED ORAL at 21:53

## 2024-06-22 RX ADMIN — ATORVASTATIN CALCIUM 80 MILLIGRAM(S): 20 TABLET, FILM COATED ORAL at 21:53

## 2024-06-22 RX ADMIN — CLOPIDOGREL BISULFATE 75 MILLIGRAM(S): 75 TABLET, FILM COATED ORAL at 09:57

## 2024-06-22 RX ADMIN — TRAMADOL HYDROCHLORIDE 25 MILLIGRAM(S): 50 TABLET, FILM COATED ORAL at 09:59

## 2024-06-22 RX ADMIN — AMIODARONE HYDROCHLORIDE 200 MILLIGRAM(S): 50 INJECTION, SOLUTION INTRAVENOUS at 09:57

## 2024-06-22 RX ADMIN — DILTIAZEM HYDROCHLORIDE 15 MG/HR: 240 CAPSULE, EXTENDED RELEASE ORAL at 09:56

## 2024-06-22 RX ADMIN — APIXABAN 5 MILLIGRAM(S): 5 TABLET, FILM COATED ORAL at 09:57

## 2024-06-22 RX ADMIN — DILTIAZEM HYDROCHLORIDE 60 MILLIGRAM(S): 240 CAPSULE, EXTENDED RELEASE ORAL at 21:54

## 2024-06-22 NOTE — PROGRESS NOTE ADULT - SUBJECTIVE AND OBJECTIVE BOX
HPI:  68 yo female with hx of learning disability, JUSTINO on CKD, DM II, HTN, A-fib s/p DCCV with h/o poor response to medications, also has ovarian and uterine CA, recent diagnosis of Splenic CA, CT Lumbar shows moderate stenosis and DDD, recent UTI, who presents as a code stroke due to sudden onset worsening of left sided weakness of LLE and LUE during PT also associated with slurred speech which has since resolved. Arrived with NIHSS of 8 with        Review of Systems:  CONSTITUTIONAL: As per hpi   EYES/ENT: No visual changes;  No vertigo or throat pain   NECK: No pain or stiffness  RESPIRATORY: No cough, wheezing, hemoptysis; No shortness of breath,   CARDIOVASCULAR: No chest pain or palpitations  GASTROINTESTINAL: No abdominal or epigastric pain. No nausea, vomiting, or hematemesis; No diarrhea or constipation.   GENITOURINARY: No dysuria, frequency or hematuria  NEUROLOGICAL: As per HPI  SKIN: No itching, burning, rashes, or lesions   All other review of systems is negative unless indicated above    PHYSICAL EXAM:    Vital Signs Last 24 Hrs  T(C): 36.8 (22 Jun 2024 08:32), Max: 36.8 (22 Jun 2024 08:32)  T(F): 98.2 (22 Jun 2024 08:32), Max: 98.2 (22 Jun 2024 08:32)  HR: 108 (22 Jun 2024 08:32) (101 - 111)  BP: 120/97 (22 Jun 2024 08:32) (110/78 - 138/73)  BP(mean): --  RR: 17 (22 Jun 2024 08:32) (17 - 17)  SpO2: 98% (22 Jun 2024 08:32) (96% - 98%)    Parameters below as of 22 Jun 2024 08:32  Patient On (Oxygen Delivery Method): room air        GENERAL: comfortable   HEAD:  Atraumatic, Normocephalic  EYES: EOMI, PERRLA, conjunctiva and sclera clear  HEENT: Moist mucous membranes  NECK: Supple, No JVD  NERVOUS SYSTEM:  Alert , left sided hemiparesis   CHEST/LUNG: Clear to auscultation bilaterally; No rales, rhonchi, wheezing, or rubs  HEART:S1S2 normal, no murmer  ABDOMEN: Soft, Nontender, Nondistended; Bowel sounds present  GENITOURINARY- Voiding, no palpable bladder  EXTREMITIES:  2+ Peripheral Pulses, No clubbing, cyanosis, or edema  MUSCULOSKELETALS No muscle tenderness,   SKIN-no rash, no lesion  PSYCH- Mood stable  LYMPH Node- No palpable lymph node    LABS:                        9.1    11.29 )-----------( 333      ( 22 Jun 2024 09:03 )             27.7     06-22    138  |  110<H>  |  41<H>  ----------------------------<  139<H>  4.8   |  20<L>  |  1.99<H>    Ca    8.7      22 Jun 2024 09:03  Phos  3.3     06-21  Mg     2.5     06-22    TPro  5.4<L>  /  Alb  2.1<L>  /  TBili  0.5  /  DBili  x   /  AST  16  /  ALT  15  /  AlkPhos  44  06-21    PT/INR - ( 21 Jun 2024 08:42 )   PT: 14.8 sec;   INR: 1.32 ratio         PTT - ( 21 Jun 2024 08:42 )  PTT:33.2 sec  Urinalysis Basic - ( 22 Jun 2024 09:03 )    Color: x / Appearance: x / SG: x / pH: x  Gluc: 139 mg/dL / Ketone: x  / Bili: x / Urobili: x   Blood: x / Protein: x / Nitrite: x   Leuk Esterase: x / RBC: x / WBC x   Sq Epi: x / Non Sq Epi: x / Bacteria: x        CAPILLARY BLOOD GLUCOSE                Standing medicine  acetaminophen     Tablet .. 650 milliGRAM(s) Oral every 6 hours PRN  aMIOdarone    Tablet 200 milliGRAM(s) Oral daily  apixaban 5 milliGRAM(s) Oral two times a day  atorvastatin 80 milliGRAM(s) Oral at bedtime  cefTRIAXone Injectable. 1000 milliGRAM(s) IV Push every 24 hours  clopidogrel Tablet 75 milliGRAM(s) Oral daily  diltiazem    Tablet 60 milliGRAM(s) Oral every 8 hours  diltiazem Infusion 15 mG/Hr IV Continuous <Continuous>  ezetimibe 10 milliGRAM(s) Oral daily  ondansetron Injectable 4 milliGRAM(s) IV Push every 6 hours PRN  traMADol 25 milliGRAM(s) Oral every 8 hours PRN

## 2024-06-22 NOTE — PROGRESS NOTE ADULT - ASSESSMENT
#Acute CVA  -ct atorvastatin  -ct Plavix per Neuro   -c/w eliquis  -PT consult for weakness  -SW cons  -OOB and ambulate with walker with assistance  -Turn and position q 2 hrs  -fall and aspiration precautions    #Afib with RVR  -s/p DCCV  -keep Mg>2, K>4  -Continue IV cardizem  --continue IV cardizem drip/po amiodarone   -c/w amiodaron  -Cardiology consult appreciated  -c/w eliquis  -EP consult appreciated; recommending AVJ ablation with leadless PPM implant later next week. Patient would need to be off anticoagulation for 24 hours post PPM. would need clearance to hold AC after PPM for 24 hours  -Transition from Eliquis to Heparin over the weekend if pt and family agree to PPM and Neuro clears patient.  -neuro follow up today     #Anemia, acute  --monitor it     #Protein calorie malnutrition  -Albumin 1.9  -Nutrition consult    #UTI  ct  Ceftriaxone, culture was not collected on admission    #Weakness  #h/o CVA  -Turn and position q 2 hrs  -PT consult    #HTN/HLD  -c/w ezetimibe  -c/w statin, increased   -DASH/TLC    #CKD  Cr at baseline    #Ovarian/Splenic/Thyroid CA  -CT with nodular masslike spleen. Per notes from April, splenic mass is known and pt had planned for splenectomy at [facility]  -Follows with Dr Weaver and Dr Madan Artis    #Recent COVID  -ground glass opacities on CT chest  -asymptomatic, VSS  -s/p 10 day isolation    DISPO: Tentative plan for AVJ ablation with leadless PPM implant later next week as above

## 2024-06-22 NOTE — PROGRESS NOTE ADULT - SUBJECTIVE AND OBJECTIVE BOX
ELECTROPHYSIOLOGY  PROGRESS NOTE    Reason for follow up: AF with RVR  Overnight: No new events. Remains in AF with intermittent -140s-plan for AVJ RFCA/ PPM implant early next week if family/ pt amenable  Update: remains in AF with intermittent HR increases to 100-140     Subjective: "  __________I'm ok___________"    General: No fatigue, no fevers/chills  Respiratory: No dyspnea, no cough, no wheeze  CV: No chest pain, no palpitations  Abd: No nausea  Neuro: No headache, no dizziness  	  Vitals:  T(C): 36.8 (06-22-24 @ 08:32), Max: 36.8 (06-22-24 @ 08:32)  HR: 108 (06-22-24 @ 08:32) (101 - 111)  BP: 120/97 (06-22-24 @ 08:32) (110/78 - 138/73)  RR: 17 (06-22-24 @ 08:32) (17 - 17)  SpO2: 98% (06-22-24 @ 08:32) (96% - 98%)  Wt(kg): --  I&O's Summary    Weight (kg): 84.5 (06-21 @ 02:00)      PHYSICAL EXAM:  Appearance: Comfortable. No acute distress  HEENT:  Head and neck: Atraumatic. Normocephalic.  Normal oral mucosa, PERRL, Neck is supple. No JVD, No carotid bruit.   Neurologic: A & O x 3, no focal deficits. EOMI.  Lymphatic: No cervical lymphadenopathy  Cardiovascular: Normal S1 S2, No murmur, rubs/gallops. No JVD, No edema  Respiratory: Lungs clear to auscultation  Gastrointestinal:  Soft, Non-tender, + BS  Lower Extremities: No edema  Psychiatry: Patient is calm. No agitation. Mood & affect appropriate  Skin: No rashes/ ecchymoses/cyanosis/ulcers visualized on the face, hands or feet.      CURRENT MEDICATIONS:  aMIOdarone    Tablet 200 milliGRAM(s) Oral daily  diltiazem    Tablet 60 milliGRAM(s) Oral every 8 hours  diltiazem Infusion 15 mG/Hr IV Continuous <Continuous>    cefTRIAXone Injectable.  atorvastatin  ezetimibe  apixaban  clopidogrel Tablet  	      LABS:	 	                            9.1    11.29 )-----------( 333      ( 22 Jun 2024 09:03 )             27.7     06-22    138  |  110<H>  |  41<H>  ----------------------------<  139<H>  4.8   |  20<L>  |  1.99<H>    Ca    8.7      22 Jun 2024 09:03  Phos  3.3     06-21  Mg     2.5     06-22    TPro  5.4<L>  /  Alb  2.1<L>  /  TBili  0.5  /  DBili  x   /  AST  16  /  ALT  15  /  AlkPhos  44  06-21    proBNP:   Lipid Profile: Date: 06-21 @ 08:42  Total cholesterol 123; Direct LDL: --; HDL: 38; Triglycerides:141          TELEMETRY: Reviewed  as above  ECG:  Reviewed by me. 	    Ventricular Rate 135 BPM    QRS Duration 84 ms    Q-T Interval 306 ms    QTC Calculation(Bazett) 459 ms    R Axis -50 degrees    T Axis 67 degrees    Diagnosis Line Atrial fibrillation with rapid ventricular response with premature ventricular or aberrantly conducted complexes  Left axis deviation  Minimal voltage criteria for LVH, may be normal variant ( [place] product )  Nonspecific ST abnormality  Abnormal ECG  When compared with ECG of 22-APR-2024 11:36,  Atrial fibrillation has replaced Sinus rhythm  Vent. rate has increased BY  82 BPM  ST now depressed in Lateral leads  T wave inversion no longer evident in Inferior leads  Confirmed by RIVER BLACKWELL, ARTEMIO ([id number]) on 6/20/2024 12:40:38 PM                                                                                                                                     ELECTROPHYSIOLOGY  PROGRESS NOTE    Reason for follow up: AF with RVR  Overnight: No new events. Remains in AF with intermittent -140s-plan for AVJ RFCA/Leadless  PPM implant early next week if family/ pt amenable  Update: remains in AF with intermittent HR increases to 100-140 -continue rate controlling optimization with CCB as well as amio for now    Subjective: "  __________I'm ok___________"    General: No fatigue, no fevers/chills  Respiratory: No dyspnea, no cough, no wheeze  CV: No chest pain, no palpitations  Abd: No nausea  Neuro: No headache, no dizziness  	  Vitals:  T(C): 36.8 (06-22-24 @ 08:32), Max: 36.8 (06-22-24 @ 08:32)  HR: 108 (06-22-24 @ 08:32) (101 - 111)  BP: 120/97 (06-22-24 @ 08:32) (110/78 - 138/73)  RR: 17 (06-22-24 @ 08:32) (17 - 17)  SpO2: 98% (06-22-24 @ 08:32) (96% - 98%)  Wt(kg): --  I&O's Summary    Weight (kg): 84.5 (06-21 @ 02:00)      PHYSICAL EXAM:  Appearance: Comfortable. No acute distress  HEENT:  Head and neck: Atraumatic. Normocephalic.  Normal oral mucosa, PERRL, Neck is supple. No JVD, No carotid bruit.   Neurologic: A & O x 3, no focal deficits. EOMI.  Lymphatic: No cervical lymphadenopathy  Cardiovascular: Normal S1 S2, No murmur, rubs/gallops. No JVD, No edema  Respiratory: Lungs clear to auscultation  Gastrointestinal:  Soft, Non-tender, + BS  Lower Extremities: No edema  Psychiatry: Patient is calm. No agitation. Mood & affect appropriate  Skin: No rashes/ ecchymoses/cyanosis/ulcers visualized on the face, hands or feet.      CURRENT MEDICATIONS:  aMIOdarone    Tablet 200 milliGRAM(s) Oral daily  diltiazem    Tablet 60 milliGRAM(s) Oral every 8 hours  diltiazem Infusion 15 mG/Hr IV Continuous <Continuous>    cefTRIAXone Injectable.  atorvastatin  ezetimibe  apixaban  clopidogrel Tablet  	      LABS:	 	                            9.1    11.29 )-----------( 333      ( 22 Jun 2024 09:03 )             27.7     06-22    138  |  110<H>  |  41<H>  ----------------------------<  139<H>  4.8   |  20<L>  |  1.99<H>    Ca    8.7      22 Jun 2024 09:03  Phos  3.3     06-21  Mg     2.5     06-22    TPro  5.4<L>  /  Alb  2.1<L>  /  TBili  0.5  /  DBili  x   /  AST  16  /  ALT  15  /  AlkPhos  44  06-21    proBNP:   Lipid Profile: Date: 06-21 @ 08:42  Total cholesterol 123; Direct LDL: --; HDL: 38; Triglycerides:141          TELEMETRY: Reviewed  as above  ECG:  Reviewed by me. 	    Ventricular Rate 135 BPM    QRS Duration 84 ms    Q-T Interval 306 ms    QTC Calculation(Bazett) 459 ms    R Axis -50 degrees    T Axis 67 degrees    Diagnosis Line Atrial fibrillation with rapid ventricular response with premature ventricular or aberrantly conducted complexes  Left axis deviation  Minimal voltage criteria for LVH, may be normal variant ( [place] product )  Nonspecific ST abnormality  Abnormal ECG  When compared with ECG of 22-APR-2024 11:36,  Atrial fibrillation has replaced Sinus rhythm  Vent. rate has increased BY  82 BPM  ST now depressed in Lateral leads  T wave inversion no longer evident in Inferior leads  Confirmed by RIVER BLACKWELL, ARTEMIO ([id number]) on 6/20/2024 12:40:38 PM

## 2024-06-22 NOTE — PROGRESS NOTE ADULT - ASSESSMENT
Assessment and Recommendation:   · Assessment	  70 yo F with above PMHx, presented with Lt side weakness/slurred speech, now on tele with acute CVA.  PAF on eliquis/amio/cardizem/toprol, s/p DCCV on 4/1/24, 4/22/24, found to have AF w.RVR on IV cardizem drip.  Also has acute anemia due to thigh hematoma, has UTI on antibiotics   PAF w/RVR, on amiodarone, acute CVA with Lt hemiparesis  - pt failed rhythm control, also due to multiple comorbidities, pt is not a candidate for AFib ablation  - recommend AVJ ablation with leadless PPM implant for rate control next week  - r/b/i/a d/w pt & family (Rosalinda), to decide  - Neurology f/u , will consider to switch eliquis to full dose IV heparin, pt will be off anticoagulation for 24 hrs post PPM

## 2024-06-22 NOTE — PROGRESS NOTE ADULT - SUBJECTIVE AND OBJECTIVE BOX
CHIEF COMPLAINT: Patient is a 69y old  Female who presents with a chief complaint of CVA, afib with RVR, Acute anemia with thigh hematoma (20 Jun 2024 21:01)      HPI:  70 yo female with hx of learning disability, JUSTINO on CKD, DM II, HTN, A-fib s/p DCCV with h/o poor response to medications, also has ovarian and uterine CA, recent diagnosis of Splenic CA, CT Lumbar shows moderate stenosis and DDD, recent UTI, who presents as a code stroke due to sudden onset worsening of left sided weakness of LLE and LUE during PT also associated with slurred speech which has sinced resolved. Arrived with NIHSS of 8 with unknown LKW. Pt is a poor historian. History taken from EMR, ED MD and pt sister at bedside.  (20 Jun 2024 21:01)    6/21-patient wtih hx of Afib-poor response to medications for rate/rhythm control with recent recurrence of ovarian/uterine CA with hypercoagulable state-splenic infarct-s/p DC/CV; now s/p TIA.  CT of head with multiple small infarcts prior with new ischemic event.  In addition severe intracranial stenosis and thyroid mass.  Ct of abd/pelvis with splenic mass and thigh hematoma       on review, past history here.....4/22-patient well known to me in with fall; above history-however did not have ablation, but rather underwent DC/CV for afib by Dr. Haider due to poor response from medications.  Patient underwent procedure to control heart rate for needed surgery for splenic cancer.  PAtient was in office last week and was in NSR, but started to feel poorly last week after office visit and fell multiple times; pt bibems from home. As per ems, " witnessed fall, - head strike, - loc, witnessed by family and slid down wall". Pt remembers sliding down wall. Pt A&ox4 and endorses falling a lot more recently.   Pt reportedly fell on Sunday, and fell again this evening. Pt states she had "jelly legs" and they just gave out under her. In the ED, pt is in Afib in RVR in 130's.   Came to ER-noted to be in Afib/RVR and difficult to control.  Given amiodarone in ER and currently HR in the 80's and BP in the 130/80's.          70 yo F with above PMHx including AFib s/p DCCV on 4/1/24, compliant with meds- eliquis, amio, cardizem 120mg, toprol 100mg presented with recurrent falls d/t worsening leg weakness, denies syncope, found to have AF w/RVR, denies any symptoms from AFib.      PMHx: PAST MEDICAL & SURGICAL HISTORY:  HTN (hypertension)  Hyperlipidemia  DM (diabetes mellitus)  Arthritis  Ovarian cancer and uterine cancer--2004 surgery & chemo  Obesity, morbid, BMI 40.0-49.9  H/O umbilical hernia [id number]  S/P total hysterectomy  bilateral salpingectomy-oophorectomy --2004 ovarian & uterine cancer  H/O total knee replacement, right      Allergies: Allergies    Bee stings (Swelling)  No Known Drug Allergies    Intolerances      Vital Signs Last 24 Hrs  T(C): 36.7 (22 Jun 2024 00:00), Max: 36.9 (21 Jun 2024 08:10)  T(F): 98.1 (22 Jun 2024 00:00), Max: 98.4 (21 Jun 2024 08:10)  HR: 105 (22 Jun 2024 04:00) (87 - 111)  BP: 118/52 (22 Jun 2024 04:00) (110/78 - 138/73)  BP(mean): 70 (21 Jun 2024 08:10) (70 - 70)  RR: 17 (22 Jun 2024 04:00) (17 - 18)  SpO2: 97% (22 Jun 2024 04:00) (96% - 99%)    Parameters below as of 22 Jun 2024 04:00  Patient On (Oxygen Delivery Method): room air            PHYSICAL EXAM:   Constitutional: NAD, awake and alert, well-developed  HEENT: PERR, EOMI, Normal Hearing, MMM  Neck: JVD  Respiratory: Breath sounds are clear bilaterally, No wheezing, rales or rhonchi  Cardiovascular: S1 and S2, regular rate and rhythm, no Murmurs, gallops or rubs  Gastrointestinal: Bowel Sounds present, soft, nontender, nondistended, no guarding, no rebound    MEDICATIONS  (STANDING):  aMIOdarone    Tablet 200 milliGRAM(s) Oral daily  apixaban 5 milliGRAM(s) Oral two times a day  atorvastatin 80 milliGRAM(s) Oral at bedtime  cefTRIAXone Injectable. 1000 milliGRAM(s) IV Push every 24 hours  clopidogrel Tablet 75 milliGRAM(s) Oral daily  diltiazem Infusion 15 mG/Hr (15 mL/Hr) IV Continuous <Continuous>  ezetimibe 10 milliGRAM(s) Oral daily      LABS: All Labs Reviewed:                              9.5    10.18 )-----------( 315      ( 21 Jun 2024 19:50 )             29.1   06-21    141  |  112<H>  |  33<H>  ----------------------------<  124<H>  4.5   |  23  |  1.41<H>    Ca    8.8      21 Jun 2024 08:42  Phos  3.3     06-21  Mg     2.4     06-21    TPro  5.4<L>  /  Alb  2.1<L>  /  TBili  0.5  /  DBili  x   /  AST  16  /  ALT  15  /  AlkPhos  44  06-21            EKG:  afib/RVR    Telemetry:    ECHO:< from: TTE W or WO Ultrasound Enhancing Agent (06.21.24 @ 09:49) >    CONCLUSIONS:      1. Technically difficult image quality.   2. Left ventricular systolic function is hyperdynamic with an ejection fraction estimated at 70 to 75 %.   3. Normal right ventricular cavity size and normal systolic function.   4. The left atrium is mildly dilated.   5. Agitated saline injection was negative for intracardiac shunt.   6. Moderate pulmonary hypertension.    ________________________________________________________________________________________  FINDINGS:     Left Ventricle:  The left ventricular cavity is normal in size. Left ventricular systolic function is hyperdynamic. The left ventricular diastolic function is indeterminate. No left ventricular hypertrophy.     Right Ventricle:  The right ventricular cavity is normal in size and normal systolic function. Tricuspid annular plane systolic excursion (TAPSE) is 1.7 cm (normal >=1.7 cm).     Left Atrium:  The left atrium is mildly dilated with an indexed volume of 38.36 ml/m².     Right Atrium:  The right atrium is normal in size with an indexed volume of 14.66 ml/m² and an indexed area of 5.85 cm²/m².     Interatrial Septum:  The interatrial septum was not well visualized. Agitated saline injection was negative for intracardiac shunt.     Aortic Valve:  The aortic valve appears trileaflet with normal systolic excursion.     Mitral Valve:  Structurally normal mitral valve with normal leaflet excursion. There is mild calcification of the mitral valve annulus. There is trace mitral regurgitation.     Tricuspid Valve:  Structurally normal tricuspid valve with normal leaflet excursion. The tricuspid valve leaflets appear normal. There is trace tricuspid regurgitation. Estimated pulmonary artery systolic pressure is 48 mmHg, consistent with moderate pulmonary hypertension.     Pulmonic Valve:  The pulmonic valve was not well visualized.     Aorta:  The aortic root at the sinuses of Valsalva is normal in size, measuring 3.10 cm (indexed 1.70 cm/m²). The aortic diameter at the sinotubular junction is normal in size, measuring 2.3 cm. The ascending aorta diameter is normal in size, measuring 2.75 cm (indexed 1.50 cm/m²). The aortic arch diameter is normal in size, measuring 3.1 cm (indexed 1.70 cm/m²).     Pericardium:  No pericardial effusion seen.     Systemic Veins:  The inferior vena cava is normal in size measuring 1.19 cm indiameter, (normal <2.1cm) with normal inspiratory collapse (normal >50%) consistent with normal right atrial pressure (~3, range 0-5mmHg).  ____________________________________________________________________  QUANTITATIVE DATA:  Left Ventricle Measurements: (Indexed to BSA)     IVSd (2D):   1.0 cm  LVPWd (2D):  1.0 cm  LVIDd (2D):  4.3 cm  LVIDs (2D):  2.5 cm  LV Mass:     139 g  75.9 g/m²     MV E Vmax:    1.28 m/s  e' lateral:   11.40 cm/s  e' medial:    8.59 cm/s  E/e' lateral: 11.23  E/e' medial:  14.90  E/e' Average: 12.81  MV DT:        152 msec    Aorta Measurements: (Normal range) (Indexed to BSA)     Sinuses of Valsalva: 3.10 cm (2.7 - 3.3 cm)  Ao ST Junct:         2.3 cm  Ao Asc prox:         2.75 cm  Ao Arch:             3.1 cm       Left Atrium Measurements: (Indexed to BSA)  LA Diam 2D:        4.50 cm  LA Vol s, MOD A4C: 70.80 ml.  LA Vol s, MOD A2C: 67.70 ml.  LA Vol s, MOD BP:  70.10 ml  38.36 ml/m²    Right Ventricle Measurements: Right Atrial Measurements:     TAPSE:      1.7 cm       RA Vol:       26.80 ml  RV Base (RVID1): 3.6 cm       RA Vol Index: 14.66 ml/m²       LVOT / RVOT/ Qp/Qs Data: (Indexed to BSA)  LVOT Vmax:      1.30 m/s  LVOT Vmn:       0.802 m/s  LVOT VTI:       18.20 cm  LVOT peak grad: 7 mmHg  LVOT mean grad: 3.0 mmHg    Aortic Valve Measurements:  AV Vmax:                1.8 m/s  AV Peak Gradient:       13.1 mmHg  AV Mean Gradient:       7.0 mmHg  AV VTI:                 23.9 cm  AV VTI Ratio:           0.76  AoV Dimensionless Index 0.76    Mitral Valve Measurements:     MV Vmax:      1.36 m/s  MV VTI, manisha   0.243 m  MV Mean Grad: 2.0 mmHg  MV Peak Grad: 7.4 mmHg  MV E Vmax:    1.3 m/s       Tricuspid Valve Measurements:     TR Vmax:          3.4 m/s  TR Peak Gradient: 44.9 mmHg  RA Pressure:      3 mmHg  PASP:             48 mmHg       Pulmonic Valve Measurments:  PV Vmax:          1.5 m/s  PV Peak Gradient: 9.5 mmHg    ________________________________________________________________________________________  Electronically signedon 6/21/2024 at 2:00:23 PM by Ryan Giang MD           < end of copied text >

## 2024-06-22 NOTE — PROGRESS NOTE ADULT - ASSESSMENT
patient with failed DC/CV with afib, severely elevated roby vasc who's meds at home include eliquis but compliance has always been an issue    1-neuro-now on plavix and eliquis and statin -     2-afib/RVR-consult EPS-already on amio and diltiazem HR currently adequately controlled on IV dilt-- will try to transition to PO cardizem 60 mg q8.   Should be considered for ablation /watchman.   In addition, KRISTEN for evaluation of MONIK    3-no evidence of ACS, check BNP to evaluate for CHF- Echo reveals normal/ hyperdynamic  LV with dilated LA and elevated pulm pressures

## 2024-06-23 LAB
ADD ON TEST-SPECIMEN IN LAB: SIGNIFICANT CHANGE UP
NT-PROBNP SERPL-SCNC: 2497 PG/ML — HIGH (ref 0–125)

## 2024-06-23 PROCEDURE — 99232 SBSQ HOSP IP/OBS MODERATE 35: CPT

## 2024-06-23 PROCEDURE — 99233 SBSQ HOSP IP/OBS HIGH 50: CPT

## 2024-06-23 RX ORDER — DILTIAZEM HYDROCHLORIDE 240 MG/1
60 CAPSULE, EXTENDED RELEASE ORAL EVERY 6 HOURS
Refills: 0 | Status: DISCONTINUED | OUTPATIENT
Start: 2024-06-23 | End: 2024-06-23

## 2024-06-23 RX ORDER — DILTIAZEM HYDROCHLORIDE 240 MG/1
90 CAPSULE, EXTENDED RELEASE ORAL EVERY 6 HOURS
Refills: 0 | Status: DISCONTINUED | OUTPATIENT
Start: 2024-06-23 | End: 2024-07-17

## 2024-06-23 RX ADMIN — AMIODARONE HYDROCHLORIDE 200 MILLIGRAM(S): 50 INJECTION, SOLUTION INTRAVENOUS at 10:56

## 2024-06-23 RX ADMIN — CLOPIDOGREL BISULFATE 75 MILLIGRAM(S): 75 TABLET, FILM COATED ORAL at 10:56

## 2024-06-23 RX ADMIN — APIXABAN 5 MILLIGRAM(S): 5 TABLET, FILM COATED ORAL at 22:17

## 2024-06-23 RX ADMIN — APIXABAN 5 MILLIGRAM(S): 5 TABLET, FILM COATED ORAL at 10:56

## 2024-06-23 RX ADMIN — DILTIAZEM HYDROCHLORIDE 60 MILLIGRAM(S): 240 CAPSULE, EXTENDED RELEASE ORAL at 05:16

## 2024-06-23 RX ADMIN — DILTIAZEM HYDROCHLORIDE 90 MILLIGRAM(S): 240 CAPSULE, EXTENDED RELEASE ORAL at 17:32

## 2024-06-23 RX ADMIN — DILTIAZEM HYDROCHLORIDE 10 MG/HR: 240 CAPSULE, EXTENDED RELEASE ORAL at 16:40

## 2024-06-23 RX ADMIN — EZETIMIBE 10 MILLIGRAM(S): 10 TABLET ORAL at 10:56

## 2024-06-23 RX ADMIN — DILTIAZEM HYDROCHLORIDE 90 MILLIGRAM(S): 240 CAPSULE, EXTENDED RELEASE ORAL at 23:19

## 2024-06-23 RX ADMIN — ATORVASTATIN CALCIUM 80 MILLIGRAM(S): 20 TABLET, FILM COATED ORAL at 22:14

## 2024-06-23 RX ADMIN — DILTIAZEM HYDROCHLORIDE 90 MILLIGRAM(S): 240 CAPSULE, EXTENDED RELEASE ORAL at 11:05

## 2024-06-23 NOTE — PROGRESS NOTE ADULT - ASSESSMENT
Assessment and Recommendation:   · Assessment	  68 yo F with above PMHx, presented with Lt side weakness/slurred speech, now on tele with acute CVA.    PAF on eliquis/amio/cardizem/toprol, s/p DCCV on 4/1/24, 4/22/24, found to have AF w.RVR on IV cardizem drip. @ 10 mg/hr at this time  Also has acute anemia due to thigh hematoma, has UTI on antibiotics   PAF w/RVR, on amiodarone, acute CVA with Lt hemiparesis  - pt failed rhythm control, also due to multiple comorbidities, pt is not a candidate for AFib ablation  - recommend AVJ ablation with leadless PPM implant for rate control next week-Hospitalist team spoke to daughter who would like to speak with Palliative Care team PRIOR to any PPM procedure/ plans   - Neurology f/u , will consider to switch eliquis to full dose IV heparin, pt will be off anticoagulation for 24 hrs post PPM

## 2024-06-23 NOTE — PROGRESS NOTE ADULT - ASSESSMENT
patient with failed DC/CV with afib, severely elevated roby vasc who's meds at home include eliquis but compliance has always been an issue    1-neuro-now on plavix and eliquis and statin -     2-afib/RVR--already on amio and diltiazem  -  HR currently adequately controlled on IV dilt. PO dilt-- will try to transition off of IV to and increase PO cardizem  from 60 mg q8 to q 6 dosing.    EP's plan for PPM with AVJ ablation    3-no evidence of ACS, check BNP to evaluate for CHF- Echo reveals normal/ hyperdynamic  LV with dilated LA and elevated pulm pressures     4) increased creatinine-- will recheck BNP, may need some gentle hydration

## 2024-06-23 NOTE — PROGRESS NOTE ADULT - SUBJECTIVE AND OBJECTIVE BOX
CHIEF COMPLAINT: Patient is a 69y old  Female who presents with a chief complaint of CVA, afib with RVR, Acute anemia with thigh hematoma (20 Jun 2024 21:01)      HPI:  70 yo female with hx of learning disability, JUSTINO on CKD, DM II, HTN, A-fib s/p DCCV with h/o poor response to medications, also has ovarian and uterine CA, recent diagnosis of Splenic CA, CT Lumbar shows moderate stenosis and DDD, recent UTI, who presents as a code stroke due to sudden onset worsening of left sided weakness of LLE and LUE during PT also associated with slurred speech which has sinced resolved. Arrived with NIHSS of 8 with unknown LKW. Pt is a poor historian. History taken from EMR, ED MD and pt sister at bedside.  (20 Jun 2024 21:01)    6/21-patient wtih hx of Afib-poor response to medications for rate/rhythm control with recent recurrence of ovarian/uterine CA with hypercoagulable state-splenic infarct-s/p DC/CV; now s/p TIA.  CT of head with multiple small infarcts prior with new ischemic event.  In addition severe intracranial stenosis and thyroid mass.  Ct of abd/pelvis with splenic mass and thigh hematoma       on review, past history here.....4/22-patient well known to me in with fall; above history-however did not have ablation, but rather underwent DC/CV for afib by Dr. Haider due to poor response from medications.  Patient underwent procedure to control heart rate for needed surgery for splenic cancer.  PAtient was in office last week and was in NSR, but started to feel poorly last week after office visit and fell multiple times; pt bibems from home. As per ems, " witnessed fall, - head strike, - loc, witnessed by family and slid down wall". Pt remembers sliding down wall. Pt A&ox4 and endorses falling a lot more recently.   Pt reportedly fell on Sunday, and fell again this evening. Pt states she had "jelly legs" and they just gave out under her. In the ED, pt is in Afib in RVR in 130's.   Came to ER-noted to be in Afib/RVR and difficult to control.  Given amiodarone in ER and currently HR in the 80's and BP in the 130/80's.          70 yo F with above PMHx including AFib s/p DCCV on 4/1/24, compliant with meds- eliquis, amio, cardizem 120mg, toprol 100mg presented with recurrent falls d/t worsening leg weakness, denies syncope, found to have AF w/RVR, denies any symptoms from AFib.      6/23/24-- HR with better control, no acute issues overnight     PMHx: PAST MEDICAL & SURGICAL HISTORY:  HTN (hypertension)  Hyperlipidemia  DM (diabetes mellitus)  Arthritis  Ovarian cancer and uterine cancer--2004 surgery & chemo  Obesity, morbid, BMI 40.0-49.9  H/O umbilical hernia [id number]  S/P total hysterectomy  bilateral salpingectomy-oophorectomy --2004 ovarian & uterine cancer  H/O total knee replacement, right      Allergies: Allergies    Bee stings (Swelling)  No Known Drug Allergies    Intolerances    Vital Signs Last 24 Hrs  T(C): 36.8 (22 Jun 2024 21:13), Max: 37.2 (22 Jun 2024 15:40)  T(F): 98.3 (22 Jun 2024 21:13), Max: 98.9 (22 Jun 2024 15:40)  HR: 97 (23 Jun 2024 06:11) (76 - 114)  BP: 120/99 (23 Jun 2024 06:11) (101/63 - 136/78)  BP(mean): 64 (22 Jun 2024 15:40) (64 - 64)  RR: 20 (22 Jun 2024 21:13) (17 - 20)  SpO2: 97% (22 Jun 2024 21:13) (94% - 98%)    Parameters below as of 22 Jun 2024 21:13  Patient On (Oxygen Delivery Method): room air          PHYSICAL EXAM:   Constitutional: NAD, awake and alert, well-developed  HEENT: PERR, EOMI, Normal Hearing, MMM  Neck: JVD  Respiratory: Breath sounds are clear bilaterally, No wheezing, rales or rhonchi  Cardiovascular: S1 and S2, regular rate and rhythm, no Murmurs, gallops or rubs  Gastrointestinal: Bowel Sounds present, soft, nontender, nondistended, no guarding, no rebound      MEDICATIONS  (STANDING):  aMIOdarone    Tablet 200 milliGRAM(s) Oral daily  apixaban 5 milliGRAM(s) Oral two times a day  atorvastatin 80 milliGRAM(s) Oral at bedtime  clopidogrel Tablet 75 milliGRAM(s) Oral daily  diltiazem    Tablet 60 milliGRAM(s) Oral every 8 hours  diltiazem Infusion 10 mG/Hr (10 mL/Hr) IV Continuous <Continuous>  ezetimibe 10 milliGRAM(s) Oral daily              LABS: All Labs Reviewed:                            9.1    11.29 )-----------( 333      ( 22 Jun 2024 09:03 )             27.7   06-22    138  |  110<H>  |  41<H>  ----------------------------<  139<H>  4.8   |  20<L>  |  1.99<H>    Ca    8.7      22 Jun 2024 09:03  Phos  3.3     06-21  Mg     2.5     06-22    TPro  5.4<L>  /  Alb  2.1<L>  /  TBili  0.5  /  DBili  x   /  AST  16  /  ALT  15  /  AlkPhos  44  06-21      EKG:  afib/RVR    Telemetry: AF rate controlled     ECHO:< from: TTE W or WO Ultrasound Enhancing Agent (06.21.24 @ 09:49) >    CONCLUSIONS:      1. Technically difficult image quality.   2. Left ventricular systolic function is hyperdynamic with an ejection fraction estimated at 70 to 75 %.   3. Normal right ventricular cavity size and normal systolic function.   4. The left atrium is mildly dilated.   5. Agitated saline injection was negative for intracardiac shunt.   6. Moderate pulmonary hypertension.    ________________________________________________________________________________________  FINDINGS:     Left Ventricle:  The left ventricular cavity is normal in size. Left ventricular systolic function is hyperdynamic. The left ventricular diastolic function is indeterminate. No left ventricular hypertrophy.     Right Ventricle:  The right ventricular cavity is normal in size and normal systolic function. Tricuspid annular plane systolic excursion (TAPSE) is 1.7 cm (normal >=1.7 cm).     Left Atrium:  The left atrium is mildly dilated with an indexed volume of 38.36 ml/m².     Right Atrium:  The right atrium is normal in size with an indexed volume of 14.66 ml/m² and an indexed area of 5.85 cm²/m².     Interatrial Septum:  The interatrial septum was not well visualized. Agitated saline injection was negative for intracardiac shunt.     Aortic Valve:  The aortic valve appears trileaflet with normal systolic excursion.     Mitral Valve:  Structurally normal mitral valve with normal leaflet excursion. There is mild calcification of the mitral valve annulus. There is trace mitral regurgitation.     Tricuspid Valve:  Structurally normal tricuspid valve with normal leaflet excursion. The tricuspid valve leaflets appear normal. There is trace tricuspid regurgitation. Estimated pulmonary artery systolic pressure is 48 mmHg, consistent with moderate pulmonary hypertension.     Pulmonic Valve:  The pulmonic valve was not well visualized.     Aorta:  The aortic root at the sinuses of Valsalva is normal in size, measuring 3.10 cm (indexed 1.70 cm/m²). The aortic diameter at the sinotubular junction is normal in size, measuring 2.3 cm. The ascending aorta diameter is normal in size, measuring 2.75 cm (indexed 1.50 cm/m²). The aortic arch diameter is normal in size, measuring 3.1 cm (indexed 1.70 cm/m²).     Pericardium:  No pericardial effusion seen.     Systemic Veins:  The inferior vena cava is normal in size measuring 1.19 cm indiameter, (normal <2.1cm) with normal inspiratory collapse (normal >50%) consistent with normal right atrial pressure (~3, range 0-5mmHg).  ____________________________________________________________________  QUANTITATIVE DATA:  Left Ventricle Measurements: (Indexed to BSA)     IVSd (2D):   1.0 cm  LVPWd (2D):  1.0 cm  LVIDd (2D):  4.3 cm  LVIDs (2D):  2.5 cm  LV Mass:     139 g  75.9 g/m²     MV E Vmax:    1.28 m/s  e' lateral:   11.40 cm/s  e' medial:    8.59 cm/s  E/e' lateral: 11.23  E/e' medial:  14.90  E/e' Average: 12.81  MV DT:        152 msec    Aorta Measurements: (Normal range) (Indexed to BSA)     Sinuses of Valsalva: 3.10 cm (2.7 - 3.3 cm)  Ao ST Junct:         2.3 cm  Ao Asc prox:         2.75 cm  Ao Arch:             3.1 cm       Left Atrium Measurements: (Indexed to BSA)  LA Diam 2D:        4.50 cm  LA Vol s, MOD A4C: 70.80 ml.  LA Vol s, MOD A2C: 67.70 ml.  LA Vol s, MOD BP:  70.10 ml  38.36 ml/m²    Right Ventricle Measurements: Right Atrial Measurements:     TAPSE:      1.7 cm       RA Vol:       26.80 ml  RV Base (RVID1): 3.6 cm       RA Vol Index: 14.66 ml/m²       LVOT / RVOT/ Qp/Qs Data: (Indexed to BSA)  LVOT Vmax:      1.30 m/s  LVOT Vmn:       0.802 m/s  LVOT VTI:       18.20 cm  LVOT peak grad: 7 mmHg  LVOT mean grad: 3.0 mmHg    Aortic Valve Measurements:  AV Vmax:                1.8 m/s  AV Peak Gradient:       13.1 mmHg  AV Mean Gradient:       7.0 mmHg  AV VTI:                 23.9 cm  AV VTI Ratio:           0.76  AoV Dimensionless Index 0.76    Mitral Valve Measurements:     MV Vmax:      1.36 m/s  MV VTI, manisha   0.243 m  MV Mean Grad: 2.0 mmHg  MV Peak Grad: 7.4 mmHg  MV E Vmax:    1.3 m/s       Tricuspid Valve Measurements:     TR Vmax:          3.4 m/s  TR Peak Gradient: 44.9 mmHg  RA Pressure:      3 mmHg  PASP:             48 mmHg       Pulmonic Valve Measurments:  PV Vmax:          1.5 m/s  PV Peak Gradient: 9.5 mmHg    ________________________________________________________________________________________  Electronically signedon 6/21/2024 at 2:00:23 PM by Ryan Giang MD           < end of copied text >

## 2024-06-23 NOTE — PROGRESS NOTE ADULT - ASSESSMENT
#Acute CVA  -ct atorvastatin  -ct Plavix per Neuro   -c/w eliquis  -PT consult for weakness  -SW cons  -OOB and ambulate with walker with assistance  -Turn and position q 2 hrs  -fall and aspiration precautions    #Afib with RVR  -s/p DCCV  -keep Mg>2, K>4  -Continue IV cardizem  --continue IV cardizem drip/po amiodarone   -c/w amiodaron  -Cardiology consult appreciated  -c/w eliquis  -EP consult appreciated; recommending AVJ ablation with leadless PPM implant later next week. Patient would need to be off anticoagulation for 24 hours post PPM.   -Will discuss with family if in agreement then will change to iv heparin for possible PPM placement     #Anemia, acute  --monitor it     #Protein calorie malnutrition  -Albumin 1.9  -Nutrition consult    #UTI  ct  Ceftriaxone, culture was not collected on admission    #Weakness  #h/o CVA  -Turn and position q 2 hrs  -PT consult    #HTN/HLD  -c/w ezetimibe  -c/w statin, increased   -DASH/TLC    #CKD  Cr at baseline    #Ovarian/Splenic/Thyroid CA  -CT with nodular masslike spleen. Per notes from April, splenic mass is known and pt had planned for splenectomy at [facility]  -Follows with Dr Weaver and Dr Madan Artis    #Recent COVID  -ground glass opacities on CT chest  -asymptomatic, VSS  -s/p 10 day isolation     Tentative plan for AVJ ablation with leadless PPM implant later next week as above

## 2024-06-23 NOTE — PROGRESS NOTE ADULT - SUBJECTIVE AND OBJECTIVE BOX
ELECTROPHYSIOLOGY  PROGRESS NOTE  Reason for follow up: AF with RVR  Overnight: No new events. Remains in AF with RVR up to 140s on Cardizem drip @ 10 mg/hr at present Amiodarone po  Update: Daughter would like to speak to palliative care team PRIOR to any Procedures such as PPM and AVJ RFCA    General: No fatigue, no fevers/chills  Respiratory: No dyspnea, no cough, no wheeze  CV: No chest pain, no palpitations  Abd: No nausea  Neuro: No headache, no dizziness  	  Vitals:  T(C): 36.6 (06-23-24 @ 08:48), Max: 37.2 (06-22-24 @ 15:40)  HR: 121 (06-23-24 @ 08:48) (76 - 121)  BP: 126/119 (06-23-24 @ 08:48) (101/63 - 136/78)  RR: 21 (06-23-24 @ 08:48) (17 - 21)  SpO2: 96% (06-23-24 @ 08:48) (94% - 97%)  Wt(kg): --  I&O's Summary    Weight (kg): 84.5 (06-21 @ 02:00)      PHYSICAL EXAM:  Appearance: Comfortable. No acute distress  HEENT:  Head and neck: Atraumatic. Normocephalic.  Normal oral mucosa, PERRL, Neck is supple. No JVD, No carotid bruit.   Neurologic: A & O x 3, no focal deficits. EOMI.  Lymphatic: No cervical lymphadenopathy  Cardiovascular: Normal S1 S2, No murmur, rubs/gallops. No JVD, No edema  Respiratory: Lungs clear to auscultation  Gastrointestinal:  Soft, Non-tender, + BS  Lower Extremities: No edema  Psychiatry: Patient is calm. No agitation. Mood & affect appropriate  Skin: No rashes/ ecchymoses/cyanosis/ulcers visualized on the face, hands or feet.      CURRENT MEDICATIONS:  aMIOdarone    Tablet 200 milliGRAM(s) Oral daily  diltiazem    Tablet 90 milliGRAM(s) Oral every 6 hours  diltiazem Infusion 10 mG/Hr IV Continuous <Continuous>    atorvastatin  ezetimibe  apixaban  clopidogrel Tablet	      LABS:	 	                            9.1    11.29 )-----------( 333      ( 22 Jun 2024 09:03 )             27.7     06-22    138  |  110<H>  |  41<H>  ----------------------------<  139<H>  4.8   |  20<L>  |  1.99<H>    Ca    8.7      22 Jun 2024 09:03  Mg     2.5     06-22      proBNP:   Lipid Profile: Date: 06-21 @ 08:42  Total cholesterol 123; Direct LDL: --; HDL: 38; Triglycerides:141    HgA1c:   TSH:       TELEMETRY: Reviewed  as above  ECG:  Reviewed by me. 	    Ventricular Rate 135 BPM    QRS Duration 84 ms    Q-T Interval 306 ms    QTC Calculation(Bazett) 459 ms    R Axis -50 degrees    T Axis 67 degrees    Diagnosis Line Atrial fibrillation with rapid ventricular response with premature ventricular or aberrantly conducted complexes  Left axis deviation  Minimal voltage criteria for LVH, may be normal variant ( Jona product )  Nonspecific ST abnormality  Abnormal ECG  When compared with ECG of 22-APR-2024 11:36,  Atrial fibrillation has replaced Sinus rhythm  Vent. rate has increased BY  82 BPM  ST now depressed in Lateral leads  T wave inversion no longer evident in Inferior leads  Confirmed by RIVER BLACKWELL, ARTEMIO ([id number]) on 6/20/2024 12:40:38 PM

## 2024-06-23 NOTE — PROGRESS NOTE ADULT - SUBJECTIVE AND OBJECTIVE BOX
HPI:  68 yo female with hx of learning disability, JUSTINO on CKD, DM II, HTN, A-fib s/p DCCV with h/o poor response to medications, also has ovarian and uterine CA, recent diagnosis of Splenic CA, CT Lumbar shows moderate stenosis and DDD, recent UTI, who presents as a code stroke due to sudden onset worsening of left sided weakness of LLE and LUE during PT also associated with slurred speech which has since resolved. Arrived with NIHSS of 8 with        Review of Systems:  CONSTITUTIONAL: As per hpi   EYES/ENT: No visual changes;  No vertigo or throat pain   NECK: No pain or stiffness  RESPIRATORY: No cough, wheezing, hemoptysis; No shortness of breath,   CARDIOVASCULAR: No chest pain or palpitations  GASTROINTESTINAL: No abdominal or epigastric pain. No nausea, vomiting, or hematemesis; No diarrhea or constipation.   GENITOURINARY: No dysuria, frequency or hematuria  NEUROLOGICAL: As per HPI  SKIN: No itching, burning, rashes, or lesions   All other review of systems is negative unless indicated above    PHYSICAL EXAM:    Vital Signs Last 24 Hrs  T(C): 36.6 (23 Jun 2024 08:48), Max: 37.2 (22 Jun 2024 15:40)  T(F): 97.9 (23 Jun 2024 08:48), Max: 98.9 (22 Jun 2024 15:40)  HR: 121 (23 Jun 2024 08:48) (76 - 121)  BP: 126/119 (23 Jun 2024 08:48) (101/63 - 136/78)  BP(mean): 64 (22 Jun 2024 15:40) (64 - 64)  RR: 21 (23 Jun 2024 08:48) (17 - 21)  SpO2: 96% (23 Jun 2024 08:48) (94% - 97%)    Parameters below as of 23 Jun 2024 08:48  Patient On (Oxygen Delivery Method): room air        GENERAL: comfortable   HEAD:  Atraumatic, Normocephalic  EYES: EOMI, PERRLA, conjunctiva and sclera clear  HEENT: Moist mucous membranes  NECK: Supple, No JVD  NERVOUS SYSTEM:  Alert , left sided hemiparesis   CHEST/LUNG: Clear to auscultation bilaterally; No rales, rhonchi, wheezing, or rubs  HEART:S1S2 normal, no murmer  ABDOMEN: Soft, Nontender, Nondistended; Bowel sounds present  GENITOURINARY- Voiding, no palpable bladder  EXTREMITIES:  2+ Peripheral Pulses, No clubbing, cyanosis, or edema  MUSCULOSKELETALS No muscle tenderness,   SKIN-no rash, no lesion  PSYCH- Mood stable  LYMPH Node- No palpable lymph node                          9.1    11.29 )-----------( 333      ( 22 Jun 2024 09:03 )             27.7     06-22    138  |  110<H>  |  41<H>  ----------------------------<  139<H>  4.8   |  20<L>  |  1.99<H>    Ca    8.7      22 Jun 2024 09:03  Mg     2.5     06-22          Culture - Urine (collected 21 Jun 2024 16:45)  Source: Clean Catch Clean Catch (Midstream)  Preliminary Report (23 Jun 2024 11:50):    10,000 - 49,000 CFU/mL Escherichia coli    Culture - Blood (collected 21 Jun 2024 01:16)  Source: .Blood None  Preliminary Report (23 Jun 2024 09:01):    No growth at 48 Hours    Culture - Blood (collected 21 Jun 2024 01:16)  Source: .Blood None  Preliminary Report (23 Jun 2024 09:01):    No growth at 48 Hours        Urinalysis Basic - ( 22 Jun 2024 09:03 )    Color: x / Appearance: x / SG: x / pH: x  Gluc: 139 mg/dL / Ketone: x  / Bili: x / Urobili: x   Blood: x / Protein: x / Nitrite: x   Leuk Esterase: x / RBC: x / WBC x   Sq Epi: x / Non Sq Epi: x / Bacteria: x          CAPILLARY BLOOD GLUCOSE          Culture - Urine (collected 21 Jun 2024 16:45)  Source: Clean Catch Clean Catch (Midstream)  Preliminary Report (23 Jun 2024 11:50):    10,000 - 49,000 CFU/mL Escherichia coli    Culture - Blood (collected 21 Jun 2024 01:16)  Source: .Blood None  Preliminary Report (23 Jun 2024 09:01):    No growth at 48 Hours    Culture - Blood (collected 21 Jun 2024 01:16)  Source: .Blood None  Preliminary Report (23 Jun 2024 09:01):    No growth at 48 Hours      MEDICATIONS  (STANDING):  aMIOdarone    Tablet 200 milliGRAM(s) Oral daily  apixaban 5 milliGRAM(s) Oral two times a day  atorvastatin 80 milliGRAM(s) Oral at bedtime  clopidogrel Tablet 75 milliGRAM(s) Oral daily  diltiazem    Tablet 90 milliGRAM(s) Oral every 6 hours  diltiazem Infusion 10 mG/Hr (10 mL/Hr) IV Continuous <Continuous>  ezetimibe 10 milliGRAM(s) Oral daily    MEDICATIONS  (PRN):  acetaminophen     Tablet .. 650 milliGRAM(s) Oral every 6 hours PRN Mild Pain (1 - 3)  ondansetron Injectable 4 milliGRAM(s) IV Push every 6 hours PRN Nausea and/or Vomiting  traMADol 25 milliGRAM(s) Oral every 8 hours PRN Moderate Pain (4 - 6)

## 2024-06-23 NOTE — CHART NOTE - NSCHARTNOTEFT_GEN_A_CORE
spoke to her sister she has not made any decision right now, she will call me back again Spoke to sister who is HCP, she would like to discuss with palliative care team before making any decision about PPM

## 2024-06-24 LAB
ANION GAP SERPL CALC-SCNC: 9 MMOL/L — SIGNIFICANT CHANGE UP (ref 5–17)
BUN SERPL-MCNC: 53 MG/DL — HIGH (ref 7–23)
CALCIUM SERPL-MCNC: 8.8 MG/DL — SIGNIFICANT CHANGE UP (ref 8.5–10.1)
CHLORIDE SERPL-SCNC: 107 MMOL/L — SIGNIFICANT CHANGE UP (ref 96–108)
CO2 SERPL-SCNC: 19 MMOL/L — LOW (ref 22–31)
CREAT SERPL-MCNC: 3.46 MG/DL — HIGH (ref 0.5–1.3)
EGFR: 14 ML/MIN/1.73M2 — LOW
GLUCOSE SERPL-MCNC: 135 MG/DL — HIGH (ref 70–99)
HCT VFR BLD CALC: 27.5 % — LOW (ref 34.5–45)
HGB BLD-MCNC: 8.9 G/DL — LOW (ref 11.5–15.5)
MCHC RBC-ENTMCNC: 29.2 PG — SIGNIFICANT CHANGE UP (ref 27–34)
MCHC RBC-ENTMCNC: 32.4 GM/DL — SIGNIFICANT CHANGE UP (ref 32–36)
MCV RBC AUTO: 90.2 FL — SIGNIFICANT CHANGE UP (ref 80–100)
PLATELET # BLD AUTO: 390 K/UL — SIGNIFICANT CHANGE UP (ref 150–400)
POTASSIUM SERPL-MCNC: 4.7 MMOL/L — SIGNIFICANT CHANGE UP (ref 3.5–5.3)
POTASSIUM SERPL-SCNC: 4.7 MMOL/L — SIGNIFICANT CHANGE UP (ref 3.5–5.3)
RBC # BLD: 3.05 M/UL — LOW (ref 3.8–5.2)
RBC # FLD: 15 % — HIGH (ref 10.3–14.5)
SODIUM SERPL-SCNC: 135 MMOL/L — SIGNIFICANT CHANGE UP (ref 135–145)
WBC # BLD: 11.46 K/UL — HIGH (ref 3.8–10.5)
WBC # FLD AUTO: 11.46 K/UL — HIGH (ref 3.8–10.5)

## 2024-06-24 PROCEDURE — 99497 ADVNCD CARE PLAN 30 MIN: CPT | Mod: 25

## 2024-06-24 PROCEDURE — 99233 SBSQ HOSP IP/OBS HIGH 50: CPT

## 2024-06-24 PROCEDURE — 99232 SBSQ HOSP IP/OBS MODERATE 35: CPT

## 2024-06-24 PROCEDURE — 99223 1ST HOSP IP/OBS HIGH 75: CPT

## 2024-06-24 RX ADMIN — ATORVASTATIN CALCIUM 80 MILLIGRAM(S): 20 TABLET, FILM COATED ORAL at 21:32

## 2024-06-24 RX ADMIN — DILTIAZEM HYDROCHLORIDE 90 MILLIGRAM(S): 240 CAPSULE, EXTENDED RELEASE ORAL at 13:08

## 2024-06-24 RX ADMIN — EZETIMIBE 10 MILLIGRAM(S): 10 TABLET ORAL at 10:05

## 2024-06-24 RX ADMIN — APIXABAN 5 MILLIGRAM(S): 5 TABLET, FILM COATED ORAL at 21:32

## 2024-06-24 RX ADMIN — APIXABAN 5 MILLIGRAM(S): 5 TABLET, FILM COATED ORAL at 10:06

## 2024-06-24 RX ADMIN — DILTIAZEM HYDROCHLORIDE 90 MILLIGRAM(S): 240 CAPSULE, EXTENDED RELEASE ORAL at 06:01

## 2024-06-24 RX ADMIN — AMIODARONE HYDROCHLORIDE 200 MILLIGRAM(S): 50 INJECTION, SOLUTION INTRAVENOUS at 10:06

## 2024-06-24 RX ADMIN — CLOPIDOGREL BISULFATE 75 MILLIGRAM(S): 75 TABLET, FILM COATED ORAL at 10:05

## 2024-06-24 RX ADMIN — DILTIAZEM HYDROCHLORIDE 10 MG/HR: 240 CAPSULE, EXTENDED RELEASE ORAL at 19:40

## 2024-06-24 RX ADMIN — DILTIAZEM HYDROCHLORIDE 90 MILLIGRAM(S): 240 CAPSULE, EXTENDED RELEASE ORAL at 18:06

## 2024-06-24 NOTE — PROGRESS NOTE ADULT - SUBJECTIVE AND OBJECTIVE BOX
No acute events overnight, no new complaints.  TTE is neg for PFO.  Still with L hemiparesis.    ROS: As stated above otherwise neg    MEDICATIONS  (STANDING):  aMIOdarone    Tablet 200 milliGRAM(s) Oral daily  apixaban 5 milliGRAM(s) Oral two times a day  atorvastatin 80 milliGRAM(s) Oral at bedtime  clopidogrel Tablet 75 milliGRAM(s) Oral daily  diltiazem    Tablet 90 milliGRAM(s) Oral every 6 hours  diltiazem Infusion 10 mG/Hr (10 mL/Hr) IV Continuous <Continuous>  ezetimibe 10 milliGRAM(s) Oral daily      Vital Signs Last 24 Hrs  T(C): 37.3 (24 Jun 2024 08:14), Max: 37.3 (24 Jun 2024 06:36)  T(F): 99.2 (24 Jun 2024 08:14), Max: 99.2 (24 Jun 2024 08:14)  HR: 82 (24 Jun 2024 08:14) (71 - 118)  BP: 119/56 (24 Jun 2024 08:14) (111/88 - 133/73)  BP(mean): 72 (24 Jun 2024 08:14) (72 - 72)  RR: 18 (24 Jun 2024 08:14) (18 - 19)  SpO2: 96% (24 Jun 2024 08:14) (93% - 96%)        Neurological exam:  HF: A x O x 3. Appropriately interactive, normal affect. Speech fluent, No Aphasia or paraphasic errors. Naming /repetition intact   CN: SHANA, EOMI, VFF, facial sensation normal, no NLFD, tongue midline, Palate moves equally, SCM equal bilaterally  Motor: No pronator drift, Strength 4/5 RUE side but she cannot lift her R leg, L side hemiparesis  Sens: Decreased to light touch LLE.  +L side extinction  Reflexes: BJ 1+, BR 1+, KJ trace+, AJ 0+, downgoing toes b/l  Coord:  No FNFA, unable to do HTS  Gait/Balance: Cannot test    NIHSS: 8        06-21 Chol 123 LDL -- HDL 38<L> Trig 141    Radiology report:  < from: MR Head No Cont (06.20.24 @ 21:39) >  FINDINGS:  There is mild diffuse parenchymal volume loss.    There are nonspecific T2 prolongation signal abnormalities in the   periventricular subcortical white matter likely related to mild chronic   microvascular ischemic changes.    Small chronic infarcts noted in the right MCA territory    There are acute infarcts in the medial posterior right frontal parietal   region, right parietal lobe and the watershed territory of right anterior   cerebral artery and middle cerebral artery in the right frontal and   parietal lobes. Largest infarct measures approximately 1.4 x 0.8 cm in   the right parietal lobe. Additional nonspecific subcentimeter high   diffusion signal with ADC map restriction noted in the anterior corpus   callosum just right of midline. This could be related to small acute   infarct.    Subcentimeter high diffusion signals also noted in the medial left   frontal lobe possibly related to subacute infarcts. No acute events overnight, no new complaints.  TTE is neg for PFO.  Still with L hemiparesis.    ROS: As stated above otherwise neg    MEDICATIONS  (STANDING):  aMIOdarone    Tablet 200 milliGRAM(s) Oral daily  apixaban 5 milliGRAM(s) Oral two times a day  atorvastatin 80 milliGRAM(s) Oral at bedtime  clopidogrel Tablet 75 milliGRAM(s) Oral daily  diltiazem    Tablet 90 milliGRAM(s) Oral every 6 hours  diltiazem Infusion 10 mG/Hr (10 mL/Hr) IV Continuous <Continuous>  ezetimibe 10 milliGRAM(s) Oral daily      Vital Signs Last 24 Hrs  T(C): 37.3 (24 Jun 2024 08:14), Max: 37.3 (24 Jun 2024 06:36)  T(F): 99.2 (24 Jun 2024 08:14), Max: 99.2 (24 Jun 2024 08:14)  HR: 82 (24 Jun 2024 08:14) (71 - 118)  BP: 119/56 (24 Jun 2024 08:14) (111/88 - 133/73)  BP(mean): 72 (24 Jun 2024 08:14) (72 - 72)  RR: 18 (24 Jun 2024 08:14) (18 - 19)  SpO2: 96% (24 Jun 2024 08:14) (93% - 96%)        Neurological exam:  HF: A x O x 3. Appropriately interactive, normal affect. Speech fluent, No Aphasia or paraphasic errors. Naming /repetition intact   CN: SHANA, EOMI, VFF, facial sensation normal, no NLFD, tongue midline, Palate moves equally, SCM equal bilaterally  Motor: No pronator drift, Strength 4/5 RUE side but she cannot lift her R leg, L side hemiparesis  Sens: Intact to LT  Reflexes: BJ 1+, BR 1+, KJ trace+, AJ 0+, downgoing toes b/l  Coord:  No FNFA, unable to do HTS  Gait/Balance: Cannot test    NIHSS: 8        06-21 Chol 123 LDL -- HDL 38<L> Trig 141    Radiology report:  < from: MR Head No Cont (06.20.24 @ 21:39) >  FINDINGS:  There is mild diffuse parenchymal volume loss.    There are nonspecific T2 prolongation signal abnormalities in the   periventricular subcortical white matter likely related to mild chronic   microvascular ischemic changes.    Small chronic infarcts noted in the right MCA territory    There are acute infarcts in the medial posterior right frontal parietal   region, right parietal lobe and the watershed territory of right anterior   cerebral artery and middle cerebral artery in the right frontal and   parietal lobes. Largest infarct measures approximately 1.4 x 0.8 cm in   the right parietal lobe. Additional nonspecific subcentimeter high   diffusion signal with ADC map restriction noted in the anterior corpus   callosum just right of midline. This could be related to small acute   infarct.    Subcentimeter high diffusion signals also noted in the medial left   frontal lobe possibly related to subacute infarcts.

## 2024-06-24 NOTE — PROGRESS NOTE ADULT - SUBJECTIVE AND OBJECTIVE BOX
HOSPITALIST ATTENDING PROGRESS NOTE    Chart and meds reviewed.      Subjective: Patient seen and examined. resting comfortably planning on making decision on pacemaker, discussed with palliative care, concerned about moving forward with her splenectomy. Discussed with neurology, can d/c plavix from neuro standpoint.       Additional results/Imaging, I have personally reviewed:    LABS:                            8.9    11.46 )-----------( 390      ( 24 Jun 2024 13:29 )             27.5     06-24    135  |  107  |  53<H>  ----------------------------<  135<H>  4.7   |  19<L>  |  3.46<H>    Ca    8.8      24 Jun 2024 13:29              Urinalysis Basic - ( 24 Jun 2024 13:29 )    Color: x / Appearance: x / SG: x / pH: x  Gluc: 135 mg/dL / Ketone: x  / Bili: x / Urobili: x   Blood: x / Protein: x / Nitrite: x   Leuk Esterase: x / RBC: x / WBC x   Sq Epi: x / Non Sq Epi: x / Bacteria: x              All other systems reviewed and found to be negative with the exception of what has been described above.    MEDICATIONS  (STANDING):  aMIOdarone    Tablet 200 milliGRAM(s) Oral daily  apixaban 5 milliGRAM(s) Oral two times a day  atorvastatin 80 milliGRAM(s) Oral at bedtime  clopidogrel Tablet 75 milliGRAM(s) Oral daily  diltiazem    Tablet 90 milliGRAM(s) Oral every 6 hours  diltiazem Infusion 10 mG/Hr (10 mL/Hr) IV Continuous <Continuous>  ezetimibe 10 milliGRAM(s) Oral daily    MEDICATIONS  (PRN):  acetaminophen     Tablet .. 650 milliGRAM(s) Oral every 6 hours PRN Mild Pain (1 - 3)  ondansetron Injectable 4 milliGRAM(s) IV Push every 6 hours PRN Nausea and/or Vomiting  traMADol 25 milliGRAM(s) Oral every 8 hours PRN Moderate Pain (4 - 6)      VITALS:  T(F): 98.5 (06-24-24 @ 15:35), Max: 99.2 (06-24-24 @ 08:14)  HR: 108 (06-24-24 @ 15:35) (71 - 118)  BP: 122/42 (06-24-24 @ 15:35) (111/88 - 134/59)  RR: 18 (06-24-24 @ 15:35) (18 - 19)  SpO2: 96% (06-24-24 @ 15:35) (93% - 96%)  Wt(kg): --    I&O's Summary      CAPILLARY BLOOD GLUCOSE          PHYSICAL EXAM:  GENERAL: comfortable   HEAD:  Atraumatic, Normocephalic  EYES: EOMI, PERRLA, conjunctiva and sclera clear  HEENT: Moist mucous membranes  NECK: Supple, No JVD  NERVOUS SYSTEM:  Alert , left sided hemiparesis   CHEST/LUNG: Clear to auscultation bilaterally; No rales, rhonchi, wheezing, or rubs  HEART:S1S2 normal, no murmer  ABDOMEN: Soft, Nontender, Nondistended; Bowel sounds present  GENITOURINARY- Voiding, no palpable bladder  EXTREMITIES:  2+ Peripheral Pulses, No clubbing, cyanosis, or edema  MUSCULOSKELETALS No muscle tenderness,   SKIN-no rash, no lesion  PSYCH- Mood stable  LYMPH Node- No palpable lymph node      CULTURES:      Telemetry, personally reviewed

## 2024-06-24 NOTE — CONSULT NOTE ADULT - SUBJECTIVE AND OBJECTIVE BOX
HPI:     "70 yo female with hx of learning disability, JUSTINO on CKD, DM II, HTN, A-fib s/p DCCV with h/o poor response to medications, also has ovarian and uterine CA, recent diagnosis of Splenic CA, CT Lumbar shows moderate stenosis and DDD, recent UTI, who presents as a code stroke due to sudden onset worsening of left sided weakness of LLE and LUE during PT also associated with slurred speech which has sinced resolved. Arrived with NIHSS of 8 with unknown LKW. Pt is a poor historian. History taken from EMR, ED MD and pt sister at bedside.  "       6/24  pt seen and examined  easily distracted in NAD  able to tell me she came in d/t a stroke first time shes ever had this.   When asked about any other medical problems she could not express to me if she had until asked about cancer- she said yes i had that once and they took care of it"   Patient states her sister helps her w/ medical decisions or Dr. Zambrano.     She seems to have little insight into complex medical decisions although she did ask if they were putting in a pacemaker today.     Will reachout to HCP Rosalinda Myers  for further discussion on goals of care and advance directives.     PAIN: ( ) YES  ( X)  NO   DYSPNEA ( ) Yes ( X) No       PAST MEDICAL & SURGICAL HISTORY:  HTN (hypertension)      Hyperlipidemia      DM (diabetes mellitus)      Arthritis      Ovarian cancer  and uterine cancer--2004 surgery & chemo      Obesity, morbid, BMI 40.0-49.9      H/O umbilical hernia repair  2005      S/P total hysterectomy  bilateral salpingectomy-oophorectomy --2004 ovarian & uterine cancer      H/O total knee replacement, right       SOCIAL HX:    Hx opiate tolerance ( )YES  ( x)NO    Baseline ADLs  (Prior to Admission)  ( ) Independent   ( x)Dependent    FAMILY HISTORY:  Patient unable to provide medical history       Review of Systems:    Anxiety- denies  Depression-denies  Physical Discomfort- in NAD  Dyspnea-denies  Constipation-denies  Diarrhea-denies  Nausea-denies  Vomiting-denies  Anorexia-denies  Weight Loss- denies  Cough-denies  Secretions-denies  Fatigue-denies  Weakness-denies  Delirium- simple capacity    All other systems reviewed and negative    PHYSICAL EXAM:    Vital Signs Last 24 Hrs  T(C): 37.3 (24 Jun 2024 08:14), Max: 37.3 (24 Jun 2024 06:36)  T(F): 99.2 (24 Jun 2024 08:14), Max: 99.2 (24 Jun 2024 08:14)  HR: 82 (24 Jun 2024 08:14) (71 - 118)  BP: 119/56 (24 Jun 2024 08:14) (111/88 - 133/73)  BP(mean): 72 (24 Jun 2024 08:14) (72 - 72)  RR: 18 (24 Jun 2024 08:14) (18 - 19)  SpO2: 96% (24 Jun 2024 08:14) (93% - 96%)    Parameters below as of 24 Jun 2024 08:14  Patient On (Oxygen Delivery Method): room air      Daily     Daily     PPSV2: 30  %  FAST:    General: calm in NAD  Mental Status: awake alert oriented x2  HEENT: eomi, perrl  Lungs: ctabl b/l bs  Cardiac: s1s2 no mgr  GI: soft nontender +BS  : voids  Ext: moves all 4 extremities spontaneously  Neuro: weakness noted from stroke   LABS:                        9.1    11.29 )-----------( 333      ( 22 Jun 2024 09:03 )             27.7     06-22    138  |  110<H>  |  41<H>  ----------------------------<  139<H>  4.8   |  20<L>  |  1.99<H>    Ca    8.7      22 Jun 2024 09:03  Mg     2.5     06-22        Albumin: Albumin: 2.1 g/dL (06-21 @ 08:42)      Allergies    Bee stings (Swelling)  No Known Drug Allergies    Intolerances      MEDICATIONS  (STANDING):  aMIOdarone    Tablet 200 milliGRAM(s) Oral daily  apixaban 5 milliGRAM(s) Oral two times a day  atorvastatin 80 milliGRAM(s) Oral at bedtime  clopidogrel Tablet 75 milliGRAM(s) Oral daily  diltiazem    Tablet 90 milliGRAM(s) Oral every 6 hours  diltiazem Infusion 10 mG/Hr (10 mL/Hr) IV Continuous <Continuous>  ezetimibe 10 milliGRAM(s) Oral daily    MEDICATIONS  (PRN):  acetaminophen     Tablet .. 650 milliGRAM(s) Oral every 6 hours PRN Mild Pain (1 - 3)  ondansetron Injectable 4 milliGRAM(s) IV Push every 6 hours PRN Nausea and/or Vomiting  traMADol 25 milliGRAM(s) Oral every 8 hours PRN Moderate Pain (4 - 6)      RADIOLOGY/ADDITIONAL STUDIES:

## 2024-06-24 NOTE — PROGRESS NOTE ADULT - ASSESSMENT
68 yo F with above PMHx, presented with Lt side weakness/slurred speech, has acute CVA.  PAF on eliquis/amio/cardizem/toprol, s/p DCCV on 4/1/24, 4/22/24, found to have AF w.RVR on IV cardizem drip.  Also has acute anemia due to thigh hematoma, has UTI on antibiotics   PAF w/RVR, on amiodarone, acute CVA with Lt hemiparesis  - pt failed rhythm control, also due to multiple comorbidities, pt is not a candidate for AFib ablation  - recommend AVJ ablation with leadless PPM implant for rate control next week  - r/b/i/a d/w pt & family (Rosalinda), to decide  - Neurology f/u , will consider to switch eliquis to full dose IV heparin, pt will be off anticoagulation for 24 hrs post PPM   -continue IV cardizem drip/po amiodarone for rate control meantime  Plan d/w pt/family//hospitalist   70 yo F with above PMHx, presented with Lt side weakness/slurred speech, has acute CVA.  PAF on eliquis/amio/cardizem/toprol, s/p DCCV on 4/1/24, 4/22/24, found to have AF w.RVR on IV cardizem drip.  Also has acute anemia due to thigh hematoma, s/p UTI on antibiotics, now ARF on CRF   PAF w/RVR, on amiodarone, acute CVA with Lt hemiparesis  - pt failed rhythm control, also due to multiple comorbidities, pt is not a candidate for AFib ablation  - recommend AVJ ablation with leadless PPM implant for rate control   - r/b/i/a d/w pt & family (Rosalinda), sister like to clarify pt's cancer status prior to make any invasive procedure  - Neurology f/u , will consider to switch eliquis to full dose IV heparin, pt will be off anticoagulation for 24 hrs post PPM   -continue IV cardizem drip/po amiodarone for rate control meantime  Plan d/w pt/family//hospitalist

## 2024-06-24 NOTE — PROGRESS NOTE ADULT - SUBJECTIVE AND OBJECTIVE BOX
HPI: 70 yo female with hx of learning disability, JUSTINO on CKD, DM II, HTN, A-fib s/p DCCV on  with h/o poor response to medications, also has ovarian and uterine CA, recent diagnosis of Splenic CA, CT Lumbar shows moderate stenosis and DDD, recent UTI, who presents as a code stroke due to sudden onset worsening of left sided weakness of LLE and LUE during PT also associated with slurred speech which has sinced resolved. Arrived with NIHSS of 8 with unknown LKW. Pt is a poor historian. History taken from EMR, ED MD and pt sister at bedside.  (20 Jun 2024 21:01)  Pt has acute CVA on previous CVA on CT scan.  Also acute anemia due to B/L thigh hematoma, s/p PRBC transfusion  Has UTI on antibiotics    Pt is resting in the bed, denies chest pain/SOB/palpitations,   EKG:  AFib 135bpm QRS 84ms QTC 459ms  tele; AFib 's bpm       Cardiac PET ST (March 2024) no ischemia, LVEF 45%  echo (Jan 2024) LVEF 63%, mildly dilated LA    6/24/24:          ROS: All other ROS is negative unless indicated above.      Physical Exam:  Vital Signs Last 24 Hrs  T(C): 37.3 (24 Jun 2024 08:14), Max: 37.3 (24 Jun 2024 06:36)  T(F): 99.2 (24 Jun 2024 08:14), Max: 99.2 (24 Jun 2024 08:14)  HR: 82 (24 Jun 2024 08:14) (71 - 118)  BP: 119/56 (24 Jun 2024 08:14) (111/88 - 133/73)  BP(mean): 72 (24 Jun 2024 08:14) (72 - 72)  RR: 18 (24 Jun 2024 08:14) (18 - 19)  SpO2: 96% (24 Jun 2024 08:14) (93% - 96%)    Parameters below as of 24 Jun 2024 08:14  Patient On (Oxygen Delivery Method): room air      Constitutional: well developed,  no deformities and no acute distress    Neurological: Alert & Oriented x 3, Lt side hemiplegia    HEENT: NC/AT, PERRLA, EOMI,  Neck supple.    Respiratory: CTA B/L, No wheezing/crackles/rhonchi    Cardiovascular: (+) irregualr S1 & S2,     Gastrointestinal: soft, NT, nondistended, (+) BS    Genitourinary: non distended bladder, voiding freely    Extremities: No pedal edema, No clubbing, No cyanosis    Skin:  normal skin color and pigmentation, no skin lesions            Allergies    Bee stings (Swelling)  No Known Drug Allergies    Intolerances      MEDICATIONS  (STANDING):  aMIOdarone    Tablet 200 milliGRAM(s) Oral daily  apixaban 5 milliGRAM(s) Oral two times a day  atorvastatin 80 milliGRAM(s) Oral at bedtime  clopidogrel Tablet 75 milliGRAM(s) Oral daily  diltiazem    Tablet 90 milliGRAM(s) Oral every 6 hours  diltiazem Infusion 10 mG/Hr (10 mL/Hr) IV Continuous <Continuous>  ezetimibe 10 milliGRAM(s) Oral daily    MEDICATIONS  (PRN):  acetaminophen     Tablet .. 650 milliGRAM(s) Oral every 6 hours PRN Mild Pain (1 - 3)  ondansetron Injectable 4 milliGRAM(s) IV Push every 6 hours PRN Nausea and/or Vomiting  traMADol 25 milliGRAM(s) Oral every 8 hours PRN Moderate Pain (4 - 6)    LABS:                EKG:  CXR:               HPI: 68 yo female with hx of learning disability, JUSTINO on CKD, DM II, HTN, A-fib s/p DCCV on  with h/o poor response to medications, also has ovarian and uterine CA, recent diagnosis of Splenic CA, CT Lumbar shows moderate stenosis and DDD, recent UTI, who presents as a code stroke due to sudden onset worsening of left sided weakness of LLE and LUE during PT also associated with slurred speech which has sinced resolved. Arrived with NIHSS of 8 with unknown LKW. Pt is a poor historian. History taken from EMR, ED MD and pt sister at bedside.  (20 Jun 2024 21:01)  Pt has acute CVA on previous CVA on CT scan.  Also acute anemia due to B/L thigh hematoma, s/p PRBC transfusion  Has UTI on antibiotics    Pt is resting in the bed, denies chest pain/SOB/palpitations,   EKG:  AFib 135bpm QRS 84ms QTC 459ms  tele; AFib 's bpm       Cardiac PET ST (March 2024) no ischemia, LVEF 45%  echo (Jan 2024) LVEF 63%, mildly dilated LA    6/24/24:  pt is resting in the bed, denies chest pain/SOB/palpitations  remains Lt side weakness  tele: Afib 's, occasional upto 130's bpm        ROS: All other ROS is negative unless indicated above.      Physical Exam:  Vital Signs Last 24 Hrs  T(C): 37.3 (24 Jun 2024 08:14), Max: 37.3 (24 Jun 2024 06:36)  T(F): 99.2 (24 Jun 2024 08:14), Max: 99.2 (24 Jun 2024 08:14)  HR: 82 (24 Jun 2024 08:14) (71 - 118)  BP: 119/56 (24 Jun 2024 08:14) (111/88 - 133/73)  BP(mean): 72 (24 Jun 2024 08:14) (72 - 72)  RR: 18 (24 Jun 2024 08:14) (18 - 19)  SpO2: 96% (24 Jun 2024 08:14) (93% - 96%)    Parameters below as of 24 Jun 2024 08:14  Patient On (Oxygen Delivery Method): room air      Constitutional: well developed,  no deformities and no acute distress    Neurological: Alert & Oriented x 3, Lt side hemiplegia    HEENT: NC/AT, PERRLA, EOMI,  Neck supple.    Respiratory: CTA B/L, No wheezing/crackles/rhonchi    Cardiovascular: (+) irregualr S1 & S2,     Gastrointestinal: soft, NT, nondistended, (+) BS    Genitourinary: non distended bladder, voiding freely    Extremities: No pedal edema, No clubbing, No cyanosis    Skin:  normal skin color and pigmentation, no skin lesions            Allergies    Bee stings (Swelling)  No Known Drug Allergies    Intolerances      MEDICATIONS  (STANDING):  aMIOdarone    Tablet 200 milliGRAM(s) Oral daily  apixaban 5 milliGRAM(s) Oral two times a day  atorvastatin 80 milliGRAM(s) Oral at bedtime  clopidogrel Tablet 75 milliGRAM(s) Oral daily  diltiazem    Tablet 90 milliGRAM(s) Oral every 6 hours  diltiazem Infusion 10 mG/Hr (10 mL/Hr) IV Continuous <Continuous>  ezetimibe 10 milliGRAM(s) Oral daily    MEDICATIONS  (PRN):  acetaminophen     Tablet .. 650 milliGRAM(s) Oral every 6 hours PRN Mild Pain (1 - 3)  ondansetron Injectable 4 milliGRAM(s) IV Push every 6 hours PRN Nausea and/or Vomiting  traMADol 25 milliGRAM(s) Oral every 8 hours PRN Moderate Pain (4 - 6)    LABS: Complete Blood Count (06.24.24 @ 13:29)    WBC Count: 11.46 K/uL   RBC Count: 3.05 M/uL   Hemoglobin: 8.9 g/dL   Hematocrit: 27.5 %   Mean Cell Volume: 90.2 fl   Mean Cell Hemoglobin: 29.2 pg   Mean Cell Hemoglobin Conc: 32.4 gm/dL   Red Cell Distrib Width: 15.0 %   Platelet Count - Automated: 390 K/uL    Basic Metabolic Panel (06.24.24 @ 13:29)    Sodium: 135 mmol/L   Potassium: 4.7 mmol/L   Chloride: 107 mmol/L   Carbon Dioxide: 19 mmol/L   Anion Gap: 9 mmol/L   Blood Urea Nitrogen: 53 mg/dL   Creatinine: 3.46 mg/dL   Glucose: 135 mg/dL   Calcium: 8.8 mg/dL   eGFR: 14:

## 2024-06-24 NOTE — CONSULT NOTE ADULT - ASSESSMENT
Process of Care  --Reviewed dx/treatment problems and alignment with Goals of Care  pt w/ acute stroke new deficits  findings of npossible recurrent ovarian or uterine ca   was getting evaluated op for splenectomy in Feb   was also advised to reduce etoh intake?      Physical Aspects of Care  --Pain  patient denies at this time  c/w current management    --Bowel Regimen  denies constipation  risk for constipation d/t immobility  daily dulcolax    --Dyspnea  No SOB at this time  comfortable and in NAD    --Nausea Vomiting  denies    --Weakness  PT as tolerated     Psychological and Psychiatric Aspects of Care:   --Greif/ Bereavement emotional support provided  --Hx of psychiatric dx: none  -Pastoral Care Available PRN     Social Aspects of Care  -SW involved     Cultural Aspects  -Primary Language: English    Goals of Care:     We discussed Palliative Care team being a supportive team when a patient has ongoing illnesses.  We also discussed that it is not an end of life care service, but can help navigate symptoms and emotional support througout their hospital stay here.       Ethical and Legal Aspects:   NA        Capacity: simple capacity  HCP/Surrogate: Rosalinda Myers  Code Status: dnr dni trial niv  MOLST: dnr dni  Dispo Plan:    Discussed With: Case coordinated with attending and SW and RN     Time Spent: 90 minutes including the care, coordination and counseling of this patient, 50% of which was spent coordinating and counseling.

## 2024-06-24 NOTE — PROGRESS NOTE ADULT - ASSESSMENT
#Acute CVA  -ct atorvastatin  -DC Plavix per Neuro   -c/w eliquis  -PT consult for weakness  -SW cons  -OOB and ambulate with walker with assistance  -Turn and position q 2 hrs  -fall and aspiration precautions    #Afib with RVR  -s/p DCCV  -keep Mg>2, K>4  -Continue IV cardizem  --continue IV cardizem drip/po amiodarone   -c/w amiodaron  -Cardiology consult appreciated  -c/w eliquis  -EP consult appreciated; recommending AVJ ablation with leadless PPM implant later next week. Patient would need to be off anticoagulation for 24 hours post PPM.   -Will discuss with family if in agreement then will change to iv heparin for possible PPM placement     #Anemia, acute  --monitor it     #Protein calorie malnutrition  -Albumin 1.9  -Nutrition consult    #UTI  ct  Ceftriaxone, culture was not collected on admission    #Weakness  #h/o CVA  -Turn and position q 2 hrs  -PT consult    #HTN/HLD  -c/w ezetimibe  -c/w statin, increased   -DASH/TLC    #CKD  Cr at baseline    #Ovarian/Splenic/Thyroid CA  -CT with nodular masslike spleen. Per notes from April, splenic mass is known and pt had planned for splenectomy at [facility]  -Follows with Dr Weaver and Dr Madan Artis    #Recent COVID  -ground glass opacities on CT chest  -asymptomatic, VSS  -s/p 10 day isolation     Tentative plan for AVJ ablation with leadless PPM implant later next week as above

## 2024-06-24 NOTE — PROGRESS NOTE ADULT - ASSESSMENT
patient with CVA while on eliquis with AFib/RVR.  Patient with poor compliance on coumadin.   5-XBT-teizlqa of left side of body-needs PT.  CVA on eliquis-would add ASA.  2-AFib RVR-d/w WPS-wants to do AVJ ablation and PPM=-feels NSR would not benefit patient; as patient to undergo procedure-afterwards, change amio to toprol as rhythm control is what is required and amio with long term toxicity  3-HTN-well controlled

## 2024-06-24 NOTE — PROGRESS NOTE ADULT - ASSESSMENT
68 yo female w/PMHx CVA 4/2024, DM, HTN, afib on Eliquis, ovarian/splenic/thyroid cancer, recent ischemic stroke non hemorrhagic presents to the ED from skilled nursing facility due to sudden onset worsening L sided weakness LLE and new LUE weakness.  Code stroke called in ED.  NIHSS 8.  LKW not known as patient does not remember if she woke up with sx's.    CT head was neg for acute bleed, multiple chronic right frontal infarcts as well as findings suspicious for acute ischemia right superior frontal gyrus, as above.  CT perfusion was neg for core infarct, evidence of active ischemia-tissue at risk RIGHT posterior frontal-parietal region.  CTA showed severe focal stenosis right P3 segment origin. Moderate stenosis distal cavernous and supraclinoid segments right internal carotid artery. 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 Focal occlusion with reconstitution left proximal A2 segment centered 1-2 cm distal to its origin.  The patient was not a candidate for IV thrombolytics 2/2 OOTW, and patient took Eliquis today.     # acute infarcts in the R hemisphere and possible L.  possibly embolic,  but no PFO on echo.  She does have a h/o ovarian/splenic/thyroid cancer which increases her risk for stroke.        Recommendations  -normotension  -Continue Eliquis as before for AFib, CVA, and can DC plavix  -Continue Atorvastatin to 80mg QD (goal LDL should be <70)  -DVT prophylaxis  -PT eval/OT/SLP/ Acute rehab rehab eval  -Neurochecks/VSS q 4  -f/u with Neurology outpatient  -sayra sign off.  Please page Neurology with any questions    D/W Dr. Prasad, Dr. Morales

## 2024-06-24 NOTE — PROGRESS NOTE ADULT - SUBJECTIVE AND OBJECTIVE BOX
CHIEF COMPLAINT: Patient is a 69y old  Female who presents with a chief complaint of CVA, afib with RVR, Acute anemia with thigh hematoma (23 Jun 2024 14:20)      HPI:  70 yo female with hx of learning disability, JUSTINO on CKD, DM II, HTN, A-fib s/p DCCV with h/o poor response to medications, also has ovarian and uterine CA, recent diagnosis of Splenic CA, CT Lumbar shows moderate stenosis and DDD, recent UTI, who presents as a code stroke due to sudden onset worsening of left sided weakness of LLE and LUE during PT also associated with slurred speech which has sinced resolved. Arrived with NIHSS of 8 with unknown LKW. Pt is a poor historian. History taken from EMR, ED MD and pt sister at bedside.  (20 Jun 2024 21:01)    6/21-patient wtih hx of Afib-poor response to medications for rate/rhythm control with recent recurrence of ovarian/uterine CA with hypercoagulable state-splenic infarct-s/p DC/CV; now s/p TIA.  CT of head with multiple small infarcts prior with new ischemic event.  In addition severe intracranial stenosis and thyroid mass.  Ct of abd/pelvis with splenic mass and thigh hematoma       on review, past history here.....4/22-patient well known to me in with fall; above history-however did not have ablation, but rather underwent DC/CV for afib by Dr. Haider due to poor response from medications.  Patient underwent procedure to control heart rate for needed surgery for splenic cancer.  PAtient was in office last week and was in NSR, but started to feel poorly last week after office visit and fell multiple times; pt bibems from home. As per ems, " witnessed fall, - head strike, - loc, witnessed by family and slid down wall". Pt remembers sliding down wall. Pt A&ox4 and endorses falling a lot more recently.   Pt reportedly fell on Sunday, and fell again this evening. Pt states she had "jelly legs" and they just gave out under her. In the ED, pt is in Afib in RVR in 130's.   Came to ER-noted to be in Afib/RVR and difficult to control.  Given amiodarone in ER and currently HR in the 80's and BP in the 130/80's.          70 yo F with above PMHx including AFib s/p DCCV on 4/1/24, compliant with meds- eliquis, amio, cardizem 120mg, toprol 100mg presented with recurrent falls d/t worsening leg weakness, denies syncope, found to have AF w/RVR, denies any symptoms from AFib.      6/23/24-- HR with better control, no acute issues overnight     6/24-patient with AFib/RVR with CVA on elequis-EPS feels patoient would not benefit from rhythm control-suggesting AVJ ablation and PPM.   Does not negate the fact that patient had CVA while on AC.   ASA added.   Patient with paresis of left side    PMHx: PAST MEDICAL & SURGICAL HISTORY:  HTN (hypertension)      Hyperlipidemia      DM (diabetes mellitus)      Arthritis      Ovarian cancer  and uterine cancer--2004 surgery & chemo      Obesity, morbid, BMI 40.0-49.9      H/O umbilical hernia repair  2005      S/P total hysterectomy  bilateral salpingectomy-oophorectomy --2004 ovarian & uterine cancer      H/O total knee replacement, right          Allergies: Allergies    Bee stings (Swelling)  No Known Drug Allergies    Intolerances          REVIEW OF SYSTEMS:    CONSTITUTIONAL: No weakness, fevers or chills  EYES/ENT: No visual changes;  No vertigo or throat pain   NECK: No pain or stiffness  RESPIRATORY: No cough, wheezing, hemoptysis; No shortness of breath  CARDIOVASCULAR: palpitations  GASTROINTESTINAL: No abdominal or epigastric pain. No nausea, vomiting, or hematemesis; No diarrhea or constipation. No melena or hematochezia.  GENITOURINARY: No dysuria, frequency or hematuria  NEUROLOGICAL:  weakness      Vital Signs Last 24 Hrs  T(C): 37.3 (24 Jun 2024 08:14), Max: 37.3 (24 Jun 2024 06:36)  T(F): 99.2 (24 Jun 2024 08:14), Max: 99.2 (24 Jun 2024 08:14)  HR: 82 (24 Jun 2024 08:14) (71 - 121)  BP: 119/56 (24 Jun 2024 08:14) (111/88 - 133/73)  BP(mean): 72 (24 Jun 2024 08:14) (72 - 72)  RR: 18 (24 Jun 2024 08:14) (18 - 21)  SpO2: 96% (24 Jun 2024 08:14) (93% - 96%)    Parameters below as of 24 Jun 2024 08:14  Patient On (Oxygen Delivery Method): room air        I&O's Summary          PHYSICAL EXAM:   Constitutional: NAD, awake and alert, well-developed  HEENT: PERR, EOMI, Normal Hearing, MMM  Neck: Soft and supple, No LAD, No JVD  Respiratory: Breath sounds are clear bilaterally, No wheezing, rales or rhonchi  Cardiovascular: S1 and S2, irregular rate and rhythm, Murmurs, gallops or rubs  Gastrointestinal: Bowel Sounds present, soft, nontender, nondistended, no guarding, no rebound    MEDICATIONS:  MEDICATIONS  (STANDING):  aMIOdarone    Tablet 200 milliGRAM(s) Oral daily  apixaban 5 milliGRAM(s) Oral two times a day  atorvastatin 80 milliGRAM(s) Oral at bedtime  clopidogrel Tablet 75 milliGRAM(s) Oral daily  diltiazem    Tablet 90 milliGRAM(s) Oral every 6 hours  diltiazem Infusion 10 mG/Hr (10 mL/Hr) IV Continuous <Continuous>  ezetimibe 10 milliGRAM(s) Oral daily      LABS: All Labs Reviewed:                        9.1    11.29 )-----------( 333      ( 22 Jun 2024 09:03 )             27.7     06-22    138  |  110<H>  |  41<H>  ----------------------------<  139<H>  4.8   |  20<L>  |  1.99<H>    Ca    8.7      22 Jun 2024 09:03  Mg     2.5     06-22            Blood Culture: Organism --  Gram Stain Blood -- Gram Stain --  Specimen Source Clean Catch Clean Catch (Midstream)  Culture-Blood --    Organism --  Gram Stain Blood -- Gram Stain --  Specimen Source .Blood None  Culture-Blood --        lipid profile          06-21 @ 08:42  cholesterol 123 mg/dL  direct LDL --  HDL 38 mg/dL  HDL/total cholesterol --  Triglyceride, serum 141 mg/dL    BNP     RADIOLOGY:    EKG:      Telemetry:    ECHO:

## 2024-06-24 NOTE — CONSULT NOTE ADULT - CONVERSATION DETAILS
PERTINENT PMH REVIEWED:  [ X ] YES [ ] NO         Primary Contact:     sister Rosalinda                 HCP [  X ] Surrogate [   ] Guardian [   ]     Pt. has simple capacity. Pt. was able to state she came in the hospital for a stroke and that she needs a pacemaker. Pt. shared that she is scared but does want to pursue Pace Maker. Pt. was not up for more conversation and gave this SW permission to speak with her HCP/sister Rosalinda. This SW spoke with Rosalinda via phone to discuss goals of care, assist with planning and provide supportive. Pt. was at [facility] for  [facility] and prior to lived with pts sister and her . Pts sister reports that pt. has a history of special needs however, she was never officially diagnosed or received services from the [facility]. This SW called TAMMI Torres [phone] and she confirmed that the pt. is not in their system therefore, they do not need to be involved in any decision making. Pts sister reports that pts has understanding but lacks insight to everything happening with her health.     Pts sister shared that pt. had history of Ovarian and Uterine cancer 20 years ago. She reports pt. was newly found to have cancer of the spleen and a possibly thyroid cancer but has not had biopsy for the thyroid. She shared that pt. needed surgery for the spleen cancer however, she needed her Afib under control before they could do the surgery. Pts sister reports that pt. has had strokes and been at [facility] since then. She would like to speak to medical team to see the status of pts cancer and if she had pacemaker placed would she be a candidate for surgery. She shared how she does not want pt. to suffer. We discussed option of a comfort focus at a SNF if there is not treatment fo caner or they do nto wish to pursue. Pt. cannot return home with sister with hospice as it is more then she can manage and at this current time is not a inpatient hospice candidate. Pts sister would like to speak with Oncology and medical team about the status of cancer before making any decisions regarding the plan moving forward. Pts sister confirmed DNR/DNI.     Plan at this time to be further determined. Emotional support provided. Our team will continue to follow.    Case than discussed / Dr. Shanice buckner will reach out to onc for further discussion w/ family re: surgical likelihood and if PPM would benefit this.

## 2024-06-25 LAB
-  AMOXICILLIN/CLAVULANIC ACID: SIGNIFICANT CHANGE UP
-  AMPICILLIN/SULBACTAM: SIGNIFICANT CHANGE UP
-  AMPICILLIN: SIGNIFICANT CHANGE UP
-  AZTREONAM: SIGNIFICANT CHANGE UP
-  CEFAZOLIN: SIGNIFICANT CHANGE UP
-  CEFEPIME: SIGNIFICANT CHANGE UP
-  CEFOXITIN: SIGNIFICANT CHANGE UP
-  CEFTRIAXONE: SIGNIFICANT CHANGE UP
-  CEFUROXIME: SIGNIFICANT CHANGE UP
-  CIPROFLOXACIN: SIGNIFICANT CHANGE UP
-  ERTAPENEM: SIGNIFICANT CHANGE UP
-  GENTAMICIN: SIGNIFICANT CHANGE UP
-  IMIPENEM: SIGNIFICANT CHANGE UP
-  LEVOFLOXACIN: SIGNIFICANT CHANGE UP
-  MEROPENEM: SIGNIFICANT CHANGE UP
-  NITROFURANTOIN: SIGNIFICANT CHANGE UP
-  PIPERACILLIN/TAZOBACTAM: SIGNIFICANT CHANGE UP
-  TOBRAMYCIN: SIGNIFICANT CHANGE UP
-  TRIMETHOPRIM/SULFAMETHOXAZOLE: SIGNIFICANT CHANGE UP
ANION GAP SERPL CALC-SCNC: 8 MMOL/L — SIGNIFICANT CHANGE UP (ref 5–17)
BUN SERPL-MCNC: 52 MG/DL — HIGH (ref 7–23)
CALCIUM SERPL-MCNC: 8.5 MG/DL — SIGNIFICANT CHANGE UP (ref 8.5–10.1)
CHLORIDE SERPL-SCNC: 106 MMOL/L — SIGNIFICANT CHANGE UP (ref 96–108)
CO2 SERPL-SCNC: 20 MMOL/L — LOW (ref 22–31)
CREAT SERPL-MCNC: 2.99 MG/DL — HIGH (ref 0.5–1.3)
CULTURE RESULTS: ABNORMAL
EGFR: 16 ML/MIN/1.73M2 — LOW
GLUCOSE SERPL-MCNC: 124 MG/DL — HIGH (ref 70–99)
HCT VFR BLD CALC: 27.3 % — LOW (ref 34.5–45)
HGB BLD-MCNC: 9.1 G/DL — LOW (ref 11.5–15.5)
MCHC RBC-ENTMCNC: 29.9 PG — SIGNIFICANT CHANGE UP (ref 27–34)
MCHC RBC-ENTMCNC: 33.3 GM/DL — SIGNIFICANT CHANGE UP (ref 32–36)
MCV RBC AUTO: 89.8 FL — SIGNIFICANT CHANGE UP (ref 80–100)
METHOD TYPE: SIGNIFICANT CHANGE UP
ORGANISM # SPEC MICROSCOPIC CNT: ABNORMAL
ORGANISM # SPEC MICROSCOPIC CNT: SIGNIFICANT CHANGE UP
PLATELET # BLD AUTO: 386 K/UL — SIGNIFICANT CHANGE UP (ref 150–400)
POTASSIUM SERPL-MCNC: 4.4 MMOL/L — SIGNIFICANT CHANGE UP (ref 3.5–5.3)
POTASSIUM SERPL-SCNC: 4.4 MMOL/L — SIGNIFICANT CHANGE UP (ref 3.5–5.3)
RBC # BLD: 3.04 M/UL — LOW (ref 3.8–5.2)
RBC # FLD: 14.8 % — HIGH (ref 10.3–14.5)
SODIUM SERPL-SCNC: 134 MMOL/L — LOW (ref 135–145)
SPECIMEN SOURCE: SIGNIFICANT CHANGE UP
WBC # BLD: 10.67 K/UL — HIGH (ref 3.8–10.5)
WBC # FLD AUTO: 10.67 K/UL — HIGH (ref 3.8–10.5)

## 2024-06-25 PROCEDURE — 99232 SBSQ HOSP IP/OBS MODERATE 35: CPT

## 2024-06-25 PROCEDURE — 99233 SBSQ HOSP IP/OBS HIGH 50: CPT

## 2024-06-25 RX ORDER — DILTIAZEM HYDROCHLORIDE 240 MG/1
5 CAPSULE, EXTENDED RELEASE ORAL
Qty: 125 | Refills: 0 | Status: DISCONTINUED | OUTPATIENT
Start: 2024-06-25 | End: 2024-06-27

## 2024-06-25 RX ADMIN — DILTIAZEM HYDROCHLORIDE 90 MILLIGRAM(S): 240 CAPSULE, EXTENDED RELEASE ORAL at 05:41

## 2024-06-25 RX ADMIN — APIXABAN 5 MILLIGRAM(S): 5 TABLET, FILM COATED ORAL at 10:04

## 2024-06-25 RX ADMIN — DILTIAZEM HYDROCHLORIDE 5 MG/HR: 240 CAPSULE, EXTENDED RELEASE ORAL at 05:41

## 2024-06-25 RX ADMIN — DILTIAZEM HYDROCHLORIDE 90 MILLIGRAM(S): 240 CAPSULE, EXTENDED RELEASE ORAL at 17:18

## 2024-06-25 RX ADMIN — TRAMADOL HYDROCHLORIDE 25 MILLIGRAM(S): 50 TABLET, FILM COATED ORAL at 01:10

## 2024-06-25 RX ADMIN — DILTIAZEM HYDROCHLORIDE 10 MG/HR: 240 CAPSULE, EXTENDED RELEASE ORAL at 05:00

## 2024-06-25 RX ADMIN — TRAMADOL HYDROCHLORIDE 25 MILLIGRAM(S): 50 TABLET, FILM COATED ORAL at 00:21

## 2024-06-25 RX ADMIN — ONDANSETRON HYDROCHLORIDE 4 MILLIGRAM(S): 2 INJECTION INTRAMUSCULAR; INTRAVENOUS at 17:59

## 2024-06-25 RX ADMIN — AMIODARONE HYDROCHLORIDE 200 MILLIGRAM(S): 50 INJECTION, SOLUTION INTRAVENOUS at 10:04

## 2024-06-25 RX ADMIN — EZETIMIBE 10 MILLIGRAM(S): 10 TABLET ORAL at 10:04

## 2024-06-25 RX ADMIN — DILTIAZEM HYDROCHLORIDE 90 MILLIGRAM(S): 240 CAPSULE, EXTENDED RELEASE ORAL at 00:20

## 2024-06-25 RX ADMIN — DILTIAZEM HYDROCHLORIDE 90 MILLIGRAM(S): 240 CAPSULE, EXTENDED RELEASE ORAL at 12:50

## 2024-06-25 RX ADMIN — CLOPIDOGREL BISULFATE 75 MILLIGRAM(S): 75 TABLET, FILM COATED ORAL at 10:05

## 2024-06-25 RX ADMIN — ATORVASTATIN CALCIUM 80 MILLIGRAM(S): 20 TABLET, FILM COATED ORAL at 21:09

## 2024-06-25 RX ADMIN — APIXABAN 5 MILLIGRAM(S): 5 TABLET, FILM COATED ORAL at 21:10

## 2024-06-25 NOTE — PROGRESS NOTE ADULT - ASSESSMENT
70 yo F with above PMHx, presented with Lt side weakness/slurred speech, has acute CVA.  PAF on eliquis/amio/cardizem/toprol, s/p DCCV on 4/1/24, 4/22/24, found to have AF w.RVR on IV cardizem drip.  Also has acute anemia due to thigh hematoma, s/p UTI on antibiotics, now ARF on CRF   PAF w/RVR, on amiodarone, acute CVA with Lt hemiparesis    A/P: Persistent Afib with difficult to control rates  Pt failed rhythm control, also due to multiple comorbidities, pt is not a candidate for AFib ablation  Recommend AVJ ablation with leadless PPM implant for rate control   r/b/i/a d/w pt & family (Rosalinda), sister like to clarify pt's cancer status prior to make any invasive procedure  Continue eliquis  5 mg PO BID, pt will be off anticoagulation for 24 hrs post PPM   Continue IV cardizem drip/po cardizem 90 mg Q6H  Continue Amiodarone 200mg PO daily  Neurology f/u   Family to have discussion for GOC   68 yo F with above PMHx, presented with Lt side weakness/slurred speech, has acute CVA.  PAF on eliquis/amio/cardizem/toprol, s/p DCCV on 4/1/24, 4/22/24, found to have AF w.RVR on IV cardizem drip.  Also has acute anemia due to thigh hematoma, s/p UTI on antibiotics, now ARF on CRF   PAF w/RVR, on amiodarone, acute CVA with Lt hemiparesis    A/P: Persistent Afib with difficult to control rates  Pt failed rhythm control, also due to multiple comorbidities, pt is not a candidate for AFib ablation  Recommend AVJ ablation with leadless PPM implant for rate control   r/b/i/a d/w pt & family (Rosalinda), sister like to clarify pt's cancer status prior to make any invasive procedure  Continue eliquis  5 mg PO BID, pt will be off anticoagulation for 24 hrs post PPM   Continue IV cardizem drip/po cardizem 90 mg Q6H  Continue Amiodarone 200mg PO daily  Neurology f/u   Family to have discussion for GOC    Anemia continue cbc monitoring, supportive care transfuse if hb<7

## 2024-06-25 NOTE — PROGRESS NOTE ADULT - SUBJECTIVE AND OBJECTIVE BOX
HPI:  70 yo female with hx of learning disability, JUSTINO on CKD, DM II, HTN, A-fib s/p DCCV with h/o poor response to medications, also has ovarian and uterine CA, recent diagnosis of Splenic CA, CT Lumbar shows moderate stenosis and DDD, recent UTI, who presents as a code stroke due to sudden onset worsening of left sided weakness of LLE and LUE during PT also associated with slurred speech which has sinced resolved. Arrived with NIHSS of 8 with unknown LKW. Pt is a poor historian. History taken from EMR, ED MD and pt sister at bedside.  (20 Jun 2024 21:01)        Cardiac PET ST (March 2024) no ischemia, LVEF 45%  echo (Jan 2024) LVEF 63%, mildly dilated LA    6/24/24:  pt is resting in the bed, denies chest pain/SOB/palpitations  remains Lt side weakness  tele: Afib 's, occasional upto 130's bpm    6/25/24: pt seen resting in bed in NAD, she denies any SOB, CP, dizziness or palpitations.  Left side weakness, can only move left hand  TELE: Afib  bpm, occasional RVRs to 130's      MEDICATIONS  (STANDING):  aMIOdarone    Tablet 200 milliGRAM(s) Oral daily  apixaban 5 milliGRAM(s) Oral two times a day  atorvastatin 80 milliGRAM(s) Oral at bedtime  clopidogrel Tablet 75 milliGRAM(s) Oral daily  diltiazem    Tablet 90 milliGRAM(s) Oral every 6 hours  diltiazem Infusion 5 mG/Hr (5 mL/Hr) IV Continuous <Continuous>  ezetimibe 10 milliGRAM(s) Oral daily    MEDICATIONS  (PRN):  acetaminophen     Tablet .. 650 milliGRAM(s) Oral every 6 hours PRN Mild Pain (1 - 3)  ondansetron Injectable 4 milliGRAM(s) IV Push every 6 hours PRN Nausea and/or Vomiting  traMADol 25 milliGRAM(s) Oral every 8 hours PRN Moderate Pain (4 - 6)      Allergies    Bee stings (Swelling)  No Known Drug Allergies    Intolerances        Vital Signs Last 24 Hrs  T(C): 36.9 (25 Jun 2024 08:03), Max: 36.9 (24 Jun 2024 15:35)  T(F): 98.5 (25 Jun 2024 08:03), Max: 98.5 (24 Jun 2024 15:35)  HR: 89 (25 Jun 2024 08:03) (72 - 108)  BP: 135/85 (25 Jun 2024 08:03) (114/100 - 135/85)  BP(mean): 67 (24 Jun 2024 18:04) (64 - 81)  RR: 18 (25 Jun 2024 08:03) (18 - 20)  SpO2: 95% (25 Jun 2024 08:03) (94% - 99%)    Parameters below as of 25 Jun 2024 08:03  Patient On (Oxygen Delivery Method): room air        PHYSICAL EXAMINATION:    GENERAL APPEARANCE:  Pt. is not currently dyspneic, in no distress. Pt. is alert, oriented, and pleasant.  HEENT:  Pupils are normal and react normally. No icterus. Mucous membranes well colored.  NECK:  Supple. No lymphadenopathy. Jugular venous pressure not elevated. Carotids equal.   HEART:   Irregular S1S2 There are no murmurs, rubs or gallops noted  CHEST:  Chest is clear to auscultation. Normal respiratory effort.  ABDOMEN:  Soft and nontender.   EXTREMITIES:  There is no edema. Left leg flaccid, LUE only moves hand/fingers.  SKIN:  No rash or significant lesions are noted.    LABS:                        9.1    10.67 )-----------( 386      ( 25 Jun 2024 06:59 )             27.3     06-25    134<L>  |  106  |  52<H>  ----------------------------<  124<H>  4.4   |  20<L>  |  2.99<H>    Ca    8.5      25 Jun 2024 06:59        Urinalysis Basic - ( 25 Jun 2024 06:59 )    Color: x / Appearance: x / SG: x / pH: x  Gluc: 124 mg/dL / Ketone: x  / Bili: x / Urobili: x   Blood: x / Protein: x / Nitrite: x   Leuk Esterase: x / RBC: x / WBC x   Sq Epi: x / Non Sq Epi: x / Bacteria: x            RADIOLOGY & ADDITIONAL TESTS:    ASSESSMENT & PLAN:

## 2024-06-25 NOTE — PROGRESS NOTE ADULT - SUBJECTIVE AND OBJECTIVE BOX
HOSPITALIST ATTENDING PROGRESS NOTE    Chart and meds reviewed.      HPI: 70 yo female with hx of learning disability, JUSTINO on CKD, DM II, HTN, A-fib s/p DCCV with h/o poor response to medications, also has ovarian and uterine CA, recent diagnosis of Splenic CA, CT Lumbar shows moderate stenosis and DDD, recent UTI, who presents as a code stroke due to sudden onset worsening of left sided weakness of LLE and LUE during PT also associated with slurred speech which has sinced resolved. Arrived with NIHSS of 8 with unknown LKW. Pt is a poor historian. History taken from EMR, ED MD and pt sister at bedside.     Subjective: Patient seen and examined. Resting comfortably, HR controlled at this time. Discussed with sister on the phone. [facility] oncology consulted. Patient being seen by palliative care. Leaning towards comfort measures and was worried that pacemaker would unnecessarily prolong life. Mentioned that it isnt an AICD and would not shock the heart and that a pacemaker may be placed for palliative measures so we can give the medications necessary to control the heart rate to keep patient comfortable.        Additional results/Imaging, I have personally reviewed:    LABS:                            9.1    10.67 )-----------( 386      ( 25 Jun 2024 06:59 )             27.3     06-25    134<L>  |  106  |  52<H>  ----------------------------<  124<H>  4.4   |  20<L>  |  2.99<H>    Ca    8.5      25 Jun 2024 06:59              Urinalysis Basic - ( 25 Jun 2024 06:59 )    Color: x / Appearance: x / SG: x / pH: x  Gluc: 124 mg/dL / Ketone: x  / Bili: x / Urobili: x   Blood: x / Protein: x / Nitrite: x   Leuk Esterase: x / RBC: x / WBC x   Sq Epi: x / Non Sq Epi: x / Bacteria: x              All other systems reviewed and found to be negative with the exception of what has been described above.    MEDICATIONS  (STANDING):  aMIOdarone    Tablet 200 milliGRAM(s) Oral daily  apixaban 5 milliGRAM(s) Oral two times a day  atorvastatin 80 milliGRAM(s) Oral at bedtime  clopidogrel Tablet 75 milliGRAM(s) Oral daily  diltiazem    Tablet 90 milliGRAM(s) Oral every 6 hours  diltiazem Infusion 5 mG/Hr (5 mL/Hr) IV Continuous <Continuous>  ezetimibe 10 milliGRAM(s) Oral daily    MEDICATIONS  (PRN):  acetaminophen     Tablet .. 650 milliGRAM(s) Oral every 6 hours PRN Mild Pain (1 - 3)  ondansetron Injectable 4 milliGRAM(s) IV Push every 6 hours PRN Nausea and/or Vomiting  traMADol 25 milliGRAM(s) Oral every 8 hours PRN Moderate Pain (4 - 6)      VITALS:  T(F): 98.1 (06-25-24 @ 15:30), Max: 98.5 (06-25-24 @ 08:03)  HR: 74 (06-25-24 @ 15:30) (72 - 103)  BP: 125/51 (06-25-24 @ 15:30) (107/78 - 135/85)  RR: 18 (06-25-24 @ 15:30) (18 - 20)  SpO2: 97% (06-25-24 @ 15:30) (94% - 99%)  Wt(kg): --    I&O's Summary    24 Jun 2024 07:01  -  25 Jun 2024 07:00  --------------------------------------------------------  IN: 0 mL / OUT: 350 mL / NET: -350 mL        CAPILLARY BLOOD GLUCOSE          PHYSICAL EXAM:  GENERAL APPEARANCE:  Pt. is not currently dyspneic, in no distress. Pt. is alert, oriented, and pleasant.  HEENT:  Pupils are normal and react normally. No icterus. Mucous membranes well colored.  NECK:  Supple. No lymphadenopathy. Jugular venous pressure not elevated. Carotids equal.   HEART:   Irregular S1S2 There are no murmurs, rubs or gallops noted  CHEST:  Chest is clear to auscultation. Normal respiratory effort.  ABDOMEN:  Soft and nontender.   EXTREMITIES:  There is no edema. Left leg flaccid, LUE only moves hand/fingers.  SKIN:  No rash or significant lesions are noted.    CULTURES:      Telemetry, personally reviewed

## 2024-06-25 NOTE — CHART NOTE - NSCHARTNOTEFT_GEN_A_CORE
This SW spoke with pts sister Rosalinda via phone to follow up. She shared that she is waiting for Oncology today to gather more information. We discussed all options again including pacemaker and returning to [facility] and seeing how pt. does and if any treatment/surgery for cancer is available VS placing Pacemaker and going to SNF with comfort focus VS No pacemaker and going to facility with comfort focus. Pts sister will speak with medical team today, gather more information and contact this SW tomorrow to further determine plan. Emotional support provided. Our team will continue to follow.

## 2024-06-25 NOTE — CHART NOTE - NSCHARTNOTEFT_GEN_A_CORE
This SW left message for pts sister/HCP Rosalinda to follow up and provide support. Awaiting call back. Our team will continue to follow.

## 2024-06-25 NOTE — CONSULT NOTE ADULT - ASSESSMENT
69-year-old female with CKD diabetes hypertension persistent A-fib recurrence of A-fib despite Prior cardioversion and amiodarone hx of multiple cancer as above unfortunately patient has acute CVA left-sided weakness.  Patient also has acute anemia likely related to hematoma status post RBC transfusion.  She has a hx of recent splenic mass likely c/w malignancy    1) Splenic mass  likely c/w malignancy  there was outpt plan for splenectomy  pt received vaccines however no notes mention splenectomy  can consider consult surgery to splenectomy however agree that prognosis poor  would consult palliative care as well to discuss  will have Dr eWaver discuss prognosis with pt and family    2) Afib  mgmt per cardiology    3) Anemia  from hematoma  monitor hgb   transfuse for hgb < 7    Thank you for the courtesy of this consultation and we will continue to follow.    Lamonte Turcios MD  [facility]  Cell: [phone]

## 2024-06-25 NOTE — PROGRESS NOTE ADULT - SUBJECTIVE AND OBJECTIVE BOX
HPI:     "70 yo female with hx of learning disability, JUSTINO on CKD, DM II, HTN, A-fib s/p DCCV with h/o poor response to medications, also has ovarian and uterine CA, recent diagnosis of Splenic CA, CT Lumbar shows moderate stenosis and DDD, recent UTI, who presents as a code stroke due to sudden onset worsening of left sided weakness of LLE and LUE during PT also associated with slurred speech which has sinced resolved. Arrived with NIHSS of 8 with unknown LKW. Pt is a poor historian. History taken from EMR, ED MD and pt sister at bedside.  "       6/24  pt seen and examined  easily distracted in NAD  able to tell me she came in d/t a stroke first time shes ever had this.   When asked about any other medical problems she could not express to me if she had until asked about cancer- she said yes i had that once and they took care of it"   Patient states her sister helps her w/ medical decisions or Dr. Zambrano.     She seems to have little insight into complex medical decisions although she did ask if they were putting in a pacemaker today.     Will reachout to HCP Rosalinda Myers  for further discussion on goals of care and advance directives.       6/25  Patient was seen and examined.  Denies nausea, vomiting, shortness of breath, chest pain, headaches, abdominal pain, constipation     Case d/w floor team awaiting Dr. Abdul for oncology input on prognosis with surgery -- but also if PPM placed is pt anhymore a candidate for splenectomy-     Family is awaiting input from him.         PAIN: ( ) YES  ( X)  NO   DYSPNEA ( ) Yes ( X) No   Review of Systems:    Anxiety- denies  Depression-denies  Physical Discomfort- in NAD  Dyspnea-denies  Constipation-denies  Diarrhea-denies  Nausea-denies  Vomiting-denies  Anorexia-denies  Weight Loss- denies  Cough-denies  Secretions-denies  Fatigue-denies  Weakness-denies  Delirium- simple capacity    All other systems reviewed and negative    PHYSICAL EXAM:    Vital Signs Last 24 Hrs  T(C): 37.3 (24 Jun 2024 08:14), Max: 37.3 (24 Jun 2024 06:36)  T(F): 99.2 (24 Jun 2024 08:14), Max: 99.2 (24 Jun 2024 08:14)  HR: 82 (24 Jun 2024 08:14) (71 - 118)  BP: 119/56 (24 Jun 2024 08:14) (111/88 - 133/73)  BP(mean): 72 (24 Jun 2024 08:14) (72 - 72)  RR: 18 (24 Jun 2024 08:14) (18 - 19)  SpO2: 96% (24 Jun 2024 08:14) (93% - 96%)    Parameters below as of 24 Jun 2024 08:14  Patient On (Oxygen Delivery Method): room air      Daily     Daily     PPSV2: 30  %  FAST:    General: calm in NAD  Mental Status: awake alert oriented x2  HEENT: eomi, perrl  Lungs: ctabl b/l bs  Cardiac: s1s2 no mgr  GI: soft nontender +BS  : voids  Ext: moves all 4 extremities spontaneously  Neuro: weakness noted from stroke   LABS:                        9.1    11.29 )-----------( 333      ( 22 Jun 2024 09:03 )             27.7     06-22    138  |  110<H>  |  41<H>  ----------------------------<  139<H>  4.8   |  20<L>  |  1.99<H>    Ca    8.7      22 Jun 2024 09:03  Mg     2.5     06-22        Albumin: Albumin: 2.1 g/dL (06-21 @ 08:42)      Allergies    Bee stings (Swelling)  No Known Drug Allergies    Intolerances      MEDICATIONS  (STANDING):  aMIOdarone    Tablet 200 milliGRAM(s) Oral daily  apixaban 5 milliGRAM(s) Oral two times a day  atorvastatin 80 milliGRAM(s) Oral at bedtime  clopidogrel Tablet 75 milliGRAM(s) Oral daily  diltiazem    Tablet 90 milliGRAM(s) Oral every 6 hours  diltiazem Infusion 10 mG/Hr (10 mL/Hr) IV Continuous <Continuous>  ezetimibe 10 milliGRAM(s) Oral daily    MEDICATIONS  (PRN):  acetaminophen     Tablet .. 650 milliGRAM(s) Oral every 6 hours PRN Mild Pain (1 - 3)  ondansetron Injectable 4 milliGRAM(s) IV Push every 6 hours PRN Nausea and/or Vomiting  traMADol 25 milliGRAM(s) Oral every 8 hours PRN Moderate Pain (4 - 6)      RADIOLOGY/ADDITIONAL STUDIES:

## 2024-06-25 NOTE — CONSULT NOTE ADULT - SUBJECTIVE AND OBJECTIVE BOX
Reason for consult: Hx of splenic lesion    HPI:  70 yo female with hx of learning disability, JUSTINO on CKD, DM II, HTN, A-fib s/p DCCV with h/o poor response to medications, also has hx of ovarian and uterine CA, recent diagnosis of Splenic lesion, CT Lumbar shows moderate stenosis and DDD, recent UTI, who presented with worsening L sided weakness.  Found to have acute CVA  She has been in persistent afib now on IV cardizem  She has a hx of splenic lesion concerning for malignancy with plans for splenectomy    PAST MEDICAL & SURGICAL HISTORY:  HTN (hypertension)      Hyperlipidemia      DM (diabetes mellitus)      Arthritis      Ovarian cancer  and uterine cancer--2004 surgery & chemo      Obesity, morbid, BMI 40.0-49.9      H/O umbilical hernia repair  2005      S/P total hysterectomy  bilateral salpingectomy-oophorectomy --2004 ovarian & uterine cancer      H/O total knee replacement, right          FAMILY HISTORY:  Patient unable to provide medical history        Alochol: Denied  Smoking: Nonsmoker  Drug Use: Denied  Marital Status:         Allergies    Bee stings (Swelling)  No Known Drug Allergies    Intolerances        MEDICATIONS  (STANDING):  aMIOdarone    Tablet 200 milliGRAM(s) Oral daily  apixaban 5 milliGRAM(s) Oral two times a day  atorvastatin 80 milliGRAM(s) Oral at bedtime  clopidogrel Tablet 75 milliGRAM(s) Oral daily  diltiazem    Tablet 90 milliGRAM(s) Oral every 6 hours  diltiazem Infusion 5 mG/Hr (5 mL/Hr) IV Continuous <Continuous>  ezetimibe 10 milliGRAM(s) Oral daily    MEDICATIONS  (PRN):  acetaminophen     Tablet .. 650 milliGRAM(s) Oral every 6 hours PRN Mild Pain (1 - 3)  ondansetron Injectable 4 milliGRAM(s) IV Push every 6 hours PRN Nausea and/or Vomiting  traMADol 25 milliGRAM(s) Oral every 8 hours PRN Moderate Pain (4 - 6)      ROS  difficult to obtain    T(C): 36.7 (06-25-24 @ 15:30), Max: 36.9 (06-25-24 @ 08:03)  HR: 74 (06-25-24 @ 15:30) (72 - 100)  BP: 125/51 (06-25-24 @ 15:30) (107/78 - 135/85)  RR: 18 (06-25-24 @ 15:30) (18 - 20)  SpO2: 97% (06-25-24 @ 15:30) (94% - 99%)  Wt(kg): --    PE  NAD  Awake    No c/c/e  No rash grossly  FROM                          9.1    10.67 )-----------( 386      ( 25 Jun 2024 06:59 )             27.3       06-25    134<L>  |  106  |  52<H>  ----------------------------<  124<H>  4.4   |  20<L>  |  2.99<H>    Ca    8.5      25 Jun 2024 06:59

## 2024-06-25 NOTE — PROGRESS NOTE ADULT - ASSESSMENT
CHIEF COMPLAINT:Patient is a 56y old  Female who presents with a chief complaint of Influenza A .Pt still coughing, no further chest pain.    	  REVIEW OF SYSTEMS:  CONSTITUTIONAL: No fever, weight loss, or fatigue  EYES: No eye pain, visual disturbances, or discharge  ENT:  No difficulty hearing, tinnitus, vertigo; No sinus or throat pain  NECK: No pain or stiffness  RESPIRATORY:+ cough, No wheezing, chills or hemoptysis; No Shortness of Breath  CARDIOVASCULAR: No chest pain, palpitations, passing out, dizziness, or leg swelling  GASTROINTESTINAL: No abdominal or epigastric pain. No nausea, vomiting, or hematemesis; No diarrhea or constipation. No melena or hematochezia.  GENITOURINARY: No dysuria, frequency, hematuria, or incontinence  NEUROLOGICAL: No headaches, memory loss, loss of strength, numbness, or tremors  SKIN: No itching, burning, rashes, or lesions   LYMPH Nodes: No enlarged glands  ENDOCRINE: No heat or cold intolerance; No hair loss  MUSCULOSKELETAL: No joint pain or swelling; No muscle, back, or extremity pain  PSYCHIATRIC: No depression, anxiety, mood swings, or difficulty sleeping  HEME/LYMPH: No easy bruising, or bleeding gums  ALLERGY AND IMMUNOLOGIC: No hives or eczema	      PHYSICAL EXAM:  T(C): 36.6 (03-03-18 @ 07:52), Max: 37 (03-02-18 @ 11:13)  HR: 69 (03-03-18 @ 07:52) (69 - 81)  BP: 123/71 (03-03-18 @ 07:52) (115/69 - 127/74)  RR: 15 (03-03-18 @ 07:52) (15 - 18)  SpO2: 97% (03-03-18 @ 07:52) (95% - 99%)    I&O's Summary    02 Mar 2018 07:01  -  03 Mar 2018 07:00  --------------------------------------------------------  IN: 200 mL / OUT: 0 mL / NET: 200 mL        Appearance: Normal	  HEENT:   Normal oral mucosa, PERRL, EOMI	  Lymphatic: No lymphadenopathy  Cardiovascular: Normal S1 S2, No JVD, No murmurs, No edema  Respiratory: Dec BS LT vase  Psychiatry: A & O x 3, Mood & affect appropriate  Gastrointestinal:  Soft, Non-tender, + BS	  Skin: No rashes, No ecchymoses, No cyanosis	  Neurologic: Non-focal  Extremities: Normal range of motion, No clubbing, cyanosis or edema  Vascular: Peripheral pulses palpable 2+ bilaterally    MEDICATIONS  (STANDING):  aspirin  chewable 81 milliGRAM(s) Oral daily  atorvastatin 10 milliGRAM(s) Oral at bedtime  enoxaparin Injectable 40 milliGRAM(s) SubCutaneous daily  gabapentin 100 milliGRAM(s) Oral three times a day  levoFLOXacin  Tablet 500 milliGRAM(s) Oral every 24 hours  melatonin 3 milliGRAM(s) Oral at bedtime  oseltamivir 75 milliGRAM(s) Oral two times a day  pantoprazole    Tablet 40 milliGRAM(s) Oral before breakfast  sodium chloride 0.9%. 1000 milliLiter(s) (75 mL/Hr) IV Continuous <Continuous>  sucralfate suspension 1 Gram(s) Oral four times a day      TELEMETRY: 	NSR      	  LABS:	 	    CARDIAC MARKERS:  CARDIAC MARKERS ( 02 Mar 2018 03:21 )  <0.015 ng/mL / x     / x     / x     / x      CARDIAC MARKERS ( 01 Mar 2018 21:46 )  <0.015 ng/mL / x     / x     / x     / x      CARDIAC MARKERS ( 01 Mar 2018 16:02 )  <0.015 ng/mL / x     / x     / x     / x      CARDIAC MARKERS ( 01 Mar 2018 10:08 )  <0.015 ng/mL / x     / 91 U/L / x     / <1.0 ng/mL                         12.4   3.6   )-----------( 161      ( 03 Mar 2018 07:03 )             37.0     03-03    141  |  105  |  8   ----------------------------<  95  3.8   |  27  |  0.65    Ca    9.0      03 Mar 2018 07:03        CHEST:     LUNGS AND LARGE AIRWAYS: Patent central airways. There is a mixed   groundglass and dense consolidation in the left lower lobe with   associated nodular and tree-in-bud opacities. Right middle lobe linear   atelectasis.  PLEURA: No pleural effusion.  VESSELS: No pulmonary embolism. Minimal atherosclerotic calcifications of   the aorta.  HEART: Heart size is normal. Coronary artery calcifications. No   pericardial effusion.  MEDIASTINUM AND DEBBIE: No lymphadenopathy.  CHEST WALL AND LOWER NECK: Within normal limits.  VISUALIZED UPPER ABDOMEN: Within normal limits.  BONES: Degenerative changes of the spine.    IMPRESSION:     No pulmonary embolism.    Left lower lobe pneumonia. Follow-up to resolution recommended.    	  OBSERVATIONS:  Mitral Valve: Mitral annular calcification. Mild mitral  regurgitation.  Aortic Root: Aortic Root: 3.8 cm.  Aortic Valve: Normal trileaflet aortic valve.  Left Atrium: Normal left atrium.  Left Ventricle: Normal Left Ventricular Systolic Function,  (EF = 55 to 60%) Normal left ventricular internal  dimensions and wall thicknesses. Grade II diastolic  dysfunction.  Right Heart: Normal right atrium. Normal right ventricular  size and function. There is mild tricuspid regurgitation.  Pericardium/PleuraNormal pericardium with no pericardial  effusion. 70 yo F with above PMHx, presented with Lt side weakness/slurred speech, has acute CVA.  PAF on eliquis/amio/cardizem/toprol, s/p DCCV on 4/1/24, 4/22/24, found to have AF w.RVR on IV cardizem drip.  Also has acute anemia due to thigh hematoma, s/p UTI on antibiotics, now ARF on CRF   PAF w/RVR, on amiodarone, acute CVA with Lt hemiparesis    A/P: Persistent Afib with difficult to control rates  Pt failed rhythm control, also due to multiple comorbidities, pt is not a candidate for AFib ablation  Recommend AVJ ablation with leadless PPM implant for rate control   r/b/i/a d/w pt & family (Rosalinda)  Continue eliquis  5 mg PO BID, pt will be off anticoagulation for 24 hrs post PPM   Continue IV cardizem drip/po cardizem 90 mg Q6H  Continue Amiodarone 200mg PO daily  Discussed with EP, pending family decision, earliest time to schedule pacemaker would be thursday/friday.  Oncology consulted, recommendations appreciated Biggest question from sister is prognosis and time/remaining.   Family appears to be leaning towards comfort measures, wants to make sure PPM would still be appropriate in that situation.   Aware that patient may no longer be a candidate for earlier planned splenectomy.

## 2024-06-25 NOTE — PROGRESS NOTE ADULT - ASSESSMENT
Process of Care  --Reviewed dx/treatment problems and alignment with Goals of Care  pt w/ acute stroke new deficits  findings of npossible recurrent ovarian or uterine ca   was getting evaluated op for splenectomy in Feb   was also advised to reduce etoh intake?      Physical Aspects of Care  --Pain  patient denies at this time  c/w current management    --Bowel Regimen  denies constipation  risk for constipation d/t immobility  daily dulcolax    --Dyspnea  No SOB at this time  comfortable and in NAD    --Nausea Vomiting  denies    --Weakness  PT as tolerated     Psychological and Psychiatric Aspects of Care:   --Greif/ Bereavement emotional support provided  --Hx of psychiatric dx: none  -Pastoral Care Available PRN     Social Aspects of Care  -SW involved     Cultural Aspects  -Primary Language: English    Goals of Care:     We discussed Palliative Care team being a supportive team when a patient has ongoing illnesses.  We also discussed that it is not an end of life care service, but can help navigate symptoms and emotional support througout their hospital stay here.    Case d/w primary team Dr. Prasad who notified Dr. Mu Dobbins will bee seeing patient today re: prognosis and if pt would benefit from proceeding surgical intervention of splenic mass at this time d/t baseline status + recent stroke  (if PPM is placed).          Ethical and Legal Aspects:   NA        Capacity: simple capacity  HCP/Surrogate: Rosalinda Myers  Code Status: dnr dni trial niv  MOLST: dnr dni  Dispo Plan:    Discussed With: Case coordinated with attending and SW and RN     Time Spent: 50 minutes including the care, coordination and counseling of this patient, 50% of which was spent coordinating and counseling.

## 2024-06-26 LAB
ANION GAP SERPL CALC-SCNC: 8 MMOL/L — SIGNIFICANT CHANGE UP (ref 5–17)
BUN SERPL-MCNC: 49 MG/DL — HIGH (ref 7–23)
CALCIUM SERPL-MCNC: 8.1 MG/DL — LOW (ref 8.5–10.1)
CHLORIDE SERPL-SCNC: 107 MMOL/L — SIGNIFICANT CHANGE UP (ref 96–108)
CO2 SERPL-SCNC: 21 MMOL/L — LOW (ref 22–31)
CREAT SERPL-MCNC: 2.66 MG/DL — HIGH (ref 0.5–1.3)
CULTURE RESULTS: SIGNIFICANT CHANGE UP
CULTURE RESULTS: SIGNIFICANT CHANGE UP
EGFR: 19 ML/MIN/1.73M2 — LOW
GLUCOSE SERPL-MCNC: 126 MG/DL — HIGH (ref 70–99)
HCT VFR BLD CALC: 24.8 % — LOW (ref 34.5–45)
HGB BLD-MCNC: 8.2 G/DL — LOW (ref 11.5–15.5)
MCHC RBC-ENTMCNC: 29.4 PG — SIGNIFICANT CHANGE UP (ref 27–34)
MCHC RBC-ENTMCNC: 33.1 GM/DL — SIGNIFICANT CHANGE UP (ref 32–36)
MCV RBC AUTO: 88.9 FL — SIGNIFICANT CHANGE UP (ref 80–100)
PLATELET # BLD AUTO: 402 K/UL — HIGH (ref 150–400)
POTASSIUM SERPL-MCNC: 4.4 MMOL/L — SIGNIFICANT CHANGE UP (ref 3.5–5.3)
POTASSIUM SERPL-SCNC: 4.4 MMOL/L — SIGNIFICANT CHANGE UP (ref 3.5–5.3)
RBC # BLD: 2.79 M/UL — LOW (ref 3.8–5.2)
RBC # FLD: 14.6 % — HIGH (ref 10.3–14.5)
SODIUM SERPL-SCNC: 136 MMOL/L — SIGNIFICANT CHANGE UP (ref 135–145)
SPECIMEN SOURCE: SIGNIFICANT CHANGE UP
SPECIMEN SOURCE: SIGNIFICANT CHANGE UP
WBC # BLD: 10.77 K/UL — HIGH (ref 3.8–10.5)
WBC # FLD AUTO: 10.77 K/UL — HIGH (ref 3.8–10.5)

## 2024-06-26 PROCEDURE — 99232 SBSQ HOSP IP/OBS MODERATE 35: CPT

## 2024-06-26 PROCEDURE — 99223 1ST HOSP IP/OBS HIGH 75: CPT

## 2024-06-26 RX ADMIN — ATORVASTATIN CALCIUM 80 MILLIGRAM(S): 20 TABLET, FILM COATED ORAL at 21:58

## 2024-06-26 RX ADMIN — AMIODARONE HYDROCHLORIDE 200 MILLIGRAM(S): 50 INJECTION, SOLUTION INTRAVENOUS at 10:16

## 2024-06-26 RX ADMIN — APIXABAN 5 MILLIGRAM(S): 5 TABLET, FILM COATED ORAL at 21:58

## 2024-06-26 RX ADMIN — DILTIAZEM HYDROCHLORIDE 90 MILLIGRAM(S): 240 CAPSULE, EXTENDED RELEASE ORAL at 00:26

## 2024-06-26 RX ADMIN — DILTIAZEM HYDROCHLORIDE 90 MILLIGRAM(S): 240 CAPSULE, EXTENDED RELEASE ORAL at 13:13

## 2024-06-26 RX ADMIN — CLOPIDOGREL BISULFATE 75 MILLIGRAM(S): 75 TABLET, FILM COATED ORAL at 10:16

## 2024-06-26 RX ADMIN — DILTIAZEM HYDROCHLORIDE 90 MILLIGRAM(S): 240 CAPSULE, EXTENDED RELEASE ORAL at 18:18

## 2024-06-26 RX ADMIN — DILTIAZEM HYDROCHLORIDE 90 MILLIGRAM(S): 240 CAPSULE, EXTENDED RELEASE ORAL at 06:02

## 2024-06-26 RX ADMIN — EZETIMIBE 10 MILLIGRAM(S): 10 TABLET ORAL at 10:16

## 2024-06-26 RX ADMIN — APIXABAN 5 MILLIGRAM(S): 5 TABLET, FILM COATED ORAL at 10:16

## 2024-06-26 NOTE — PROGRESS NOTE ADULT - SUBJECTIVE AND OBJECTIVE BOX
HPI: 68 yo female with hx of learning disability, JUSTINO on CKD, DM II, HTN, A-fib s/p DCCV with h/o poor response to medications, also has ovarian and uterine CA, recent diagnosis of Splenic CA, CT Lumbar shows moderate stenosis and DDD, recent UTI, who presents as a code stroke due to sudden onset worsening of left sided weakness of LLE and LUE during PT also associated with slurred speech which has sinced resolved. Arrived with NIHSS of 8 with unknown LKW. Pt is a poor historian. History taken from EMR, ED MD and pt sister at bedside.     Subjective: Patient seen and examined. Resting comfortably, HR controlled at this time. Discussed with sister on the phone. [facility] oncology consulted. Patient being seen by palliative care. Leaning towards comfort measures and was worried that pacemaker would unnecessarily prolong life. Mentioned that it isnt an AICD and would not shock the heart and that a pacemaker may be placed for palliative measures so we can give the medications necessary to control the heart rate to keep patient comfortable.        Additional results/Imaging, I have personally reviewed:All other systems reviewed and found to be negative with the exception of what has been described above.      PHYSICAL EXAM:    Daily     Daily     ICU Vital Signs Last 24 Hrs  T(C): 36.8 (25 Jun 2024 23:23), Max: 36.8 (25 Jun 2024 23:23)  T(F): 98.2 (25 Jun 2024 23:23), Max: 98.2 (25 Jun 2024 23:23)  HR: 100 (26 Jun 2024 06:00) (74 - 101)  BP: 113/53 (26 Jun 2024 06:00) (107/78 - 133/73)  BP(mean): --  ABP: --  ABP(mean): --  RR: 20 (25 Jun 2024 23:23) (18 - 20)  SpO2: 93% (25 Jun 2024 23:23) (93% - 97%)    O2 Parameters below as of 25 Jun 2024 23:23  Patient On (Oxygen Delivery Method): room air  PHYSICAL EXAM:  GENERAL APPEARANCE:  Pt. is not currently dyspneic, in no distress. Pt. is alert, oriented, and pleasant.  HEENT:  Pupils are normal and react normally. No icterus. Mucous membranes well colored.  NECK:  Supple. No lymphadenopathy. Jugular venous pressure not elevated. Carotids equal.   HEART:   Irregular S1S2 There are no murmurs, rubs or gallops noted  CHEST:  Chest is clear to auscultation. Normal respiratory effort.  ABDOMEN:  Soft and nontender.   EXTREMITIES:  There is no edema. Left leg flaccid, LUE only moves hand/fingers.  SKIN:  No rash or significant lesions are noted.                                      9.1    10.67 )-----------( 386      ( 25 Jun 2024 06:59 )             27.3       CBC Full  -  ( 25 Jun 2024 06:59 )  WBC Count : 10.67 K/uL  RBC Count : 3.04 M/uL  Hemoglobin : 9.1 g/dL  Hematocrit : 27.3 %  Platelet Count - Automated : 386 K/uL  Mean Cell Volume : 89.8 fl  Mean Cell Hemoglobin : 29.9 pg  Mean Cell Hemoglobin Concentration : 33.3 gm/dL  Auto Neutrophil # : x  Auto Lymphocyte # : x  Auto Monocyte # : x  Auto Eosinophil # : x  Auto Basophil # : x  Auto Neutrophil % : x  Auto Lymphocyte % : x  Auto Monocyte % : x  Auto Eosinophil % : x  Auto Basophil % : x      06-25    134<L>  |  106  |  52<H>  ----------------------------<  124<H>  4.4   |  20<L>  |  2.99<H>    Ca    8.5      25 Jun 2024 06:59                      Urinalysis Basic - ( 25 Jun 2024 06:59 )    Color: x / Appearance: x / SG: x / pH: x  Gluc: 124 mg/dL / Ketone: x  / Bili: x / Urobili: x   Blood: x / Protein: x / Nitrite: x   Leuk Esterase: x / RBC: x / WBC x   Sq Epi: x / Non Sq Epi: x / Bacteria: x            MEDICATIONS  (STANDING):  aMIOdarone    Tablet 200 milliGRAM(s) Oral daily  apixaban 5 milliGRAM(s) Oral two times a day  atorvastatin 80 milliGRAM(s) Oral at bedtime  clopidogrel Tablet 75 milliGRAM(s) Oral daily  diltiazem    Tablet 90 milliGRAM(s) Oral every 6 hours  diltiazem Infusion 5 mG/Hr (5 mL/Hr) IV Continuous <Continuous>  ezetimibe 10 milliGRAM(s) Oral daily

## 2024-06-26 NOTE — CONSULT NOTE ADULT - ASSESSMENT
70 yo woman with PMHX of DM, HTN, learning disability, A fib post DCCV x2 on 4/1 and 4/22/2024, and recent dx of Splenic CA admitted with left sided weakness on 6/20.  --JUSTINO with unclear baseline CKD.    C/w ATN/KHAI : now slowly improving.    Would be cautious with contrast studies in the future.    Meds reviewed and acceptable.  --Electrolytes and fluid status stable.  --HTN : well controlled.  --A FIB : continue CCB /Amiodarone for rate control  --Spleen mass : c/w malignancy : further intervention to be determined.

## 2024-06-26 NOTE — CONSULT NOTE ADULT - SUBJECTIVE AND OBJECTIVE BOX
Chief complaints.  presented with Left sided weakness during PT on 6/20.    HPI:  68 yo woman with PMHX of DM, HTN, learning disability, A fib post DCCV x2 on 4/1 and 4/22/2024, and recent dx of Splenic CA admitted with left sided weakness on 6/20.  IN ED, CTA of brain c/w acute ischemia in right superior frontal gyrus.  CT with ivc revealed spleen nodular masses.  Creat 1.46 on admission with previous episode of  JUSTINO in 4/2024, creat 1.54 on d/c, 4/27.  Creat peaked at 3.46, with improvement last 2 days.    PMHX and PSHX.  --DM  --HTN  --Hx of Ovarian and Uterine CA--post surgery and chemo in 2004.  --Hx of Arthritis  --R TKR  --A FIB post DCCV  --Hx of learning disability    Home Medications:   * Patient Currently Takes Medications as of 27-Apr-2024 15:06 documented in Structured Notes  · 	cefuroxime 250 mg oral tablet: 1 tab(s) orally every 12 hours continue till 04/30/24  · 	aspirin 81 mg oral delayed release tablet: 1 tab(s) orally once a day  · 	ezetimibe 10 mg oral tablet: 1 tab(s) orally once a day  · 	amiodarone 200 mg oral tablet: 1 tab(s) orally once a day  · 	DilTIAZem (Eqv-Cardizem CD) 120 mg/24 hours oral capsule, extended release: 1 cap(s) orally once a day  · 	Toprol- mg oral tablet, extended release: 1 tab(s) orally once a day  · 	atorvastatin 40 mg oral tablet: 1 tab(s) orally once a day  · 	Eliquis 5 mg oral tablet: 1 tab(s) orally 2 times a day    FAMILY HISTORY:  Patient unable to provide medical history    SOCIAL HISTORY : unable to obtain    Allergies :  Bee stings (Swelling)  No Known Drug Allergies    REVIEW OF SYSTEMS:  only focused on repeating something is wrong with her left leg.      MEDICATIONS  (STANDING):  aMIOdarone    Tablet 200 milliGRAM(s) Oral daily  apixaban 5 milliGRAM(s) Oral two times a day  atorvastatin 80 milliGRAM(s) Oral at bedtime  clopidogrel Tablet 75 milliGRAM(s) Oral daily  diltiazem    Tablet 90 milliGRAM(s) Oral every 6 hours  diltiazem Infusion 5 mG/Hr (5 mL/Hr) IV Continuous <Continuous>  ezetimibe 10 milliGRAM(s) Oral daily    MEDICATIONS  (PRN):  acetaminophen     Tablet .. 650 milliGRAM(s) Oral every 6 hours PRN Mild Pain (1 - 3)  ondansetron Injectable 4 milliGRAM(s) IV Push every 6 hours PRN Nausea and/or Vomiting  traMADol 25 milliGRAM(s) Oral every 8 hours PRN Moderate Pain (4 - 6)         Vital Signs Last 24 Hrs  T(C): 37.1 (26 Jun 2024 08:13), Max: 37.1 (26 Jun 2024 08:13)  T(F): 98.7 (26 Jun 2024 08:13), Max: 98.7 (26 Jun 2024 08:13)  HR: 93 (26 Jun 2024 08:13) (74 - 101)  BP: 141/65 (26 Jun 2024 08:13) (107/78 - 141/65)  BP(mean): --  RR: 19 (26 Jun 2024 08:13) (18 - 20)  SpO2: 93% (26 Jun 2024 08:13) (93% - 97%)    Parameters below as of 26 Jun 2024 08:13  Patient On (Oxygen Delivery Method): room air      PHYSICAL EXAM:  GEN: alert and responding appropriately.  oriented x 3 but little insight into her condition  HEENT: WNL  NECK : supple  CV: S1S2 tachy  LUNGS: fair air entry  ABD: soft  EXT: no edema    LABS:                        8.2    10.77 )-----------( 402      ( 26 Jun 2024 07:56 )             24.8     06-26    136  |  107  |  49<H>  ----------------------------<  126<H>  4.4   |  21<L>  |  2.66<H>    Ca    8.1<L>      26 Jun 2024 07:56        Urinalysis Basic - ( 26 Jun 2024 07:56 )    Color: x / Appearance: x / SG: x / pH: x  Gluc: 126 mg/dL / Ketone: x  / Bili: x / Urobili: x   Blood: x / Protein: x / Nitrite: x   Leuk Esterase: x / RBC: x / WBC x   Sq Epi: x / Non Sq Epi: x / Bacteria: x

## 2024-06-26 NOTE — PROGRESS NOTE ADULT - ASSESSMENT
70 yo F with above PMHx, presented with Lt side weakness/slurred speech, has acute CVA.  PAF on eliquis/amio/cardizem/toprol, s/p DCCV on 4/1/24, 4/22/24, found to have AF w.RVR on IV cardizem drip.  Also has acute anemia due to thigh hematoma, s/p UTI on antibiotics, now ARF on CRF   PAF w/RVR, on amiodarone, acute CVA with Lt hemiparesis    A/P: Persistent Afib with difficult to control rates  Pt failed rhythm control, also due to multiple comorbidities, pt is not a candidate for AFib ablation  Recommend AVJ ablation with leadless PPM implant for rate control   r/b/i/a d/w pt & family (Rosalinda), sister like to clarify pt's cancer status prior to make any invasive procedure  Continue eliquis  5 mg PO BID, pt will be off anticoagulation for 24 hrs post PPM   Continue IV cardizem drip/po cardizem 90 mg Q6H  Continue Amiodarone 200mg PO daily  Neurology f/u   Family to have discussion for GOC with palliative team  D/W pt, RN, Dr. Lin

## 2024-06-26 NOTE — PROGRESS NOTE ADULT - ASSESSMENT
68 yo F with above PMHx, presented with Lt side weakness/slurred speech, has acute CVA.  PAF on eliquis/amio/cardizem/toprol, s/p DCCV on 4/1/24, 4/22/24, found to have AF w.RVR on IV cardizem drip.  Also has acute anemia due to thigh hematoma, s/p UTI on antibiotics, now ARF on CRF   PAF w/RVR, on amiodarone,      # acute CVA with Lt hemiparesis  # Persistent Afib with difficult to control rates  #JUSTINO/ATN on CKD   #acute blood loss anemia due to thigh hematoma    Pt failed rhythm control, also due to multiple comorbidities, pt is not a candidate for AFib ablation  Recommend AVJ ablation with leadless PPM implant for rate control   r/b/i/a d/w pt & family (Rosalinda)  Continue eliquis  5 mg PO BID, pt will be off anticoagulation for 24 hrs post PPM   Continue IV cardizem drip/po cardizem 90 mg Q6H  Continue Amiodarone 200mg PO daily  Nephro consult for ATN, check bladder scan , IVF  Discussed with EP, pending family decision, earliest time to schedule pacemaker would be thursday/friday.  Oncology consulted, recommendations appreciated Biggest question from sister is prognosis and time/remaining.   Family appears to be leaning towards comfort measures, wants to make sure PPM would still be appropriate in that situation.   Aware that patient may no longer be a candidate for earlier planned splenectomy.

## 2024-06-26 NOTE — PROGRESS NOTE ADULT - SUBJECTIVE AND OBJECTIVE BOX
HPI:  68 yo female with hx of learning disability, JUSTINO on CKD, DM II, HTN, A-fib s/p DCCV with h/o poor response to medications, also has ovarian and uterine CA, recent diagnosis of Splenic CA, CT Lumbar shows moderate stenosis and DDD, recent UTI, who presents as a code stroke due to sudden onset worsening of left sided weakness of LLE and LUE during PT also associated with slurred speech which has sinced resolved. Arrived with NIHSS of 8 with unknown LKW. Pt is a poor historian. History taken from EMR, ED MD and pt sister at bedside.  (20 Jun 2024 21:01)        Cardiac PET ST (March 2024) no ischemia, LVEF 45%  echo (Jan 2024) LVEF 63%, mildly dilated LA    6/24/24:  pt is resting in the bed, denies chest pain/SOB/palpitations  remains Lt side weakness  tele: Afib 's, occasional upto 130's bpm    6/25/24: pt seen resting in bed in NAD, she denies any SOB, CP, dizziness or palpitations.  Left side weakness, can only move left hand  TELE: Afib  bpm, occasional RVRs to 130's     HPI:  68 yo female with hx of learning disability, JUSTINO on CKD, DM II, HTN, A-fib s/p DCCV with h/o poor response to medications, also has ovarian and uterine CA, recent diagnosis of Splenic CA, CT Lumbar shows moderate stenosis and DDD, recent UTI, who presents as a code stroke due to sudden onset worsening of left sided weakness of LLE and LUE during PT also associated with slurred speech which has sinced resolved. Arrived with NIHSS of 8 with unknown LKW. Pt is a poor historian. History taken from EMR, ED MD and pt sister at bedside.  (20 Jun 2024 21:01)        Cardiac PET ST (March 2024) no ischemia, LVEF 45%  echo (Jan 2024) LVEF 63%, mildly dilated LA    6/24/24:  pt is resting in the bed, denies chest pain/SOB/palpitations  remains Lt side weakness  tele: Afib 's, occasional upto 130's bpm    6/25/24: pt seen resting in bed in NAD, she denies any SOB, CP, dizziness or palpitations.  Left side weakness, can only move left hand  TELE: Afib  bpm, occasional RVRs to 130's    6/26/24: TELE: AFib  bpm, RVRs to 120-130's.  Cardizem gtt is at 5mg/hr.  She denies symptoms.      MEDICATIONS  (STANDING):  aMIOdarone    Tablet 200 milliGRAM(s) Oral daily  apixaban 5 milliGRAM(s) Oral two times a day  atorvastatin 80 milliGRAM(s) Oral at bedtime  clopidogrel Tablet 75 milliGRAM(s) Oral daily  diltiazem    Tablet 90 milliGRAM(s) Oral every 6 hours  diltiazem Infusion 5 mG/Hr (5 mL/Hr) IV Continuous <Continuous>  ezetimibe 10 milliGRAM(s) Oral daily    MEDICATIONS  (PRN):  acetaminophen     Tablet .. 650 milliGRAM(s) Oral every 6 hours PRN Mild Pain (1 - 3)  ondansetron Injectable 4 milliGRAM(s) IV Push every 6 hours PRN Nausea and/or Vomiting  traMADol 25 milliGRAM(s) Oral every 8 hours PRN Moderate Pain (4 - 6)    Allergies    Bee stings (Swelling)  No Known Drug Allergies    Intolerances      Vital Signs Last 24 Hrs  T(C): 37.1 (26 Jun 2024 08:13), Max: 37.1 (26 Jun 2024 08:13)  T(F): 98.7 (26 Jun 2024 08:13), Max: 98.7 (26 Jun 2024 08:13)  HR: 100 (26 Jun 2024 13:00) (74 - 101)  BP: 117/73 (26 Jun 2024 13:00) (109/66 - 141/65)  BP(mean): 92 (26 Jun 2024 13:00) (92 - 92)  RR: 19 (26 Jun 2024 08:13) (18 - 20)  SpO2: 93% (26 Jun 2024 08:13) (93% - 97%)    Parameters below as of 26 Jun 2024 08:13  Patient On (Oxygen Delivery Method): room air                          8.2    10.77 )-----------( 402      ( 26 Jun 2024 07:56 )             24.8     06-26    136  |  107  |  49<H>  ----------------------------<  126<H>  4.4   |  21<L>  |  2.66<H>    Ca    8.1<L>      26 Jun 2024 07:56      PHYSICAL EXAMINATION:    GENERAL APPEARANCE:  Pt. is not currently dyspneic, in no distress. Pt. is alert, oriented, and pleasant. Left sided weakness.  HEENT:  Pupils are normal and react normally. No icterus. Mucous membranes well colored.  NECK:  Supple. No lymphadenopathy. Jugular venous pressure not elevated. Carotids equal.   HEART:   Irregular S1S2 There are no murmurs, rubs or gallops noted  CHEST:  Chest is clear to auscultation. Normal respiratory effort.  ABDOMEN:  Soft and nontender.   EXTREMITIES:  There is no edema. Left leg flaccid, LUE only moves hand/fingers.  SKIN:  No rash or significant lesions are noted.

## 2024-06-27 LAB
ANION GAP SERPL CALC-SCNC: 7 MMOL/L — SIGNIFICANT CHANGE UP (ref 5–17)
BASOPHILS # BLD AUTO: 0.04 K/UL — SIGNIFICANT CHANGE UP (ref 0–0.2)
BASOPHILS NFR BLD AUTO: 0.4 % — SIGNIFICANT CHANGE UP (ref 0–2)
BUN SERPL-MCNC: 41 MG/DL — HIGH (ref 7–23)
CALCIUM SERPL-MCNC: 8.5 MG/DL — SIGNIFICANT CHANGE UP (ref 8.5–10.1)
CHLORIDE SERPL-SCNC: 108 MMOL/L — SIGNIFICANT CHANGE UP (ref 96–108)
CO2 SERPL-SCNC: 23 MMOL/L — SIGNIFICANT CHANGE UP (ref 22–31)
CREAT SERPL-MCNC: 2.23 MG/DL — HIGH (ref 0.5–1.3)
EGFR: 23 ML/MIN/1.73M2 — LOW
EOSINOPHIL # BLD AUTO: 0.35 K/UL — SIGNIFICANT CHANGE UP (ref 0–0.5)
EOSINOPHIL NFR BLD AUTO: 3.3 % — SIGNIFICANT CHANGE UP (ref 0–6)
GLUCOSE SERPL-MCNC: 144 MG/DL — HIGH (ref 70–99)
HCT VFR BLD CALC: 26 % — LOW (ref 34.5–45)
HGB BLD-MCNC: 8.4 G/DL — LOW (ref 11.5–15.5)
IMM GRANULOCYTES NFR BLD AUTO: 0.9 % — SIGNIFICANT CHANGE UP (ref 0–0.9)
LYMPHOCYTES # BLD AUTO: 0.67 K/UL — LOW (ref 1–3.3)
LYMPHOCYTES # BLD AUTO: 6.3 % — LOW (ref 13–44)
MCHC RBC-ENTMCNC: 29.1 PG — SIGNIFICANT CHANGE UP (ref 27–34)
MCHC RBC-ENTMCNC: 32.3 GM/DL — SIGNIFICANT CHANGE UP (ref 32–36)
MCV RBC AUTO: 90 FL — SIGNIFICANT CHANGE UP (ref 80–100)
MONOCYTES # BLD AUTO: 0.99 K/UL — HIGH (ref 0–0.9)
MONOCYTES NFR BLD AUTO: 9.3 % — SIGNIFICANT CHANGE UP (ref 2–14)
NEUTROPHILS # BLD AUTO: 8.47 K/UL — HIGH (ref 1.8–7.4)
NEUTROPHILS NFR BLD AUTO: 79.8 % — HIGH (ref 43–77)
PLATELET # BLD AUTO: 398 K/UL — SIGNIFICANT CHANGE UP (ref 150–400)
POTASSIUM SERPL-MCNC: 4.6 MMOL/L — SIGNIFICANT CHANGE UP (ref 3.5–5.3)
POTASSIUM SERPL-SCNC: 4.6 MMOL/L — SIGNIFICANT CHANGE UP (ref 3.5–5.3)
RBC # BLD: 2.89 M/UL — LOW (ref 3.8–5.2)
RBC # FLD: 14.6 % — HIGH (ref 10.3–14.5)
SODIUM SERPL-SCNC: 138 MMOL/L — SIGNIFICANT CHANGE UP (ref 135–145)
WBC # BLD: 10.62 K/UL — HIGH (ref 3.8–10.5)
WBC # FLD AUTO: 10.62 K/UL — HIGH (ref 3.8–10.5)

## 2024-06-27 PROCEDURE — 99233 SBSQ HOSP IP/OBS HIGH 50: CPT

## 2024-06-27 PROCEDURE — 99232 SBSQ HOSP IP/OBS MODERATE 35: CPT

## 2024-06-27 RX ORDER — METOPROLOL TARTRATE 50 MG
2.5 TABLET ORAL EVERY 6 HOURS
Refills: 0 | Status: DISCONTINUED | OUTPATIENT
Start: 2024-06-27 | End: 2024-06-30

## 2024-06-27 RX ADMIN — DILTIAZEM HYDROCHLORIDE 90 MILLIGRAM(S): 240 CAPSULE, EXTENDED RELEASE ORAL at 05:19

## 2024-06-27 RX ADMIN — DILTIAZEM HYDROCHLORIDE 90 MILLIGRAM(S): 240 CAPSULE, EXTENDED RELEASE ORAL at 18:50

## 2024-06-27 RX ADMIN — AMIODARONE HYDROCHLORIDE 200 MILLIGRAM(S): 50 INJECTION, SOLUTION INTRAVENOUS at 09:42

## 2024-06-27 RX ADMIN — ATORVASTATIN CALCIUM 80 MILLIGRAM(S): 20 TABLET, FILM COATED ORAL at 21:40

## 2024-06-27 RX ADMIN — APIXABAN 5 MILLIGRAM(S): 5 TABLET, FILM COATED ORAL at 21:40

## 2024-06-27 RX ADMIN — Medication 650 MILLIGRAM(S): at 09:43

## 2024-06-27 RX ADMIN — DILTIAZEM HYDROCHLORIDE 90 MILLIGRAM(S): 240 CAPSULE, EXTENDED RELEASE ORAL at 00:39

## 2024-06-27 RX ADMIN — EZETIMIBE 10 MILLIGRAM(S): 10 TABLET ORAL at 09:42

## 2024-06-27 RX ADMIN — DILTIAZEM HYDROCHLORIDE 90 MILLIGRAM(S): 240 CAPSULE, EXTENDED RELEASE ORAL at 13:55

## 2024-06-27 RX ADMIN — CLOPIDOGREL BISULFATE 75 MILLIGRAM(S): 75 TABLET, FILM COATED ORAL at 09:42

## 2024-06-27 RX ADMIN — APIXABAN 5 MILLIGRAM(S): 5 TABLET, FILM COATED ORAL at 09:42

## 2024-06-27 NOTE — CHART NOTE - NSCHARTNOTEFT_GEN_A_CORE
68 yo F with hx of learning disability, JUSTINO on CKD, DM II, HTN, A-fib s/p DCCV with h/o poor response to medications, also has ovarian and uterine CA, recent diagnosis of Splenic CA, CT Lumbar shows moderate stenosis and DDD, recent UTI,PMHx, presented with Lt side weakness/slurred speech, has acute CVA.  PAF on eliquis/amio/cardizem/toprol, s/p DCCV on 4/1/24, 4/22/24, found to have AF w.RVR on IV cardizem drip.  Also has acute anemia due to thigh hematoma, s/p UTI on antibiotics, now ARF on CRF   PAF w/RVR, on amiodarone, acute CVA with Lt hemiparesis    A/P: Persistent Afib with difficult to control rates  Pt failed rhythm control, also due to multiple comorbidities, pt is not a candidate for AFib ablation  Recommend AVJ ablation with leadless PPM implant for rate control   r/b/i/a d/w pt & family (Rosalinda), sister like to clarify pt's cancer status & GOC prior to make any invasive procedure  Continue eliquis  5 mg PO BID, will switch to IV heparin if pt/family agree with procedure, also pt will be off anticoagulation for 24 hrs post PPM   d/c  IV cardizem drip/continue po cardizem 90 mg Q6H  Continue Amiodarone 200mg PO daily  IV metoprolol 2.5mg PRN for HR>130  Please call EP once pt/family agree with procedure  d/w /hospitalist 68 yo F with hx of learning disability, JUSTINO on CKD, DM II, HTN, A-fib s/p DCCV with h/o poor response to medications, also has ovarian and uterine CA, recent diagnosis of Splenic CA, CT Lumbar shows moderate stenosis and DDD, recent UTI,PMHx, presented with Lt side weakness/slurred speech, has acute CVA.  PAF on eliquis/amio/cardizem/toprol, s/p DCCV on 4/1/24, 4/22/24, found to have AF w.RVR on IV cardizem drip.  Also has acute anemia due to thigh hematoma, s/p UTI on antibiotics, now ARF on CRF   PAF w/RVR, on amiodarone, acute CVA with Lt hemiparesis    A/P: Paroxysmal Afib with difficult to control rates  Pt failed rhythm control, also due to multiple comorbidities, pt is not a candidate for AFib ablation  Recommend AVJ ablation with leadless PPM implant for rate control   r/b/i/a d/w pt & family (Rosalinda), sister like to clarify pt's cancer status & GOC prior to make any invasive procedure  Continue eliquis  5 mg PO BID, will switch to IV heparin if pt/family agree with procedure, also pt will be off anticoagulation for 24 hrs post PPM   d/c  IV cardizem drip/continue po cardizem 90 mg Q6H  Continue Amiodarone 200mg PO daily  IV metoprolol 2.5mg PRN for HR>130  Please call EP once pt/family agree with procedure  d/w /hospitalist

## 2024-06-27 NOTE — PROGRESS NOTE ADULT - SUBJECTIVE AND OBJECTIVE BOX
Pt seen, c/o bilateral foot pain      MEDICATIONS  (STANDING):  aMIOdarone    Tablet 200 milliGRAM(s) Oral daily  apixaban 5 milliGRAM(s) Oral two times a day  atorvastatin 80 milliGRAM(s) Oral at bedtime  clopidogrel Tablet 75 milliGRAM(s) Oral daily  diltiazem    Tablet 90 milliGRAM(s) Oral every 6 hours  ezetimibe 10 milliGRAM(s) Oral daily    MEDICATIONS  (PRN):  acetaminophen     Tablet .. 650 milliGRAM(s) Oral every 6 hours PRN Mild Pain (1 - 3)  metoprolol tartrate Injectable 2.5 milliGRAM(s) IV Push every 6 hours PRN HR>130  ondansetron Injectable 4 milliGRAM(s) IV Push every 6 hours PRN Nausea and/or Vomiting  traMADol 25 milliGRAM(s) Oral every 8 hours PRN Moderate Pain (4 - 6)      ROS  No fever, sweats, chills       Vital Signs Last 24 Hrs  T(C): 36.3 (27 Jun 2024 08:50), Max: 36.7 (27 Jun 2024 05:30)  T(F): 97.3 (27 Jun 2024 08:50), Max: 98.1 (27 Jun 2024 05:30)  HR: 103 (27 Jun 2024 08:50) (71 - 111)  BP: 112/81 (27 Jun 2024 08:50) (112/35 - 135/93)  BP(mean): 88 (27 Jun 2024 08:50) (88 - 102)  RR: 20 (27 Jun 2024 08:50) (18 - 20)  SpO2: 98% (27 Jun 2024 08:50) (91% - 98%)    Parameters below as of 27 Jun 2024 08:50  Patient On (Oxygen Delivery Method): room air        PE  NAD  Awake, alert     No rash grossly  FROM                          8.4    10.62 )-----------( 398      ( 27 Jun 2024 08:10 )             26.0       06-27    138  |  108  |  41<H>  ----------------------------<  144<H>  4.6   |  23  |  2.23<H>    Ca    8.5      27 Jun 2024 08:10

## 2024-06-27 NOTE — PROGRESS NOTE ADULT - SUBJECTIVE AND OBJECTIVE BOX
Chief Complaint: Sudden onset worsening of left sided weakness of LLE and LUE during PT also associated with slurred speech     Interval Hx: Patient seen and examined. Resting comfortably. Converted to SR. Oncology consulted given new finding of splenic mass lesion. Family inquiring re prognosis.    ROS: Multi system review is bit limited due to patient's neurocognitive impairment    Vitals:  T(F): 99 (27 Jun 2024 15:56), Max: 99 (27 Jun 2024 15:56)  HR: 93 (27 Jun 2024 15:56) (71 - 103)  BP: 141/80 (27 Jun 2024 15:56) (112/35 - 141/80)  RR: 19 (27 Jun 2024 15:56) (19 - 20)  SpO2: 97% (27 Jun 2024 15:56) (91% - 98%) on room air    Exam:  Gen: Comfortable  HEENT: NCAT PERRL EOMI MMM clear oropharynx  Neck: Supple, no LAD  CVS: s1 s2 normal, regular, rate ~80  Chest: Normal resp effort at rest, lungs CTA B/L  Abd: +BS, soft, NT ND   Ext: No tenderness, intact peripheral pulses, normal cap refill  Skin: Warm, dry  Neuro: Awake and alert, answers simple questions, follows simple commands    Labs:                        8.4    10.62 )-------( 398                 26.0     Iron 21  TIBC 191  Iron sat 11%  Ferritin 672        138  |  108  |  41  ----------------------<  144  4.6   |   23   |  2.2    Ca    8.5          Troponin  negative    ProBNP 2497     A1c 5.9     LDL 60    UA 6/20: Yellow, cloudy, mod LE, N+, 13 WBC, 6 RBC, many bact    Micro:  Urine culture 6/21: 10,000-49,000 CFU/mL E.coli pan-S  Blood culture 6/21: Negative     Imaging:  CT B/L LE WO 6/20: Acute-appearing intramuscular and subcutaneous hematomas within the proximal L thigh. Indeterminate but chronic appearing mixed attenuation lesion along the lateral margin of the R hamstring musculature within the mid R thigh, possibly related to a subacute to chronic hematoma.     MR head WO 6/20: Mild diffuse parenchymal volume loss. Mild chronic microvascular ischemic changes. Small chronic infarcts noted in the right MCA territory Acute infarcts in the medial posterior right frontal parietal region, right parietal lobe and the watershed territory of right anterior cerebral artery and middle cerebral artery in the right frontal and parietal lobes. Largest infarct measures approximately 1.4 x 0.8 cm in the right parietal lobe. Additional nonspecific subcentimeter high diffusion signal with ADC map restriction noted in the anterior corpus callosum just right of midline. This could be related to small acute infarct. Subcentimeter high diffusion signals also noted in the medial left frontal lobe possibly related to subacute infarcts. There is no acute parenchymal hemorrhage, or midline shift. There is no extra-axial fluid collection.  There is no hydrocephalus. Partial empty sella noted. Mild mucosal thickening paranasal sinuses.    CT abdomen and pelvis W/ 6/20: Nodular masslike appearance of the spleen. There is a small ill-defined intramuscular hematoma in the left upper thigh medial compartment.    CT chest W/ 6/20: No acute pulmonary embolism to the lobar level. Limited assessment of segmental and subsegmental branches. Cardiomegaly with mild perihilar groundglass opacities which may represent mild pulmonary edema or an infectious process.    CTA head and neck 6/20: Bilateral vertebral arteries patent. No significant stenosis, occlusion or dissection bilateral extracranial carotid arteries. Severe focal stenosis right P3 segment origin. Moderate stenosis distal cavernous and supraclinoid segments right internal carotid artery. 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 Focal occlusion with reconstitution left proximal A2 segment centered 1-2 cm distal to its origin. Additional findings, as above, including an approximate 6 cm left thyroid mass, resulting in regional mass effect. Recommend nonemergent thyroid ultrasound.    CT perfusion 6/20: No predicted core infarct. Evidence of active ischemia-tissue at risk R posterior frontal-parietal region.    CT head 6/20: Compared with 4/19/2024. No evidence of acute hemorrhage, extra-axial collection or mass effect. Multiple chronic right frontal infarcts as well as findings suspicious for acute ischemia right superior frontal gyrus, as above.    Cardiac Testing:  TTE 6/21: Technically difficult image quality. Left ventricular systolic function is hyperdynamic with an ejection fraction estimated at 70 to 75 %. Normal right ventricular cavity size and normal systolic function. The left atrium is mildly dilated. Agitated saline injection was negative for intracardiac shunt. Moderate pulmonary hypertension.    Meds:  MEDICATIONS  (STANDING):  aMIOdarone    Tablet 200 milliGRAM(s) Oral daily  apixaban 5 milliGRAM(s) Oral two times a day  atorvastatin 80 milliGRAM(s) Oral at bedtime  clopidogrel Tablet 75 milliGRAM(s) Oral daily  diltiazem    Tablet 90 milliGRAM(s) Oral every 6 hours  ezetimibe 10 milliGRAM(s) Oral daily    MEDICATIONS  (PRN):  acetaminophen     Tablet .. 650 milliGRAM(s) Oral every 6 hours PRN Mild Pain (1 - 3)  metoprolol tartrate Injectable 2.5 milliGRAM(s) IV Push every 6 hours PRN HR>130  ondansetron Injectable 4 milliGRAM(s) IV Push every 6 hours PRN Nausea and/or Vomiting  traMADol 25 milliGRAM(s) Oral every 8 hours PRN Moderate Pain (4 - 6)

## 2024-06-27 NOTE — PROGRESS NOTE ADULT - SUBJECTIVE AND OBJECTIVE BOX
NEPHROLOGY INTERVAL HPI/OVERNIGHT EVENTS:    Date of Service: 06-27-24 @ 14:59    6/27--resting comfortably.  no distress.       HPI:  70 yo woman with PMHX of DM, HTN, learning disability, A fib post DCCV x2 on 4/1 and 4/22/2024, and recent dx of Splenic CA admitted with left sided weakness on 6/20.  IN ED, CTA of brain c/w acute ischemia in right superior frontal gyrus.  CT with ivc revealed spleen nodular masses.  Creat 1.46 on admission with previous episode of  JUSTINO in 4/2024, creat 1.54 on d/c, 4/27.  Creat peaked at 3.46, with improvement last 2 days.    PMHX and PSHX.  --DM  --HTN  --Hx of Ovarian and Uterine CA--post surgery and chemo in 2004.  --Hx of Arthritis  --R TKR  --A FIB post DCCV  --Hx of learning disability    Home Medications:   * Patient Currently Takes Medications as of 27-Apr-2024 15:06 documented in Structured Notes  · 	cefuroxime 250 mg oral tablet: 1 tab(s) orally every 12 hours continue till 04/30/24  · 	aspirin 81 mg oral delayed release tablet: 1 tab(s) orally once a day  · 	ezetimibe 10 mg oral tablet: 1 tab(s) orally once a day  · 	amiodarone 200 mg oral tablet: 1 tab(s) orally once a day  · 	DilTIAZem (Eqv-Cardizem CD) 120 mg/24 hours oral capsule, extended release: 1 cap(s) orally once a day  · 	Toprol- mg oral tablet, extended release: 1 tab(s) orally once a day  · 	atorvastatin 40 mg oral tablet: 1 tab(s) orally once a day  · 	Eliquis 5 mg oral tablet: 1 tab(s) orally 2 times a day    MEDICATIONS  (STANDING):  aMIOdarone    Tablet 200 milliGRAM(s) Oral daily  apixaban 5 milliGRAM(s) Oral two times a day  atorvastatin 80 milliGRAM(s) Oral at bedtime  clopidogrel Tablet 75 milliGRAM(s) Oral daily  diltiazem    Tablet 90 milliGRAM(s) Oral every 6 hours  ezetimibe 10 milliGRAM(s) Oral daily    MEDICATIONS  (PRN):  acetaminophen     Tablet .. 650 milliGRAM(s) Oral every 6 hours PRN Mild Pain (1 - 3)  metoprolol tartrate Injectable 2.5 milliGRAM(s) IV Push every 6 hours PRN HR>130  ondansetron Injectable 4 milliGRAM(s) IV Push every 6 hours PRN Nausea and/or Vomiting  traMADol 25 milliGRAM(s) Oral every 8 hours PRN Moderate Pain (4 - 6)    Vital Signs Last 24 Hrs  T(C): 36.3 (27 Jun 2024 08:50), Max: 36.7 (27 Jun 2024 05:30)  T(F): 97.3 (27 Jun 2024 08:50), Max: 98.1 (27 Jun 2024 05:30)  HR: 103 (27 Jun 2024 08:50) (71 - 111)  BP: 112/81 (27 Jun 2024 08:50) (112/35 - 135/93)  BP(mean): 88 (27 Jun 2024 08:50) (88 - 102)  RR: 20 (27 Jun 2024 08:50) (18 - 20)  SpO2: 98% (27 Jun 2024 08:50) (91% - 98%)    Parameters below as of 27 Jun 2024 08:50  Patient On (Oxygen Delivery Method): room air    06-26 @ 07:01  -  06-27 @ 07:00  --------------------------------------------------------  IN: 0 mL / OUT: 500 mL / NET: -500 mL    PHYSICAL EXAM:  GENERAL: no distress  CHEST/LUNG: fair air entry  HEART: S1S2 RRR  ABDOMEN: soft  EXTREMITIES: no edema  SKIN:     LABS:                        8.4    10.62 )-----------( 398      ( 27 Jun 2024 08:10 )             26.0     06-27    138  |  108  |  41<H>  ----------------------------<  144<H>  4.6   |  23  |  2.23<H>    Ca    8.5      27 Jun 2024 08:10        Urinalysis Basic - ( 27 Jun 2024 08:10 )    Color: x / Appearance: x / SG: x / pH: x  Gluc: 144 mg/dL / Ketone: x  / Bili: x / Urobili: x   Blood: x / Protein: x / Nitrite: x   Leuk Esterase: x / RBC: x / WBC x   Sq Epi: x / Non Sq Epi: x / Bacteria: x              RADIOLOGY & ADDITIONAL TESTS:

## 2024-06-27 NOTE — PROGRESS NOTE ADULT - ASSESSMENT
69-year-old female with CKD diabetes hypertension persistent A-fib recurrence of A-fib despite Prior cardioversion and amiodarone hx of multiple cancer as above unfortunately patient has acute CVA left-sided weakness.  Patient also has acute anemia likely related to hematoma status post RBC transfusion.  She has a hx of recent splenic mass likely c/w malignancy    1) Splenic mass  likely c/w malignancy however no tissue ever obtained  there was outpt plan for splenectomy  pt received vaccines however no notes mention splenectomy  can consider IR guided bx of splenic mass to help clarify diagnosis and prognosis       2) Afib  mgmt per cardiology  discussions about ppm  very hard to give prognosis without tissue diagnosis    3) Anemia  from hematoma  monitor hgb   transfuse for hgb < 7    Thank you for the courtesy of this consultation and we will continue to follow.    Lamonte Turcios MD  [facility]  Cell: [phone]

## 2024-06-27 NOTE — PROGRESS NOTE ADULT - ASSESSMENT
69-year-old female with DM, HTN, afib, CKD, hx of multiple cancer diagnosis, admitted for neurological changes found to have an acute CVA, also found to have acute anemia likely related to hematoma status post RBC transfusion, splenic mass concerning for malignancy, and persisting afib that has been difficult to control at times.     Acute infarcts in the R hemisphere and possible L.    Likely embolic. Continue Eliquis as before for AFib. Plavix 75mg daily. Atorvastatin.     Persistent Afib with difficult to control rates  Patient failed rhythm control. EP recommended AVJ ablation with leadless PPM implant for rate control. Family wants to clarify cancer status prior to agreeing to invasive procedure. Rate control with Cardizem 90mg PO q6h. Amiodarone 200mg daily. IV metoprolol 2.5mg PRN for HR>130. Continue Eliquis  5 mg PO BID, will switch to IV heparin if pt/family agree with procedure, also pt will be off anticoagulation for 24 hrs post PPM.     JUSTINO on CKD  Appreciate input from Nephrology. JUSTINO/KHAI, slowly improving. Fluid and electrolytes stable. Continue to monitor    Splenic mass  Appreciate Hematology Oncology input. Likely c/w malignancy however no tissue ever obtained. There was outpatient plan for splenectomy. Patient received vaccines however no notes mention splenectomy. Oncology noted that consideration can be given to IR guided bx of splenic mass to help clarify diagnosis and prognosis    Anemia  Likely from hematoma, see below. Monitor hgb. Transfuse for hgb < 7    Thigh hematomas  On CT, suggestion of acute-appearing intramuscular and subcutaneous hematomas within the proximal L thigh. Indeterminate but chronic appearing mixed attenuation lesion along the lateral margin of the R hamstring musculature within the mid R thigh, possibly related to a subacute to chronic hematoma. Radiology recommended short-term interval follow-up MRI of bilateral thighs without and with contrast in 8-12 weeks to demonstrate resolution/improvement of these findings and to rule out an underlying mass/neoplasm.   69-year-old female with DM, HTN, afib, CKD, hx of multiple cancer diagnosis, admitted for neurological changes found to have an acute CVA, also found to have acute anemia likely related to hematoma status post RBC transfusion, splenic mass concerning for malignancy, and persisting afib that has been difficult to control at times.     Acute infarcts in the R hemisphere and possible L.    Likely embolic. Continue Eliquis as before for AFib. Plavix 75mg daily. Atorvastatin.     Persistent Afib with difficult to control rates  Patient failed rhythm control. EP recommended AVJ ablation with leadless PPM implant for rate control. Family wants to clarify cancer status prior to agreeing to invasive procedure. Rate control with Cardizem 90mg PO q6h. Amiodarone 200mg daily. IV metoprolol 2.5mg PRN for HR>130. Continue Eliquis  5 mg PO BID, will switch to IV heparin if pt/family agree with procedure, also pt will be off anticoagulation for 24 hrs post PPM.     JUSTINO on CKD  Appreciate input from Nephrology. JUSTINO/KHAI, slowly improving. Fluid and electrolytes stable. Continue to monitor    Splenic mass  Appreciate Hematology Oncology input. Likely c/w malignancy however no tissue ever obtained. There was outpatient plan for splenectomy. Patient received vaccines however no notes mention splenectomy. Oncology noted that consideration can be given to IR guided bx of splenic mass to help clarify diagnosis and prognosis    Anemia  Likely from hematoma, see below. Monitor hgb. Transfuse for hgb < 7    Thigh hematomas  On CT, suggestion of acute-appearing intramuscular and subcutaneous hematomas within the proximal L thigh. Indeterminate but chronic appearing mixed attenuation lesion along the lateral margin of the R hamstring musculature within the mid R thigh, possibly related to a subacute to chronic hematoma. Radiology recommended short-term interval follow-up MRI of bilateral thighs without and with contrast in 8-12 weeks to demonstrate resolution/improvement of these findings and to rule out an underlying mass/neoplasm.      52 minutes spent on total encounter. The necessity of the time spent during the encounter on this date of service was due to reviewing patient data in the medical chart, seeing and evaluating patient at bedside, discussing plan of care with patient, coordinating care with specialty services such as Cardiac Electrophysiology, Oncology, and Nephrology, ordering tests and medications, and documenting today's findings and care plan.

## 2024-06-27 NOTE — PROGRESS NOTE ADULT - ASSESSMENT
70 yo woman with PMHX of DM, HTN, learning disability, A fib post DCCV x2 on 4/1 and 4/22/2024, and recent dx of Splenic CA admitted with left sided weakness on 6/20.  --JUSTINO with unclear baseline CKD.    C/w ATN/KHAI : now slowly improving.    Would be cautious with contrast studies in the future.    Meds reviewed and acceptable.  --Electrolytes and fluid status stable.  --HTN : well controlled.  --A FIB : continue CCB /Amiodarone for rate control  --Spleen mass : c/w malignancy : further intervention to be determined.    6/27 SY  --JUSTINO/KHAI :  slowly improving.  --Fluid and electrolytes stable.  --A FIB : evaluation for ablation and PPM in progress --awaiting for prognosis from Onc.  --Spleen mass : c/w malignancy but without tissue diagnosis.  ? prognosis.

## 2024-06-28 LAB
ANION GAP SERPL CALC-SCNC: 6 MMOL/L — SIGNIFICANT CHANGE UP (ref 5–17)
APTT BLD: 32.1 SEC — SIGNIFICANT CHANGE UP (ref 24.5–35.6)
BUN SERPL-MCNC: 39 MG/DL — HIGH (ref 7–23)
CALCIUM SERPL-MCNC: 8.7 MG/DL — SIGNIFICANT CHANGE UP (ref 8.5–10.1)
CHLORIDE SERPL-SCNC: 107 MMOL/L — SIGNIFICANT CHANGE UP (ref 96–108)
CO2 SERPL-SCNC: 23 MMOL/L — SIGNIFICANT CHANGE UP (ref 22–31)
CREAT SERPL-MCNC: 2.02 MG/DL — HIGH (ref 0.5–1.3)
EGFR: 26 ML/MIN/1.73M2 — LOW
GLUCOSE SERPL-MCNC: 138 MG/DL — HIGH (ref 70–99)
HCT VFR BLD CALC: 26 % — LOW (ref 34.5–45)
HGB BLD-MCNC: 8.3 G/DL — LOW (ref 11.5–15.5)
MCHC RBC-ENTMCNC: 28.7 PG — SIGNIFICANT CHANGE UP (ref 27–34)
MCHC RBC-ENTMCNC: 31.9 GM/DL — LOW (ref 32–36)
MCV RBC AUTO: 90 FL — SIGNIFICANT CHANGE UP (ref 80–100)
PLATELET # BLD AUTO: 425 K/UL — HIGH (ref 150–400)
POTASSIUM SERPL-MCNC: 4.6 MMOL/L — SIGNIFICANT CHANGE UP (ref 3.5–5.3)
POTASSIUM SERPL-SCNC: 4.6 MMOL/L — SIGNIFICANT CHANGE UP (ref 3.5–5.3)
RBC # BLD: 2.89 M/UL — LOW (ref 3.8–5.2)
RBC # FLD: 14.8 % — HIGH (ref 10.3–14.5)
SODIUM SERPL-SCNC: 136 MMOL/L — SIGNIFICANT CHANGE UP (ref 135–145)
WBC # BLD: 10.16 K/UL — SIGNIFICANT CHANGE UP (ref 3.8–10.5)
WBC # FLD AUTO: 10.16 K/UL — SIGNIFICANT CHANGE UP (ref 3.8–10.5)

## 2024-06-28 PROCEDURE — 99232 SBSQ HOSP IP/OBS MODERATE 35: CPT

## 2024-06-28 RX ORDER — HEPARIN SODIUM 50 [USP'U]/ML
6500 INJECTION, SOLUTION INTRAVENOUS ONCE
Refills: 0 | Status: COMPLETED | OUTPATIENT
Start: 2024-06-28 | End: 2024-06-28

## 2024-06-28 RX ORDER — HEPARIN SODIUM 50 [USP'U]/ML
INJECTION, SOLUTION INTRAVENOUS
Qty: 25000 | Refills: 0 | Status: DISCONTINUED | OUTPATIENT
Start: 2024-06-28 | End: 2024-06-30

## 2024-06-28 RX ORDER — HEPARIN SODIUM 50 [USP'U]/ML
6500 INJECTION, SOLUTION INTRAVENOUS EVERY 6 HOURS
Refills: 0 | Status: DISCONTINUED | OUTPATIENT
Start: 2024-06-28 | End: 2024-06-30

## 2024-06-28 RX ORDER — HEPARIN SODIUM 50 [USP'U]/ML
3000 INJECTION, SOLUTION INTRAVENOUS EVERY 6 HOURS
Refills: 0 | Status: DISCONTINUED | OUTPATIENT
Start: 2024-06-28 | End: 2024-06-30

## 2024-06-28 RX ORDER — CEFAZOLIN 10 G/1
2000 INJECTION, POWDER, FOR SOLUTION INTRAVENOUS ONCE
Refills: 0 | Status: COMPLETED | OUTPATIENT
Start: 2024-07-01 | End: 2024-07-01

## 2024-06-28 RX ADMIN — DILTIAZEM HYDROCHLORIDE 90 MILLIGRAM(S): 240 CAPSULE, EXTENDED RELEASE ORAL at 18:46

## 2024-06-28 RX ADMIN — DILTIAZEM HYDROCHLORIDE 90 MILLIGRAM(S): 240 CAPSULE, EXTENDED RELEASE ORAL at 00:23

## 2024-06-28 RX ADMIN — DILTIAZEM HYDROCHLORIDE 90 MILLIGRAM(S): 240 CAPSULE, EXTENDED RELEASE ORAL at 15:40

## 2024-06-28 RX ADMIN — DILTIAZEM HYDROCHLORIDE 90 MILLIGRAM(S): 240 CAPSULE, EXTENDED RELEASE ORAL at 05:36

## 2024-06-28 RX ADMIN — TRAMADOL HYDROCHLORIDE 25 MILLIGRAM(S): 50 TABLET, FILM COATED ORAL at 03:33

## 2024-06-28 RX ADMIN — CLOPIDOGREL BISULFATE 75 MILLIGRAM(S): 75 TABLET, FILM COATED ORAL at 15:39

## 2024-06-28 RX ADMIN — ATORVASTATIN CALCIUM 80 MILLIGRAM(S): 20 TABLET, FILM COATED ORAL at 22:12

## 2024-06-28 RX ADMIN — HEPARIN SODIUM 6500 UNIT(S): 50 INJECTION, SOLUTION INTRAVENOUS at 18:35

## 2024-06-28 RX ADMIN — HEPARIN SODIUM 1500 UNIT(S)/HR: 50 INJECTION, SOLUTION INTRAVENOUS at 18:39

## 2024-06-28 RX ADMIN — AMIODARONE HYDROCHLORIDE 200 MILLIGRAM(S): 50 INJECTION, SOLUTION INTRAVENOUS at 15:39

## 2024-06-28 RX ADMIN — TRAMADOL HYDROCHLORIDE 25 MILLIGRAM(S): 50 TABLET, FILM COATED ORAL at 04:00

## 2024-06-28 RX ADMIN — EZETIMIBE 10 MILLIGRAM(S): 10 TABLET ORAL at 15:39

## 2024-06-28 RX ADMIN — HEPARIN SODIUM 1500 UNIT(S)/HR: 50 INJECTION, SOLUTION INTRAVENOUS at 19:54

## 2024-06-28 NOTE — PROGRESS NOTE ADULT - ASSESSMENT
69-year-old female with CKD diabetes hypertension persistent A-fib recurrence of A-fib despite Prior cardioversion and amiodarone hx of multiple cancer as above unfortunately patient has acute CVA left-sided weakness.  Patient also has acute anemia likely related to hematoma status post RBC transfusion.  She has a hx of recent splenic mass likely c/w malignancy    1) Splenic mass  likely c/w malignancy however no tissue ever obtained  there was outpt plan for splenectomy  pt received vaccines however no notes mention splenectomy  will obtain images to make comparison to [facility] imaging to see if any growth  ok from hematologic standpoint to pursue pacemaker       2) Afib  mgmt per cardiology  discussions about ppm  very hard to give prognosis without tissue diagnosis    3) Anemia  from hematoma  monitor hgb   transfuse for hgb < 7    Thank you for the courtesy of this consultation and we will continue to follow.    Lamonte Turcios MD  [facility]  Cell: [phone]

## 2024-06-28 NOTE — PROGRESS NOTE ADULT - ASSESSMENT
70 yo F with above PMHx, presented with Lt side weakness/slurred speech, has acute CVA.  PAF on eliquis/amio/cardizem/toprol, s/p DCCV on 4/1/24, 4/22/24, found to have AF w.RVR was on IV cardizem drip.  Also has acute anemia due to thigh hematoma, s/p UTI on antibiotics, now ARF on CRF   PAF w/RVR, on amiodarone, acute CVA with Lt hemiparesis    A/P: Paroxysmal Afib with difficult to control rates  Pt failed rhythm control, also due to multiple comorbidities, pt is not a candidate for AFib ablation  Recommend AVJ ablation with leadless PPM implant for rate control   r/b/i/a d/w pt & family (Rosalinda), now agree with procedure  d/c eliquis after AM dose, change to full dose of IV heparin today till 6AM on Monday (7/1/24)  keep pt NPO post MN on Sunday (6/30/24)  Continue po cardizem 90 mg Q6H  Continue Amiodarone 200mg PO daily  IV metoprolol 2.5mg q6hrs PRN  Plan d/w pt/family/ Dr. Lin/hospitalist

## 2024-06-28 NOTE — PROGRESS NOTE ADULT - PROBLEM SELECTOR PROBLEM 1
CVA (cerebrovascular accident)
Chronic atrial fibrillation

## 2024-06-28 NOTE — PROGRESS NOTE ADULT - SUBJECTIVE AND OBJECTIVE BOX
HPI: 70 yo female with hx of learning disability, JUSTINO on CKD, DM II, HTN, A-fib s/p DCCV with h/o poor response to medications, also has ovarian and uterine CA, recent diagnosis of Splenic CA, CT Lumbar shows moderate stenosis and DDD, recent UTI, who presents as a code stroke due to sudden onset worsening of left sided weakness of LLE and LUE during PT also associated with slurred speech which has sinced resolved. Arrived with NIHSS of 8 with unknown LKW.  History taken from EMR, ED MD and pt sister at bedside.  (20 Jun 2024 21:01)      Cardiac PET ST (March 2024) no ischemia, LVEF 45%  echo (6/21/2024) LVEF 70%, mildly dilated LA, mod pul HTN    6/24/24:  pt is resting in the bed, denies chest pain/SOB/palpitations  remains Lt side weakness  tele: Afib 's, occasional upto 130's bpm    6/25/24: pt seen resting in bed in NAD, she denies any SOB, CP, dizziness or palpitations.  Left side weakness, can only move left hand  TELE: Afib  bpm, occasional RVRs to 130's    6/26/24: TELE: AFib  bpm, RVRs to 120-130's.  Cardizem gtt is at 5mg/hr.  She denies symptoms.    6/28/24: pt is resting in the bed, denies chest pain/SOB/palpitations  Tele: AFib 100-130's bpm    ROS: All other ROS is negative unless indicated above.      Physical Exam:  Vital Signs Last 24 Hrs  T(C): 36.9 (28 Jun 2024 08:21), Max: 37.3 (27 Jun 2024 23:34)  T(F): 98.5 (28 Jun 2024 08:21), Max: 99.1 (27 Jun 2024 23:34)  HR: 81 (28 Jun 2024 08:21) (81 - 108)  BP: 108/91 (28 Jun 2024 08:21) (108/91 - 143/80)  BP(mean): 98 (28 Jun 2024 08:21) (98 - 98)  RR: 18 (28 Jun 2024 08:21) (18 - 20)  SpO2: 96% (28 Jun 2024 08:21) (92% - 97%)    Parameters below as of 28 Jun 2024 08:21  Patient On (Oxygen Delivery Method): room air      Constitutional: well developed,  no deformities and no acute distress    Neurological: Alert & Oriented x 3,(+) Lt side hemiparesis    HEENT: NC/AT, PERRLA, EOMI,  Neck supple.    Respiratory: CTA B/L, No wheezing/crackles/rhonchi    Cardiovascular: (+) irregular S1 & S2,     Gastrointestinal: soft, NT, nondistended, (+) BS    Genitourinary: non distended bladder, voiding freely    Extremities: No pedal edema, No clubbing, No cyanosis      Allergies    Bee stings (Swelling)  No Known Drug Allergies    Intolerances      MEDICATIONS  (STANDING):  aMIOdarone    Tablet 200 milliGRAM(s) Oral daily  apixaban 5 milliGRAM(s) Oral two times a day  atorvastatin 80 milliGRAM(s) Oral at bedtime  clopidogrel Tablet 75 milliGRAM(s) Oral daily  diltiazem    Tablet 90 milliGRAM(s) Oral every 6 hours  ezetimibe 10 milliGRAM(s) Oral daily    MEDICATIONS  (PRN):  acetaminophen     Tablet .. 650 milliGRAM(s) Oral every 6 hours PRN Mild Pain (1 - 3)  metoprolol tartrate Injectable 2.5 milliGRAM(s) IV Push every 6 hours PRN HR>130  ondansetron Injectable 4 milliGRAM(s) IV Push every 6 hours PRN Nausea and/or Vomiting  traMADol 25 milliGRAM(s) Oral every 8 hours PRN Moderate Pain (4 - 6)    LABS:                        8.3    10.16 )-----------( 425      ( 28 Jun 2024 08:09 )             26.0     06-28    136  |  107  |  39<H>  ----------------------------<  138<H>  4.6   |  23  |  2.02<H>    Ca    8.7      28 Jun 2024 08:09        Urinalysis Basic - ( 28 Jun 2024 08:09 )    Color: x / Appearance: x / SG: x / pH: x  Gluc: 138 mg/dL / Ketone: x  / Bili: x / Urobili: x   Blood: x / Protein: x / Nitrite: x   Leuk Esterase: x / RBC: x / WBC x   Sq Epi: x / Non Sq Epi: x / Bacteria: x

## 2024-06-28 NOTE — PROGRESS NOTE ADULT - ASSESSMENT
70 yo woman with PMHX of DM, HTN, learning disability, A fib post DCCV x2 on 4/1 and 4/22/2024, and recent dx of Splenic CA admitted with left sided weakness on 6/20.  --JUSTINO with unclear baseline CKD.    C/w ATN/KHAI : now slowly improving.    Would be cautious with contrast studies in the future.    Meds reviewed and acceptable.  --Electrolytes and fluid status stable.  --HTN : well controlled.  --A FIB : continue CCB /Amiodarone for rate control  --Spleen mass : c/w malignancy : further intervention to be determined.    6/27 SY  --JUSTINO/KHAI :  slowly improving.  --Fluid and electrolytes stable.  --A FIB : evaluation for ablation and PPM in progress --awaiting for prognosis from Onc.  --Spleen mass : c/w malignancy but without tissue diagnosis.  ? prognosis. 70 yo woman with PMHX of DM, HTN, learning disability, A fib post DCCV x2 on 4/1 and 4/22/2024, and recent dx of Splenic CA admitted with left sided weakness on 6/20.  --JUSTINO with unclear baseline CKD.    C/w ATN/KHAI : now slowly improving.    Would be cautious with contrast studies in the future.    Meds reviewed and acceptable.  --Electrolytes and fluid status stable.  --HTN : well controlled.  --A FIB : continue CCB /Amiodarone for rate control  --Spleen mass : c/w malignancy : further intervention to be determined.    6/27 SY  --JUSTINO/KHAI :  slowly improving.  --Fluid and electrolytes stable.  --A FIB : evaluation for ablation and PPM in progress --awaiting for prognosis from Onc.  --Spleen mass : c/w malignancy but without tissue diagnosis.  ? prognosis.    6/28  Pt w JUSTINO/ KHAI gradually improving  Splenic malignancy hx  A Fib  Anemia, Electrolytes stable  vitals stable  Dr Chang on call if needed

## 2024-06-28 NOTE — PROGRESS NOTE ADULT - ASSESSMENT
69-year-old female with DM, HTN, afib, CKD, hx of multiple cancer diagnosis, admitted for neurological changes found to have an acute CVA, also found to have acute anemia likely related to hematoma status post RBC transfusion, splenic mass concerning for malignancy, and persisting afib that has been difficult to control at times.     Acute infarcts in the R hemisphere and possible L.    Likely embolic. Continue Eliquis as before for AFib but will hold Eliquis as of tonight and switch over to IV heparin drip in anticipation of AVJ ablation and pacemaker insertion Monday 7/1. OK to continue Plavix 75mg daily. Atorvastatin at bedtime.     Persistent Afib with difficult to control rates  Patient failed rhythm control. EP recommended AVJ ablation with leadless PPM implant for rate control and HCP is in agreement with procedure. Now planned for Mon 7/1. Rate control with Cardizem 90mg PO q6h. Amiodarone 200mg daily. IV metoprolol 2.5mg PRN for HR>130. Hold Eliquis as of tonight and switch over to IV heparin drip in anticipation of procedure.     JUSTINO on CKD  Appreciate input from Nephrology. JUSTINO/KHAI, slowly improving. Fluid and electrolytes stable. Continue to monitor    Splenic mass  Appreciate Hematology Oncology input. Likely c/w malignancy however no tissue ever obtained. There was outpatient plan for splenectomy. Patient received vaccines however no notes mention splenectomy. Oncology noted that consideration can be given to IR guided bx of splenic mass to help clarify diagnosis and prognosis. HCP wishes to defer.     Anemia  Likely from hematoma, see below. Monitor hgb. Transfuse for hgb < 7    Thigh hematomas  On CT, suggestion of acute-appearing intramuscular and subcutaneous hematomas within the proximal L thigh. Indeterminate but chronic appearing mixed attenuation lesion along the lateral margin of the R hamstring musculature within the mid R thigh, possibly related to a subacute to chronic hematoma. Radiology recommended short-term interval follow-up MRI of bilateral thighs without and with contrast in 8-12 weeks to demonstrate resolution/improvement of these findings and to rule out an underlying mass/neoplasm.    Deconditioning and debility  PT consulted           69-year-old female with DM, HTN, afib, CKD, hx of multiple cancer diagnosis, admitted for neurological changes found to have an acute CVA, also found to have acute anemia likely related to hematoma status post RBC transfusion, splenic mass concerning for malignancy, and persisting afib that has been difficult to control at times.     Acute infarcts in the R hemisphere and possible L.    Likely embolic. Continue Eliquis as before for AFib but will hold Eliquis as of tonight and switch over to IV heparin drip in anticipation of AVJ ablation and pacemaker insertion Monday 7/1. OK to continue Plavix 75mg daily. Atorvastatin at bedtime.     Persistent Afib with difficult to control rates  Patient failed rhythm control. EP recommended AVJ ablation with leadless PPM implant for rate control and HCP is in agreement with procedure. Now planned for Mon 7/1. Rate control with Cardizem 90mg PO q6h. Amiodarone 200mg daily. IV metoprolol 2.5mg PRN for HR>130. Hold Eliquis as of tonight and switch over to IV heparin drip in anticipation of procedure.     JUSTINO on CKD  Appreciate input from Nephrology. JUSTINO/KHAI, slowly improving. Fluid and electrolytes stable. Continue to monitor    Splenic mass  Appreciate Hematology Oncology input. Likely c/w malignancy however no tissue ever obtained. There was outpatient plan for splenectomy. Patient received vaccines however no notes mention splenectomy. Oncology noted that consideration can be given to IR guided bx of splenic mass to help clarify diagnosis and prognosis. HCP wishes to defer.     Anemia  Likely from hematoma, see below. Monitor hgb. Transfuse for hgb < 7    Thigh hematomas  On CT, suggestion of acute-appearing intramuscular and subcutaneous hematomas within the proximal L thigh. Indeterminate but chronic appearing mixed attenuation lesion along the lateral margin of the R hamstring musculature within the mid R thigh, possibly related to a subacute to chronic hematoma. Radiology recommended short-term interval follow-up MRI of bilateral thighs without and with contrast in 8-12 weeks to demonstrate resolution/improvement of these findings and to rule out an underlying mass/neoplasm.    Deconditioning and debility  PT consulted      Dispo: To be determined pending further clinical assessment. For AVJ ablation and leadless pacemaker implantation Monday 7/1.

## 2024-06-28 NOTE — PROGRESS NOTE ADULT - SUBJECTIVE AND OBJECTIVE BOX
Chief Complaint: Sudden onset worsening of left sided weakness of LLE and LUE during PT also associated with slurred speech     Interval Hx: Patient seen and examined. Resting comfortably. No acute complaints though subjective assessment is somewhat limted by her learning disability. She remains in afib with rates difficult to control. Plan is for AVJ ablation and leadless pacemaker implantation Mon 7/1. HCP spoke with Oncology re splenic mass lesion. HCP deferring further evaluation and management of her mass, possible malignancy.     ROS: Multi system review is bit limited due to patient's neurocognitive impairment    Vitals:  T(F): 98.5 (28 Jun 2024 08:21), Max: 99.1 (27 Jun 2024 23:34)  HR: 81 (28 Jun 2024 08:21) (81 - 108)  BP: 108/91 (28 Jun 2024 08:21) (108/91 - 143/80)  BP(mean): 98 (28 Jun 2024 08:21) (98 - 98)  RR: 18 (28 Jun 2024 08:21) (18 - 20)  SpO2: 96% (28 Jun 2024 08:21) (92% - 97%) on room air    Exam:  Gen: Comfortable  HEENT: NCAT PERRL EOMI MMM clear oropharynx  Neck: Supple, no LAD  CVS: s1 s2 normal, irregular, rate ~100  Chest: Normal resp effort at rest, lungs CTA B/L  Abd: +BS, soft, NT ND   Ext: No tenderness, intact peripheral pulses, normal cap refill  Skin: Warm, dry  Neuro: Awake and alert, answers simple questions, follows simple commands    Labs:                          8.3    10.16 )-------( 425               26.0     Iron 21  TIBC 191  Iron sat 11%  Ferritin 672      136  |  107  |  39  -----------------------<  138  4.6   |   23   |  2.02    Ca  8.7         Troponin  negative    ProBNP 2497     A1c 5.9     LDL 60    UA 6/20: Yellow, cloudy, mod LE, N+, 13 WBC, 6 RBC, many bact    Micro:  Urine culture 6/21: 10,000-49,000 CFU/mL E.coli pan-S  Blood culture 6/21: Negative     Imaging:  CT B/L LE WO 6/20: Acute-appearing intramuscular and subcutaneous hematomas within the proximal L thigh. Indeterminate but chronic appearing mixed attenuation lesion along the lateral margin of the R hamstring musculature within the mid R thigh, possibly related to a subacute to chronic hematoma.     MR head WO 6/20: Mild diffuse parenchymal volume loss. Mild chronic microvascular ischemic changes. Small chronic infarcts noted in the right MCA territory Acute infarcts in the medial posterior right frontal parietal region, right parietal lobe and the watershed territory of right anterior cerebral artery and middle cerebral artery in the right frontal and parietal lobes. Largest infarct measures approximately 1.4 x 0.8 cm in the right parietal lobe. Additional nonspecific subcentimeter high diffusion signal with ADC map restriction noted in the anterior corpus callosum just right of midline. This could be related to small acute infarct. Subcentimeter high diffusion signals also noted in the medial left frontal lobe possibly related to subacute infarcts. There is no acute parenchymal hemorrhage, or midline shift. There is no extra-axial fluid collection.  There is no hydrocephalus. Partial empty sella noted. Mild mucosal thickening paranasal sinuses.    CT abdomen and pelvis W/ 6/20: Nodular masslike appearance of the spleen. There is a small ill-defined intramuscular hematoma in the left upper thigh medial compartment.    CT chest W/ 6/20: No acute pulmonary embolism to the lobar level. Limited assessment of segmental and subsegmental branches. Cardiomegaly with mild perihilar groundglass opacities which may represent mild pulmonary edema or an infectious process.    CTA head and neck 6/20: Bilateral vertebral arteries patent. No significant stenosis, occlusion or dissection bilateral extracranial carotid arteries. Severe focal stenosis right P3 segment origin. Moderate stenosis distal cavernous and supraclinoid segments right internal carotid artery. 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 Focal occlusion with reconstitution left proximal A2 segment centered 1-2 cm distal to its origin. Additional findings, as above, including an approximate 6 cm left thyroid mass, resulting in regional mass effect. Recommend nonemergent thyroid ultrasound.    CT perfusion 6/20: No predicted core infarct. Evidence of active ischemia-tissue at risk R posterior frontal-parietal region.    CT head 6/20: Compared with 4/19/2024. No evidence of acute hemorrhage, extra-axial collection or mass effect. Multiple chronic right frontal infarcts as well as findings suspicious for acute ischemia right superior frontal gyrus, as above.    Cardiac Testing:  TTE 6/21: Technically difficult image quality. Left ventricular systolic function is hyperdynamic with an ejection fraction estimated at 70 to 75 %. Normal right ventricular cavity size and normal systolic function. The left atrium is mildly dilated. Agitated saline injection was negative for intracardiac shunt. Moderate pulmonary hypertension.    Meds:  MEDICATIONS  (STANDING):  aMIOdarone    Tablet 200 milliGRAM(s) Oral daily  apixaban 5 milliGRAM(s) Oral two times a day  atorvastatin 80 milliGRAM(s) Oral at bedtime  clopidogrel Tablet 75 milliGRAM(s) Oral daily  diltiazem    Tablet 90 milliGRAM(s) Oral every 6 hours  ezetimibe 10 milliGRAM(s) Oral daily    MEDICATIONS  (PRN):  acetaminophen     Tablet .. 650 milliGRAM(s) Oral every 6 hours PRN Mild Pain (1 - 3)  metoprolol tartrate Injectable 2.5 milliGRAM(s) IV Push every 6 hours PRN HR>130  ondansetron Injectable 4 milliGRAM(s) IV Push every 6 hours PRN Nausea and/or Vomiting  traMADol 25 milliGRAM(s) Oral every 8 hours PRN Moderate Pain (4 - 6)

## 2024-06-28 NOTE — PROGRESS NOTE ADULT - SUBJECTIVE AND OBJECTIVE BOX
Pt seen, c/o continued leg pain    MEDICATIONS  (STANDING):  aMIOdarone    Tablet 200 milliGRAM(s) Oral daily  apixaban 5 milliGRAM(s) Oral two times a day  atorvastatin 80 milliGRAM(s) Oral at bedtime  clopidogrel Tablet 75 milliGRAM(s) Oral daily  diltiazem    Tablet 90 milliGRAM(s) Oral every 6 hours  ezetimibe 10 milliGRAM(s) Oral daily    MEDICATIONS  (PRN):  acetaminophen     Tablet .. 650 milliGRAM(s) Oral every 6 hours PRN Mild Pain (1 - 3)  metoprolol tartrate Injectable 2.5 milliGRAM(s) IV Push every 6 hours PRN HR>130  ondansetron Injectable 4 milliGRAM(s) IV Push every 6 hours PRN Nausea and/or Vomiting  traMADol 25 milliGRAM(s) Oral every 8 hours PRN Moderate Pain (4 - 6)      ROS  No fever, sweats, chills       Vital Signs Last 24 Hrs  T(C): 36.9 (28 Jun 2024 08:21), Max: 37.3 (27 Jun 2024 23:34)  T(F): 98.5 (28 Jun 2024 08:21), Max: 99.1 (27 Jun 2024 23:34)  HR: 81 (28 Jun 2024 08:21) (81 - 108)  BP: 108/91 (28 Jun 2024 08:21) (108/91 - 143/80)  BP(mean): 98 (28 Jun 2024 08:21) (98 - 98)  RR: 18 (28 Jun 2024 08:21) (18 - 20)  SpO2: 96% (28 Jun 2024 08:21) (92% - 97%)    Parameters below as of 28 Jun 2024 08:21  Patient On (Oxygen Delivery Method): room air        PE  NAD  Awake, alert     No rash grossly  FROM                          8.3    10.16 )-----------( 425      ( 28 Jun 2024 08:09 )             26.0       06-28    136  |  107  |  39<H>  ----------------------------<  138<H>  4.6   |  23  |  2.02<H>    Ca    8.7      28 Jun 2024 08:09

## 2024-06-28 NOTE — CHART NOTE - NSCHARTNOTEFT_GEN_A_CORE
This SW spoke with pts sister Rosalinda to follow up. She reports that pt. is having  pacemaker next week. She would like a meeting with our team with pts other sister as well to further discuss plan moving forward. We will meet on Monday at 9:00AM. Emotional support provided. Our team will continue to follow.

## 2024-06-28 NOTE — PROGRESS NOTE ADULT - SUBJECTIVE AND OBJECTIVE BOX
NEPHROLOGY INTERVAL HPI/OVERNIGHT EVENTS:    Date of Service: 06-27-24 @ 14:59  6/28-   6/27--resting comfortably.  no distress.       HPI:  68 yo woman with PMHX of DM, HTN, learning disability, A fib post DCCV x2 on 4/1 and 4/22/2024, and recent dx of Splenic CA admitted with left sided weakness on 6/20.  IN ED, CTA of brain c/w acute ischemia in right superior frontal gyrus.  CT with ivc revealed spleen nodular masses.  Creat 1.46 on admission with previous episode of  JUSTINO in 4/2024, creat 1.54 on d/c, 4/27.  Creat peaked at 3.46, with improvement last 2 days.    PMHX and PSHX.  --DM  --HTN  --Hx of Ovarian and Uterine CA--post surgery and chemo in 2004.  --Hx of Arthritis  --R TKR  --A FIB post DCCV  --Hx of learning disability    Home Medications:   * Patient Currently Takes Medications as of 27-Apr-2024 15:06 documented in Structured Notes  · 	cefuroxime 250 mg oral tablet: 1 tab(s) orally every 12 hours continue till 04/30/24  · 	aspirin 81 mg oral delayed release tablet: 1 tab(s) orally once a day  · 	ezetimibe 10 mg oral tablet: 1 tab(s) orally once a day  · 	amiodarone 200 mg oral tablet: 1 tab(s) orally once a day  · 	DilTIAZem (Eqv-Cardizem CD) 120 mg/24 hours oral capsule, extended release: 1 cap(s) orally once a day  · 	Toprol- mg oral tablet, extended release: 1 tab(s) orally once a day  · 	atorvastatin 40 mg oral tablet: 1 tab(s) orally once a day  · 	Eliquis 5 mg oral tablet: 1 tab(s) orally 2 times a day      MEDICATIONS  (STANDING):  aMIOdarone    Tablet 200 milliGRAM(s) Oral daily  apixaban 5 milliGRAM(s) Oral two times a day  atorvastatin 80 milliGRAM(s) Oral at bedtime  clopidogrel Tablet 75 milliGRAM(s) Oral daily  diltiazem    Tablet 90 milliGRAM(s) Oral every 6 hours  ezetimibe 10 milliGRAM(s) Oral daily    MEDICATIONS  (PRN):  acetaminophen     Tablet .. 650 milliGRAM(s) Oral every 6 hours PRN Mild Pain (1 - 3)  metoprolol tartrate Injectable 2.5 milliGRAM(s) IV Push every 6 hours PRN HR>130  ondansetron Injectable 4 milliGRAM(s) IV Push every 6 hours PRN Nausea and/or Vomiting  traMADol 25 milliGRAM(s) Oral every 8 hours PRN Moderate Pain (4 - 6)      I&O's Detail    27 Jun 2024 07:01  -  28 Jun 2024 07:00  --------------------------------------------------------  IN:  Total IN: 0 mL    OUT:    Voided (mL): 550 mL  Total OUT: 550 mL    Total NET: -550 mL          Vital Signs Last 24 Hrs  T(C): 36.9 (28 Jun 2024 08:21), Max: 37.3 (27 Jun 2024 23:34)  T(F): 98.5 (28 Jun 2024 08:21), Max: 99.1 (27 Jun 2024 23:34)  HR: 81 (28 Jun 2024 08:21) (81 - 108)  BP: 108/91 (28 Jun 2024 08:21) (108/91 - 143/80)  BP(mean): 98 (28 Jun 2024 08:21) (98 - 98)  RR: 18 (28 Jun 2024 08:21) (18 - 20)  SpO2: 96% (28 Jun 2024 08:21) (92% - 97%)    Parameters below as of 28 Jun 2024 08:21  Patient On (Oxygen Delivery Method): room air    PHYSICAL EXAM:  GENERAL: no distress  CHEST/LUNG: fair air entry  HEART: S1S2 RRR  ABDOMEN: soft  EXTREMITIES: no edema  SKIN:     LABS:                          8.3    10.16 )-----------( 425      ( 28 Jun 2024 08:09 )             26.0                           8.4    10.62 )-----------( 398      ( 27 Jun 2024 08:10 )             26.0     06-28    136  |  107  |  39<H>  ----------------------------<  138<H>  4.6   |  23  |  2.02<H>    Ca    8.7      28 Jun 2024 08:09        06-27    138  |  108  |  41<H>  ----------------------------<  144<H>  4.6   |  23  |  2.23<H>    Ca    8.5      27 Jun 2024 08:10        Urinalysis Basic - ( 27 Jun 2024 08:10 )    Color: x / Appearance: x / SG: x / pH: x  Gluc: 144 mg/dL / Ketone: x  / Bili: x / Urobili: x   Blood: x / Protein: x / Nitrite: x   Leuk Esterase: x / RBC: x / WBC x   Sq Epi: x / Non Sq Epi: x / Bacteria: x              RADIOLOGY & ADDITIONAL TESTS:   NEPHROLOGY INTERVAL HPI/OVERNIGHT EVENTS:    Date of Service: 06-27-24 @ 14:59  6/28- in bed eating feels well no new events  6/27--resting comfortably.  no distress.       HPI:  68 yo woman with PMHX of DM, HTN, learning disability, A fib post DCCV x2 on 4/1 and 4/22/2024, and recent dx of Splenic CA admitted with left sided weakness on 6/20.  IN ED, CTA of brain c/w acute ischemia in right superior frontal gyrus.  CT with ivc revealed spleen nodular masses.  Creat 1.46 on admission with previous episode of  JUSTINO in 4/2024, creat 1.54 on d/c, 4/27.  Creat peaked at 3.46, with improvement last 2 days.    PMHX and PSHX.  --DM  --HTN  --Hx of Ovarian and Uterine CA--post surgery and chemo in 2004.  --Hx of Arthritis  --R TKR  --A FIB post DCCV  --Hx of learning disability    Home Medications:   * Patient Currently Takes Medications as of 27-Apr-2024 15:06 documented in Structured Notes  · 	cefuroxime 250 mg oral tablet: 1 tab(s) orally every 12 hours continue till 04/30/24  · 	aspirin 81 mg oral delayed release tablet: 1 tab(s) orally once a day  · 	ezetimibe 10 mg oral tablet: 1 tab(s) orally once a day  · 	amiodarone 200 mg oral tablet: 1 tab(s) orally once a day  · 	DilTIAZem (Eqv-Cardizem CD) 120 mg/24 hours oral capsule, extended release: 1 cap(s) orally once a day  · 	Toprol- mg oral tablet, extended release: 1 tab(s) orally once a day  · 	atorvastatin 40 mg oral tablet: 1 tab(s) orally once a day  · 	Eliquis 5 mg oral tablet: 1 tab(s) orally 2 times a day      MEDICATIONS  (STANDING):  aMIOdarone    Tablet 200 milliGRAM(s) Oral daily  apixaban 5 milliGRAM(s) Oral two times a day  atorvastatin 80 milliGRAM(s) Oral at bedtime  clopidogrel Tablet 75 milliGRAM(s) Oral daily  diltiazem    Tablet 90 milliGRAM(s) Oral every 6 hours  ezetimibe 10 milliGRAM(s) Oral daily    MEDICATIONS  (PRN):  acetaminophen     Tablet .. 650 milliGRAM(s) Oral every 6 hours PRN Mild Pain (1 - 3)  metoprolol tartrate Injectable 2.5 milliGRAM(s) IV Push every 6 hours PRN HR>130  ondansetron Injectable 4 milliGRAM(s) IV Push every 6 hours PRN Nausea and/or Vomiting  traMADol 25 milliGRAM(s) Oral every 8 hours PRN Moderate Pain (4 - 6)      I&O's Detail    27 Jun 2024 07:01  -  28 Jun 2024 07:00  --------------------------------------------------------  IN:  Total IN: 0 mL    OUT:    Voided (mL): 550 mL  Total OUT: 550 mL    Total NET: -550 mL          Vital Signs Last 24 Hrs  T(C): 36.9 (28 Jun 2024 08:21), Max: 37.3 (27 Jun 2024 23:34)  T(F): 98.5 (28 Jun 2024 08:21), Max: 99.1 (27 Jun 2024 23:34)  HR: 81 (28 Jun 2024 08:21) (81 - 108)  BP: 108/91 (28 Jun 2024 08:21) (108/91 - 143/80)  BP(mean): 98 (28 Jun 2024 08:21) (98 - 98)  RR: 18 (28 Jun 2024 08:21) (18 - 20)  SpO2: 96% (28 Jun 2024 08:21) (92% - 97%)    Parameters below as of 28 Jun 2024 08:21  Patient On (Oxygen Delivery Method): room air    PHYSICAL EXAM:  GENERAL: no distress  CHEST/LUNG: fair air entry  HEART: S1S2 RRR  ABDOMEN: soft  EXTREMITIES: no edema  SKIN:     LABS:                          8.3    10.16 )-----------( 425      ( 28 Jun 2024 08:09 )             26.0                           8.4    10.62 )-----------( 398      ( 27 Jun 2024 08:10 )             26.0     06-28    136  |  107  |  39<H>  ----------------------------<  138<H>  4.6   |  23  |  2.02<H>    Ca    8.7      28 Jun 2024 08:09        06-27    138  |  108  |  41<H>  ----------------------------<  144<H>  4.6   |  23  |  2.23<H>    Ca    8.5      27 Jun 2024 08:10        Urinalysis Basic - ( 27 Jun 2024 08:10 )    Color: x / Appearance: x / SG: x / pH: x  Gluc: 144 mg/dL / Ketone: x  / Bili: x / Urobili: x   Blood: x / Protein: x / Nitrite: x   Leuk Esterase: x / RBC: x / WBC x   Sq Epi: x / Non Sq Epi: x / Bacteria: x              RADIOLOGY & ADDITIONAL TESTS:

## 2024-06-29 LAB
APTT BLD: 40.8 SEC — HIGH (ref 24.5–35.6)
APTT BLD: 83.5 SEC — HIGH (ref 24.5–35.6)
APTT BLD: > 200 SEC (ref 24.5–35.6)
HCT VFR BLD CALC: 25.6 % — LOW (ref 34.5–45)
HGB BLD-MCNC: 8.3 G/DL — LOW (ref 11.5–15.5)
MCHC RBC-ENTMCNC: 28.8 PG — SIGNIFICANT CHANGE UP (ref 27–34)
MCHC RBC-ENTMCNC: 32.4 GM/DL — SIGNIFICANT CHANGE UP (ref 32–36)
MCV RBC AUTO: 88.9 FL — SIGNIFICANT CHANGE UP (ref 80–100)
PLATELET # BLD AUTO: 425 K/UL — HIGH (ref 150–400)
RBC # BLD: 2.88 M/UL — LOW (ref 3.8–5.2)
RBC # FLD: 14.7 % — HIGH (ref 10.3–14.5)
WBC # BLD: 9.79 K/UL — SIGNIFICANT CHANGE UP (ref 3.8–10.5)
WBC # FLD AUTO: 9.79 K/UL — SIGNIFICANT CHANGE UP (ref 3.8–10.5)

## 2024-06-29 PROCEDURE — 99233 SBSQ HOSP IP/OBS HIGH 50: CPT

## 2024-06-29 PROCEDURE — 99232 SBSQ HOSP IP/OBS MODERATE 35: CPT

## 2024-06-29 RX ADMIN — DILTIAZEM HYDROCHLORIDE 90 MILLIGRAM(S): 240 CAPSULE, EXTENDED RELEASE ORAL at 10:57

## 2024-06-29 RX ADMIN — HEPARIN SODIUM 3000 UNIT(S): 50 INJECTION, SOLUTION INTRAVENOUS at 01:25

## 2024-06-29 RX ADMIN — AMIODARONE HYDROCHLORIDE 200 MILLIGRAM(S): 50 INJECTION, SOLUTION INTRAVENOUS at 10:57

## 2024-06-29 RX ADMIN — DILTIAZEM HYDROCHLORIDE 90 MILLIGRAM(S): 240 CAPSULE, EXTENDED RELEASE ORAL at 17:13

## 2024-06-29 RX ADMIN — DILTIAZEM HYDROCHLORIDE 90 MILLIGRAM(S): 240 CAPSULE, EXTENDED RELEASE ORAL at 00:20

## 2024-06-29 RX ADMIN — HEPARIN SODIUM 1700 UNIT(S)/HR: 50 INJECTION, SOLUTION INTRAVENOUS at 07:35

## 2024-06-29 RX ADMIN — HEPARIN SODIUM 1700 UNIT(S)/HR: 50 INJECTION, SOLUTION INTRAVENOUS at 09:15

## 2024-06-29 RX ADMIN — HEPARIN SODIUM 1400 UNIT(S)/HR: 50 INJECTION, SOLUTION INTRAVENOUS at 19:35

## 2024-06-29 RX ADMIN — ATORVASTATIN CALCIUM 80 MILLIGRAM(S): 20 TABLET, FILM COATED ORAL at 21:16

## 2024-06-29 RX ADMIN — HEPARIN SODIUM 1400 UNIT(S)/HR: 50 INJECTION, SOLUTION INTRAVENOUS at 17:11

## 2024-06-29 RX ADMIN — CLOPIDOGREL BISULFATE 75 MILLIGRAM(S): 75 TABLET, FILM COATED ORAL at 10:58

## 2024-06-29 RX ADMIN — Medication 2.5 MILLIGRAM(S): at 05:51

## 2024-06-29 RX ADMIN — DILTIAZEM HYDROCHLORIDE 90 MILLIGRAM(S): 240 CAPSULE, EXTENDED RELEASE ORAL at 05:32

## 2024-06-29 RX ADMIN — HEPARIN SODIUM 1700 UNIT(S)/HR: 50 INJECTION, SOLUTION INTRAVENOUS at 01:20

## 2024-06-29 RX ADMIN — EZETIMIBE 10 MILLIGRAM(S): 10 TABLET ORAL at 10:57

## 2024-06-29 RX ADMIN — HEPARIN SODIUM 0 UNIT(S)/HR: 50 INJECTION, SOLUTION INTRAVENOUS at 15:57

## 2024-06-29 NOTE — PROVIDER CONTACT NOTE (OTHER) - SITUATION
received pt from previous RN with ace wrap on RUE, wrapped around 1700. as per RN pt has been bleeding through dressings from previous iv removal all day. pt currently on heparin gtt. wrap saturated

## 2024-06-29 NOTE — PROGRESS NOTE ADULT - ASSESSMENT
69-year-old female with CKD diabetes hypertension persistent A-fib recurrence of A-fib despite Prior cardioversion and amiodarone hx of multiple cancer as above unfortunately patient has acute CVA left-sided weakness.  Patient also has acute anemia likely related to hematoma status post RBC transfusion.  She has a hx of recent splenic mass likely c/w malignancy    1) Splenic mass  likely c/w malignancy however no tissue ever obtained  there was outpt plan for splenectomy  pt received vaccines  in prep for splenectomy  will obtain images to make comparison to [facility] imaging to see if any growth         2) Afib  mgmt per cardiology  for ppm placement    3) Anemia  from hematoma  monitor hgb   transfuse for hgb < 7    Thank you for the courtesy of this consultation and we will continue to follow.    Lamonte Turcios MD  [facility]  Cell: [phone]

## 2024-06-29 NOTE — PROGRESS NOTE ADULT - ASSESSMENT
70 yo F with above PMHx, presented with Lt side weakness/slurred speech, has acute CVA.  PAF on eliquis/amio/cardizem/toprol, s/p DCCV on 4/1/24, 4/22/24, found to have AF w.RVR was on IV cardizem drip.  Also has acute anemia due to thigh hematoma, s/p UTI on antibiotics, now ARF on CRF   PAF w/RVR, on amiodarone, acute CVA with Lt hemiparesis    A/P: Paroxysmal Afib with difficult to control rates  Pt failed rhythm control, also due to multiple comorbidities, pt is not a candidate for AFib ablation  Recommend AVJ ablation with leadless PPM implant for rate control   r/b/i/a d/w pt & family (Rosalinda), now agree with procedure  d/c eliquis after AM dose, change to full dose of IV heparin today till 6AM on Monday (7/1/24)  keep pt NPO post MN on Sunday (6/30/24)  Continue po cardizem 90 mg Q6H  Continue Amiodarone 200mg PO daily  IV metoprolol 2.5mg q6hrs PRN

## 2024-06-29 NOTE — PROGRESS NOTE ADULT - ASSESSMENT
69-year-old female with DM, HTN, afib, CKD, hx of multiple cancer diagnosis, admitted for neurological changes found to have an acute CVA, also found to have acute anemia likely related to hematoma status post RBC transfusion, splenic mass concerning for malignancy, and persisting afib that has been difficult to control at times.     Acute infarcts in the R hemisphere and possible L.    Likely embolic. Eliquis on hold as patient is now on IV heparin in anticipation of AVJ ablation and pacemaker insertion Monday 7/1. OK to continue Plavix 75mg daily. Atorvastatin at bedtime.     Persistent Afib with difficult to control rates  Patient failed rhythm control. EP recommended AVJ ablation with leadless PPM implant for rate control and HCP is in agreement with procedure. Now planned for Mon 7/1. Rate control with Cardizem 90mg PO q6h. Amiodarone 200mg daily. IV metoprolol 2.5mg PRN for HR>130. IV heparin drip in anticipation of procedure.     JUSTINO on CKD  Appreciate input from Nephrology. JUSTINO/KHAI, slowly improving. Fluid and electrolytes stable. Continue to monitor    Splenic mass  Appreciate Hematology Oncology input. Likely c/w malignancy however no tissue ever obtained. There was outpatient plan for splenectomy. Patient received vaccines however no notes mention splenectomy. Oncology noted that consideration can be given to IR guided bx of splenic mass to help clarify diagnosis and prognosis. HCP wishes to defer.     Anemia  Likely from hematoma, see below. Monitor hgb. Transfuse for hgb < 7    Thigh hematomas  On CT, suggestion of acute-appearing intramuscular and subcutaneous hematomas within the proximal L thigh. Indeterminate but chronic appearing mixed attenuation lesion along the lateral margin of the R hamstring musculature within the mid R thigh, possibly related to a subacute to chronic hematoma. Radiology recommended short-term interval follow-up MRI of bilateral thighs without and with contrast in 8-12 weeks to demonstrate resolution/improvement of these findings and to rule out an underlying mass/neoplasm.    Deconditioning and debility  PT consulted      Dispo: To be determined pending further clinical assessment. For AVJ ablation and leadless pacemaker implantation Monday 7/1. PT assessment.

## 2024-06-29 NOTE — PROGRESS NOTE ADULT - SUBJECTIVE AND OBJECTIVE BOX
Pt seen, resting, comfortable    MEDICATIONS  (STANDING):  aMIOdarone    Tablet 200 milliGRAM(s) Oral daily  atorvastatin 80 milliGRAM(s) Oral at bedtime  clopidogrel Tablet 75 milliGRAM(s) Oral daily  diltiazem    Tablet 90 milliGRAM(s) Oral every 6 hours  ezetimibe 10 milliGRAM(s) Oral daily  heparin  Infusion.  Unit(s)/Hr (15 mL/Hr) IV Continuous <Continuous>    MEDICATIONS  (PRN):  acetaminophen     Tablet .. 650 milliGRAM(s) Oral every 6 hours PRN Mild Pain (1 - 3)  heparin   Injectable 6500 Unit(s) IV Push every 6 hours PRN For aPTT less than 40  heparin   Injectable 3000 Unit(s) IV Push every 6 hours PRN For aPTT between 40 - 57  metoprolol tartrate Injectable 2.5 milliGRAM(s) IV Push every 6 hours PRN HR>130  ondansetron Injectable 4 milliGRAM(s) IV Push every 6 hours PRN Nausea and/or Vomiting      ROS  No fever, sweats, chills      Vital Signs Last 24 Hrs  T(C): 37.2 (29 Jun 2024 08:05), Max: 37.3 (28 Jun 2024 15:53)  T(F): 99 (29 Jun 2024 08:05), Max: 99.1 (28 Jun 2024 15:53)  HR: 96 (29 Jun 2024 08:05) (85 - 131)  BP: 132/63 (29 Jun 2024 08:05) (132/63 - 155/92)  BP(mean): 83 (28 Jun 2024 23:17) (79 - 83)  RR: 18 (29 Jun 2024 08:05) (18 - 19)  SpO2: 97% (29 Jun 2024 08:05) (94% - 97%)    Parameters below as of 29 Jun 2024 08:05  Patient On (Oxygen Delivery Method): room air        PE  NAD  Awake, alert    No c/c/e  No rash grossly  FROM                          8.3    9.79  )-----------( 425      ( 29 Jun 2024 00:45 )             25.6       06-28    136  |  107  |  39<H>  ----------------------------<  138<H>  4.6   |  23  |  2.02<H>    Ca    8.7      28 Jun 2024 08:09

## 2024-06-29 NOTE — PROGRESS NOTE ADULT - SUBJECTIVE AND OBJECTIVE BOX
HPI: 68 yo female with hx of learning disability, JUSTINO on CKD, DM II, HTN, A-fib s/p DCCV with h/o poor response to medications, also has ovarian and uterine CA, recent diagnosis of Splenic CA, CT Lumbar shows moderate stenosis and DDD, recent UTI, who presents as a code stroke due to sudden onset worsening of left sided weakness of LLE and LUE during PT also associated with slurred speech which has sinced resolved. Arrived with NIHSS of 8 with unknown LKW.  History taken from EMR, ED MD and pt sister at bedside.  (20 Jun 2024 21:01)      Cardiac PET ST (March 2024) no ischemia, LVEF 45%  echo (6/21/2024) LVEF 70%, mildly dilated LA, mod pul HTN    6/24/24:  pt is resting in the bed, denies chest pain/SOB/palpitations  remains Lt side weakness  tele: Afib 's, occasional upto 130's bpm    6/25/24: pt seen resting in bed in NAD, she denies any SOB, CP, dizziness or palpitations.  Left side weakness, can only move left hand  TELE: Afib  bpm, occasional RVRs to 130's    6/26/24: TELE: AFib  bpm, RVRs to 120-130's.  Cardizem gtt is at 5mg/hr.  She denies symptoms.    6/28/24: pt is resting in the bed, denies chest pain/SOB/palpitations  Tele: AFib 100-130's bpm    6/29/24 : Pt in bed without new symptms remains tachycardic at rest.   Tele: Afib RVR 90's to 130's       MEDICATIONS  (STANDING):  aMIOdarone    Tablet 200 milliGRAM(s) Oral daily  atorvastatin 80 milliGRAM(s) Oral at bedtime  clopidogrel Tablet 75 milliGRAM(s) Oral daily  diltiazem    Tablet 90 milliGRAM(s) Oral every 6 hours  ezetimibe 10 milliGRAM(s) Oral daily  heparin  Infusion.  Unit(s)/Hr (15 mL/Hr) IV Continuous <Continuous>    MEDICATIONS  (PRN):  acetaminophen     Tablet .. 650 milliGRAM(s) Oral every 6 hours PRN Mild Pain (1 - 3)  heparin   Injectable 3000 Unit(s) IV Push every 6 hours PRN For aPTT between 40 - 57  heparin   Injectable 6500 Unit(s) IV Push every 6 hours PRN For aPTT less than 40  metoprolol tartrate Injectable 2.5 milliGRAM(s) IV Push every 6 hours PRN HR>130  ondansetron Injectable 4 milliGRAM(s) IV Push every 6 hours PRN Nausea and/or Vomiting        Physical Exam:      Vital Signs Last 24 Hrs  T(C): 37.2 (29 Jun 2024 08:05), Max: 37.3 (28 Jun 2024 15:53)  T(F): 99 (29 Jun 2024 08:05), Max: 99.1 (28 Jun 2024 15:53)  HR: 96 (29 Jun 2024 08:05) (85 - 131)  BP: 132/63 (29 Jun 2024 08:05) (132/63 - 155/92)  BP(mean): 83 (28 Jun 2024 23:17) (79 - 83)  RR: 18 (29 Jun 2024 08:05) (18 - 19)  SpO2: 97% (29 Jun 2024 08:05) (94% - 97%)    Parameters below as of 29 Jun 2024 08:05  Patient On (Oxygen Delivery Method): room air          Constitutional: well developed,  no deformities and no acute distress  Neurological: Alert & Oriented x 3,(+) Lt side hemiparesis  HEENT: NC/AT, PERRLA, EOMI,  Neck supple.  Respiratory: CTA B/L, No wheezing/crackles/rhonchi  Cardiovascular: (+) irregular S1 & S2,   Gastrointestinal: soft, NT, nondistended, (+) BS  Genitourinary: non distended bladder, voiding freely  Extremities: No pedal edema, No clubbing, No cyanosis      Allergies    Bee stings (Swelling)  No Known Drug Allergies    LABS:                        8.3    9.79  )-----------( 425      ( 29 Jun 2024 00:45 )             25.6       Ca    8.7        28 Jun 2024 08:09      PTT - ( 29 Jun 2024 07:36 )  PTT:83.5 sec

## 2024-06-29 NOTE — PROGRESS NOTE ADULT - SUBJECTIVE AND OBJECTIVE BOX
Chief Complaint: Sudden onset worsening of left sided weakness of LLE and LUE during PT also associated with slurred speech     Interval Hx: Patient seen and examined. Resting comfortably. No acute complaints though subjective assessment is somewhat limted by her learning disability. She remains in afib with rates difficult to control. Plan is for AVJ ablation and leadless pacemaker implantation Mon 7/1. HCP spoke with Oncology re splenic mass lesion. HCP deferring further evaluation and management of her mass, possible malignancy.     ROS: Multi system review is bit limited due to patient's neurocognitive impairment    Vitals:  T(F): 97.4 (29 Jun 2024 16:00), Max: 99 (29 Jun 2024 08:05)  HR: 82 (29 Jun 2024 16:00) (82 - 131)  BP: 157/77 (29 Jun 2024 16:00) (132/63 - 157/77)  RR: 18 (29 Jun 2024 16:00) (18 - 18)  SpO2: 94% (29 Jun 2024 16:00) (94% - 97%) on room air    Exam:  Gen: Comfortable  HEENT: NCAT PERRL EOMI MMM clear oropharynx  Neck: Supple, no LAD  CVS: s1 s2 normal, irregular, rate ~100s  Chest: Normal resp effort at rest, lungs CTA B/L  Abd: +BS, soft, NT ND   Ext: No tenderness, intact peripheral pulses, normal cap refill  Skin: Warm, dry  Neuro: Awake and alert, answers simple questions, follows simple commands    Labs:                          8.3    9.79 )---------( 425               25.6     Iron 21  TIBC 191  Iron sat 11%  Ferritin 672      136  |  107  |  39  -----------------------<  138  4.6   |   23   |  2.02    Ca  8.7         Troponin  negative    ProBNP 2497     A1c 5.9     LDL 60    UA 6/20: Yellow, cloudy, mod LE, N+, 13 WBC, 6 RBC, many bact    Micro:  Urine culture 6/21: 10,000-49,000 CFU/mL E.coli pan-S  Blood culture 6/21: Negative     Imaging:  CT B/L LE WO 6/20: Acute-appearing intramuscular and subcutaneous hematomas within the proximal L thigh. Indeterminate but chronic appearing mixed attenuation lesion along the lateral margin of the R hamstring musculature within the mid R thigh, possibly related to a subacute to chronic hematoma.     MR head WO 6/20: Mild diffuse parenchymal volume loss. Mild chronic microvascular ischemic changes. Small chronic infarcts noted in the right MCA territory Acute infarcts in the medial posterior right frontal parietal region, right parietal lobe and the watershed territory of right anterior cerebral artery and middle cerebral artery in the right frontal and parietal lobes. Largest infarct measures approximately 1.4 x 0.8 cm in the right parietal lobe. Additional nonspecific subcentimeter high diffusion signal with ADC map restriction noted in the anterior corpus callosum just right of midline. This could be related to small acute infarct. Subcentimeter high diffusion signals also noted in the medial left frontal lobe possibly related to subacute infarcts. There is no acute parenchymal hemorrhage, or midline shift. There is no extra-axial fluid collection.  There is no hydrocephalus. Partial empty sella noted. Mild mucosal thickening paranasal sinuses.    CT abdomen and pelvis W/ 6/20: Nodular masslike appearance of the spleen. There is a small ill-defined intramuscular hematoma in the left upper thigh medial compartment.    CT chest W/ 6/20: No acute pulmonary embolism to the lobar level. Limited assessment of segmental and subsegmental branches. Cardiomegaly with mild perihilar groundglass opacities which may represent mild pulmonary edema or an infectious process.    CTA head and neck 6/20: Bilateral vertebral arteries patent. No significant stenosis, occlusion or dissection bilateral extracranial carotid arteries. Severe focal stenosis right P3 segment origin. Moderate stenosis distal cavernous and supraclinoid segments right internal carotid artery. 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 Focal occlusion with reconstitution left proximal A2 segment centered 1-2 cm distal to its origin. Additional findings, as above, including an approximate 6 cm left thyroid mass, resulting in regional mass effect. Recommend nonemergent thyroid ultrasound.    CT perfusion 6/20: No predicted core infarct. Evidence of active ischemia-tissue at risk R posterior frontal-parietal region.    CT head 6/20: Compared with 4/19/2024. No evidence of acute hemorrhage, extra-axial collection or mass effect. Multiple chronic right frontal infarcts as well as findings suspicious for acute ischemia right superior frontal gyrus, as above.    Cardiac Testing:  TTE 6/21: Technically difficult image quality. Left ventricular systolic function is hyperdynamic with an ejection fraction estimated at 70 to 75 %. Normal right ventricular cavity size and normal systolic function. The left atrium is mildly dilated. Agitated saline injection was negative for intracardiac shunt. Moderate pulmonary hypertension.    Meds:  MEDICATIONS  (STANDING):  aMIOdarone    Tablet 200 milliGRAM(s) Oral daily  atorvastatin 80 milliGRAM(s) Oral at bedtime  clopidogrel Tablet 75 milliGRAM(s) Oral daily  diltiazem    Tablet 90 milliGRAM(s) Oral every 6 hours  ezetimibe 10 milliGRAM(s) Oral daily  heparin  Infusion.  Unit(s)/Hr (15 mL/Hr) IV Continuous <Continuous>    MEDICATIONS  (PRN):  acetaminophen     Tablet .. 650 milliGRAM(s) Oral every 6 hours PRN Mild Pain (1 - 3)  heparin   Injectable 6500 Unit(s) IV Push every 6 hours PRN For aPTT less than 40  heparin   Injectable 3000 Unit(s) IV Push every 6 hours PRN For aPTT between 40 - 57  metoprolol tartrate Injectable 2.5 milliGRAM(s) IV Push every 6 hours PRN HR>130  ondansetron Injectable 4 milliGRAM(s) IV Push every 6 hours PRN Nausea and/or Vomiting

## 2024-06-30 LAB
ANION GAP SERPL CALC-SCNC: 6 MMOL/L — SIGNIFICANT CHANGE UP (ref 5–17)
APTT BLD: 32.1 SEC — SIGNIFICANT CHANGE UP (ref 24.5–35.6)
APTT BLD: 36.2 SEC — HIGH (ref 24.5–35.6)
APTT BLD: 51.2 SEC — HIGH (ref 24.5–35.6)
APTT BLD: >200 SEC — CRITICAL HIGH (ref 24.5–35.6)
BLD GP AB SCN SERPL QL: SIGNIFICANT CHANGE UP
BUN SERPL-MCNC: 39 MG/DL — HIGH (ref 7–23)
CALCIUM SERPL-MCNC: 8.7 MG/DL — SIGNIFICANT CHANGE UP (ref 8.5–10.1)
CHLORIDE SERPL-SCNC: 108 MMOL/L — SIGNIFICANT CHANGE UP (ref 96–108)
CO2 SERPL-SCNC: 22 MMOL/L — SIGNIFICANT CHANGE UP (ref 22–31)
CREAT SERPL-MCNC: 2.29 MG/DL — HIGH (ref 0.5–1.3)
EGFR: 22 ML/MIN/1.73M2 — LOW
GLUCOSE SERPL-MCNC: 184 MG/DL — HIGH (ref 70–99)
HCT VFR BLD CALC: 21 % — CRITICAL LOW (ref 34.5–45)
HCT VFR BLD CALC: 25.8 % — LOW (ref 34.5–45)
HGB BLD-MCNC: 7 G/DL — CRITICAL LOW (ref 11.5–15.5)
HGB BLD-MCNC: 8.7 G/DL — LOW (ref 11.5–15.5)
MCHC RBC-ENTMCNC: 29.5 PG — SIGNIFICANT CHANGE UP (ref 27–34)
MCHC RBC-ENTMCNC: 29.9 PG — SIGNIFICANT CHANGE UP (ref 27–34)
MCHC RBC-ENTMCNC: 33.3 GM/DL — SIGNIFICANT CHANGE UP (ref 32–36)
MCHC RBC-ENTMCNC: 33.7 GM/DL — SIGNIFICANT CHANGE UP (ref 32–36)
MCV RBC AUTO: 88.6 FL — SIGNIFICANT CHANGE UP (ref 80–100)
MCV RBC AUTO: 88.7 FL — SIGNIFICANT CHANGE UP (ref 80–100)
PLATELET # BLD AUTO: 389 K/UL — SIGNIFICANT CHANGE UP (ref 150–400)
PLATELET # BLD AUTO: 398 K/UL — SIGNIFICANT CHANGE UP (ref 150–400)
POTASSIUM SERPL-MCNC: 4.8 MMOL/L — SIGNIFICANT CHANGE UP (ref 3.5–5.3)
POTASSIUM SERPL-SCNC: 4.8 MMOL/L — SIGNIFICANT CHANGE UP (ref 3.5–5.3)
RBC # BLD: 2.37 M/UL — LOW (ref 3.8–5.2)
RBC # BLD: 2.91 M/UL — LOW (ref 3.8–5.2)
RBC # FLD: 14.2 % — SIGNIFICANT CHANGE UP (ref 10.3–14.5)
RBC # FLD: 14.8 % — HIGH (ref 10.3–14.5)
SODIUM SERPL-SCNC: 136 MMOL/L — SIGNIFICANT CHANGE UP (ref 135–145)
WBC # BLD: 13.04 K/UL — HIGH (ref 3.8–10.5)
WBC # BLD: 15.44 K/UL — HIGH (ref 3.8–10.5)
WBC # FLD AUTO: 13.04 K/UL — HIGH (ref 3.8–10.5)
WBC # FLD AUTO: 15.44 K/UL — HIGH (ref 3.8–10.5)

## 2024-06-30 PROCEDURE — 99233 SBSQ HOSP IP/OBS HIGH 50: CPT

## 2024-06-30 RX ORDER — METOPROLOL TARTRATE 50 MG
5 TABLET ORAL EVERY 6 HOURS
Refills: 0 | Status: DISCONTINUED | OUTPATIENT
Start: 2024-06-30 | End: 2024-07-17

## 2024-06-30 RX ORDER — HEPARIN SODIUM 50 [USP'U]/ML
6500 INJECTION, SOLUTION INTRAVENOUS EVERY 6 HOURS
Refills: 0 | Status: DISCONTINUED | OUTPATIENT
Start: 2024-06-30 | End: 2024-07-01

## 2024-06-30 RX ORDER — HEPARIN SODIUM 50 [USP'U]/ML
3000 INJECTION, SOLUTION INTRAVENOUS EVERY 6 HOURS
Refills: 0 | Status: DISCONTINUED | OUTPATIENT
Start: 2024-06-30 | End: 2024-06-30

## 2024-06-30 RX ORDER — HEPARIN SODIUM 50 [USP'U]/ML
6500 INJECTION, SOLUTION INTRAVENOUS EVERY 6 HOURS
Refills: 0 | Status: DISCONTINUED | OUTPATIENT
Start: 2024-06-30 | End: 2024-06-30

## 2024-06-30 RX ORDER — HEPARIN SODIUM 50 [USP'U]/ML
3000 INJECTION, SOLUTION INTRAVENOUS EVERY 6 HOURS
Refills: 0 | Status: DISCONTINUED | OUTPATIENT
Start: 2024-06-30 | End: 2024-07-01

## 2024-06-30 RX ORDER — HEPARIN SODIUM 50 [USP'U]/ML
1000 INJECTION, SOLUTION INTRAVENOUS
Qty: 25000 | Refills: 0 | Status: DISCONTINUED | OUTPATIENT
Start: 2024-06-30 | End: 2024-06-30

## 2024-06-30 RX ORDER — ONDANSETRON HYDROCHLORIDE 2 MG/ML
4 INJECTION INTRAMUSCULAR; INTRAVENOUS ONCE
Refills: 0 | Status: COMPLETED | OUTPATIENT
Start: 2024-06-30 | End: 2024-06-30

## 2024-06-30 RX ORDER — HEPARIN SODIUM 50 [USP'U]/ML
1000 INJECTION, SOLUTION INTRAVENOUS
Qty: 25000 | Refills: 0 | Status: DISCONTINUED | OUTPATIENT
Start: 2024-06-30 | End: 2024-07-01

## 2024-06-30 RX ADMIN — HEPARIN SODIUM 1000 UNIT(S)/HR: 50 INJECTION, SOLUTION INTRAVENOUS at 13:04

## 2024-06-30 RX ADMIN — HEPARIN SODIUM 1300 UNIT(S)/HR: 50 INJECTION, SOLUTION INTRAVENOUS at 11:23

## 2024-06-30 RX ADMIN — CLOPIDOGREL BISULFATE 75 MILLIGRAM(S): 75 TABLET, FILM COATED ORAL at 10:44

## 2024-06-30 RX ADMIN — HEPARIN SODIUM 1000 UNIT(S)/HR: 50 INJECTION, SOLUTION INTRAVENOUS at 18:19

## 2024-06-30 RX ADMIN — Medication 2.5 MILLIGRAM(S): at 01:35

## 2024-06-30 RX ADMIN — DILTIAZEM HYDROCHLORIDE 90 MILLIGRAM(S): 240 CAPSULE, EXTENDED RELEASE ORAL at 17:45

## 2024-06-30 RX ADMIN — HEPARIN SODIUM 1100 UNIT(S)/HR: 50 INJECTION, SOLUTION INTRAVENOUS at 07:24

## 2024-06-30 RX ADMIN — HEPARIN SODIUM 1100 UNIT(S)/HR: 50 INJECTION, SOLUTION INTRAVENOUS at 01:23

## 2024-06-30 RX ADMIN — HEPARIN SODIUM 1000 UNIT(S)/HR: 50 INJECTION, SOLUTION INTRAVENOUS at 19:50

## 2024-06-30 RX ADMIN — DILTIAZEM HYDROCHLORIDE 90 MILLIGRAM(S): 240 CAPSULE, EXTENDED RELEASE ORAL at 11:00

## 2024-06-30 RX ADMIN — HEPARIN SODIUM 0 UNIT(S)/HR: 50 INJECTION, SOLUTION INTRAVENOUS at 00:21

## 2024-06-30 RX ADMIN — ATORVASTATIN CALCIUM 80 MILLIGRAM(S): 20 TABLET, FILM COATED ORAL at 22:03

## 2024-06-30 RX ADMIN — AMIODARONE HYDROCHLORIDE 200 MILLIGRAM(S): 50 INJECTION, SOLUTION INTRAVENOUS at 10:45

## 2024-06-30 RX ADMIN — ONDANSETRON HYDROCHLORIDE 4 MILLIGRAM(S): 2 INJECTION INTRAMUSCULAR; INTRAVENOUS at 10:44

## 2024-06-30 RX ADMIN — DILTIAZEM HYDROCHLORIDE 90 MILLIGRAM(S): 240 CAPSULE, EXTENDED RELEASE ORAL at 05:52

## 2024-06-30 RX ADMIN — DILTIAZEM HYDROCHLORIDE 90 MILLIGRAM(S): 240 CAPSULE, EXTENDED RELEASE ORAL at 00:35

## 2024-06-30 RX ADMIN — DILTIAZEM HYDROCHLORIDE 90 MILLIGRAM(S): 240 CAPSULE, EXTENDED RELEASE ORAL at 23:50

## 2024-06-30 RX ADMIN — EZETIMIBE 10 MILLIGRAM(S): 10 TABLET ORAL at 10:43

## 2024-06-30 NOTE — PROGRESS NOTE ADULT - TIME BILLING
- extensive review of patient's medical chart including provider and RN notes, labs, imaging, etc, evaluation of patient at bedside, discussions with patient and daughter re patient's clinical condition and plan of care, coordination of care with consultants including Cardiac EP, Hematology and Surgery, and finally, documentation of today's findings and plan.

## 2024-06-30 NOTE — CONSULT NOTE ADULT - SUBJECTIVE AND OBJECTIVE BOX
69-year-old female with CKD diabetes hypertension persistent A-fib recurrence of A-fib despite Prior cardioversion and amiodarone hx of multiple cancer as above unfortunately patient has acute CVA left-sided weakness.  Patient also has acute anemia likely related to hematoma status post RBC transfusion.  She has a hx of recent splenic mass likely c/w malignancy.  Vascular surgery consulted for IV line bleeding in the RUE, pt has been on heparin drip due to Afib awaiting AVJ ablation and pacemaker, has been supratherapeutic, heparin on hold now.    Vitals:  T(C): 36.6 (06-30 @ 08:33), Max: 36.6 (06-29 @ 23:21)  HR: 113 (06-30 @ 08:33) (82 - 145)  BP: 138/83 (06-30 @ 08:33) (108/91 - 157/77)  RR: 18 (06-30 @ 08:33) (18 - 18)  SpO2: 97% (06-30 @ 08:33) (94% - 97%)      Physical Exam:  General:  NAD  Chest: Normal respiratory effort  Heart: RRR  Abdomen: Soft, NTND, no masses  Skin: ecchymoses in the arms in various states of healing  Extremities: RUQ: edema+, ecchymosis in the hand and forearm, palpable radial pulse, normal cap refill, mid forearm pinpoint bleeding from IV line.    06-29 @ 00:45                    8.3  CBC: 9.79>)-------(<425                     25.6                 - | - | -    CMP:  ----------------------< -               - | - | -                      Ca:-  Phos:-  Mg:-               -|      |-        LFTs:  ------|-|-----             -|      |-      Current Inpatient Medications:  acetaminophen     Tablet .. 650 milliGRAM(s) Oral every 6 hours PRN  aMIOdarone    Tablet 200 milliGRAM(s) Oral daily  atorvastatin 80 milliGRAM(s) Oral at bedtime  clopidogrel Tablet 75 milliGRAM(s) Oral daily  diltiazem    Tablet 90 milliGRAM(s) Oral every 6 hours  ezetimibe 10 milliGRAM(s) Oral daily  heparin   Injectable 3000 Unit(s) IV Push every 6 hours PRN  heparin   Injectable 6500 Unit(s) IV Push every 6 hours PRN  heparin  Infusion.  Unit(s)/Hr (15 mL/Hr) IV Continuous <Continuous>  metoprolol tartrate Injectable 2.5 milliGRAM(s) IV Push every 6 hours PRN  ondansetron Injectable 4 milliGRAM(s) IV Push every 6 hours PRN  ondansetron Injectable 4 milliGRAM(s) IV Push once

## 2024-06-30 NOTE — PROGRESS NOTE ADULT - ASSESSMENT
68 yo F with above PMHx, presented with Lt side weakness/slurred speech, has acute CVA.  PAF on eliquis/amio/cardizem/toprol, s/p DCCV on 4/1/24, 4/22/24, found to have AF w.RVR was on IV cardizem drip.  Also has acute anemia due to thigh hematoma, s/p UTI on antibiotics, now ARF on CRF   PAF w/RVR, on amiodarone, acute CVA with Lt hemiparesis    A/P: Paroxysmal Afib with difficult to control rates  Pt failed rhythm control, also due to multiple comorbidities, pt is not a candidate for AFib ablation  Recommend AVJ ablation with leadless PPM implant for rate control    D/C Heparin at 04:00 AM tommorow for scheduled procedure.   Continue po cardizem 90 mg Q6H  Continue Amiodarone 200mg PO daily  IV metoprolol 2.5mg q6hrs PRN

## 2024-06-30 NOTE — PROGRESS NOTE ADULT - ASSESSMENT
69-year-old female with DM, HTN, afib, CKD, hx of multiple cancer diagnosis, admitted for neurological changes found to have an acute CVA, also found to have acute anemia likely related to hematoma status post RBC transfusion, splenic mass concerning for malignancy, and persisting afib that has been difficult to control at times.     Acute infarcts in the R hemisphere and possible L.    Likely embolic. Eliquis on hold as patient is now on IV heparin in anticipation of AVJ ablation and pacemaker insertion Monday 7/1. OK to continue Plavix 75mg daily. Atorvastatin at bedtime.     Persistent Afib with difficult to control rates  Patient failed rhythm control. EP recommended AVJ ablation with leadless PPM implant for rate control and HCP is in agreement with procedure. Now planned for Mon 7/1. Rate control with Cardizem 90mg PO q6h. Amiodarone 200mg daily. IV metoprolol 2.5mg PRN for HR>130. IV heparin drip in anticipation of procedure. IV heparin need to be held pending this AM's PTT given persistently surpratherapeutic PTT readings.     RUE bleeding  In setting of supratherapeutic PTT / heparin drip. Held pending this AM's PTT, Hgb. Unclear whether she may have an additional component causing the issue, such as an acquired factor inhibitor in setting of possible cancer diagnosis. Discussed with Hematology Oncology Dr Turcios who will assist with workup.     JUSTINO on CKD  Appreciate input from Nephrology. JUSTINO/KHAI, slowly improving. Fluid and electrolytes stable. Continue to monitor    Splenic mass  Appreciate Hematology Oncology input. Likely c/w malignancy however no tissue ever obtained. There was outpatient plan for splenectomy. Patient received vaccines however no notes mention splenectomy. Oncology noted that consideration can be given to IR guided bx of splenic mass to help clarify diagnosis and prognosis. HCP wishes to defer.     Anemia  Likely from hematoma, see below. Monitor Hgb. Transfuse for hgb < 7    Thigh hematomas  On CT, suggestion of acute-appearing intramuscular and subcutaneous hematomas within the proximal L thigh. Indeterminate but chronic appearing mixed attenuation lesion along the lateral margin of the R hamstring musculature within the mid R thigh, possibly related to a subacute to chronic hematoma. Radiology recommended short-term interval follow-up MRI of bilateral thighs without and with contrast in 8-12 weeks to demonstrate resolution/improvement of these findings and to rule out an underlying mass/neoplasm.    Deconditioning and debility  PT consulted  - F/u PT eval      Dispo: To be determined pending further clinical assessment. For AVJ ablation and leadless pacemaker implantation Monday 7/1. PT assessment.

## 2024-06-30 NOTE — PROGRESS NOTE ADULT - SUBJECTIVE AND OBJECTIVE BOX
Chief Complaint: Sudden onset worsening of left sided weakness of LLE and LUE during PT also associated with slurred speech     Interval Hx: Patient with bleeding from prior IV site in arm in setting of persistently supratherapeutic INR on heparin drip for her afib that has been difficult to control and for which she is planned to have AVJ ablation and leadless pacemaker placement. BP reassuringly is stable. Hgb and PTT from this AM is pending. Heparin drip held pending this AM's PTT. She has a pressure dressing on her arm, adjusted by Surgery this AM. Otherwise, no complaints, though subjective assessment is somewhat limited by her learning disability. HCP spoke with Oncology re splenic mass lesion, possible cancer diagnosis. HCP deferring further evaluation and management of her mass. Consideration given to possibly of acquired factor inhibitor in setting of this current bleeding issue, thigh hematoma diagnosed upon admission, and possible underlying malignancy Discussed with Hematology.      ROS: Multi system review is bit limited due to patient's neurocognitive impairment    Vitals:  T(F): 97.8 (30 Jun 2024 08:33), Max: 97.9 (29 Jun 2024 23:21)  HR: 113 (30 Jun 2024 08:33) (82 - 145)  BP: 138/83 (30 Jun 2024 08:33) (108/91 - 157/77)  RR: 18 (30 Jun 2024 08:33) (18 - 18)  SpO2: 97% (30 Jun 2024 08:33) (94% - 97%) on room air    Exam:  Gen: Comfortable  HEENT: NCAT PERRL EOMI MMM clear oropharynx  Neck: Supple, no LAD  CVS: s1 s2 normal, irregular, rate ~100s  Chest: Normal resp effort at rest, lungs CTA B/L  Abd: +BS, soft, NT ND   Ext: RUE with pressure dressing, R hand sensation and motor function intact.   Skin: Warm, dry  Neuro: Awake and alert, answers simple questions, follows simple commands    Labs:               Today's CBC, BMP, PTT are pending - escalated to Nursing Admin as phlebotomy having difficulty obtaining labs                 8.3    9.79 )---------( 425               25.6     Iron 21  TIBC 191  Iron sat 11%  Ferritin 672      136  |  107  |  39  -----------------------<  138  4.6   |   23   |  2.02    Ca  8.7         Troponin  negative    ProBNP 2497     A1c 5.9     LDL 60    UA 6/20: Yellow, cloudy, mod LE, N+, 13 WBC, 6 RBC, many bact    Micro:  Urine culture 6/21: 10,000-49,000 CFU/mL E.coli pan-S  Blood culture 6/21: Negative     Imaging:  CT B/L LE WO 6/20: Acute-appearing intramuscular and subcutaneous hematomas within the proximal L thigh. Indeterminate but chronic appearing mixed attenuation lesion along the lateral margin of the R hamstring musculature within the mid R thigh, possibly related to a subacute to chronic hematoma.     MR head WO 6/20: Mild diffuse parenchymal volume loss. Mild chronic microvascular ischemic changes. Small chronic infarcts noted in the right MCA territory Acute infarcts in the medial posterior right frontal parietal region, right parietal lobe and the watershed territory of right anterior cerebral artery and middle cerebral artery in the right frontal and parietal lobes. Largest infarct measures approximately 1.4 x 0.8 cm in the right parietal lobe. Additional nonspecific subcentimeter high diffusion signal with ADC map restriction noted in the anterior corpus callosum just right of midline. This could be related to small acute infarct. Subcentimeter high diffusion signals also noted in the medial left frontal lobe possibly related to subacute infarcts. There is no acute parenchymal hemorrhage, or midline shift. There is no extra-axial fluid collection.  There is no hydrocephalus. Partial empty sella noted. Mild mucosal thickening paranasal sinuses.    CT abdomen and pelvis W/ 6/20: Nodular masslike appearance of the spleen. There is a small ill-defined intramuscular hematoma in the left upper thigh medial compartment.    CT chest W/ 6/20: No acute pulmonary embolism to the lobar level. Limited assessment of segmental and subsegmental branches. Cardiomegaly with mild perihilar groundglass opacities which may represent mild pulmonary edema or an infectious process.    CTA head and neck 6/20: Bilateral vertebral arteries patent. No significant stenosis, occlusion or dissection bilateral extracranial carotid arteries. Severe focal stenosis right P3 segment origin. Moderate stenosis distal cavernous and supraclinoid segments right internal carotid artery. 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 Focal occlusion with reconstitution left proximal A2 segment centered 1-2 cm distal to its origin. Additional findings, as above, including an approximate 6 cm left thyroid mass, resulting in regional mass effect. Recommend nonemergent thyroid ultrasound.    CT perfusion 6/20: No predicted core infarct. Evidence of active ischemia-tissue at risk R posterior frontal-parietal region.    CT head 6/20: Compared with 4/19/2024. No evidence of acute hemorrhage, extra-axial collection or mass effect. Multiple chronic right frontal infarcts as well as findings suspicious for acute ischemia right superior frontal gyrus, as above.    Cardiac Testing:  TTE 6/21: Technically difficult image quality. Left ventricular systolic function is hyperdynamic with an ejection fraction estimated at 70 to 75 %. Normal right ventricular cavity size and normal systolic function. The left atrium is mildly dilated. Agitated saline injection was negative for intracardiac shunt. Moderate pulmonary hypertension.    Meds:  MEDICATIONS  (STANDING):  aMIOdarone    Tablet 200 milliGRAM(s) Oral daily  atorvastatin 80 milliGRAM(s) Oral at bedtime  clopidogrel Tablet 75 milliGRAM(s) Oral daily  diltiazem    Tablet 90 milliGRAM(s) Oral every 6 hours  ezetimibe 10 milliGRAM(s) Oral daily  heparin  Infusion.  Unit(s)/Hr (15 mL/Hr) IV Continuous <Continuous>  ondansetron Injectable 4 milliGRAM(s) IV Push once    MEDICATIONS  (PRN):  acetaminophen     Tablet .. 650 milliGRAM(s) Oral every 6 hours PRN Mild Pain (1 - 3)  heparin   Injectable 6500 Unit(s) IV Push every 6 hours PRN For aPTT less than 40  heparin   Injectable 3000 Unit(s) IV Push every 6 hours PRN For aPTT between 40 - 57  metoprolol tartrate Injectable 2.5 milliGRAM(s) IV Push every 6 hours PRN HR>130  ondansetron Injectable 4 milliGRAM(s) IV Push every 6 hours PRN Nausea and/or Vomiting

## 2024-06-30 NOTE — CONSULT NOTE ADULT - ASSESSMENT
69-year-old female with CKD diabetes hypertension persistent A-fib recurrence of A-fib despite Prior cardioversion and amiodarone hx of multiple cancer as above unfortunately patient has acute CVA left-sided weakness.  Patient also has acute anemia likely related to hematoma status post RBC transfusion.  She has a hx of recent splenic mass likely c/w malignancy.  Vascular surgery consulted for IV line bleeding in the RUE, pt has been on heparin drip due to Afib awaiting AVJ ablation and pacemaker, has been supratherapeutic, heparin on hold now.    RUQ: edema+, ecchymosis in the hand and forearm, palpable radial pulse, normal cap refill, mid forearm pinpoint bleeding from IV line.  Pinpoint pressure applied for 5 min, no further bleeding, area re wrapped with kerlix and ace bandage from the hand to the upper arm.    Recommendations:    c/w arm compression dressing  will will reassess for further bleeding, if no more bleeding, c/w compression for 48h more  No further intervention at the moment    Case discussed with Dr. Hickey

## 2024-07-01 LAB
ANION GAP SERPL CALC-SCNC: 10 MMOL/L — SIGNIFICANT CHANGE UP (ref 5–17)
APTT BLD: 31.3 SEC — SIGNIFICANT CHANGE UP (ref 24.5–35.6)
APTT BLD: 36.9 SEC — HIGH (ref 24.5–35.6)
APTT BLD: 41.7 SEC — HIGH (ref 24.5–35.6)
BASOPHILS # BLD AUTO: 0.07 K/UL — SIGNIFICANT CHANGE UP (ref 0–0.2)
BASOPHILS NFR BLD AUTO: 0.5 % — SIGNIFICANT CHANGE UP (ref 0–2)
BUN SERPL-MCNC: 45 MG/DL — HIGH (ref 7–23)
CALCIUM SERPL-MCNC: 9 MG/DL — SIGNIFICANT CHANGE UP (ref 8.5–10.1)
CHLORIDE SERPL-SCNC: 107 MMOL/L — SIGNIFICANT CHANGE UP (ref 96–108)
CO2 SERPL-SCNC: 20 MMOL/L — LOW (ref 22–31)
CREAT SERPL-MCNC: 2.74 MG/DL — HIGH (ref 0.5–1.3)
EGFR: 18 ML/MIN/1.73M2 — LOW
EOSINOPHIL # BLD AUTO: 0.15 K/UL — SIGNIFICANT CHANGE UP (ref 0–0.5)
EOSINOPHIL NFR BLD AUTO: 1.1 % — SIGNIFICANT CHANGE UP (ref 0–6)
GLUCOSE SERPL-MCNC: 151 MG/DL — HIGH (ref 70–99)
HCT VFR BLD CALC: 24 % — LOW (ref 34.5–45)
HCT VFR BLD CALC: 24.9 % — LOW (ref 34.5–45)
HCT VFR BLD CALC: 25.1 % — LOW (ref 34.5–45)
HGB BLD-MCNC: 7.9 G/DL — LOW (ref 11.5–15.5)
HGB BLD-MCNC: 8.3 G/DL — LOW (ref 11.5–15.5)
HGB BLD-MCNC: 8.4 G/DL — LOW (ref 11.5–15.5)
IMM GRANULOCYTES NFR BLD AUTO: 1.2 % — HIGH (ref 0–0.9)
LYMPHOCYTES # BLD AUTO: 1.26 K/UL — SIGNIFICANT CHANGE UP (ref 1–3.3)
LYMPHOCYTES # BLD AUTO: 9.1 % — LOW (ref 13–44)
MCHC RBC-ENTMCNC: 29.1 PG — SIGNIFICANT CHANGE UP (ref 27–34)
MCHC RBC-ENTMCNC: 29.6 PG — SIGNIFICANT CHANGE UP (ref 27–34)
MCHC RBC-ENTMCNC: 29.6 PG — SIGNIFICANT CHANGE UP (ref 27–34)
MCHC RBC-ENTMCNC: 32.9 GM/DL — SIGNIFICANT CHANGE UP (ref 32–36)
MCHC RBC-ENTMCNC: 33.3 GM/DL — SIGNIFICANT CHANGE UP (ref 32–36)
MCHC RBC-ENTMCNC: 33.5 GM/DL — SIGNIFICANT CHANGE UP (ref 32–36)
MCV RBC AUTO: 87.4 FL — SIGNIFICANT CHANGE UP (ref 80–100)
MCV RBC AUTO: 88.4 FL — SIGNIFICANT CHANGE UP (ref 80–100)
MCV RBC AUTO: 89.9 FL — SIGNIFICANT CHANGE UP (ref 80–100)
MONOCYTES # BLD AUTO: 1.13 K/UL — HIGH (ref 0–0.9)
MONOCYTES NFR BLD AUTO: 8.1 % — SIGNIFICANT CHANGE UP (ref 2–14)
NEUTROPHILS # BLD AUTO: 11.14 K/UL — HIGH (ref 1.8–7.4)
NEUTROPHILS NFR BLD AUTO: 80 % — HIGH (ref 43–77)
PLATELET # BLD AUTO: 319 K/UL — SIGNIFICANT CHANGE UP (ref 150–400)
PLATELET # BLD AUTO: 333 K/UL — SIGNIFICANT CHANGE UP (ref 150–400)
PLATELET # BLD AUTO: 352 K/UL — SIGNIFICANT CHANGE UP (ref 150–400)
POTASSIUM SERPL-MCNC: 4.6 MMOL/L — SIGNIFICANT CHANGE UP (ref 3.5–5.3)
POTASSIUM SERPL-SCNC: 4.6 MMOL/L — SIGNIFICANT CHANGE UP (ref 3.5–5.3)
RBC # BLD: 2.67 M/UL — LOW (ref 3.8–5.2)
RBC # BLD: 2.84 M/UL — LOW (ref 3.8–5.2)
RBC # BLD: 2.85 M/UL — LOW (ref 3.8–5.2)
RBC # FLD: 14.5 % — SIGNIFICANT CHANGE UP (ref 10.3–14.5)
RBC # FLD: 14.6 % — HIGH (ref 10.3–14.5)
RBC # FLD: 14.9 % — HIGH (ref 10.3–14.5)
SODIUM SERPL-SCNC: 137 MMOL/L — SIGNIFICANT CHANGE UP (ref 135–145)
WBC # BLD: 13.92 K/UL — HIGH (ref 3.8–10.5)
WBC # BLD: 14.34 K/UL — HIGH (ref 3.8–10.5)
WBC # BLD: 16.04 K/UL — HIGH (ref 3.8–10.5)
WBC # FLD AUTO: 13.92 K/UL — HIGH (ref 3.8–10.5)
WBC # FLD AUTO: 14.34 K/UL — HIGH (ref 3.8–10.5)
WBC # FLD AUTO: 16.04 K/UL — HIGH (ref 3.8–10.5)

## 2024-07-01 PROCEDURE — 99232 SBSQ HOSP IP/OBS MODERATE 35: CPT

## 2024-07-01 PROCEDURE — 99233 SBSQ HOSP IP/OBS HIGH 50: CPT

## 2024-07-01 RX ORDER — HEPARIN SODIUM 50 [USP'U]/ML
3000 INJECTION, SOLUTION INTRAVENOUS EVERY 6 HOURS
Refills: 0 | Status: DISCONTINUED | OUTPATIENT
Start: 2024-07-01 | End: 2024-07-03

## 2024-07-01 RX ORDER — HEPARIN SODIUM 50 [USP'U]/ML
1100 INJECTION, SOLUTION INTRAVENOUS
Qty: 25000 | Refills: 0 | Status: DISCONTINUED | OUTPATIENT
Start: 2024-07-01 | End: 2024-07-03

## 2024-07-01 RX ORDER — SODIUM CHLORIDE 0.9 % (FLUSH) 0.9 %
1000 SYRINGE (ML) INJECTION
Refills: 0 | Status: DISCONTINUED | OUTPATIENT
Start: 2024-07-01 | End: 2024-07-02

## 2024-07-01 RX ORDER — HEPARIN SODIUM 50 [USP'U]/ML
6500 INJECTION, SOLUTION INTRAVENOUS EVERY 6 HOURS
Refills: 0 | Status: DISCONTINUED | OUTPATIENT
Start: 2024-07-01 | End: 2024-07-03

## 2024-07-01 RX ADMIN — DILTIAZEM HYDROCHLORIDE 90 MILLIGRAM(S): 240 CAPSULE, EXTENDED RELEASE ORAL at 11:23

## 2024-07-01 RX ADMIN — HEPARIN SODIUM 1200 UNIT(S)/HR: 50 INJECTION, SOLUTION INTRAVENOUS at 01:57

## 2024-07-01 RX ADMIN — AMIODARONE HYDROCHLORIDE 200 MILLIGRAM(S): 50 INJECTION, SOLUTION INTRAVENOUS at 11:22

## 2024-07-01 RX ADMIN — Medication 75 MILLILITER(S): at 11:21

## 2024-07-01 RX ADMIN — ATORVASTATIN CALCIUM 80 MILLIGRAM(S): 20 TABLET, FILM COATED ORAL at 23:37

## 2024-07-01 RX ADMIN — DILTIAZEM HYDROCHLORIDE 90 MILLIGRAM(S): 240 CAPSULE, EXTENDED RELEASE ORAL at 23:36

## 2024-07-01 RX ADMIN — EZETIMIBE 10 MILLIGRAM(S): 10 TABLET ORAL at 11:21

## 2024-07-01 RX ADMIN — HEPARIN SODIUM 1100 UNIT(S)/HR: 50 INJECTION, SOLUTION INTRAVENOUS at 11:30

## 2024-07-01 RX ADMIN — DILTIAZEM HYDROCHLORIDE 90 MILLIGRAM(S): 240 CAPSULE, EXTENDED RELEASE ORAL at 19:52

## 2024-07-01 RX ADMIN — HEPARIN SODIUM 3000 UNIT(S): 50 INJECTION, SOLUTION INTRAVENOUS at 02:02

## 2024-07-01 RX ADMIN — CLOPIDOGREL BISULFATE 75 MILLIGRAM(S): 75 TABLET, FILM COATED ORAL at 11:21

## 2024-07-01 RX ADMIN — CEFAZOLIN 2000 MILLIGRAM(S): 10 INJECTION, POWDER, FOR SOLUTION INTRAVENOUS at 11:22

## 2024-07-01 RX ADMIN — DILTIAZEM HYDROCHLORIDE 90 MILLIGRAM(S): 240 CAPSULE, EXTENDED RELEASE ORAL at 06:43

## 2024-07-01 RX ADMIN — HEPARIN SODIUM 1400 UNIT(S)/HR: 50 INJECTION, SOLUTION INTRAVENOUS at 19:46

## 2024-07-01 NOTE — PROGRESS NOTE ADULT - ASSESSMENT
68 yo F with above PMHx, presented with Lt side weakness/slurred speech, has acute CVA.  PAF on eliquis/amio/cardizem/toprol, s/p DCCV on 4/1/24, 4/22/24, found to have AF w.RVR was on IV cardizem drip.  Also has acute anemia due to thigh hematoma, s/p UTI on antibiotics, now ARF on CRF   PAF w/RVR, on amiodarone, acute CVA with Lt hemiparesis    A/P: Paroxysmal Afib with difficult to control rates  Pt failed rhythm control, also due to multiple comorbidities, pt is not a candidate for AFib ablation  Recommend AVJ ablation with leadless PPM implant for rate control   renal function worsen today, PPM implant/AVJ abl cancelled, plan for tomorrow if renal function improves  Keep pt NPO post MN  D/C Heparin 4 hrs prior  to procedure tomorrow.  Continue po cardizem 90 mg Q6H  Continue Amiodarone 200mg PO daily  IV metoprolol 2.5mg q6hrs PRN  Plan d/w pt/family//hospitalist

## 2024-07-01 NOTE — PROGRESS NOTE ADULT - TIME BILLING
extensive review of patient's medical chart including provider and RN notes, labs, imaging, etc, evaluation of patient at bedside, discussions with patient and daughter re patient's clinical condition and plan of care, coordination of care with consultants including Cardiac EP, Hematology and Surgery, and finally, documentation of today's findings and plan.

## 2024-07-01 NOTE — PROGRESS NOTE ADULT - ASSESSMENT
69-year-old female with DM, HTN, afib, CKD, hx of multiple cancer diagnosis, admitted for neurological changes found to have an acute CVA, also found to have acute anemia likely related to hematoma status post RBC transfusion, splenic mass concerning for malignancy, and persisting afib that has been difficult to control at times.     Acute infarcts in the R hemisphere and possible L.    Likely embolic. Eliquis on hold as patient is now on IV heparin in anticipation of AVJ ablation and pacemaker insertion 7/2. OK to continue Plavix 75mg daily. Atorvastatin at bedtime.     Persistent Afib with difficult to control rates  Patient failed rhythm control. EP recommended AVJ ablation with leadless PPM implant for rate control and HCP is in agreement with procedure. Now planned for tomorrow 7/2. Rate control with Cardizem 90mg PO q6h. Amiodarone 200mg daily. IV metoprolol 2.5mg PRN for HR>130. IV heparin drip in anticipation of procedure. IV heparin need to be held 7/2 at 6AM.     RUE bleeding  In setting of supratherapeutic PTT / heparin drip. Appreciate input from Surgery who assisted with pressure dressing. Bleeding appears to have resolved. PTTs are no longer critically supratherapeutic. Unclear whether she may have an additional component causing the issue, such as an acquired factor inhibitor in setting of possible cancer diagnosis. Discussed with Hematology Oncology Dr Turcios who will assist with workup.    JUSTINO on CKD  Appreciate input from Nephrology. JUSTINO/KHAI. Fluid balance stable and electrolytes stable. Cr did increase compared to yesterday, possibly related to hypovolemia from her bleeding event over the weekend. Ordered for trial of IV fluids. Continue to monitor.    Splenic mass  Appreciate Hematology Oncology input. Likely c/w malignancy however no tissue ever obtained. There was outpatient plan for splenectomy. Patient received vaccines however no notes mention splenectomy. Oncology noted that consideration can be given to IR guided bx of splenic mass to help clarify diagnosis and prognosis. HCP wishes to defer.     Anemia  Likely from hematoma, see below. Monitor Hgb. Transfuse for hgb < 7    Thigh hematomas  On CT, suggestion of acute-appearing intramuscular and subcutaneous hematomas within the proximal L thigh. Indeterminate but chronic appearing mixed attenuation lesion along the lateral margin of the R hamstring musculature within the mid R thigh, possibly related to a subacute to chronic hematoma. Radiology recommended short-term interval follow-up MRI of bilateral thighs without and with contrast in 8-12 weeks to demonstrate resolution/improvement of these findings and to rule out an underlying mass/neoplasm.    Deconditioning and debility  PT consulted. F/u PT eval      Dispo: To be determined pending further clinical assessment. For AVJ ablation and leadless pacemaker implantation 7/2. PT assessment.

## 2024-07-01 NOTE — PROGRESS NOTE ADULT - SUBJECTIVE AND OBJECTIVE BOX
NEPHROLOGY INTERVAL HPI/OVERNIGHT EVENTS:  07-01-24 @ 13:03    7/1  PPM cancelled due to worsening justino  6/28- in bed eating feels well no new events  6/27--resting comfortably.  no distress.       HPI:  68 yo woman with PMHX of DM, HTN, learning disability, A fib post DCCV x2 on 4/1 and 4/22/2024, and recent dx of Splenic CA admitted with left sided weakness on 6/20.  IN ED, CTA of brain c/w acute ischemia in right superior frontal gyrus.  CT with ivc revealed spleen nodular masses.  Creat 1.46 on admission with previous episode of  JUSTINO in 4/2024, creat 1.54 on d/c, 4/27.  Creat peaked at 3.46, with improvement last 2 days.    PMHX and PSHX.  --DM  --HTN  --Hx of Ovarian and Uterine CA--post surgery and chemo in 2004.  --Hx of Arthritis  --R TKR  --A FIB post DCCV  --Hx of learning disability      MEDICATIONS  (STANDING):  aMIOdarone    Tablet 200 milliGRAM(s) Oral daily  atorvastatin 80 milliGRAM(s) Oral at bedtime  clopidogrel Tablet 75 milliGRAM(s) Oral daily  diltiazem    Tablet 90 milliGRAM(s) Oral every 6 hours  ezetimibe 10 milliGRAM(s) Oral daily  heparin  Infusion. 1100 Unit(s)/Hr (11 mL/Hr) IV Continuous <Continuous>  sodium chloride 0.9%. 1000 milliLiter(s) (75 mL/Hr) IV Continuous <Continuous>    MEDICATIONS  (PRN):  acetaminophen     Tablet .. 650 milliGRAM(s) Oral every 6 hours PRN Mild Pain (1 - 3)  heparin   Injectable 3000 Unit(s) IV Push every 6 hours PRN For aPTT between 40 - 57  heparin   Injectable 6500 Unit(s) IV Push every 6 hours PRN For aPTT less than 40  metoprolol tartrate Injectable 5 milliGRAM(s) IV Push every 6 hours PRN HR > 130  ondansetron Injectable 4 milliGRAM(s) IV Push every 6 hours PRN Nausea and/or Vomiting      Allergies    Bee stings (Swelling)  No Known Drug Allergies    Intolerances        I&O's Detail      .    Vital Signs Last 24 Hrs  T(C): 37 (01 Jul 2024 08:09), Max: 37.2 (30 Jun 2024 14:36)  T(F): 98.6 (01 Jul 2024 08:09), Max: 99 (30 Jun 2024 14:36)  HR: 67 (01 Jul 2024 08:09) (67 - 117)  BP: 125/55 (01 Jul 2024 08:09) (114/59 - 164/67)  BP(mean): 76 (01 Jul 2024 08:09) (76 - 77)  RR: 19 (01 Jul 2024 08:09) (19 - 20)  SpO2: 99% (01 Jul 2024 08:09) (94% - 100%)    Parameters below as of 01 Jul 2024 08:09  Patient On (Oxygen Delivery Method): room air      Daily     Daily     PHYSICAL EXAM:  General: alert. awake NAD  HEENT: MMM  CV: s1s2 rrr  LUNGS: B/L CTA  EXT: no edema    LABS:                        8.3    13.92 )-----------( 333      ( 01 Jul 2024 07:16 )             24.9     07-01    137  |  107  |  45<H>  ----------------------------<  151<H>  4.6   |  20<L>  |  2.74<H>    Ca    9.0      01 Jul 2024 07:16      PTT - ( 01 Jul 2024 07:16 )  PTT:31.3 sec  Urinalysis Basic - ( 01 Jul 2024 07:16 )    Color: x / Appearance: x / SG: x / pH: x  Gluc: 151 mg/dL / Ketone: x  / Bili: x / Urobili: x   Blood: x / Protein: x / Nitrite: x   Leuk Esterase: x / RBC: x / WBC x   Sq Epi: x / Non Sq Epi: x / Bacteria: x

## 2024-07-01 NOTE — GOALS OF CARE CONVERSATION - ADVANCED CARE PLANNING - CONVERSATION DETAILS
HPI:  70 yo female with hx of learning disability, JUSTINO on CKD, DM II, HTN, A-fib s/p DCCV with h/o poor response to medications, also has ovarian and uterine CA, recent diagnosis of Splenic CA, CT Lumbar shows moderate stenosis and DDD, recent UTI, who presents as a code stroke due to sudden onset worsening of left sided weakness of LLE and LUE during PT also associated with slurred speech which has sinced resolved.    (20 Jun 2024 21:01)      PERTINENT PMH REVIEWED:  [ X ] YES [ ] NO           Primary Contact:     sister Rosalinda                 HCP [  X ] Surrogate [   ] Guardian [   ]    Mental Status: Pt. is simple capacity   Concerns of Depression [  ]- Not identified   Anxiety [   ]- Not identified   Baseline ADLs (prior to admission):  Independent [ X ] moderately [ ] fully   Dependent   [ ] moderately [ ]fully    Anticipated Grief: Patient [ X ] Family [  X ]    Caregiver [place] Assessed: Yes [ X ] No [  ]    [employer]: [employer]     Spiritual Concerns: Not identified     Goals of Care: to be determined     Previous Services: [facility]     ADVANCE DIRECTIVES: DNR/DNI Trial Non invasive     Anticipated D/C Plan: to be further determined                      Summary:    This SW met with pt. at bedside. Pt. has simple capacity. Pt. was able to state she came in the hospital for a stroke and that she needs a pacemaker. Pt. shared that she is scared but does want to pursue Pace Maker. Pt. was not up for more conversation and gave this SW permission to speak with her HCP/sister Rosalinda. This SW spoke with Rosalinda via phone to discuss goals of care, assist with planning and provide supportive. Pt. was at [facility] for  [facility] and prior to lived with pts sister and her . Pts sister reports that pt. has a history of special needs however, she was never officially diagnosed or received services from the [facility]. This SW called TAMMI Torres [phone] and she confirmed that the pt. is not in their system therefore, they do not need to be involved in any decision making. Pts sister reports that pts has understanding but lacks insight to everything happening with her health.    GOals of care discussed. Pts sister shared that pt. had history of Ovarian and Uterine cancer 20 years ago. She reports pt. was newly found to have cancer of the spleen and a possibly thyroid cancer but has not had biopsy for the thyroid. She shared that pt. needed surgery for the spleen cancer however, she needed her Afib under control before they could do the surgery. Pts sister reports that pt. has had strokes and been at [facility] since then. She would like to speak to medical team to see the status of pts cancer and if she had pacemaker placed would she be a candidate for surgery. She shared how she does not want pt. to suffer. We discussed option of a comfort focus at a SNF if there is not treatment fo caner or they do nto wish to pursue. Pt. cannot return home with sister with hospice as it is more then she can manage and at this current time is not a inpatient hospice candidate. Pts sister would like to speak with Oncology and medical team about the status of cancer before making any decisions regarding the plan moving forward. Pts sister confirmed DNR/DNI.     Plan at this time to be further determined. Emotional support provided. Our team will continue to follow.
This SW met with pts family  to follow up and further discuss goals of care. We reviewed pts current medical condition and that pt. is likely not a candidate for cancer treatment. They discussed that the don't know how fast cancer will progress and would like to pursue pacemaker that pt. is halving placed to day and then have pt. go to [facility]. Pts family would like to see how pt. does at [facility] before deciding on comfort focus. They recognize that she will decline however, it is unclear how long that will take. We reviewed again the option of a comfort focus at facility at any time and that a comfort MOLST would need to be put in place. Pts family expressed understanding. Pt. will likely need LTC transition after rehab days are finished, financial implications of this discussed. Plan  at this time is for pacemaker placement today and [facility] upon d/c with likely transition to LTC. Family is not ready for comfort focus et but are aware they can shift focus at any time. They would [facility] prior to admission. Message left for floor SW team. Emotional support provided. Our team will continue to follow.

## 2024-07-01 NOTE — PROGRESS NOTE ADULT - SUBJECTIVE AND OBJECTIVE BOX
HPI: 68 yo female with hx of learning disability, JUSTINO on CKD, DM II, HTN, A-fib s/p DCCV with h/o poor response to medications, also has ovarian and uterine CA, recent diagnosis of Splenic CA, CT Lumbar shows moderate stenosis and DDD, recent UTI, who presents as a code stroke due to sudden onset worsening of left sided weakness of LLE and LUE during PT also associated with slurred speech which has sinced resolved. Arrived with NIHSS of 8 with unknown LKW.  History taken from EMR, ED MD and pt sister at bedside.  (20 Jun 2024 21:01)      Cardiac PET ST (March 2024) no ischemia, LVEF 45%  echo (6/21/2024) LVEF 70%, mildly dilated LA, mod pul HTN    6/24/24:  pt is resting in the bed, denies chest pain/SOB/palpitations  remains Lt side weakness  tele: Afib 's, occasional upto 130's bpm    6/25/24: pt seen resting in bed in NAD, she denies any SOB, CP, dizziness or palpitations.  Left side weakness, can only move left hand  TELE: Afib  bpm, occasional RVRs to 130's    6/26/24: TELE: AFib  bpm, RVRs to 120-130's.  Cardizem gtt is at 5mg/hr.  She denies symptoms.    6/28/24: pt is resting in the bed, denies chest pain/SOB/palpitations  Tele: AFib 100-130's bpm    6/29/24 : Pt in bed remains tachycardic at rest. Patient with bleeding from prior IV site in arm in setting of persistently supratherapeutic INR on heparin drip for her afib that has been difficult to control.    7/1/24: pt had bleeding from IV site while on IV heparin, required PRBC transfusion on 6/30.  Pt is resting in the bed, denies chest pain/SOB/palpitations  tele: AFIb 100-130's bpm  renal function worsen today, PPM implant/AVJ abl cancelled      ROS: All other ROS is negative unless indicated above.      Physical Exam:  Vital Signs Last 24 Hrs  T(C): 37 (01 Jul 2024 08:09), Max: 37.2 (30 Jun 2024 14:36)  T(F): 98.6 (01 Jul 2024 08:09), Max: 99 (30 Jun 2024 14:36)  HR: 67 (01 Jul 2024 08:09) (67 - 117)  BP: 125/55 (01 Jul 2024 08:09) (114/59 - 164/67)  BP(mean): 76 (01 Jul 2024 08:09) (76 - 77)  RR: 19 (01 Jul 2024 08:09) (19 - 20)  SpO2: 99% (01 Jul 2024 08:09) (94% - 100%)    Parameters below as of 01 Jul 2024 08:09  Patient On (Oxygen Delivery Method): room air    Constitutional: well developed,  no deformities and no acute distress    Neurological: Alert & Oriented x 3, Lt side  hemiparesis    HEENT: NC/AT, PERRLA, EOMI,  Neck supple.    Respiratory: CTA B/L, No wheezing/crackles/rhonchi    Cardiovascular: (+) irregular S1 & S2,     Gastrointestinal: soft, NT, nondistended, (+) BS    Genitourinary: non distended bladder, voiding freely    Extremities: (+) RUE compression dressing        Allergies    Bee stings (Swelling)  No Known Drug Allergies    Intolerances      MEDICATIONS  (STANDING):  aMIOdarone    Tablet 200 milliGRAM(s) Oral daily  atorvastatin 80 milliGRAM(s) Oral at bedtime  ceFAZolin  Injectable. 2000 milliGRAM(s) IV Push once  clopidogrel Tablet 75 milliGRAM(s) Oral daily  diltiazem    Tablet 90 milliGRAM(s) Oral every 6 hours  ezetimibe 10 milliGRAM(s) Oral daily  heparin  Infusion. 1100 Unit(s)/Hr (11 mL/Hr) IV Continuous <Continuous>  sodium chloride 0.9%. 1000 milliLiter(s) (75 mL/Hr) IV Continuous <Continuous>    MEDICATIONS  (PRN):  acetaminophen     Tablet .. 650 milliGRAM(s) Oral every 6 hours PRN Mild Pain (1 - 3)  heparin   Injectable 6500 Unit(s) IV Push every 6 hours PRN For aPTT less than 40  heparin   Injectable 3000 Unit(s) IV Push every 6 hours PRN For aPTT between 40 - 57  metoprolol tartrate Injectable 5 milliGRAM(s) IV Push every 6 hours PRN HR > 130  ondansetron Injectable 4 milliGRAM(s) IV Push every 6 hours PRN Nausea and/or Vomiting    LABS:                        8.3    13.92 )-----------( 333      ( 01 Jul 2024 07:16 )             24.9     07-01    137  |  107  |  45<H>  ----------------------------<  151<H>  4.6   |  20<L>  |  2.74<H>    Ca    9.0      01 Jul 2024 07:16      PTT - ( 01 Jul 2024 07:16 )  PTT:31.3 sec  Urinalysis Basic - ( 01 Jul 2024 07:16 )    Color: x / Appearance: x / SG: x / pH: x  Gluc: 151 mg/dL / Ketone: x  / Bili: x / Urobili: x   Blood: x / Protein: x / Nitrite: x   Leuk Esterase: x / RBC: x / WBC x   Sq Epi: x / Non Sq Epi: x / Bacteria: x

## 2024-07-01 NOTE — PROGRESS NOTE ADULT - NS ATTEND AMEND GEN_ALL_CORE FT
Agree with assessment and plan as above  69-year-old multiple cancer and prior therapy stroke with left-sided weakness slurred speech patient has persistent A-fib difficult to rate control as well as rhythm control.  Patient has been on amiodarone and cardizem. Considering her comorbidities and recent stroke poor functional status not a good candidate for rhythm control therapy  Continue anticoagulation  rate control  Can consider pacemaker and AVJ ablation in the setting of difficult to control A-fib RVR, defer to oncology and palliative team to discuss goals of care first, depending on prognosis should then consider if ppm avj ablation is benefitial   EMIR monitor H&H   will follow prn    I spent a total of 35 minutes on the encounter, including chart review, evaluating and discussing treatment options with the patient, as well as counseling and coordination as stated above.
68 yo woman Hx CVA 4/2024, DM HTN afib Eliquis, recent ischemic stroke non hemorrhagic presents to the ED from skilled nursing facility due to sudden onset worsening L sided weakness.  Code stroke called in ED.  NIHSS 8. Pt was not a candidate for IV thrombolytics 2/2 OOTW.MR head +  acute infarcts in the R hemisphere and possible L.  Likely embolic. Agree with above.
7/1/24: pt had bleeding from IV site while on IV heparin, required PRBC transfusion on 6/30.  renal function worsen today, will delay PPM implant/AVJ abl until more stable.
Agree with assessment and plan as above  69-year-old multiple cancer and prior therapy stroke with left-sided weakness slurred speech patient has persistent A-fib difficult to rate control as well as rhythm control.  Patient has been on amiodarone and cardizem. Considering her comorbidities and recent stroke poor functional status not a good candidate for rhythm control therapy  Continue anticoagulation  rate control  Can consider pacemaker and AVJ ablation in the setting of difficult to control A-fib RVR, defer to oncology and palliative team to discuss goals of care first, depending on prognosis should then consider if ppm avj ablation is beneficial   EMIR monitor H&H   will follow prn    I spent a total of 35 minutes on the encounter, including chart review, evaluating and discussing treatment options with the patient, as well as counseling and coordination as stated above.
Agree with assessment and plan as above  69-year-old multiple cancer and prior therapy stroke with left-sided weakness slurred speech patient has persistent A-fib difficult to rate control as well as rhythm control.  Patient has been on amiodarone metoprolol Cardizem.  Considering her comorbidities and recent stroke poor functional status not a good candidate for rhythm control therapy  Continue anticoagulation  Can consider pacemaker and AVJ ablation in the setting of difficult to control A-fib RVR  Will continue to follow her clinical course    I spent a total of 35 minutes on the encounter, including chart review, evaluating and discussing treatment options with the patient, as well as counseling and coordination as stated above.
Agree with assessment and plan as above  69-year-old multiple cancer and prior therapy stroke with left-sided weakness slurred speech patient has persistent A-fib difficult to rate control as well as rhythm control.  Patient has been on amiodarone and cardizem. Considering her comorbidities and recent stroke poor functional status not a good candidate for rhythm control therapy  Continue anticoagulation  rate control  Can consider pacemaker and AVJ ablation in the setting of difficult to control A-fib RVR, defer to oncology and palliative team to discuss goals of care   Will continue to follow her clinical course  EMIR monitor H&H     I spent a total of 35 minutes on the encounter, including chart review, evaluating and discussing treatment options with the patient, as well as counseling and coordination as stated above.

## 2024-07-01 NOTE — PROGRESS NOTE ADULT - ASSESSMENT
70 yo woman with PMHX of DM, HTN, learning disability, A fib post DCCV x2 on 4/1 and 4/22/2024, and recent dx of Splenic CA admitted with left sided weakness on 6/20.  --JUSTINO with unclear baseline CKD.    C/w ATN/KHAI : now slowly improving.    Would be cautious with contrast studies in the future.    Meds reviewed and acceptable.  --Electrolytes and fluid status stable.  --HTN : well controlled.  --A FIB : continue CCB /Amiodarone for rate control  --Spleen mass : c/w malignancy : further intervention to be determined.    6/27 SY  --JUSTINO/KHAI :  slowly improving.  --Fluid and electrolytes stable.  --A FIB : evaluation for ablation and PPM in progress --awaiting for prognosis from Onc.  --Spleen mass : c/w malignancy but without tissue diagnosis.  ? prognosis.    6/28  Pt w JUSTINO/ KHAI gradually improving  Splenic malignancy hx  A Fib  Anemia, Electrolytes stable  vitals stable  Dr Chang on call if needed    7/1 MK   - JUSTINO due to KHAI, recovery delayed by additional hemodynamic variability with dec h/h    fu trend of h/h and scr     UOP noted, bladder scan prn as neededn    cw ivf    avoid IVC     no nsaid   - afib with difficult to control rate     amiodarone     leadless PPM upon stability of scr   - Splenic mass with multiple malignancy     GOC discussion noted, family declined work-up for cancers

## 2024-07-01 NOTE — PROGRESS NOTE ADULT - SUBJECTIVE AND OBJECTIVE BOX
Chief Complaint: Sudden onset worsening of left sided weakness of LLE and LUE during PT also associated with slurred speech     Interval Hx: No acute events overnight. Patient is overally fairly stable aside for modest increase in Cr noted on AM labs, likely related to this past weekend's bleeding / hypovolemia. The JUSTINO on CKD that is underlying is probably from KHAI. She is without acute complaints though subjective assessment is somewhat limited by her learning disability. Her RUE bleeding appears to have resolved. Dressing is being managed by Surgery. Her RUE remains neurovascularly intact. Hgb is stable. PTT is no longer critically supratherapeutic. Patient is for AVJ ablation and leadless PPM insertion tomorrow if Cr improved. Ordered for IV fluid hydration. Nephrology following.     ROS: Multi system review is bit limited due to patient's neurocognitive impairment    Vitals:  T(F): 97.8 (01 Jul 2024 15:20), Max: 98.6 (01 Jul 2024 08:09)  HR: 70 (01 Jul 2024 15:20) (67 - 95)  BP: 131/83 (01 Jul 2024 15:20) (114/59 - 152/51)  RR: 19 (01 Jul 2024 15:20) (19 - 20)  SpO2: 98% (01 Jul 2024 15:20) (96% - 100%)    Exam:  Gen: Comfortable  HEENT: NCAT PERRL EOMI MMM clear oropharynx  Neck: Supple, no LAD  CVS: s1 s2 normal, irregular, rate ~80s  Chest: Normal resp effort at rest, lungs CTA B/L  Abd: +BS, soft, NT ND   Ext: RUE with pressure dressing, R hand sensation and motor function intact.   Skin: Warm, dry  Neuro: Awake and alert, answers simple questions, follows simple commands    Labs:                                 7.9    14.34 )--------( 319                   24.0         137  |  107  |  45  ----------------------------<  151  4.6   |   20   |  2.74    Ca    9.0        PTT: 36.9 sec    Iron 21  TIBC 191  Iron sat 11%  Ferritin 672       Troponin  negative    ProBNP 2497     A1c 5.9     LDL 60    UA 6/20: Yellow, cloudy, mod LE, N+, 13 WBC, 6 RBC, many bact    Micro:  Urine culture 6/21: 10,000-49,000 CFU/mL E.coli pan-S  Blood culture 6/21: Negative     Imaging:  CT B/L LE WO 6/20: Acute-appearing intramuscular and subcutaneous hematomas within the proximal L thigh. Indeterminate but chronic appearing mixed attenuation lesion along the lateral margin of the R hamstring musculature within the mid R thigh, possibly related to a subacute to chronic hematoma.     MR head WO 6/20: Mild diffuse parenchymal volume loss. Mild chronic microvascular ischemic changes. Small chronic infarcts noted in the right MCA territory Acute infarcts in the medial posterior right frontal parietal region, right parietal lobe and the watershed territory of right anterior cerebral artery and middle cerebral artery in the right frontal and parietal lobes. Largest infarct measures approximately 1.4 x 0.8 cm in the right parietal lobe. Additional nonspecific subcentimeter high diffusion signal with ADC map restriction noted in the anterior corpus callosum just right of midline. This could be related to small acute infarct. Subcentimeter high diffusion signals also noted in the medial left frontal lobe possibly related to subacute infarcts. There is no acute parenchymal hemorrhage, or midline shift. There is no extra-axial fluid collection.  There is no hydrocephalus. Partial empty sella noted. Mild mucosal thickening paranasal sinuses.    CT abdomen and pelvis W/ 6/20: Nodular masslike appearance of the spleen. There is a small ill-defined intramuscular hematoma in the left upper thigh medial compartment.    CT chest W/ 6/20: No acute pulmonary embolism to the lobar level. Limited assessment of segmental and subsegmental branches. Cardiomegaly with mild perihilar groundglass opacities which may represent mild pulmonary edema or an infectious process.    CTA head and neck 6/20: Bilateral vertebral arteries patent. No significant stenosis, occlusion or dissection bilateral extracranial carotid arteries. Severe focal stenosis right P3 segment origin. Moderate stenosis distal cavernous and supraclinoid segments right internal carotid artery. 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 Focal occlusion with reconstitution left proximal A2 segment centered 1-2 cm distal to its origin. Additional findings, as above, including an approximate 6 cm left thyroid mass, resulting in regional mass effect. Recommend nonemergent thyroid ultrasound.    CT perfusion 6/20: No predicted core infarct. Evidence of active ischemia-tissue at risk R posterior frontal-parietal region.    CT head 6/20: Compared with 4/19/2024. No evidence of acute hemorrhage, extra-axial collection or mass effect. Multiple chronic right frontal infarcts as well as findings suspicious for acute ischemia right superior frontal gyrus, as above.    Cardiac Testing:  TTE 6/21: Technically difficult image quality. Left ventricular systolic function is hyperdynamic with an ejection fraction estimated at 70 to 75 %. Normal right ventricular cavity size and normal systolic function. The left atrium is mildly dilated. Agitated saline injection was negative for intracardiac shunt. Moderate pulmonary hypertension.    Meds:  MEDICATIONS  (STANDING):  aMIOdarone    Tablet 200 milliGRAM(s) Oral daily  atorvastatin 80 milliGRAM(s) Oral at bedtime  clopidogrel Tablet 75 milliGRAM(s) Oral daily  diltiazem    Tablet 90 milliGRAM(s) Oral every 6 hours  ezetimibe 10 milliGRAM(s) Oral daily  heparin  Infusion. 1100 Unit(s)/Hr (11 mL/Hr) IV Continuous <Continuous>  sodium chloride 0.9%. 1000 milliLiter(s) (75 mL/Hr) IV Continuous <Continuous>    MEDICATIONS  (PRN):  acetaminophen     Tablet .. 650 milliGRAM(s) Oral every 6 hours PRN Mild Pain (1 - 3)  heparin   Injectable 6500 Unit(s) IV Push every 6 hours PRN For aPTT less than 40  heparin   Injectable 3000 Unit(s) IV Push every 6 hours PRN For aPTT between 40 - 57  metoprolol tartrate Injectable 5 milliGRAM(s) IV Push every 6 hours PRN HR > 130  ondansetron Injectable 4 milliGRAM(s) IV Push every 6 hours PRN Nausea and/or Vomiting

## 2024-07-02 LAB
ADD ON TEST-SPECIMEN IN LAB: SIGNIFICANT CHANGE UP
ANION GAP SERPL CALC-SCNC: 9 MMOL/L — SIGNIFICANT CHANGE UP (ref 5–17)
APTT BLD: 70 SEC — HIGH (ref 24.5–35.6)
APTT BLD: 73.8 SEC — HIGH (ref 24.5–35.6)
BASOPHILS # BLD AUTO: 0.04 K/UL — SIGNIFICANT CHANGE UP (ref 0–0.2)
BASOPHILS NFR BLD AUTO: 0.3 % — SIGNIFICANT CHANGE UP (ref 0–2)
BUN SERPL-MCNC: 37 MG/DL — HIGH (ref 7–23)
CALCIUM SERPL-MCNC: 8.3 MG/DL — LOW (ref 8.5–10.1)
CHLORIDE SERPL-SCNC: 108 MMOL/L — SIGNIFICANT CHANGE UP (ref 96–108)
CO2 SERPL-SCNC: 20 MMOL/L — LOW (ref 22–31)
CREAT SERPL-MCNC: 2.06 MG/DL — HIGH (ref 0.5–1.3)
EGFR: 25 ML/MIN/1.73M2 — LOW
EOSINOPHIL # BLD AUTO: 0.45 K/UL — SIGNIFICANT CHANGE UP (ref 0–0.5)
EOSINOPHIL NFR BLD AUTO: 3.7 % — SIGNIFICANT CHANGE UP (ref 0–6)
GLUCOSE BLDC GLUCOMTR-MCNC: 154 MG/DL — HIGH (ref 70–99)
GLUCOSE SERPL-MCNC: 118 MG/DL — HIGH (ref 70–99)
HCT VFR BLD CALC: 21.3 % — LOW (ref 34.5–45)
HGB BLD-MCNC: 7.1 G/DL — LOW (ref 11.5–15.5)
IMM GRANULOCYTES NFR BLD AUTO: 1.1 % — HIGH (ref 0–0.9)
INR BLD: 1.22 RATIO — HIGH (ref 0.85–1.18)
LYMPHOCYTES # BLD AUTO: 1.21 K/UL — SIGNIFICANT CHANGE UP (ref 1–3.3)
LYMPHOCYTES # BLD AUTO: 10 % — LOW (ref 13–44)
MCHC RBC-ENTMCNC: 29.3 PG — SIGNIFICANT CHANGE UP (ref 27–34)
MCHC RBC-ENTMCNC: 33.3 GM/DL — SIGNIFICANT CHANGE UP (ref 32–36)
MCV RBC AUTO: 88 FL — SIGNIFICANT CHANGE UP (ref 80–100)
MONOCYTES # BLD AUTO: 0.95 K/UL — HIGH (ref 0–0.9)
MONOCYTES NFR BLD AUTO: 7.9 % — SIGNIFICANT CHANGE UP (ref 2–14)
NEUTROPHILS # BLD AUTO: 9.31 K/UL — HIGH (ref 1.8–7.4)
NEUTROPHILS NFR BLD AUTO: 77 % — SIGNIFICANT CHANGE UP (ref 43–77)
PLATELET # BLD AUTO: 336 K/UL — SIGNIFICANT CHANGE UP (ref 150–400)
POTASSIUM SERPL-MCNC: 4 MMOL/L — SIGNIFICANT CHANGE UP (ref 3.5–5.3)
POTASSIUM SERPL-SCNC: 4 MMOL/L — SIGNIFICANT CHANGE UP (ref 3.5–5.3)
PROTHROM AB SERPL-ACNC: 13.7 SEC — HIGH (ref 9.5–13)
RBC # BLD: 2.42 M/UL — LOW (ref 3.8–5.2)
RBC # FLD: 14.7 % — HIGH (ref 10.3–14.5)
SODIUM SERPL-SCNC: 137 MMOL/L — SIGNIFICANT CHANGE UP (ref 135–145)
WBC # BLD: 12.09 K/UL — HIGH (ref 3.8–10.5)
WBC # FLD AUTO: 12.09 K/UL — HIGH (ref 3.8–10.5)

## 2024-07-02 PROCEDURE — 99232 SBSQ HOSP IP/OBS MODERATE 35: CPT

## 2024-07-02 PROCEDURE — 99233 SBSQ HOSP IP/OBS HIGH 50: CPT

## 2024-07-02 RX ADMIN — CLOPIDOGREL BISULFATE 75 MILLIGRAM(S): 75 TABLET, FILM COATED ORAL at 07:57

## 2024-07-02 RX ADMIN — EZETIMIBE 10 MILLIGRAM(S): 10 TABLET ORAL at 09:20

## 2024-07-02 RX ADMIN — DILTIAZEM HYDROCHLORIDE 90 MILLIGRAM(S): 240 CAPSULE, EXTENDED RELEASE ORAL at 19:31

## 2024-07-02 RX ADMIN — AMIODARONE HYDROCHLORIDE 200 MILLIGRAM(S): 50 INJECTION, SOLUTION INTRAVENOUS at 07:57

## 2024-07-02 RX ADMIN — ATORVASTATIN CALCIUM 80 MILLIGRAM(S): 20 TABLET, FILM COATED ORAL at 22:04

## 2024-07-02 RX ADMIN — HEPARIN SODIUM 1400 UNIT(S)/HR: 50 INJECTION, SOLUTION INTRAVENOUS at 09:14

## 2024-07-02 RX ADMIN — HEPARIN SODIUM 1400 UNIT(S)/HR: 50 INJECTION, SOLUTION INTRAVENOUS at 08:36

## 2024-07-02 RX ADMIN — Medication 3 MILLIGRAM(S): at 22:04

## 2024-07-02 RX ADMIN — DILTIAZEM HYDROCHLORIDE 90 MILLIGRAM(S): 240 CAPSULE, EXTENDED RELEASE ORAL at 12:10

## 2024-07-02 RX ADMIN — DILTIAZEM HYDROCHLORIDE 90 MILLIGRAM(S): 240 CAPSULE, EXTENDED RELEASE ORAL at 23:39

## 2024-07-02 RX ADMIN — HEPARIN SODIUM 1400 UNIT(S)/HR: 50 INJECTION, SOLUTION INTRAVENOUS at 19:34

## 2024-07-02 RX ADMIN — DILTIAZEM HYDROCHLORIDE 90 MILLIGRAM(S): 240 CAPSULE, EXTENDED RELEASE ORAL at 05:57

## 2024-07-02 RX ADMIN — HEPARIN SODIUM 1400 UNIT(S)/HR: 50 INJECTION, SOLUTION INTRAVENOUS at 02:29

## 2024-07-02 RX ADMIN — HEPARIN SODIUM 1400 UNIT(S)/HR: 50 INJECTION, SOLUTION INTRAVENOUS at 07:24

## 2024-07-02 NOTE — CHART NOTE - NSCHARTNOTEFT_GEN_A_CORE
Pt is resting in the bed, NAD  tele: AFib 100-130's bpm  Cr improved to 2.0  H/hct 7/21  Plan for I unit PRBC transfusion today, will hold IV heparin due to anemia  keep pt NPO post MN for leadless PPM /AVJ ablation tomorrow if pt remains stable  Plan to resume eliquis in 24 hrs post-op  Plan d/w pt/family//hospitalist

## 2024-07-02 NOTE — PROGRESS NOTE ADULT - TIME BILLING
- extensive review of patient's medical chart including provider and RN notes, labs, imaging, etc, evaluation of patient at bedside, discussions with patient and  re patient's clinical condition and plan of care, coordination of care with consultants including Cardiac Electrophysiology, Nephrology and Surgery, and finally, documentation of today's findings and plan. extensive review of patient's medical chart including provider and RN notes, labs, imaging, etc, evaluation of patient at bedside, discussions with patient re patient's clinical condition and plan of care, coordination of care with consultants including Cardiac Electrophysiology, Nephrology and Surgery, and finally, documentation of today's findings and plan.

## 2024-07-02 NOTE — PROGRESS NOTE ADULT - ASSESSMENT
69-year-old female with DM, HTN, afib, CKD, hx of multiple cancer diagnosis, admitted for neurological changes found to have an acute CVA, also found to have acute anemia likely related to hematoma status post RBC transfusion, splenic mass concerning for malignancy, and persisting afib that has been difficult to control at times.     Acute infarcts in the R hemisphere and possible L.    Likely embolic. Eliquis on hold as patient is now on IV heparin in anticipation of AVJ ablation and pacemaker insertion 7/2. OK to continue Plavix 75mg daily. Atorvastatin at bedtime.     Persistent Afib with difficult to control rates  Patient failed rhythm control. EP recommended AVJ ablation with leadless PPM implant for rate control and HCP is in agreement with procedure. Now planned for tomorrow 7/2. Rate control with Cardizem 90mg PO q6h. Amiodarone 200mg daily. IV metoprolol 2.5mg PRN for HR>130. IV heparin drip in anticipation of procedure. IV heparin need to be held 7/2 at 6AM.     RUE bleeding  In setting of supratherapeutic PTT / heparin drip. Appreciate input from Surgery who assisted with pressure dressing. Bleeding appears to have resolved. PTTs are no longer critically supratherapeutic. Unclear whether she may have an additional component causing the issue, such as an acquired factor inhibitor in setting of possible cancer diagnosis. Discussed with Hematology Oncology Dr Turcios who will assist with workup.    JUSTINO on CKD  Appreciate input from Nephrology. JUSTINO/KHAI. Fluid balance stable and electrolytes stable. Cr did increase compared to yesterday, possibly related to hypovolemia from her bleeding event over the weekend. Ordered for trial of IV fluids. Continue to monitor.    Splenic mass  Appreciate Hematology Oncology input. Likely c/w malignancy however no tissue ever obtained. There was outpatient plan for splenectomy. Patient received vaccines however no notes mention splenectomy. Oncology noted that consideration can be given to IR guided bx of splenic mass to help clarify diagnosis and prognosis. HCP wishes to defer.     Anemia  Likely from hematoma, see below. Monitor Hgb. Transfuse for hgb < 7    Thigh hematomas  On CT, suggestion of acute-appearing intramuscular and subcutaneous hematomas within the proximal L thigh. Indeterminate but chronic appearing mixed attenuation lesion along the lateral margin of the R hamstring musculature within the mid R thigh, possibly related to a subacute to chronic hematoma. Radiology recommended short-term interval follow-up MRI of bilateral thighs without and with contrast in 8-12 weeks to demonstrate resolution/improvement of these findings and to rule out an underlying mass/neoplasm.    Deconditioning and debility  PT consulted. F/u PT eval      Dispo: To be determined pending further clinical assessment. For AVJ ablation and leadless pacemaker implantation 7/2. PT assessment.    69-year-old female with DM, HTN, afib, CKD, hx of multiple cancer diagnosis, admitted back on 6/20/24 for neurological changes, found to have an acute CVA, also found to have acute anemia likely related to hematoma status post RBC transfusion, splenic mass concerning for malignancy, and persisting atrial fibrillation that has been difficult to control at times. Admitted to Medicine.      Acute infarcts in the R hemisphere and possible L.    Appreciate input from Neurology. Likely embolic. Eliquis on hold as patient is now on IV heparin in anticipation of AVJ ablation and pacemaker insertion tomorrow 7/3.   - Continue Plavix daily.   - Continue Atorvastatin at bedtime.     Persistent Afib with difficult to control rates  Patient failed rhythm control. EP recommended AVJ ablation with leadless PPM implant for rate control and HCP is in agreement with procedure. Now planned for tomorrow 7/3.   - Rate control with Cardizem 90mg PO q6h. Amiodarone 200mg daily. IV metoprolol 2.5mg PRN for HR>130.   - IV heparin drip, which is to be held 7/3 at 6AM.   - AVJ ablation and leadless PPM placement 7/3    RUE bleeding  In setting of supratherapeutic PTT / heparin drip. Appreciate input from Surgery who assisted with pressure dressing. Heparin drip was held and then restarted at lower rate. Bleeding appears to have resolved. No overt bleeding anymore. PTTs are no longer critically supratherapeutic. Unclear whether she may have an additional component causing the issue, such as an acquired factor inhibitor in setting of possible cancer diagnosis. Discussed with Hematology Oncology Dr Turcios who will assist with workup.   - Continue to monitor  - Continue neurovascular checks    Acute on chronic anemia  Likely multi factorial. Concern for a possible underlying malignancy, splenic mass, etc. Leg hematoma that was noted upon admission. And now bleeding she was having yesterday and the day prior in setting of markedly supratherapeutic heparin drip. While bleeding appears to have resolved (was previously oozing from prior IV site, now no bleeding), she is noted to have downtrend in Hgb today. Ordered for 1u PRBC.   - Transfuse 1u PRBC  - Continue to monitor Hgb    JUSTINO on CKD  Appreciate input from Nephrology. JUSTINO/KHAI. Fluid balance stable and electrolytes stable. Cr did increase 7/1 compared to day prior, possibly related to hypovolemia from her bleeding event over the weekend. Ordered for trial of IV fluids yesterday and Cr is now improved. For additional volume today, ordered for 1u PRBC.   - Continue to monitor, trend Cr, monitor Is and Os    Splenic mass  Appreciate Hematology Oncology input. Possibly consistent with malignancy however no tissue ever obtained. There was outpatient plan for splenectomy. Oncology noted that consideration can be given to IR guided bx of splenic mass or splenectomy to help clarify diagnosis and prognosis. HCP wishes to defer at this point.   - Follow up with Hematology / Oncology    Thigh hematomas  On CT, suggestion of acute-appearing intramuscular and subcutaneous hematomas within the proximal L thigh. Indeterminate but chronic appearing mixed attenuation lesion along the lateral margin of the R hamstring musculature within the mid R thigh, possibly related to a subacute to chronic hematoma.   - Recommended short-term interval follow-up MRI of bilateral thighs with and without contrast in 8-12 weeks to demonstrate resolution/improvement of these findings and to rule out an underlying mass/neoplasm.    Deconditioning and debility  PT consulted.   - F/u PT eval      Dispo: To be determined pending further clinical assessment. For AVJ ablation and leadless pacemaker implantation 7/3. PT assessment.

## 2024-07-02 NOTE — PROGRESS NOTE ADULT - SUBJECTIVE AND OBJECTIVE BOX
Chief Complaint: Sudden onset worsening of left sided weakness of LLE and LUE during PT also associated with slurred speech     Interval Hx: No acute events overnight. Patient is overally fairly stable aside for modest increase in Cr noted on AM labs, likely related to this past weekend's bleeding / hypovolemia. The JUSTINO on CKD that is underlying is probably from KHAI. She is without acute complaints though subjective assessment is somewhat limited by her learning disability. Her RUE bleeding appears to have resolved. Dressing is being managed by Surgery. Her RUE remains neurovascularly intact. Hgb is stable. PTT is no longer critically supratherapeutic. Patient is for AVJ ablation and leadless PPM insertion tomorrow if Cr improved. Ordered for IV fluid hydration. Nephrology following.     ROS: Multi system review is bit limited due to patient's neurocognitive impairment    Vitals:  T(F): 97.8 (01 Jul 2024 15:20), Max: 98.6 (01 Jul 2024 08:09)  HR: 70 (01 Jul 2024 15:20) (67 - 95)  BP: 131/83 (01 Jul 2024 15:20) (114/59 - 152/51)  RR: 19 (01 Jul 2024 15:20) (19 - 20)  SpO2: 98% (01 Jul 2024 15:20) (96% - 100%)    Exam:  Gen: Comfortable  HEENT: NCAT PERRL EOMI MMM clear oropharynx  Neck: Supple, no LAD  CVS: s1 s2 normal, irregular, rate ~80s  Chest: Normal resp effort at rest, lungs CTA B/L  Abd: +BS, soft, NT ND   Ext: RUE with pressure dressing, R hand sensation and motor function intact.   Skin: Warm, dry  Neuro: Awake and alert, answers simple questions, follows simple commands    Labs:                                 7.9    14.34 )--------( 319                   24.0         137  |  107  |  45  ----------------------------<  151  4.6   |   20   |  2.74    Ca    9.0        PTT: 36.9 sec    Iron 21  TIBC 191  Iron sat 11%  Ferritin 672       Troponin  negative    ProBNP 2497     A1c 5.9     LDL 60    UA 6/20: Yellow, cloudy, mod LE, N+, 13 WBC, 6 RBC, many bact    Micro:  Urine culture 6/21: 10,000-49,000 CFU/mL E.coli pan-S  Blood culture 6/21: Negative     Imaging:  CT B/L LE WO 6/20: Acute-appearing intramuscular and subcutaneous hematomas within the proximal L thigh. Indeterminate but chronic appearing mixed attenuation lesion along the lateral margin of the R hamstring musculature within the mid R thigh, possibly related to a subacute to chronic hematoma.     MR head WO 6/20: Mild diffuse parenchymal volume loss. Mild chronic microvascular ischemic changes. Small chronic infarcts noted in the right MCA territory Acute infarcts in the medial posterior right frontal parietal region, right parietal lobe and the watershed territory of right anterior cerebral artery and middle cerebral artery in the right frontal and parietal lobes. Largest infarct measures approximately 1.4 x 0.8 cm in the right parietal lobe. Additional nonspecific subcentimeter high diffusion signal with ADC map restriction noted in the anterior corpus callosum just right of midline. This could be related to small acute infarct. Subcentimeter high diffusion signals also noted in the medial left frontal lobe possibly related to subacute infarcts. There is no acute parenchymal hemorrhage, or midline shift. There is no extra-axial fluid collection.  There is no hydrocephalus. Partial empty sella noted. Mild mucosal thickening paranasal sinuses.    CT abdomen and pelvis W/ 6/20: Nodular masslike appearance of the spleen. There is a small ill-defined intramuscular hematoma in the left upper thigh medial compartment.    CT chest W/ 6/20: No acute pulmonary embolism to the lobar level. Limited assessment of segmental and subsegmental branches. Cardiomegaly with mild perihilar groundglass opacities which may represent mild pulmonary edema or an infectious process.    CTA head and neck 6/20: Bilateral vertebral arteries patent. No significant stenosis, occlusion or dissection bilateral extracranial carotid arteries. Severe focal stenosis right P3 segment origin. Moderate stenosis distal cavernous and supraclinoid segments right internal carotid artery. 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 Focal occlusion with reconstitution left proximal A2 segment centered 1-2 cm distal to its origin. Additional findings, as above, including an approximate 6 cm left thyroid mass, resulting in regional mass effect. Recommend nonemergent thyroid ultrasound.    CT perfusion 6/20: No predicted core infarct. Evidence of active ischemia-tissue at risk R posterior frontal-parietal region.    CT head 6/20: Compared with 4/19/2024. No evidence of acute hemorrhage, extra-axial collection or mass effect. Multiple chronic right frontal infarcts as well as findings suspicious for acute ischemia right superior frontal gyrus, as above.    Cardiac Testing:  TTE 6/21: Technically difficult image quality. Left ventricular systolic function is hyperdynamic with an ejection fraction estimated at 70 to 75 %. Normal right ventricular cavity size and normal systolic function. The left atrium is mildly dilated. Agitated saline injection was negative for intracardiac shunt. Moderate pulmonary hypertension.    Meds:  MEDICATIONS  (STANDING):  aMIOdarone    Tablet 200 milliGRAM(s) Oral daily  atorvastatin 80 milliGRAM(s) Oral at bedtime  clopidogrel Tablet 75 milliGRAM(s) Oral daily  diltiazem    Tablet 90 milliGRAM(s) Oral every 6 hours  ezetimibe 10 milliGRAM(s) Oral daily  heparin  Infusion. 1100 Unit(s)/Hr (11 mL/Hr) IV Continuous <Continuous>  sodium chloride 0.9%. 1000 milliLiter(s) (75 mL/Hr) IV Continuous <Continuous>    MEDICATIONS  (PRN):  acetaminophen     Tablet .. 650 milliGRAM(s) Oral every 6 hours PRN Mild Pain (1 - 3)  heparin   Injectable 6500 Unit(s) IV Push every 6 hours PRN For aPTT less than 40  heparin   Injectable 3000 Unit(s) IV Push every 6 hours PRN For aPTT between 40 - 57  metoprolol tartrate Injectable 5 milliGRAM(s) IV Push every 6 hours PRN HR > 130  ondansetron Injectable 4 milliGRAM(s) IV Push every 6 hours PRN Nausea and/or Vomiting   Chief Complaint: Sudden onset worsening of left sided weakness of LLE and LUE during PT also associated with slurred speech     Interval Hx: No acute events overnight. R arm inspected, bleeding appears to have resolved, remains neurovascularly intact. Hgb has downtrended since yesterday, however. Likely a reflection of bleeding from yesterday and day prior. Ordered for 1u PRBC transfusion and AVJ ablation with leadless PPM implantation rescheduled for tomorrow. She is without acute complaints, though subjective assessment is somewhat limited by her learning disability.     ROS: Multi system review is bit limited due to patient's neurocognitive impairment    Vitals:  T(F): 98.2 (02 Jul 2024 15:49), Max: 98.3 (02 Jul 2024 10:45)  HR: 117 (02 Jul 2024 15:49) (94 - 117)  BP: 110/96 (02 Jul 2024 15:49) (104/73 - 149/65)  RR: 18 (02 Jul 2024 15:49) (18 - 18)  SpO2: 96% (02 Jul 2024 15:49) (96% - 100%) on room air    Exam:  Gen: Comfortable  HEENT: NCAT PERRL EOMI MMM clear oropharynx  Neck: Supple, no LAD  CVS: s1 s2 normal, irregular, rate ~100s  Chest: Normal resp effort at rest, lungs CTA B/L  Abd: +BS, soft, NT ND   Ext: RUE with swelling, no tenderness, no longer with active bleeding from old IV site. R hand sensation and motor function intact. Normal peripheral pulses.   Skin: Warm, dry  Neuro: Awake and alert, answers simple questions, follows simple commands    Labs:                                  7.1    12.09 )--------( 336                   21.3     MCV 88   RDW 14.7   Iron 21  TIBC 191  Iron sat 11%  Ferritin 672         137  |  108  |  37  -----------------------< 118  4.0   |   20   |  2.06    Ca  8.3        PTT: 73.8 sec, 70.0 sec    Troponin  negative    ProBNP 2497     A1c 5.9     LDL 60    UA 6/20: Yellow, cloudy, mod LE, N+, 13 WBC, 6 RBC, many bact    Micro:  Urine culture 6/21: 10,000-49,000 CFU/mL E.coli pan-S  Blood culture 6/21: Negative     Imaging:  CT B/L LE WO 6/20: Acute-appearing intramuscular and subcutaneous hematomas within the proximal L thigh. Indeterminate but chronic appearing mixed attenuation lesion along the lateral margin of the R hamstring musculature within the mid R thigh, possibly related to a subacute to chronic hematoma.     MR head WO 6/20: Mild diffuse parenchymal volume loss. Mild chronic microvascular ischemic changes. Small chronic infarcts noted in the right MCA territory Acute infarcts in the medial posterior right frontal parietal region, right parietal lobe and the watershed territory of right anterior cerebral artery and middle cerebral artery in the right frontal and parietal lobes. Largest infarct measures approximately 1.4 x 0.8 cm in the right parietal lobe. Additional nonspecific subcentimeter high diffusion signal with ADC map restriction noted in the anterior corpus callosum just right of midline. This could be related to small acute infarct. Subcentimeter high diffusion signals also noted in the medial left frontal lobe possibly related to subacute infarcts. There is no acute parenchymal hemorrhage, or midline shift. There is no extra-axial fluid collection.  There is no hydrocephalus. Partial empty sella noted. Mild mucosal thickening paranasal sinuses.    CT abdomen and pelvis W/ 6/20: Nodular masslike appearance of the spleen. There is a small ill-defined intramuscular hematoma in the left upper thigh medial compartment.    CT chest W/ 6/20: No acute pulmonary embolism to the lobar level. Limited assessment of segmental and subsegmental branches. Cardiomegaly with mild perihilar groundglass opacities which may represent mild pulmonary edema or an infectious process.    CTA head and neck 6/20: Bilateral vertebral arteries patent. No significant stenosis, occlusion or dissection bilateral extracranial carotid arteries. Severe focal stenosis right P3 segment origin. Moderate stenosis distal cavernous and supraclinoid segments right internal carotid artery. 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 Focal occlusion with reconstitution left proximal A2 segment centered 1-2 cm distal to its origin. Additional findings, as above, including an approximate 6 cm left thyroid mass, resulting in regional mass effect. Recommend nonemergent thyroid ultrasound.    CT perfusion 6/20: No predicted core infarct. Evidence of active ischemia-tissue at risk R posterior frontal-parietal region.    CT head 6/20: Compared with 4/19/2024. No evidence of acute hemorrhage, extra-axial collection or mass effect. Multiple chronic right frontal infarcts as well as findings suspicious for acute ischemia right superior frontal gyrus, as above.    Cardiac Testing:  Tele 7/1-7/2: Personally reviewed. Afib. Rates 80s-120s.     TTE 6/21: Technically difficult image quality. Left ventricular systolic function is hyperdynamic with an ejection fraction estimated at 70 to 75 %. Normal right ventricular cavity size and normal systolic function. The left atrium is mildly dilated. Agitated saline injection was negative for intracardiac shunt. Moderate pulmonary hypertension.    Meds:  MEDICATIONS  (STANDING):  aMIOdarone    Tablet 200 milliGRAM(s) Oral daily  atorvastatin 80 milliGRAM(s) Oral at bedtime  clopidogrel Tablet 75 milliGRAM(s) Oral daily  diltiazem    Tablet 90 milliGRAM(s) Oral every 6 hours  ezetimibe 10 milliGRAM(s) Oral daily  heparin  Infusion. 1100 Unit(s)/Hr (11 mL/Hr) IV Continuous <Continuous>  melatonin 3 milliGRAM(s) Oral at bedtime    MEDICATIONS  (PRN):  acetaminophen     Tablet .. 650 milliGRAM(s) Oral every 6 hours PRN Mild Pain (1 - 3)  heparin   Injectable 3000 Unit(s) IV Push every 6 hours PRN For aPTT between 40 - 57  heparin   Injectable 6500 Unit(s) IV Push every 6 hours PRN For aPTT less than 40  metoprolol tartrate Injectable 5 milliGRAM(s) IV Push every 6 hours PRN HR > 130  ondansetron Injectable 4 milliGRAM(s) IV Push every 6 hours PRN Nausea and/or Vomiting

## 2024-07-02 NOTE — PROGRESS NOTE ADULT - SUBJECTIVE AND OBJECTIVE BOX
NEPHROLOGY INTERVAL HPI/OVERNIGHT EVENTS:  07-01-24 @ 13:03  7/2- PPM tentatively scheduled for 7/3, transfused  7/1  PPM cancelled due to worsening justino  6/28- in bed eating feels well no new events  6/27--resting comfortably.  no distress.       HPI:  68 yo woman with PMHX of DM, HTN, learning disability, A fib post DCCV x2 on 4/1 and 4/22/2024, and recent dx of Splenic CA admitted with left sided weakness on 6/20.  IN ED, CTA of brain c/w acute ischemia in right superior frontal gyrus.  CT with ivc revealed spleen nodular masses.  Creat 1.46 on admission with previous episode of  JUSTINO in 4/2024, creat 1.54 on d/c, 4/27.  Creat peaked at 3.46, with improvement last 2 days.    PMHX and PSHX.  --DM  --HTN  --Hx of Ovarian and Uterine CA--post surgery and chemo in 2004.  --Hx of Arthritis  --R TKR  --A FIB post DCCV  --Hx of learning disability      MEDICATIONS  (STANDING):  aMIOdarone    Tablet 200 milliGRAM(s) Oral daily  atorvastatin 80 milliGRAM(s) Oral at bedtime  clopidogrel Tablet 75 milliGRAM(s) Oral daily  diltiazem    Tablet 90 milliGRAM(s) Oral every 6 hours  ezetimibe 10 milliGRAM(s) Oral daily  heparin  Infusion. 1100 Unit(s)/Hr (11 mL/Hr) IV Continuous <Continuous>  melatonin 3 milliGRAM(s) Oral at bedtime    MEDICATIONS  (PRN):  acetaminophen     Tablet .. 650 milliGRAM(s) Oral every 6 hours PRN Mild Pain (1 - 3)  heparin   Injectable 3000 Unit(s) IV Push every 6 hours PRN For aPTT between 40 - 57  heparin   Injectable 6500 Unit(s) IV Push every 6 hours PRN For aPTT less than 40  metoprolol tartrate Injectable 5 milliGRAM(s) IV Push every 6 hours PRN HR > 130  ondansetron Injectable 4 milliGRAM(s) IV Push every 6 hours PRN Nausea and/or Vomiting        Allergies    Bee stings (Swelling)  No Known Drug Allergies    Intolerances        I&O's Detail  Vital Signs Last 24 Hrs  T(C): 36.8 (02 Jul 2024 15:49), Max: 36.8 (02 Jul 2024 10:45)  T(F): 98.2 (02 Jul 2024 15:49), Max: 98.3 (02 Jul 2024 10:45)  HR: 117 (02 Jul 2024 15:49) (94 - 117)  BP: 110/96 (02 Jul 2024 15:49) (104/73 - 149/65)  BP(mean): --  RR: 18 (02 Jul 2024 15:49) (18 - 18)  SpO2: 96% (02 Jul 2024 15:49) (96% - 100%)    Parameters below as of 02 Jul 2024 15:49  Patient On (Oxygen Delivery Method): room air        PHYSICAL EXAM:  General: alert. awake NAD  HEENT: MMM  CV: s1s2 rrr  LUNGS: B/L CTA  EXT: no edema    LABS:                                   7.1    12.09 )-----------( 336      ( 02 Jul 2024 07:14 )             21.3                         7.9    14.34 )-----------( 319      ( 01 Jul 2024 17:50 )             24.0                         8.3    13.92 )-----------( 333      ( 01 Jul 2024 07:16 )             24.9     07-02    137  |  108  |  37<H>  ----------------------------<  118<H>  4.0   |  20<L>  |  2.06<H>    Ca    8.3<L>      02 Jul 2024 07:14          PTT - ( 01 Jul 2024 07:16 )  PTT:31.3 sec  Urinalysis Basic - ( 01 Jul 2024 07:16 )    Color: x / Appearance: x / SG: x / pH: x  Gluc: 151 mg/dL / Ketone: x  / Bili: x / Urobili: x   Blood: x / Protein: x / Nitrite: x   Leuk Esterase: x / RBC: x / WBC x   Sq Epi: x / Non Sq Epi: x / Bacteria: x

## 2024-07-02 NOTE — PROGRESS NOTE ADULT - ASSESSMENT
70 yo woman with PMHX of DM, HTN, learning disability, A fib post DCCV x2 on 4/1 and 4/22/2024, and recent dx of Splenic CA admitted with left sided weakness on 6/20.  --JUSTINO with unclear baseline CKD.    C/w ATN/KHAI : now slowly improving.    Would be cautious with contrast studies in the future.    Meds reviewed and acceptable.  --Electrolytes and fluid status stable.  --HTN : well controlled.  --A FIB : continue CCB /Amiodarone for rate control  --Spleen mass : c/w malignancy : further intervention to be determined.    6/27 SY  --JUSTINO/KHAI :  slowly improving.  --Fluid and electrolytes stable.  --A FIB : evaluation for ablation and PPM in progress --awaiting for prognosis from Onc.  --Spleen mass : c/w malignancy but without tissue diagnosis.  ? prognosis.    6/28  Pt w JUSTINO/ KHAI gradually improving  Splenic malignancy hx  A Fib  Anemia, Electrolytes stable  vitals stable  Dr Chang on call if needed    7/1 MK   - JUSTINO due to KHAI, recovery delayed by additional hemodynamic variability with dec h/h    fu trend of h/h and scr     UOP noted, bladder scan prn as needed    cw ivf    avoid IVC     no nsaid   - afib with difficult to control rate     amiodarone     leadless PPM upon stability of scr   - Splenic mass with multiple malignancy     GOC discussion noted, family declined work-up for cancers       7/2  JUSTINO/ KHAI some recovery in renal function noted today  Anemia s/p transfusion 1 u PRBC  Malignancy w/u declined by family  PPM tentatively scheduled for 7/3 pending pt stability, and HGB  A fib now on po amiodarone and diltiazem

## 2024-07-03 PROBLEM — C26.1: Chronic | Status: ACTIVE | Noted: 2024-07-01

## 2024-07-03 PROBLEM — C55 MALIGNANT NEOPLASM OF UTERUS, PART UNSPECIFIED: Chronic | Status: ACTIVE | Noted: 2024-07-01

## 2024-07-03 LAB
ANION GAP SERPL CALC-SCNC: 6 MMOL/L — SIGNIFICANT CHANGE UP (ref 5–17)
APTT BLD: 58.8 SEC — HIGH (ref 24.5–35.6)
BASOPHILS # BLD AUTO: 0.08 K/UL — SIGNIFICANT CHANGE UP (ref 0–0.2)
BASOPHILS NFR BLD AUTO: 0.6 % — SIGNIFICANT CHANGE UP (ref 0–2)
BLD GP AB SCN SERPL QL: SIGNIFICANT CHANGE UP
BUN SERPL-MCNC: 27 MG/DL — HIGH (ref 7–23)
CALCIUM SERPL-MCNC: 8.4 MG/DL — LOW (ref 8.5–10.1)
CHLORIDE SERPL-SCNC: 111 MMOL/L — HIGH (ref 96–108)
CO2 SERPL-SCNC: 22 MMOL/L — SIGNIFICANT CHANGE UP (ref 22–31)
CREAT SERPL-MCNC: 1.45 MG/DL — HIGH (ref 0.5–1.3)
EGFR: 39 ML/MIN/1.73M2 — LOW
EOSINOPHIL # BLD AUTO: 0.83 K/UL — HIGH (ref 0–0.5)
EOSINOPHIL NFR BLD AUTO: 6.4 % — HIGH (ref 0–6)
GLUCOSE SERPL-MCNC: 108 MG/DL — HIGH (ref 70–99)
HCT VFR BLD CALC: 26.4 % — LOW (ref 34.5–45)
HGB BLD-MCNC: 8.8 G/DL — LOW (ref 11.5–15.5)
IMM GRANULOCYTES NFR BLD AUTO: 0.9 % — SIGNIFICANT CHANGE UP (ref 0–0.9)
LYMPHOCYTES # BLD AUTO: 1.1 K/UL — SIGNIFICANT CHANGE UP (ref 1–3.3)
LYMPHOCYTES # BLD AUTO: 8.5 % — LOW (ref 13–44)
MCHC RBC-ENTMCNC: 29.6 PG — SIGNIFICANT CHANGE UP (ref 27–34)
MCHC RBC-ENTMCNC: 33.3 GM/DL — SIGNIFICANT CHANGE UP (ref 32–36)
MCV RBC AUTO: 88.9 FL — SIGNIFICANT CHANGE UP (ref 80–100)
MONOCYTES # BLD AUTO: 0.89 K/UL — SIGNIFICANT CHANGE UP (ref 0–0.9)
MONOCYTES NFR BLD AUTO: 6.9 % — SIGNIFICANT CHANGE UP (ref 2–14)
NEUTROPHILS # BLD AUTO: 9.93 K/UL — HIGH (ref 1.8–7.4)
NEUTROPHILS NFR BLD AUTO: 76.7 % — SIGNIFICANT CHANGE UP (ref 43–77)
PLATELET # BLD AUTO: 311 K/UL — SIGNIFICANT CHANGE UP (ref 150–400)
POTASSIUM SERPL-MCNC: 3.9 MMOL/L — SIGNIFICANT CHANGE UP (ref 3.5–5.3)
POTASSIUM SERPL-SCNC: 3.9 MMOL/L — SIGNIFICANT CHANGE UP (ref 3.5–5.3)
RBC # BLD: 2.97 M/UL — LOW (ref 3.8–5.2)
RBC # FLD: 14.9 % — HIGH (ref 10.3–14.5)
SODIUM SERPL-SCNC: 139 MMOL/L — SIGNIFICANT CHANGE UP (ref 135–145)
WBC # BLD: 12.94 K/UL — HIGH (ref 3.8–10.5)
WBC # FLD AUTO: 12.94 K/UL — HIGH (ref 3.8–10.5)

## 2024-07-03 PROCEDURE — 93010 ELECTROCARDIOGRAM REPORT: CPT

## 2024-07-03 PROCEDURE — 33274 TCAT INSJ/RPL PERM LDLS PM: CPT | Mod: Q0

## 2024-07-03 PROCEDURE — 71045 X-RAY EXAM CHEST 1 VIEW: CPT | Mod: 26

## 2024-07-03 PROCEDURE — 99232 SBSQ HOSP IP/OBS MODERATE 35: CPT

## 2024-07-03 PROCEDURE — 93650 ICAR CATH ABLTJ AV NODE FUNC: CPT

## 2024-07-03 PROCEDURE — 93281 PM DEVICE PROGR EVAL MULTI: CPT | Mod: 26,59

## 2024-07-03 RX ORDER — CEFAZOLIN 10 G/1
1000 INJECTION, POWDER, FOR SOLUTION INTRAVENOUS EVERY 8 HOURS
Refills: 0 | Status: DISCONTINUED | OUTPATIENT
Start: 2024-07-03 | End: 2024-07-03

## 2024-07-03 RX ORDER — CEFAZOLIN 10 G/1
2000 INJECTION, POWDER, FOR SOLUTION INTRAVENOUS ONCE
Refills: 0 | Status: COMPLETED | OUTPATIENT
Start: 2024-07-03 | End: 2024-07-03

## 2024-07-03 RX ORDER — CEFAZOLIN 10 G/1
2000 INJECTION, POWDER, FOR SOLUTION INTRAVENOUS ONCE
Refills: 0 | Status: DISCONTINUED | OUTPATIENT
Start: 2024-07-03 | End: 2024-07-03

## 2024-07-03 RX ORDER — APIXABAN 5 MG/1
5 TABLET, FILM COATED ORAL
Refills: 0 | Status: DISCONTINUED | OUTPATIENT
Start: 2024-07-04 | End: 2024-07-17

## 2024-07-03 RX ORDER — CEFAZOLIN 10 G/1
1000 INJECTION, POWDER, FOR SOLUTION INTRAVENOUS EVERY 8 HOURS
Refills: 0 | Status: COMPLETED | OUTPATIENT
Start: 2024-07-03 | End: 2024-07-04

## 2024-07-03 RX ADMIN — DILTIAZEM HYDROCHLORIDE 90 MILLIGRAM(S): 240 CAPSULE, EXTENDED RELEASE ORAL at 14:02

## 2024-07-03 RX ADMIN — DILTIAZEM HYDROCHLORIDE 90 MILLIGRAM(S): 240 CAPSULE, EXTENDED RELEASE ORAL at 23:27

## 2024-07-03 RX ADMIN — DILTIAZEM HYDROCHLORIDE 90 MILLIGRAM(S): 240 CAPSULE, EXTENDED RELEASE ORAL at 05:49

## 2024-07-03 RX ADMIN — ATORVASTATIN CALCIUM 80 MILLIGRAM(S): 20 TABLET, FILM COATED ORAL at 22:30

## 2024-07-03 RX ADMIN — AMIODARONE HYDROCHLORIDE 200 MILLIGRAM(S): 50 INJECTION, SOLUTION INTRAVENOUS at 14:01

## 2024-07-03 RX ADMIN — Medication 3 MILLIGRAM(S): at 22:30

## 2024-07-03 RX ADMIN — CEFAZOLIN 2000 MILLIGRAM(S): 10 INJECTION, POWDER, FOR SOLUTION INTRAVENOUS at 10:38

## 2024-07-03 RX ADMIN — CEFAZOLIN 1000 MILLIGRAM(S): 10 INJECTION, POWDER, FOR SOLUTION INTRAVENOUS at 18:33

## 2024-07-03 RX ADMIN — HEPARIN SODIUM 1400 UNIT(S)/HR: 50 INJECTION, SOLUTION INTRAVENOUS at 05:48

## 2024-07-03 RX ADMIN — CLOPIDOGREL BISULFATE 75 MILLIGRAM(S): 75 TABLET, FILM COATED ORAL at 13:59

## 2024-07-03 RX ADMIN — DILTIAZEM HYDROCHLORIDE 90 MILLIGRAM(S): 240 CAPSULE, EXTENDED RELEASE ORAL at 18:14

## 2024-07-03 RX ADMIN — EZETIMIBE 10 MILLIGRAM(S): 10 TABLET ORAL at 14:01

## 2024-07-03 NOTE — PROGRESS NOTE ADULT - ASSESSMENT
69-year-old female with DM, HTN, afib, CKD, hx of multiple cancer diagnosis, admitted back on 6/20/24 for neurological changes, found to have an acute CVA, also found to have acute anemia likely related to hematoma status post RBC transfusion, splenic mass concerning for malignancy, and persisting atrial fibrillation that has been difficult to control at times. Admitted to Medicine.      Acute infarcts in the R hemisphere and possible L.    Appreciate input from Neurology. Likely embolic. Eliquis on hold as patient is now on IV heparin in anticipation of AVJ ablation and pacemaker insertion tomorrow 7/3.   - Continue Plavix daily.   - Continue Atorvastatin at bedtime.     Persistent Afib with difficult to control rates  Patient failed rhythm control. EP recommended AVJ ablation with leadless PPM implant for rate control and HCP is in agreement with procedure. Now planned for tomorrow 7/3.   - Rate control with Cardizem 90mg PO q6h. Amiodarone 200mg daily. IV metoprolol 2.5mg PRN for HR>130.   - IV heparin drip, which is to be held 7/3 at 6AM.   - AVJ ablation and leadless PPM placement 7/3    RUE bleeding  In setting of supratherapeutic PTT / heparin drip. Appreciate input from Surgery who assisted with pressure dressing. Heparin drip was held and then restarted at lower rate. Bleeding appears to have resolved. No overt bleeding anymore. PTTs are no longer critically supratherapeutic. Unclear whether she may have an additional component causing the issue, such as an acquired factor inhibitor in setting of possible cancer diagnosis. Discussed with Hematology Oncology Dr Turcios who will assist with workup.   - Continue to monitor  - Continue neurovascular checks    Acute on chronic anemia  Likely multi factorial. Concern for a possible underlying malignancy, splenic mass, etc. Leg hematoma that was noted upon admission. And now bleeding she was having yesterday and the day prior in setting of markedly supratherapeutic heparin drip. While bleeding appears to have resolved (was previously oozing from prior IV site, now no bleeding), she is noted to have downtrend in Hgb today. Ordered for 1u PRBC.   - Hgb 8.8 today s/p transfusion. Monitor Hgb in AM    JUSTINO on CKD  Appreciate input from Nephrology. JUSTINO/KHAI. Fluid balance stable and electrolytes stable. Cr did increase 7/1 compared to day prior, possibly related to hypovolemia from her bleeding event over the weekend. Ordered for trial of IV fluids yesterday and Cr is now improved. For additional volume today, ordered for 1u PRBC.   - Continue to monitor, trend Cr, monitor Is and Os  - Cr improving today    Splenic mass  Appreciate Hematology Oncology input. Possibly consistent with malignancy however no tissue ever obtained. There was outpatient plan for splenectomy. Oncology noted that consideration can be given to IR guided bx of splenic mass or splenectomy to help clarify diagnosis and prognosis. HCP wishes to defer at this point.   - Follow up with Hematology / Oncology    Thigh hematomas  On CT, suggestion of acute-appearing intramuscular and subcutaneous hematomas within the proximal L thigh. Indeterminate but chronic appearing mixed attenuation lesion along the lateral margin of the R hamstring musculature within the mid R thigh, possibly related to a subacute to chronic hematoma.   - Recommended short-term interval follow-up MRI of bilateral thighs with and without contrast in 8-12 weeks to demonstrate resolution/improvement of these findings and to rule out an underlying mass/neoplasm.    Deconditioning and debility  PT consulted.   - F/u PT eval    Dispo: To be determined pending further clinical assessment. s/p AVJ ablation and leadless pacemaker implantation today. PT assessment.

## 2024-07-03 NOTE — PROGRESS NOTE ADULT - ASSESSMENT
69 year old female with prior uterine and ovarian cancer without evidence of disease recurrence  Recent imaging reveals splenic mass : etiology not clear  She is admitted with embolic CVA   Bleeding from anticoagulation  She has an arrythmia and is scheduled AV ablation    In terms of the splenic mass, etiology is unclear  There are no other sites of metastases    Would defer DUFFY of splenic mass till cardiac/ neurologic/ hematologic status have stabilized  Splenic biopsy could be complicated by bleeding / therefore would hold on this for now

## 2024-07-03 NOTE — PROGRESS NOTE ADULT - SUBJECTIVE AND OBJECTIVE BOX
INTERVAL HISTORY:      68 yo female with hx of learning disability, JUSTINO on CKD, DM II, HTN, A-fib s/p DCCV with h/o poor response to medications, prior  ovarian and uterine CA, recent diagnosis of Splenic mass on imaging admitted with   left sided weakness of LLE and LUE during PT / + DUFFY for CVA; in addition has   REVIEW OF SYSTEMS:      Allergies    Bee stings (Swelling)  No Known Drug Allergies    Intolerances        MEDICATIONS  (STANDING):  aMIOdarone    Tablet 200 milliGRAM(s) Oral daily  atorvastatin 80 milliGRAM(s) Oral at bedtime  ceFAZolin  Injectable. 2000 milliGRAM(s) IV Push once  clopidogrel Tablet 75 milliGRAM(s) Oral daily  diltiazem    Tablet 90 milliGRAM(s) Oral every 6 hours  ezetimibe 10 milliGRAM(s) Oral daily  heparin  Infusion. 1100 Unit(s)/Hr (11 mL/Hr) IV Continuous <Continuous>  melatonin 3 milliGRAM(s) Oral at bedtime    MEDICATIONS  (PRN):  acetaminophen     Tablet .. 650 milliGRAM(s) Oral every 6 hours PRN Mild Pain (1 - 3)  heparin   Injectable 6500 Unit(s) IV Push every 6 hours PRN For aPTT less than 40  heparin   Injectable 3000 Unit(s) IV Push every 6 hours PRN For aPTT between 40 - 57  metoprolol tartrate Injectable 5 milliGRAM(s) IV Push every 6 hours PRN HR > 130  ondansetron Injectable 4 milliGRAM(s) IV Push every 6 hours PRN Nausea and/or Vomiting      Vital Signs Last 24 Hrs  T(C): 36.8 (03 Jul 2024 09:17), Max: 36.8 (02 Jul 2024 10:45)  T(F): 98.2 (03 Jul 2024 09:17), Max: 98.3 (02 Jul 2024 10:45)  HR: 110 (03 Jul 2024 09:17) (83 - 117)  BP: 141/99 (03 Jul 2024 09:17) (104/73 - 152/59)  BP(mean): 88 (03 Jul 2024 05:46) (88 - 88)  RR: 19 (03 Jul 2024 09:17) (18 - 19)  SpO2: 98% (03 Jul 2024 09:17) (96% - 100%)    Parameters below as of 03 Jul 2024 09:17  Patient On (Oxygen Delivery Method): room air        PHYSICAL EXAM:    GENERAL: NAD,   HEAD:  Atraumatic, Normocephalic  EYES: EOMI, PERRLA, conjunctiva and sclera clear    NECK: Supple, No JVD, Normal thyroid  NERVOUS SYSTEM:    CHEST/LUNG: Clear to percussion bilaterally; No rales, rhonchi,   HEART: Regular rate and rhythm;   ABDOMEN: Soft, Nontender.  EXTREMITIES:   edema:-  LYMPH: No lymphadenopathy noted        LABS:                        8.8    12.94 )-----------( 311      ( 03 Jul 2024 06:58 )             26.4     07-03    139  |  111<H>  |  27<H>  ----------------------------<  108<H>  3.9   |  22  |  1.45<H>    Ca    8.4<L>      03 Jul 2024 06:58      PT/INR - ( 02 Jul 2024 07:14 )   PT: 13.7 sec;   INR: 1.22 ratio         PTT - ( 03 Jul 2024 06:58 )  PTT:58.8 sec  Urinalysis Basic - ( 03 Jul 2024 06:58 )    Color: x / Appearance: x / SG: x / pH: x  Gluc: 108 mg/dL / Ketone: x  / Bili: x / Urobili: x   Blood: x / Protein: x / Nitrite: x   Leuk Esterase: x / RBC: x / WBC x   Sq Epi: x / Non Sq Epi: x / Bacteria: x          RADIOLOGY & ADDITIONAL STUDIES:    PATHOLOGY:         INTERVAL HISTORY:      68 yo female with hx of learning disability, JUSTINO on CKD, DM II, HTN, A-fib s/p DCCV with h/o poor response to medications, prior  ovarian ( Stage III)  and uterine CA (Stage I)  in 2004 , recent diagnosis of Splenic mass on imaging ; MRI 9 cm heterogenous mass in spleen occupying majority of spleen suspicious for malignancy ; PET mildly hyperdense splenic lesion 5.8 x 4.3 cm SUV 3.8 ;  She was referred to surgery and plan was splenectomy once cardiac / medical status addressed.     Now admitted with   left sided weakness of LLE and LUE during PT / + DUFFY for CVA; in addition has hematomas on imaging/ recent IV site bleeding s/p transfusion 9 Super therapeutic anticoagulation)      < from: CT Abdomen and Pelvis w/ IV Cont (06.20.24 @ 19:14) >  ACC: [id number] EXAM:  CT ABDOMEN AND PELVIS IC   ORDERED BY: MAURO AVALOS     ACC: [id number] EXAM:  CT ANGIO CHEST PULM ART WAWIC   ORDERED BY: MAURO AVALOS     PROCEDURE DATE:  06/20/2024          INTERPRETATION:  CLINICAL INFORMATION: A. Fib, anemia    COMPARISON: None.    CONTRAST/COMPLICATIONS:  IV Contrast: Omnipaque 350 (accession [id number]), IV contrast documented   in unlinked concurrent exam (accession [id number])  75 cc administered   0   cc discarded  Oral Contrast: NONE  Complications:None reported at time of study completion    PROCEDURE:  CT Angiography of the Chest was performed followed by portal venous phase   imaging of the Abdomen and Pelvis.  Sagittal and coronal reformats were performed as well as 3D (MIP)   reconstructions.    FINDINGS:  CHEST:  LUNGS AND LARGE AIRWAYS: Patent central airways. There is some faint   perihilar groundglass opacities and right basilar atelectasis.  PLEURA: No pleural effusion.  VESSELS: Good contrast bolus in the pulmonary arteries and aorta. No   acute pulmonary embolism to the lobar level. Limited assessment of   segmental and subsegmental branches due to respiratory motion artifact.  HEART: Cardiomegaly. No pericardial effusion.  MEDIASTINUM AND JOHN: No lymphadenopathy.  CHEST WALL AND LOWER NECK: Left thyroid nodular goiter with substernal   extension    ABDOMEN AND PELVIS:  LIVER: Within normal limits.  BILE DUCTS: Normal caliber.  GALLBLADDER: Cholelithiasis.  SPLEEN: Limited assessment is extensive streak artifact. Questionable   nodular contour. There is a rounded mass like density along the spleen   anteriorly (series 3-33)  PANCREAS: Within normal limits.  ADRENALS: Within normal limits.  KIDNEYS/URETERS: Within normal limits. Few small renal cysts.    BLADDER: Within normal limits.  REPRODUCTIVE ORGANS:    BOWEL: No bowel obstruction. Appendix is normal.  PERITONEUM/RETROPERITONEUM: Within normal limits.  VESSELS: Within normal limits.  LYMPH NODES: No lymphadenopathy.  ABDOMINAL WALL: Diastases of the rectus abdominis. Midline surgical scar.   There is a left upper thigh hematoma in the medial compartment,   ill-defined but measuring up to 5 cm.  BONES: Degenerative changes.    IMPRESSION:    No acute pulmonary embolism to the lobar level. Limited assessment of   segmental and subsegmental branches. Cardiomegaly with mild perihilar   groundglass opacities which may represent mild pulmonary edema or an   infectious process.    Nodular masslike appearance of the spleen. Follow-up MRI of the abdomen   with and without contrast recommended.    There is a small ill-defined intramuscular hematoma in the left upper   thigh medial compartment.      < end of copied text >          Allergies    Bee stings (Swelling)  No Known Drug Allergies    Intolerances        MEDICATIONS  (STANDING):  aMIOdarone    Tablet 200 milliGRAM(s) Oral daily  atorvastatin 80 milliGRAM(s) Oral at bedtime  ceFAZolin  Injectable. 2000 milliGRAM(s) IV Push once  clopidogrel Tablet 75 milliGRAM(s) Oral daily  diltiazem    Tablet 90 milliGRAM(s) Oral every 6 hours  ezetimibe 10 milliGRAM(s) Oral daily  heparin  Infusion. 1100 Unit(s)/Hr (11 mL/Hr) IV Continuous <Continuous>  melatonin 3 milliGRAM(s) Oral at bedtime    MEDICATIONS  (PRN):  acetaminophen     Tablet .. 650 milliGRAM(s) Oral every 6 hours PRN Mild Pain (1 - 3)  heparin   Injectable 6500 Unit(s) IV Push every 6 hours PRN For aPTT less than 40  heparin   Injectable 3000 Unit(s) IV Push every 6 hours PRN For aPTT between 40 - 57  metoprolol tartrate Injectable 5 milliGRAM(s) IV Push every 6 hours PRN HR > 130  ondansetron Injectable 4 milliGRAM(s) IV Push every 6 hours PRN Nausea and/or Vomiting      Vital Signs Last 24 Hrs  T(C): 36.8 (03 Jul 2024 09:17), Max: 36.8 (02 Jul 2024 10:45)  T(F): 98.2 (03 Jul 2024 09:17), Max: 98.3 (02 Jul 2024 10:45)  HR: 110 (03 Jul 2024 09:17) (83 - 117)  BP: 141/99 (03 Jul 2024 09:17) (104/73 - 152/59)  BP(mean): 88 (03 Jul 2024 05:46) (88 - 88)  RR: 19 (03 Jul 2024 09:17) (18 - 19)  SpO2: 98% (03 Jul 2024 09:17) (96% - 100%)    Parameters below as of 03 Jul 2024 09:17  Patient On (Oxygen Delivery Method): room air        PHYSICAL EXAM:    GENERAL: NAD,   HEAD:  Atraumatic, Normocephalic  EYES: EOMI, PERRLA, conjunctiva and sclera clear    NECK: Supple, No JVD, Normal thyroid  NERVOUS SYSTEM:    CHEST/LUNG: Clear to percussion bilaterally; No rales, rhonchi,   HEART: Regular rate and rhythm;   ABDOMEN: Soft, Nontender.  EXTREMITIES:   edema:-  LYMPH: No lymphadenopathy noted        LABS:                        8.8    12.94 )-----------( 311      ( 03 Jul 2024 06:58 )             26.4     07-03    139  |  111<H>  |  27<H>  ----------------------------<  108<H>  3.9   |  22  |  1.45<H>    Ca    8.4<L>      03 Jul 2024 06:58      PT/INR - ( 02 Jul 2024 07:14 )   PT: 13.7 sec;   INR: 1.22 ratio         PTT - ( 03 Jul 2024 06:58 )  PTT:58.8 sec  Urinalysis Basic - ( 03 Jul 2024 06:58 )    Color: x / Appearance: x / SG: x / pH: x  Gluc: 108 mg/dL / Ketone: x  / Bili: x / Urobili: x   Blood: x / Protein: x / Nitrite: x   Leuk Esterase: x / RBC: x / WBC x   Sq Epi: x / Non Sq Epi: x / Bacteria: x

## 2024-07-03 NOTE — PACU DISCHARGE NOTE - COMMENTS
Report given to Elo CHAN. Verified with [employer] that the patient is on cardiac monitor. Transferred patient to  with transport staff.

## 2024-07-03 NOTE — PROCEDURE NOTE - NSICDXPROCEDURE_GEN_ALL_CORE_FT
PROCEDURES:  Ablation, AV node 03-Jul-2024 12:27:17  Deepa Redd  Insertion or repositioning of leadless intracardiac pacemaker 03-Jul-2024 12:27:50  Deepa Redd

## 2024-07-04 LAB
ANION GAP SERPL CALC-SCNC: 7 MMOL/L — SIGNIFICANT CHANGE UP (ref 5–17)
BASOPHILS # BLD AUTO: 0.07 K/UL — SIGNIFICANT CHANGE UP (ref 0–0.2)
BASOPHILS NFR BLD AUTO: 0.5 % — SIGNIFICANT CHANGE UP (ref 0–2)
BUN SERPL-MCNC: 23 MG/DL — SIGNIFICANT CHANGE UP (ref 7–23)
CALCIUM SERPL-MCNC: 8.3 MG/DL — LOW (ref 8.5–10.1)
CHLORIDE SERPL-SCNC: 111 MMOL/L — HIGH (ref 96–108)
CO2 SERPL-SCNC: 21 MMOL/L — LOW (ref 22–31)
CREAT SERPL-MCNC: 1.31 MG/DL — HIGH (ref 0.5–1.3)
EGFR: 44 ML/MIN/1.73M2 — LOW
EOSINOPHIL # BLD AUTO: 0.81 K/UL — HIGH (ref 0–0.5)
EOSINOPHIL NFR BLD AUTO: 6.1 % — HIGH (ref 0–6)
GLUCOSE SERPL-MCNC: 90 MG/DL — SIGNIFICANT CHANGE UP (ref 70–99)
HCT VFR BLD CALC: 26.3 % — LOW (ref 34.5–45)
HGB BLD-MCNC: 8.7 G/DL — LOW (ref 11.5–15.5)
IMM GRANULOCYTES NFR BLD AUTO: 0.6 % — SIGNIFICANT CHANGE UP (ref 0–0.9)
LYMPHOCYTES # BLD AUTO: 0.88 K/UL — LOW (ref 1–3.3)
LYMPHOCYTES # BLD AUTO: 6.7 % — LOW (ref 13–44)
MCHC RBC-ENTMCNC: 29.9 PG — SIGNIFICANT CHANGE UP (ref 27–34)
MCHC RBC-ENTMCNC: 33.1 GM/DL — SIGNIFICANT CHANGE UP (ref 32–36)
MCV RBC AUTO: 90.4 FL — SIGNIFICANT CHANGE UP (ref 80–100)
MONOCYTES # BLD AUTO: 0.88 K/UL — SIGNIFICANT CHANGE UP (ref 0–0.9)
MONOCYTES NFR BLD AUTO: 6.7 % — SIGNIFICANT CHANGE UP (ref 2–14)
NEUTROPHILS # BLD AUTO: 10.46 K/UL — HIGH (ref 1.8–7.4)
NEUTROPHILS NFR BLD AUTO: 79.4 % — HIGH (ref 43–77)
PLATELET # BLD AUTO: 295 K/UL — SIGNIFICANT CHANGE UP (ref 150–400)
POTASSIUM SERPL-MCNC: 3.9 MMOL/L — SIGNIFICANT CHANGE UP (ref 3.5–5.3)
POTASSIUM SERPL-SCNC: 3.9 MMOL/L — SIGNIFICANT CHANGE UP (ref 3.5–5.3)
RBC # BLD: 2.91 M/UL — LOW (ref 3.8–5.2)
RBC # FLD: 15.1 % — HIGH (ref 10.3–14.5)
SODIUM SERPL-SCNC: 139 MMOL/L — SIGNIFICANT CHANGE UP (ref 135–145)
WBC # BLD: 13.18 K/UL — HIGH (ref 3.8–10.5)
WBC # FLD AUTO: 13.18 K/UL — HIGH (ref 3.8–10.5)

## 2024-07-04 PROCEDURE — 99232 SBSQ HOSP IP/OBS MODERATE 35: CPT

## 2024-07-04 PROCEDURE — 93010 ELECTROCARDIOGRAM REPORT: CPT

## 2024-07-04 RX ADMIN — Medication 650 MILLIGRAM(S): at 09:37

## 2024-07-04 RX ADMIN — DILTIAZEM HYDROCHLORIDE 90 MILLIGRAM(S): 240 CAPSULE, EXTENDED RELEASE ORAL at 13:24

## 2024-07-04 RX ADMIN — CEFAZOLIN 1000 MILLIGRAM(S): 10 INJECTION, POWDER, FOR SOLUTION INTRAVENOUS at 02:37

## 2024-07-04 RX ADMIN — CLOPIDOGREL BISULFATE 75 MILLIGRAM(S): 75 TABLET, FILM COATED ORAL at 09:36

## 2024-07-04 RX ADMIN — DILTIAZEM HYDROCHLORIDE 90 MILLIGRAM(S): 240 CAPSULE, EXTENDED RELEASE ORAL at 06:32

## 2024-07-04 RX ADMIN — DILTIAZEM HYDROCHLORIDE 90 MILLIGRAM(S): 240 CAPSULE, EXTENDED RELEASE ORAL at 18:09

## 2024-07-04 RX ADMIN — APIXABAN 5 MILLIGRAM(S): 5 TABLET, FILM COATED ORAL at 18:08

## 2024-07-04 RX ADMIN — EZETIMIBE 10 MILLIGRAM(S): 10 TABLET ORAL at 09:36

## 2024-07-04 RX ADMIN — AMIODARONE HYDROCHLORIDE 200 MILLIGRAM(S): 50 INJECTION, SOLUTION INTRAVENOUS at 09:37

## 2024-07-04 RX ADMIN — ATORVASTATIN CALCIUM 80 MILLIGRAM(S): 20 TABLET, FILM COATED ORAL at 22:41

## 2024-07-04 RX ADMIN — Medication 3 MILLIGRAM(S): at 22:42

## 2024-07-04 RX ADMIN — Medication 650 MILLIGRAM(S): at 10:10

## 2024-07-04 NOTE — PROGRESS NOTE ADULT - SUBJECTIVE AND OBJECTIVE BOX
NEPHROLOGY INTERVAL HPI/OVERNIGHT EVENTS:    Date of Service: 07-04-24 @ 14:59    7/4--Post leadless PPM and AVJ ablation.   Alert and responding well today.  NO distress  7/2- PPM tentatively scheduled for 7/3, transfused  7/1  PPM cancelled due to worsening justino  6/28- in bed eating feels well no new events  6/27--resting comfortably.  no distress.       HPI:  68 yo woman with PMHX of DM, HTN, learning disability, A fib post DCCV x2 on 4/1 and 4/22/2024, and recent dx of Splenic CA admitted with left sided weakness on 6/20.  IN ED, CTA of brain c/w acute ischemia in right superior frontal gyrus.  CT with ivc revealed spleen nodular masses.  Creat 1.46 on admission with previous episode of  JUSTINO in 4/2024, creat 1.54 on d/c, 4/27.  Creat peaked at 3.46, with improvement last 2 days.    PMHX and PSHX.  --DM  --HTN  --Hx of Ovarian and Uterine CA--post surgery and chemo in 2004.  --Hx of Arthritis  --R TKR  --A FIB post DCCV  --Hx of learning disability    MEDICATIONS  (STANDING):  aMIOdarone    Tablet 200 milliGRAM(s) Oral daily  apixaban 5 milliGRAM(s) Oral two times a day  atorvastatin 80 milliGRAM(s) Oral at bedtime  clopidogrel Tablet 75 milliGRAM(s) Oral daily  diltiazem    Tablet 90 milliGRAM(s) Oral every 6 hours  ezetimibe 10 milliGRAM(s) Oral daily  melatonin 3 milliGRAM(s) Oral at bedtime    MEDICATIONS  (PRN):  acetaminophen     Tablet .. 650 milliGRAM(s) Oral every 6 hours PRN Mild Pain (1 - 3)  metoprolol tartrate Injectable 5 milliGRAM(s) IV Push every 6 hours PRN HR > 130  ondansetron Injectable 4 milliGRAM(s) IV Push every 6 hours PRN Nausea and/or Vomiting    Vital Signs Last 24 Hrs  T(C): 36.8 (04 Jul 2024 08:10), Max: 36.9 (03 Jul 2024 15:33)  T(F): 98.2 (04 Jul 2024 08:10), Max: 98.4 (03 Jul 2024 15:33)  HR: 71 (04 Jul 2024 13:18) (70 - 79)  BP: 121/74 (04 Jul 2024 13:18) (121/74 - 155/67)  BP(mean): --  RR: 18 (04 Jul 2024 08:10) (17 - 18)  SpO2: 97% (04 Jul 2024 08:10) (96% - 100%)    Parameters below as of 04 Jul 2024 08:10  Patient On (Oxygen Delivery Method): room air    PHYSICAL EXAM:  GENERAL: no distress  CHEST/LUNG: clear to aus  HEART: S1S2 RRR  ABDOMEN: soft  EXTREMITIES: trace edema  SKIN:     LABS:                        8.7    13.18 )-----------( 295      ( 04 Jul 2024 05:43 )             26.3     07-04    139  |  111<H>  |  23  ----------------------------<  90  3.9   |  21<L>  |  1.31<H>    Ca    8.3<L>      04 Jul 2024 05:43      PTT - ( 03 Jul 2024 06:58 )  PTT:58.8 sec  Urinalysis Basic - ( 04 Jul 2024 05:43 )    Color: x / Appearance: x / SG: x / pH: x  Gluc: 90 mg/dL / Ketone: x  / Bili: x / Urobili: x   Blood: x / Protein: x / Nitrite: x   Leuk Esterase: x / RBC: x / WBC x   Sq Epi: x / Non Sq Epi: x / Bacteria: x              RADIOLOGY & ADDITIONAL TESTS:

## 2024-07-04 NOTE — PHYSICAL THERAPY INITIAL EVALUATION ADULT - BALANCE TRAINING, PT EVAL
GOAL: Pt will improve balance during (static/dynamic) (sitting/standing) activities by at least 1 balance grade within 3-4 weeks to assist with greater independence during functional mobility and ADL's.
2. (1wk) ind static sitting balance 1/2 gr

## 2024-07-04 NOTE — PHYSICAL THERAPY INITIAL EVALUATION ADULT - MODALITIES TREATMENT COMMENTS
performed PROM LLE, unable to elicit mm activity, AA/PROM L lower arm/elbow, bed mob, static sitting EOB x few min. pt left supine in bed, CBIR, NAD, HM, primafit, heels off mattress, LUE on pillow, LLE positioned in neutral, HOB up

## 2024-07-04 NOTE — PHYSICAL THERAPY INITIAL EVALUATION ADULT - ADDITIONAL COMMENTS
O/N Events: DEEPALI  Subjective/ROS: No complaints. Denies HA, CP, SOB, n/v, changes in bowel/urinary habits.  12pt ROS otherwise negative.    VITALS  Vital Signs Last 24 Hrs  T(C): 36.8 (03 Mar 2023 16:27), Max: 36.8 (03 Mar 2023 16:27)  T(F): 98.3 (03 Mar 2023 16:27), Max: 98.3 (03 Mar 2023 16:27)  HR: 93 (03 Mar 2023 16:27) (82 - 102)  BP: 120/63 (03 Mar 2023 16:27) (113/66 - 144/79)  BP(mean): 87 (03 Mar 2023 09:07) (87 - 87)  RR: 18 (03 Mar 2023 16:27) (16 - 18)  SpO2: 97% (03 Mar 2023 16:27) (95% - 97%)    Parameters below as of 03 Mar 2023 16:27  Patient On (Oxygen Delivery Method): room air        I&O's Summary    02 Mar 2023 07:01  -  03 Mar 2023 07:00  --------------------------------------------------------  IN: 450 mL / OUT: 1370.5 mL / NET: -920.5 mL    03 Mar 2023 07:01  -  03 Mar 2023 19:09  --------------------------------------------------------  IN: 0 mL / OUT: 557.5 mL / NET: -557.5 mL        CAPILLARY BLOOD GLUCOSE          PHYSICAL EXAM  General: A&Ox3; NAD; word finding difficulty  Head: Head wrapped with drain   Neck: Supple; no JVD  Respiratory: CTA B/L; no wheezes/crackles/rales auscultated w/ good air movement  Cardiovascular: Regular rhythm/rate; S1/S2; no gallops or murmurs auscultated  Gastrointestinal: Soft; NTND w/out rebound tenderness or guarding; bowel sounds normal  Extremities: WWP; no edema or cyanosis; radial/pedal pulses palpable  Neurological:  CNII-XII grossly intact; R sided weakness  Skin: No rashes noted  Vasc: +2 DP/PT pulses b/l   Psych: Appropriate Affect    MEDICATIONS  (STANDING):  amLODIPine   Tablet 10 milliGRAM(s) Oral daily  bisacodyl 5 milliGRAM(s) Oral every 12 hours  buDESOnide    Inhalation Suspension 0.25 milliGRAM(s) Inhalation two times a day  ceFAZolin   IVPB 2000 milliGRAM(s) IV Intermittent every 8 hours  enoxaparin Injectable 50 milliGRAM(s) SubCutaneous every 24 hours  fluticasone propionate 50 MICROgram(s)/spray Nasal Spray 1 Spray(s) Both Nostrils two times a day  lactulose Syrup 15 Gram(s) Oral daily  latanoprost 0.005% Ophthalmic Solution 1 Drop(s) Both EYES at bedtime  levETIRAcetam 1000 milliGRAM(s) Oral two times a day  polyethylene glycol 3350 17 Gram(s) Oral two times a day  senna 2 Tablet(s) Oral at bedtime    MEDICATIONS  (PRN):  acetaminophen     Tablet .. 650 milliGRAM(s) Oral every 6 hours PRN Temp greater or equal to 38C (100.4F), Mild Pain (1 - 3)  bisacodyl Suppository 10 milliGRAM(s) Rectal daily PRN Constipation  ondansetron Injectable 4 milliGRAM(s) IV Push every 6 hours PRN Nausea and/or Vomiting  oxyCODONE    IR 5 milliGRAM(s) Oral every 4 hours PRN Moderate Pain (4 - 6)      No Known Allergies      LABS                        9.0    7.80  )-----------( 238      ( 03 Mar 2023 07:39 )             27.9     03-03    139  |  103  |  15  ----------------------------<  113<H>  3.1<L>   |  27  |  0.82    Ca    8.6      03 Mar 2023 07:39  Phos  3.6     03-03  Mg     2.2     03-03                IMAGING/EKG/ETC: reviewed see IE

## 2024-07-04 NOTE — PHYSICAL THERAPY INITIAL EVALUATION ADULT - IMPAIRMENTS FOUND, PT EVAL
aerobic capacity/endurance/gait, locomotion, and balance/gross motor/muscle strength
gait, locomotion, and balance/gross motor/muscle strength

## 2024-07-04 NOTE — PROGRESS NOTE ADULT - ASSESSMENT
69-year-old female with DM, HTN, afib, CKD, hx of multiple cancer diagnosis, admitted back on 6/20/24 for neurological changes, found to have an acute CVA, also found to have acute anemia likely related to hematoma status post RBC transfusion, splenic mass concerning for malignancy, and persisting atrial fibrillation that has been difficult to control at times. Admitted to Medicine.      Acute infarcts in the R hemisphere and possible L.    Appreciate input from Neurology. Likely embolic in setting of afib. Now s/p AVJ ablation and Micra leadless pacemaker insertion. Back on Eliquis.  - Continue Plavix and Eliquis, atorvastatin at bedtime.     Persistent Afib with difficult to control rates  Patient failed rhythm control. Was unable to control rates either. Patient is now s/p AVJ ablation with leadless PPM implant.   - Continue Cardizem 90mg PO q6h, Amiodarone 200mg daily  - Continue Eliquis  - Cardiac EP follow up 7/8/24 at 14:30 in EP clinic for wound check    RUE bleeding  Resolved. Developed in setting of supratherapeutic PTT / heparin drip. Appreciate input from Surgery who assisted with pressure dressing. Heparin drip was held and then restarted at lower rate. Bleeding resolved. Unclear whether she may have an additional component causing the issue, such as an acquired factor inhibitor in setting of possible cancer diagnosis. Discussed with Hematology Oncology Dr Turcios who will assist with workup.   - Continue to monitor    Acute on chronic anemia  Likely multi factorial. Concern for a possible underlying malignancy, splenic mass, etc. Leg hematoma that was noted upon admission. And then RUE bleeding a few days ago, since resolved. S/p PRBC transfusion in the setting of that bleeding.   - Continue to monitor    JUSTINO on CKD  Appreciate input from Nephrology. JUSTINO/KHAI. Fluid balance stable and electrolytes stable. Cr now improved.    - Continue to monitor    Splenic mass  Appreciate Hematology Oncology input. Possibly consistent with malignancy however no tissue ever obtained. There was outpatient plan for splenectomy. Oncology noted that consideration can be given to IR guided bx of splenic mass or splenectomy to help clarify diagnosis and prognosis. HCP wishes to defer at this point.   - Follow up with Hematology / Oncology    Thigh hematomas  On CT, suggestion of acute-appearing intramuscular and subcutaneous hematomas within the proximal L thigh. Indeterminate but chronic appearing mixed attenuation lesion along the lateral margin of the R hamstring musculature within the mid R thigh, possibly related to a subacute to chronic hematoma.   - Recommended short-term interval follow-up MRI of bilateral thighs with and without contrast in 8-12 weeks to demonstrate resolution/improvement of these findings and to rule out an underlying mass/neoplasm.    Deconditioning and debility  PT consulted.   - F/u PT eval      Dispo: Approaching stability for discharge pending final EP clearance, PT eval, facility placement

## 2024-07-04 NOTE — PHYSICAL THERAPY INITIAL EVALUATION ADULT - STRENGTHENING, PT EVAL
3. (1wk) inc LUE/CARLOS strength 1/2 gr)
GOAL: Pt will improve UE and LE strength by at least 1/2 MMT grade  to assist with performing functional mobility and ADLs within 4 weeks.

## 2024-07-04 NOTE — PROGRESS NOTE ADULT - SUBJECTIVE AND OBJECTIVE BOX
EP Nurse Practitioner Note  HPI:  70 yo female with hx of learning disability, JUSTINO on CKD, DM II, HTN, A-fib s/p DCCV with h/o poor response to medications, also has ovarian and uterine CA, recent diagnosis of Splenic CA, CT Lumbar shows moderate stenosis and DDD, recent UTI, who presents as a code stroke due to sudden onset worsening of left sided weakness of LLE and LUE during PT also associated with slurred speech which has sinced resolved. Arrived with NIHSS of 8 with unknown LKW. Pt is a poor historian. History taken from EMR, ED MD and pt sister at bedside.  (20 Jun 2024 21:01)    S/P AVEIR PPM implant and AVJ ablation     INTERVAL/OVERNIGHT EVENTS  No adverse events ovenight     Vital Signs Last 24 Hrs  T(C): 36.8 (04 Jul 2024 08:10), Max: 36.9 (03 Jul 2024 13:49)  T(F): 98.2 (04 Jul 2024 08:10), Max: 98.4 (03 Jul 2024 13:49)  HR: 70 (04 Jul 2024 08:10) (70 - 82)  BP: 142/50 (04 Jul 2024 08:10) (129/49 - 172/96)  BP(mean): --  RR: 18 (04 Jul 2024 08:10) (16 - 18)  SpO2: 97% (04 Jul 2024 08:10) (96% - 100%)    Parameters below as of 04 Jul 2024 08:10  Patient On (Oxygen Delivery Method): room air        PHYSICAL EXAM:  NEURO: Non-focal, baseline poor historian.  No neuro deficits   CHEST/LUNG: Clear to auscultation bilaterally; No wheeze  HEART: V paced   ABDOMEN: Soft, Nontender, Nondistended; Bowel sounds present X 4 quadrants   EXTREMITIES:  2+ Peripheral Pulses, No clubbing, cyanosis, or edema   VASCULAR: Peripheral pulses palpable 2+ bilaterally  PROCEDURE SITE: figure 8 suture removed from right groin, site is without hematoma or bleeding. Sensation and KEEGAN intact. Distal pulses palpable 2+, capillary refill < 2 seconds. Patient denies pain, numbness, tingling, CP or SOB. Clean dry dressing applied    Labs:                        8.7    13.18 )-----------( 295      ( 04 Jul 2024 05:43 )             26.3     07-04    139  |  111<H>  |  23  ----------------------------<  90  3.9   |  21<L>  |  1.31<H>    Ca    8.3<L>      04 Jul 2024 05:43      PTT - ( 03 Jul 2024 06:58 )  PTT:58.8 sec  MEDICATIONS  (STANDING):  aMIOdarone    Tablet 200 milliGRAM(s) Oral daily  apixaban 5 milliGRAM(s) Oral two times a day  atorvastatin 80 milliGRAM(s) Oral at bedtime  clopidogrel Tablet 75 milliGRAM(s) Oral daily  diltiazem    Tablet 90 milliGRAM(s) Oral every 6 hours  ezetimibe 10 milliGRAM(s) Oral daily  melatonin 3 milliGRAM(s) Oral at bedtime      RADIOLOGY   Awan report attached to chart  Base rate 70  Max sensor 110      EKG 7/5/24  Vpaced             Antibiotics as ordered  continue medications as prescribed   Anticoagulation to resume tonight  PPM/ICD booklet & temporary ID card included in chart   Post d/c & wound care instruction given to pt and SHERRELL chart  Follow up appointment made on          7/8/24 at 14:30         in EP clinic for wound check  If pt develops chest pain/palpitations/shortness of breath/pain or swelling at procedure site, call EP clinic at [phone].  Remainder of care to be addressed by primary team

## 2024-07-04 NOTE — PHYSICAL THERAPY INITIAL EVALUATION ADULT - PERTINENT HX OF CURRENT PROBLEM, REHAB EVAL
see IE. 69-year-old female with DM, HTN, afib, CKD, hx of multiple cancer diagnosis, adm back on 6/20/24 for neurological changes, found to have an acute CVA- Acute infarcts in the R hemisphere and possibly L, likely embolic. also found to have acute anemia likely related to hematoma-CT s/o acute-appearing IM and subcutaneous hematomas within the proximal L thigh, Indeterminate but chronic appearing mixed attenuation lesion along the lateral margin of the R hamstring musculature within the mid R thigh, possibly related to a subacute to chronic hematoma. status post RBC transfusion, Hgb today 8.7. Issue w/ RUE bleeding- In setting of supratherapeutic PTT / heparin drip. seen by surgery-pressure dsg applied, bleeding appeared to have resolved. PTT no longer critically supratherapeutic. this adm Pt found w/ splenic mass concerning for malignancy. Course c/b  persisting atrial fibrillation that has been difficult to control at times, required IV cardizem this adm.  EP rec. AVJ ablation and PPM which was done 7/3.

## 2024-07-04 NOTE — PHYSICAL THERAPY INITIAL EVALUATION ADULT - ACTIVE RANGE OF MOTION EXAMINATION, REHAB EVAL
able to move L lower arm weakly , no appreciable mvt LLE/Right UE Active ROM was WFL (within functional limits)/Right LE Active ROM was WFL (within functional limits)
pt ROM WFL RUE, ROM limited on RLE/LLE 2/2 weakness, LLE no AROM noted.

## 2024-07-04 NOTE — PHYSICAL THERAPY INITIAL EVALUATION ADULT - BED MOBILITY TRAINING, PT EVAL
1. (1wk) able to perform w/ mod assist
GOAL: Pt will perform all bedmobility with CGA within 3-4 wks.

## 2024-07-04 NOTE — PROGRESS NOTE ADULT - SUBJECTIVE AND OBJECTIVE BOX
Chief Complaint: Sudden onset worsening of left sided weakness of LLE and LUE during PT also associated with slurred speech     Interval Hx: No acute events overnight. Tolerated AVJ ablation with leadless PPM implantation yesterday. No acute complaints. R arm inspected, swelling improving. Scattered bruising stable. Remains neurovascularly intact in that extremity. Now awaiting PPM check. Discharge / placement.      ROS: Multi system review is bit limited due to patient's neurocognitive impairment    Vitals:  T(F): 97.3 (04 Jul 2024 16:17), Max: 98.2 (04 Jul 2024 05:48)  HR: 71 (04 Jul 2024 16:17) (70 - 79)  BP: 142/56 (04 Jul 2024 16:17) (121/74 - 155/67)  RR: 18 (04 Jul 2024 16:17) (17 - 18)  SpO2: 95% (04 Jul 2024 16:17) (95% - 97%) on room air    Exam:  Gen: Comfortable  HEENT: NCAT PERRL EOMI MMM clear oropharynx  Neck: Supple, no LAD  CVS: s1 s2 normal, regular, rate ~70  Chest: Normal resp effort at rest, lungs CTA B/L  Abd: +BS, soft, NT ND   Ext: RUE with mild swelling, no tenderness, scattered bruises, no longer with active bleeding from old IV site. R hand sensation and motor function intact. Normal peripheral pulses.   Skin: Warm, dry  Neuro: Awake and alert, answers simple questions, follows simple commands    Labs:                                          8.7    13.18 )-------( 295                   26.3       139  |  111  |  23  ------------------------<  90  3.9   |   21   |  1.31    Ca  8.3    MCV 88   RDW 14.7   Iron 21  TIBC 191  Iron sat 11%  Ferritin 672       Troponin  negative    ProBNP 2497     A1c 5.9     LDL 60    UA 6/20: Yellow, cloudy, mod LE, N+, 13 WBC, 6 RBC, many bact    Micro:  Urine culture 6/21: 10,000-49,000 CFU/mL E.coli pan-S  Blood culture 6/21: Negative     Imaging:  CXR 7/3: Lungs appear clear similar to April 19. Present film shows a Micra pacemaker slightly to the left of the midline.    CT B/L LE WO 6/20: Acute-appearing intramuscular and subcutaneous hematomas within the proximal L thigh. Indeterminate but chronic appearing mixed attenuation lesion along the lateral margin of the R hamstring musculature within the mid R thigh, possibly related to a subacute to chronic hematoma.     MR head WO 6/20: Mild diffuse parenchymal volume loss. Mild chronic microvascular ischemic changes. Small chronic infarcts noted in the right MCA territory Acute infarcts in the medial posterior right frontal parietal region, right parietal lobe and the watershed territory of right anterior cerebral artery and middle cerebral artery in the right frontal and parietal lobes. Largest infarct measures approximately 1.4 x 0.8 cm in the right parietal lobe. Additional nonspecific subcentimeter high diffusion signal with ADC map restriction noted in the anterior corpus callosum just right of midline. This could be related to small acute infarct. Subcentimeter high diffusion signals also noted in the medial left frontal lobe possibly related to subacute infarcts. There is no acute parenchymal hemorrhage, or midline shift. There is no extra-axial fluid collection.  There is no hydrocephalus. Partial empty sella noted. Mild mucosal thickening paranasal sinuses.    CT abdomen and pelvis W/ 6/20: Nodular masslike appearance of the spleen. There is a small ill-defined intramuscular hematoma in the left upper thigh medial compartment.    CT chest W/ 6/20: No acute pulmonary embolism to the lobar level. Limited assessment of segmental and subsegmental branches. Cardiomegaly with mild perihilar groundglass opacities which may represent mild pulmonary edema or an infectious process.    CTA head and neck 6/20: Bilateral vertebral arteries patent. No significant stenosis, occlusion or dissection bilateral extracranial carotid arteries. Severe focal stenosis right P3 segment origin. Moderate stenosis distal cavernous and supraclinoid segments right internal carotid artery. 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 Focal occlusion with reconstitution left proximal A2 segment centered 1-2 cm distal to its origin. Additional findings, as above, including an approximate 6 cm left thyroid mass, resulting in regional mass effect. Recommend nonemergent thyroid ultrasound.    CT perfusion 6/20: No predicted core infarct. Evidence of active ischemia-tissue at risk R posterior frontal-parietal region.    CT head 6/20: Compared with 4/19/2024. No evidence of acute hemorrhage, extra-axial collection or mass effect. Multiple chronic right frontal infarcts as well as findings suspicious for acute ischemia right superior frontal gyrus, as above.    Cardiac Testing:  Tele 7/4: Personally reviewed. SR rate WNL aside for brief episode of HR 40s and failed capturing. EP input pending.     Tele 7/1-7/2: Personally reviewed. Afib. Rates 80s-120s.     TTE 6/21: Technically difficult image quality. Left ventricular systolic function is hyperdynamic with an ejection fraction estimated at 70 to 75 %. Normal right ventricular cavity size and normal systolic function. The left atrium is mildly dilated. Agitated saline injection was negative for intracardiac shunt. Moderate pulmonary hypertension.    Meds:  MEDICATIONS  (STANDING):  aMIOdarone    Tablet 200 milliGRAM(s) Oral daily  apixaban 5 milliGRAM(s) Oral two times a day  atorvastatin 80 milliGRAM(s) Oral at bedtime  clopidogrel Tablet 75 milliGRAM(s) Oral daily  diltiazem    Tablet 90 milliGRAM(s) Oral every 6 hours  ezetimibe 10 milliGRAM(s) Oral daily  melatonin 3 milliGRAM(s) Oral at bedtime    MEDICATIONS  (PRN):  acetaminophen     Tablet .. 650 milliGRAM(s) Oral every 6 hours PRN Mild Pain (1 - 3)  metoprolol tartrate Injectable 5 milliGRAM(s) IV Push every 6 hours PRN HR > 130  ondansetron Injectable 4 milliGRAM(s) IV Push every 6 hours PRN Nausea and/or Vomiting

## 2024-07-04 NOTE — PHYSICAL THERAPY INITIAL EVALUATION ADULT - GENERAL OBSERVATIONS, REHAB EVAL
Pt seen for 30min PT bedside Eval. Pt rec'd semi supine in bed in NAD, declining OOB 2/2 pain in LLE. Pt willing to participate in bedside eval. History taken becoming slightly agitated 2/2 multiple questions. pt ROM WFL RUE, ROM limited on RLE/LLE 2/2 weakness, LLE no AROM noted. strength grossly 3+/5 RUE, 2+/5 RLE, 2/5 LUE, 0/5 LLE. Pt left semi supine in bed in NAD, all needs met, +bed alarm, RN aware.
pt rec'd supine in bed on 3N, HM, pt postured in reverse "C" in bed, LLE in frog leg poistion, head to bed rail-repositioned w/ max assist, tends to migrate head to R bedrail, RUE bruised. pt obese. affect simple.

## 2024-07-04 NOTE — PHYSICAL THERAPY INITIAL EVALUATION ADULT - DIAGNOSIS, PT EVAL
CVA, afib with RVR- AVJ ablation and leadless PPM placement 7/3, Acute anemia with thigh hematoma, JUSTINO
Decreased Functional mobility s/p acute Rt infarct.

## 2024-07-04 NOTE — PHYSICAL THERAPY INITIAL EVALUATION ADULT - MANUAL MUSCLE TESTING RESULTS, REHAB EVAL
RUE/LE WFL, LUE trace shld, trace+ elbow flex/ext, pron 2/5, supin 2-, wrist 2-, fingers in poor range, LLE 0/5
strength grossly 3+/5 RUE, 2+/5 RLE, 2/5 LUE, 0/5 LLE./grossly assessed due to

## 2024-07-04 NOTE — PROGRESS NOTE ADULT - ASSESSMENT
68 yo woman with PMHX of DM, HTN, learning disability, A fib post DCCV x2 on 4/1 and 4/22/2024, and recent dx of Splenic CA admitted with left sided weakness on 6/20.  --JUSTINO with unclear baseline CKD.    C/w ATN/KHAI : now slowly improving.    Would be cautious with contrast studies in the future.    Meds reviewed and acceptable.  --Electrolytes and fluid status stable.  --HTN : well controlled.  --A FIB : continue CCB /Amiodarone for rate control  --Spleen mass : c/w malignancy : further intervention to be determined.    6/27 SY  --JUSTINO/KHAI :  slowly improving.  --Fluid and electrolytes stable.  --A FIB : evaluation for ablation and PPM in progress --awaiting for prognosis from Onc.  --Spleen mass : c/w malignancy but without tissue diagnosis.  ? prognosis.    6/28  Pt w JUSTINO/ KHAI gradually improving  Splenic malignancy hx  A Fib  Anemia, Electrolytes stable  vitals stable  Dr Chang on call if needed    7/1 MK   - JUSTINO due to KHAI, recovery delayed by additional hemodynamic variability with dec h/h    fu trend of h/h and scr     UOP noted, bladder scan prn as needed    cw ivf    avoid IVC     no nsaid   - afib with difficult to control rate     amiodarone     leadless PPM upon stability of scr   - Splenic mass with multiple malignancy     GOC discussion noted, family declined work-up for cancers       7/2  JUSTINO/ KHAI some recovery in renal function noted today  Anemia s/p transfusion 1 u PRBC  Malignancy w/u declined by family  PPM tentatively scheduled for 7/3 pending pt stability, and HGB  A fib now on po amiodarone and diltiazem     7/4 SY  --JUSTINO/KHAI : improved and stable.  --A FIB : post leadless PPM and AVJ ablation.  --Splenic mass : to defer intervention for now.  --D/c planning.

## 2024-07-05 PROCEDURE — 99232 SBSQ HOSP IP/OBS MODERATE 35: CPT

## 2024-07-05 RX ORDER — TRAMADOL HYDROCHLORIDE 50 MG/1
25 TABLET, FILM COATED ORAL EVERY 8 HOURS
Refills: 0 | Status: DISCONTINUED | OUTPATIENT
Start: 2024-07-05 | End: 2024-07-12

## 2024-07-05 RX ADMIN — Medication 3 MILLIGRAM(S): at 22:59

## 2024-07-05 RX ADMIN — Medication 650 MILLIGRAM(S): at 22:59

## 2024-07-05 RX ADMIN — ATORVASTATIN CALCIUM 80 MILLIGRAM(S): 20 TABLET, FILM COATED ORAL at 22:59

## 2024-07-05 RX ADMIN — CLOPIDOGREL BISULFATE 75 MILLIGRAM(S): 75 TABLET, FILM COATED ORAL at 10:29

## 2024-07-05 RX ADMIN — TRAMADOL HYDROCHLORIDE 25 MILLIGRAM(S): 50 TABLET, FILM COATED ORAL at 15:29

## 2024-07-05 RX ADMIN — DILTIAZEM HYDROCHLORIDE 90 MILLIGRAM(S): 240 CAPSULE, EXTENDED RELEASE ORAL at 06:13

## 2024-07-05 RX ADMIN — AMIODARONE HYDROCHLORIDE 200 MILLIGRAM(S): 50 INJECTION, SOLUTION INTRAVENOUS at 10:29

## 2024-07-05 RX ADMIN — EZETIMIBE 10 MILLIGRAM(S): 10 TABLET ORAL at 10:29

## 2024-07-05 RX ADMIN — DILTIAZEM HYDROCHLORIDE 90 MILLIGRAM(S): 240 CAPSULE, EXTENDED RELEASE ORAL at 18:24

## 2024-07-05 RX ADMIN — DILTIAZEM HYDROCHLORIDE 90 MILLIGRAM(S): 240 CAPSULE, EXTENDED RELEASE ORAL at 22:59

## 2024-07-05 RX ADMIN — APIXABAN 5 MILLIGRAM(S): 5 TABLET, FILM COATED ORAL at 18:24

## 2024-07-05 RX ADMIN — DILTIAZEM HYDROCHLORIDE 90 MILLIGRAM(S): 240 CAPSULE, EXTENDED RELEASE ORAL at 11:34

## 2024-07-05 RX ADMIN — Medication 650 MILLIGRAM(S): at 00:29

## 2024-07-05 RX ADMIN — DILTIAZEM HYDROCHLORIDE 90 MILLIGRAM(S): 240 CAPSULE, EXTENDED RELEASE ORAL at 00:47

## 2024-07-05 RX ADMIN — TRAMADOL HYDROCHLORIDE 25 MILLIGRAM(S): 50 TABLET, FILM COATED ORAL at 16:00

## 2024-07-05 RX ADMIN — APIXABAN 5 MILLIGRAM(S): 5 TABLET, FILM COATED ORAL at 06:13

## 2024-07-05 NOTE — PROGRESS NOTE ADULT - ASSESSMENT
68 yo woman with PMHX of DM, HTN, learning disability, A fib post DCCV x2 on 4/1 and 4/22/2024, and recent dx of Splenic CA admitted with left sided weakness on 6/20.  --JUSTINO with unclear baseline CKD.    C/w ATN/KHAI : now slowly improving.    Would be cautious with contrast studies in the future.    Meds reviewed and acceptable.  --Electrolytes and fluid status stable.  --HTN : well controlled.  --A FIB : continue CCB /Amiodarone for rate control  --Spleen mass : c/w malignancy : further intervention to be determined.    6/27 SY  --JUSTINO/KHAI :  slowly improving.  --Fluid and electrolytes stable.  --A FIB : evaluation for ablation and PPM in progress --awaiting for prognosis from Onc.  --Spleen mass : c/w malignancy but without tissue diagnosis.  ? prognosis.    6/28  Pt w JUSTINO/ KHAI gradually improving  Splenic malignancy hx  A Fib  Anemia, Electrolytes stable  vitals stable  Dr Chang on call if needed    7/1 MK   - JUSTINO due to KHAI, recovery delayed by additional hemodynamic variability with dec h/h    fu trend of h/h and scr     UOP noted, bladder scan prn as needed    cw ivf    avoid IVC     no nsaid   - afib with difficult to control rate     amiodarone     leadless PPM upon stability of scr   - Splenic mass with multiple malignancy     GOC discussion noted, family declined work-up for cancers       7/2  JUSTINO/ KHAI some recovery in renal function noted today  Anemia s/p transfusion 1 u PRBC  Malignancy w/u declined by family  PPM tentatively scheduled for 7/3 pending pt stability, and HGB  A fib now on po amiodarone and diltiazem     7/4 SY  --JUSTINO/KHAI : improved and stable.  --A FIB : post leadless PPM and AVJ ablation.  --Splenic mass : to defer intervention for now.  --D/c planning.    7/5 MK   - JUSTINO/KHAI resolving      on po intake  - afib s/p ppm and avj ablation      will sign off, please call with questions

## 2024-07-05 NOTE — PROGRESS NOTE ADULT - ASSESSMENT
69-year-old female with DM, HTN, afib, CKD, hx of multiple cancer diagnosis, admitted back on 6/20/24 for neurological changes, found to have an acute CVA, also found to have acute anemia likely related to hematoma status post RBC transfusion, splenic mass concerning for malignancy, and persisting atrial fibrillation that has been difficult to control at times. Admitted to Medicine.      Acute infarcts in the R hemisphere and possible L.    Appreciate input from Neurology. Likely embolic in setting of afib. Now s/p AVJ ablation and Micra leadless pacemaker insertion. Back on Eliquis.  - Continue Plavix and Eliquis, atorvastatin at bedtime.     Persistent Afib with difficult to control rates  Patient failed rhythm control. Was unable to control rates either. Patient is now s/p AVJ ablation with leadless PPM implant.   - Continue Cardizem 90mg PO q6h, Amiodarone 200mg daily  - Continue Eliquis  - Cardiac EP follow up 7/8/24 at 14:30 in EP clinic for wound check    RUE bleeding  Resolved. Developed in setting of supratherapeutic PTT / heparin drip. Appreciate input from Surgery who assisted with pressure dressing. Heparin drip was held and then restarted at lower rate. Bleeding resolved. Unclear whether she may have an additional component causing the issue, such as an acquired factor inhibitor in setting of possible cancer diagnosis. Discussed with Hematology Oncology Dr Turcios who will assist with workup.   - Continue to monitor    Acute on chronic anemia  Likely multi factorial. Concern for a possible underlying malignancy, splenic mass, etc. Leg hematoma that was noted upon admission. And then RUE bleeding a few days ago, since resolved. S/p PRBC transfusion in the setting of that bleeding.   - Continue to monitor    JUSTINO on CKD  Appreciate input from Nephrology. JUSTINO/KHAI. Fluid balance stable and electrolytes stable. Cr now improved.    Cr 1.3 today  - Continue to monitor    Splenic mass  Appreciate Hematology Oncology input. Possibly consistent with malignancy however no tissue ever obtained. There was outpatient plan for splenectomy. Oncology noted that consideration can be given to IR guided bx of splenic mass or splenectomy to help clarify diagnosis and prognosis. HCP wishes to defer at this point.   - Follow up with Hematology / Oncology    Thigh hematomas  On CT, suggestion of acute-appearing intramuscular and subcutaneous hematomas within the proximal L thigh. Indeterminate but chronic appearing mixed attenuation lesion along the lateral margin of the R hamstring musculature within the mid R thigh, possibly related to a subacute to chronic hematoma.   - Recommended short-term interval follow-up MRI of bilateral thighs with and without contrast in 8-12 weeks to demonstrate resolution/improvement of these findings and to rule out an underlying mass/neoplasm.    Deconditioning and debility  PT consulted.   - F/u PT eval      Dispo: Approaching stability for discharge pending final EP clearance, PT eval, facility placement

## 2024-07-05 NOTE — PROGRESS NOTE ADULT - SUBJECTIVE AND OBJECTIVE BOX
Chief Complaint: Sudden onset worsening of left sided weakness of LLE and LUE during PT also associated with slurred speech     Interval Hx: No acute events overnight. Tolerated AVJ ablation with leadless PPM implantation yesterday. No acute complaints. R arm inspected, swelling improving. Scattered bruising stable. Remains neurovascularly intact in that extremity. Now awaiting PPM check. Discharge / placement.    07/05/24: Patient seen and examined. No new issues.     ROS: Multi system review is bit limited due to patient's neurocognitive impairment    Vitals:    Vital Signs Last 24 Hrs  T(C): 36.4 (05 Jul 2024 08:20), Max: 37.1 (05 Jul 2024 05:14)  T(F): 97.6 (05 Jul 2024 08:20), Max: 98.7 (05 Jul 2024 05:14)  HR: 70 (05 Jul 2024 11:30) (70 - 78)  BP: 113/71 (05 Jul 2024 11:30) (113/71 - 143/51)  BP(mean): 78 (05 Jul 2024 11:30) (78 - 78)  RR: 18 (05 Jul 2024 08:20) (18 - 18)  SpO2: 97% (05 Jul 2024 08:20) (95% - 97%)    Parameters below as of 05 Jul 2024 08:20  Patient On (Oxygen Delivery Method): room air        Exam:  Gen: Comfortable  HEENT: NCAT PERRL EOMI MMM clear oropharynx  Neck: Supple, no LAD  CVS: s1 s2 normal, regular, rate ~70  Chest: Normal resp effort at rest, lungs CTA B/L  Abd: +BS, soft, NT ND   Ext: RUE with mild swelling, no tenderness, scattered bruises, no longer with active bleeding from old IV site. R hand sensation and motor function intact. Normal peripheral pulses.   Skin: Warm, dry  Neuro: Awake and alert, answers simple questions, follows simple commands    Labs:                                                            8.7    13.18 )-----------( 295      ( 04 Jul 2024 05:43 )             26.3     04 Jul 2024 05:43    139    |  111    |  23     ----------------------------<  90     3.9     |  21     |  1.31     Ca    8.3        04 Jul 2024 05:43          CAPILLARY BLOOD GLUCOSE            Urinalysis Basic - ( 04 Jul 2024 05:43 )    Color: x / Appearance: x / SG: x / pH: x  Gluc: 90 mg/dL / Ketone: x  / Bili: x / Urobili: x   Blood: x / Protein: x / Nitrite: x   Leuk Esterase: x / RBC: x / WBC x   Sq Epi: x / Non Sq Epi: x / Bacteria: x          Micro:  Urine culture 6/21: 10,000-49,000 CFU/mL E.coli pan-S  Blood culture 6/21: Negative     Imaging:  CXR 7/3: Lungs appear clear similar to April 19. Present film shows a Micra pacemaker slightly to the left of the midline.    CT B/L LE WO 6/20: Acute-appearing intramuscular and subcutaneous hematomas within the proximal L thigh. Indeterminate but chronic appearing mixed attenuation lesion along the lateral margin of the R hamstring musculature within the mid R thigh, possibly related to a subacute to chronic hematoma.     MR head WO 6/20: Mild diffuse parenchymal volume loss. Mild chronic microvascular ischemic changes. Small chronic infarcts noted in the right MCA territory Acute infarcts in the medial posterior right frontal parietal region, right parietal lobe and the watershed territory of right anterior cerebral artery and middle cerebral artery in the right frontal and parietal lobes. Largest infarct measures approximately 1.4 x 0.8 cm in the right parietal lobe. Additional nonspecific subcentimeter high diffusion signal with ADC map restriction noted in the anterior corpus callosum just right of midline. This could be related to small acute infarct. Subcentimeter high diffusion signals also noted in the medial left frontal lobe possibly related to subacute infarcts. There is no acute parenchymal hemorrhage, or midline shift. There is no extra-axial fluid collection.  There is no hydrocephalus. Partial empty sella noted. Mild mucosal thickening paranasal sinuses.    CT abdomen and pelvis W/ 6/20: Nodular masslike appearance of the spleen. There is a small ill-defined intramuscular hematoma in the left upper thigh medial compartment.    CT chest W/ 6/20: No acute pulmonary embolism to the lobar level. Limited assessment of segmental and subsegmental branches. Cardiomegaly with mild perihilar groundglass opacities which may represent mild pulmonary edema or an infectious process.    CTA head and neck 6/20: Bilateral vertebral arteries patent. No significant stenosis, occlusion or dissection bilateral extracranial carotid arteries. Severe focal stenosis right P3 segment origin. Moderate stenosis distal cavernous and supraclinoid segments right internal carotid artery. 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 Focal occlusion with reconstitution left proximal A2 segment centered 1-2 cm distal to its origin. Additional findings, as above, including an approximate 6 cm left thyroid mass, resulting in regional mass effect. Recommend nonemergent thyroid ultrasound.    CT perfusion 6/20: No predicted core infarct. Evidence of active ischemia-tissue at risk R posterior frontal-parietal region.    CT head 6/20: Compared with 4/19/2024. No evidence of acute hemorrhage, extra-axial collection or mass effect. Multiple chronic right frontal infarcts as well as findings suspicious for acute ischemia right superior frontal gyrus, as above.    Cardiac Testing:  Tele 7/4: Personally reviewed. SR rate WNL aside for brief episode of HR 40s and failed capturing. EP input pending.     Tele 7/1-7/2: Personally reviewed. Afib. Rates 80s-120s.     TTE 6/21: Technically difficult image quality. Left ventricular systolic function is hyperdynamic with an ejection fraction estimated at 70 to 75 %. Normal right ventricular cavity size and normal systolic function. The left atrium is mildly dilated. Agitated saline injection was negative for intracardiac shunt. Moderate pulmonary hypertension.    Meds:  MEDICATIONS  (STANDING):  aMIOdarone    Tablet 200 milliGRAM(s) Oral daily  apixaban 5 milliGRAM(s) Oral two times a day  atorvastatin 80 milliGRAM(s) Oral at bedtime  clopidogrel Tablet 75 milliGRAM(s) Oral daily  diltiazem    Tablet 90 milliGRAM(s) Oral every 6 hours  ezetimibe 10 milliGRAM(s) Oral daily  melatonin 3 milliGRAM(s) Oral at bedtime    MEDICATIONS  (PRN):  acetaminophen     Tablet .. 650 milliGRAM(s) Oral every 6 hours PRN Mild Pain (1 - 3)  metoprolol tartrate Injectable 5 milliGRAM(s) IV Push every 6 hours PRN HR > 130  ondansetron Injectable 4 milliGRAM(s) IV Push every 6 hours PRN Nausea and/or Vomiting

## 2024-07-06 LAB
ANION GAP SERPL CALC-SCNC: 8 MMOL/L — SIGNIFICANT CHANGE UP (ref 5–17)
BUN SERPL-MCNC: 21 MG/DL — SIGNIFICANT CHANGE UP (ref 7–23)
CALCIUM SERPL-MCNC: 9.1 MG/DL — SIGNIFICANT CHANGE UP (ref 8.5–10.1)
CHLORIDE SERPL-SCNC: 114 MMOL/L — HIGH (ref 96–108)
CO2 SERPL-SCNC: 22 MMOL/L — SIGNIFICANT CHANGE UP (ref 22–31)
CREAT SERPL-MCNC: 1.06 MG/DL — SIGNIFICANT CHANGE UP (ref 0.5–1.3)
EGFR: 57 ML/MIN/1.73M2 — LOW
GLUCOSE SERPL-MCNC: 103 MG/DL — HIGH (ref 70–99)
HCT VFR BLD CALC: 28.9 % — LOW (ref 34.5–45)
HGB BLD-MCNC: 9.5 G/DL — LOW (ref 11.5–15.5)
MCHC RBC-ENTMCNC: 29.8 PG — SIGNIFICANT CHANGE UP (ref 27–34)
MCHC RBC-ENTMCNC: 32.9 GM/DL — SIGNIFICANT CHANGE UP (ref 32–36)
MCV RBC AUTO: 90.6 FL — SIGNIFICANT CHANGE UP (ref 80–100)
PLATELET # BLD AUTO: 310 K/UL — SIGNIFICANT CHANGE UP (ref 150–400)
POTASSIUM SERPL-MCNC: 4.2 MMOL/L — SIGNIFICANT CHANGE UP (ref 3.5–5.3)
POTASSIUM SERPL-SCNC: 4.2 MMOL/L — SIGNIFICANT CHANGE UP (ref 3.5–5.3)
RBC # BLD: 3.19 M/UL — LOW (ref 3.8–5.2)
RBC # FLD: 15.3 % — HIGH (ref 10.3–14.5)
SODIUM SERPL-SCNC: 144 MMOL/L — SIGNIFICANT CHANGE UP (ref 135–145)
WBC # BLD: 11.04 K/UL — HIGH (ref 3.8–10.5)
WBC # FLD AUTO: 11.04 K/UL — HIGH (ref 3.8–10.5)

## 2024-07-06 PROCEDURE — 99232 SBSQ HOSP IP/OBS MODERATE 35: CPT

## 2024-07-06 RX ADMIN — DILTIAZEM HYDROCHLORIDE 90 MILLIGRAM(S): 240 CAPSULE, EXTENDED RELEASE ORAL at 23:03

## 2024-07-06 RX ADMIN — Medication 3 MILLIGRAM(S): at 23:02

## 2024-07-06 RX ADMIN — DILTIAZEM HYDROCHLORIDE 90 MILLIGRAM(S): 240 CAPSULE, EXTENDED RELEASE ORAL at 12:46

## 2024-07-06 RX ADMIN — APIXABAN 5 MILLIGRAM(S): 5 TABLET, FILM COATED ORAL at 05:16

## 2024-07-06 RX ADMIN — APIXABAN 5 MILLIGRAM(S): 5 TABLET, FILM COATED ORAL at 17:02

## 2024-07-06 RX ADMIN — CLOPIDOGREL BISULFATE 75 MILLIGRAM(S): 75 TABLET, FILM COATED ORAL at 09:21

## 2024-07-06 RX ADMIN — TRAMADOL HYDROCHLORIDE 25 MILLIGRAM(S): 50 TABLET, FILM COATED ORAL at 10:00

## 2024-07-06 RX ADMIN — AMIODARONE HYDROCHLORIDE 200 MILLIGRAM(S): 50 INJECTION, SOLUTION INTRAVENOUS at 09:21

## 2024-07-06 RX ADMIN — TRAMADOL HYDROCHLORIDE 25 MILLIGRAM(S): 50 TABLET, FILM COATED ORAL at 09:21

## 2024-07-06 RX ADMIN — DILTIAZEM HYDROCHLORIDE 90 MILLIGRAM(S): 240 CAPSULE, EXTENDED RELEASE ORAL at 17:02

## 2024-07-06 RX ADMIN — ATORVASTATIN CALCIUM 80 MILLIGRAM(S): 20 TABLET, FILM COATED ORAL at 23:02

## 2024-07-06 RX ADMIN — EZETIMIBE 10 MILLIGRAM(S): 10 TABLET ORAL at 09:21

## 2024-07-06 RX ADMIN — DILTIAZEM HYDROCHLORIDE 90 MILLIGRAM(S): 240 CAPSULE, EXTENDED RELEASE ORAL at 05:16

## 2024-07-06 NOTE — PROGRESS NOTE ADULT - ASSESSMENT
69-year-old female with DM, HTN, afib, CKD, hx of multiple cancer diagnosis, admitted back on 6/20/24 for neurological changes, found to have an acute CVA, also found to have acute anemia likely related to hematoma status post RBC transfusion, splenic mass concerning for malignancy, and persisting atrial fibrillation that has been difficult to control at times. Admitted to Medicine.      Acute infarcts in the R hemisphere and possible L.    Appreciate input from Neurology. Likely embolic in setting of afib. Now s/p AVJ ablation and Micra leadless pacemaker insertion. Back on Eliquis.  - Continue Plavix and Eliquis, atorvastatin at bedtime.     Persistent Afib with difficult to control rates  Patient failed rhythm control. Was unable to control rates either. Patient is now s/p AVJ ablation with leadless PPM implant.   - Continue Cardizem 90mg PO q6h, Amiodarone 200mg daily  - Continue Eliquis  - Cardiac EP follow up 7/8/24 at 14:30 in EP clinic for wound check    RUE bleeding  Resolved. Developed in setting of supratherapeutic PTT / heparin drip. Appreciate input from Surgery who assisted with pressure dressing. Heparin drip was held and then restarted at lower rate. Bleeding resolved. Unclear whether she may have an additional component causing the issue, such as an acquired factor inhibitor in setting of possible cancer diagnosis. Discussed with Hematology Oncology Dr Turcios who will assist with workup.   - Continue to monitor    Acute on chronic anemia  Likely multi factorial. Concern for a possible underlying malignancy, splenic mass, etc. Leg hematoma that was noted upon admission. And then RUE bleeding a few days ago, since resolved. S/p PRBC transfusion in the setting of that bleeding.   - Continue to monitor    JUSTINO on CKD  Appreciate input from Nephrology. JUSTINO/KHAI. Fluid balance stable and electrolytes stable. Cr now improved.    Cr 1.3 today  - Continue to monitor    Splenic mass  Appreciate Hematology Oncology input. Possibly consistent with malignancy however no tissue ever obtained. There was outpatient plan for splenectomy. Oncology noted that consideration can be given to IR guided bx of splenic mass or splenectomy to help clarify diagnosis and prognosis. HCP wishes to defer at this point.   - Follow up with Hematology / Oncology    Thigh hematomas  On CT, suggestion of acute-appearing intramuscular and subcutaneous hematomas within the proximal L thigh. Indeterminate but chronic appearing mixed attenuation lesion along the lateral margin of the R hamstring musculature within the mid R thigh, possibly related to a subacute to chronic hematoma.   - Recommended short-term interval follow-up MRI of bilateral thighs with and without contrast in 8-12 weeks to demonstrate resolution/improvement of these findings and to rule out an underlying mass/neoplasm.    Deconditioning and debility  PT consulted.   - F/u PT eval      Dispo: back to rehab on Monday

## 2024-07-06 NOTE — PROGRESS NOTE ADULT - SUBJECTIVE AND OBJECTIVE BOX
Chief Complaint: Sudden onset worsening of left sided weakness of LLE and LUE during PT also associated with slurred speech     Interval Hx: No acute events overnight. Tolerated AVJ ablation with leadless PPM implantation yesterday. No acute complaints. R arm inspected, swelling improving. Scattered bruising stable. Remains neurovascularly intact in that extremity. Now awaiting PPM check. Discharge / placement.    07/05/24: Patient seen and examined. No new issues.   07/06/24: No more bradycardia. Discussed with sister yesterday regarding plan of care    ROS: Multi system review is bit limited due to patient's neurocognitive impairment    Vitals:    Vital Signs Last 24 Hrs  T(C): 36.9 (06 Jul 2024 09:18), Max: 36.9 (06 Jul 2024 09:18)  T(F): 98.4 (06 Jul 2024 09:18), Max: 98.4 (06 Jul 2024 09:18)  HR: 76 (06 Jul 2024 12:45) (71 - 76)  BP: 139/85 (06 Jul 2024 12:45) (118/97 - 153/49)  BP(mean): 101 (06 Jul 2024 12:45) (94 - 106)  RR: 18 (06 Jul 2024 12:45) (17 - 18)  SpO2: 95% (06 Jul 2024 12:45) (93% - 95%)    Parameters below as of 06 Jul 2024 12:45  Patient On (Oxygen Delivery Method): room air        Exam:  Gen: Comfortable  HEENT: NCAT PERRL EOMI MMM clear oropharynx  Neck: Supple, no LAD  CVS: s1 s2 normal, regular, rate ~70  Chest: Normal resp effort at rest, lungs CTA B/L  Abd: +BS, soft, NT ND   Ext: RUE with mild swelling, no tenderness, scattered bruises, no longer with active bleeding from old IV site. R hand sensation and motor function intact. Normal peripheral pulses.   Skin: Warm, dry  Neuro: Awake and alert, answers simple questions, follows simple commands    Labs:                                                             9.5    11.04 )-----------( 310      ( 06 Jul 2024 07:49 )             28.9     06 Jul 2024 07:49    144    |  114    |  21     ----------------------------<  103    4.2     |  22     |  1.06     Ca    9.1        06 Jul 2024 07:49          CAPILLARY BLOOD GLUCOSE            Urinalysis Basic - ( 06 Jul 2024 07:49 )    Color: x / Appearance: x / SG: x / pH: x  Gluc: 103 mg/dL / Ketone: x  / Bili: x / Urobili: x   Blood: x / Protein: x / Nitrite: x   Leuk Esterase: x / RBC: x / WBC x   Sq Epi: x / Non Sq Epi: x / Bacteria: x          Micro:  Urine culture 6/21: 10,000-49,000 CFU/mL E.coli pan-S  Blood culture 6/21: Negative     Imaging:  CXR 7/3: Lungs appear clear similar to April 19. Present film shows a Micra pacemaker slightly to the left of the midline.    CT B/L LE WO 6/20: Acute-appearing intramuscular and subcutaneous hematomas within the proximal L thigh. Indeterminate but chronic appearing mixed attenuation lesion along the lateral margin of the R hamstring musculature within the mid R thigh, possibly related to a subacute to chronic hematoma.     MR head WO 6/20: Mild diffuse parenchymal volume loss. Mild chronic microvascular ischemic changes. Small chronic infarcts noted in the right MCA territory Acute infarcts in the medial posterior right frontal parietal region, right parietal lobe and the watershed territory of right anterior cerebral artery and middle cerebral artery in the right frontal and parietal lobes. Largest infarct measures approximately 1.4 x 0.8 cm in the right parietal lobe. Additional nonspecific subcentimeter high diffusion signal with ADC map restriction noted in the anterior corpus callosum just right of midline. This could be related to small acute infarct. Subcentimeter high diffusion signals also noted in the medial left frontal lobe possibly related to subacute infarcts. There is no acute parenchymal hemorrhage, or midline shift. There is no extra-axial fluid collection.  There is no hydrocephalus. Partial empty sella noted. Mild mucosal thickening paranasal sinuses.    CT abdomen and pelvis W/ 6/20: Nodular masslike appearance of the spleen. There is a small ill-defined intramuscular hematoma in the left upper thigh medial compartment.    CT chest W/ 6/20: No acute pulmonary embolism to the lobar level. Limited assessment of segmental and subsegmental branches. Cardiomegaly with mild perihilar groundglass opacities which may represent mild pulmonary edema or an infectious process.    CTA head and neck 6/20: Bilateral vertebral arteries patent. No significant stenosis, occlusion or dissection bilateral extracranial carotid arteries. Severe focal stenosis right P3 segment origin. Moderate stenosis distal cavernous and supraclinoid segments right internal carotid artery. 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 Focal occlusion with reconstitution left proximal A2 segment centered 1-2 cm distal to its origin. Additional findings, as above, including an approximate 6 cm left thyroid mass, resulting in regional mass effect. Recommend nonemergent thyroid ultrasound.    CT perfusion 6/20: No predicted core infarct. Evidence of active ischemia-tissue at risk R posterior frontal-parietal region.    CT head 6/20: Compared with 4/19/2024. No evidence of acute hemorrhage, extra-axial collection or mass effect. Multiple chronic right frontal infarcts as well as findings suspicious for acute ischemia right superior frontal gyrus, as above.    Cardiac Testing:  Tele 7/4: Personally reviewed. SR rate WNL aside for brief episode of HR 40s and failed capturing. EP input pending.     Tele 7/1-7/2: Personally reviewed. Afib. Rates 80s-120s.     TTE 6/21: Technically difficult image quality. Left ventricular systolic function is hyperdynamic with an ejection fraction estimated at 70 to 75 %. Normal right ventricular cavity size and normal systolic function. The left atrium is mildly dilated. Agitated saline injection was negative for intracardiac shunt. Moderate pulmonary hypertension.    Meds:  MEDICATIONS  (STANDING):  aMIOdarone    Tablet 200 milliGRAM(s) Oral daily  apixaban 5 milliGRAM(s) Oral two times a day  atorvastatin 80 milliGRAM(s) Oral at bedtime  clopidogrel Tablet 75 milliGRAM(s) Oral daily  diltiazem    Tablet 90 milliGRAM(s) Oral every 6 hours  ezetimibe 10 milliGRAM(s) Oral daily  melatonin 3 milliGRAM(s) Oral at bedtime    MEDICATIONS  (PRN):  acetaminophen     Tablet .. 650 milliGRAM(s) Oral every 6 hours PRN Mild Pain (1 - 3)  metoprolol tartrate Injectable 5 milliGRAM(s) IV Push every 6 hours PRN HR > 130  ondansetron Injectable 4 milliGRAM(s) IV Push every 6 hours PRN Nausea and/or Vomiting

## 2024-07-07 LAB
APPEARANCE UR: ABNORMAL
BACTERIA # UR AUTO: ABNORMAL /HPF
BILIRUB UR-MCNC: NEGATIVE — SIGNIFICANT CHANGE UP
COLOR SPEC: ABNORMAL
COMMENT - URINE: SIGNIFICANT CHANGE UP
DIFF PNL FLD: ABNORMAL
GLUCOSE UR QL: NEGATIVE MG/DL — SIGNIFICANT CHANGE UP
HCT VFR BLD CALC: 27.6 % — LOW (ref 34.5–45)
HGB BLD-MCNC: 8.9 G/DL — LOW (ref 11.5–15.5)
HYALINE CASTS # UR AUTO: PRESENT
KETONES UR-MCNC: NEGATIVE MG/DL — SIGNIFICANT CHANGE UP
LEUKOCYTE ESTERASE UR-ACNC: ABNORMAL
MCHC RBC-ENTMCNC: 29.5 PG — SIGNIFICANT CHANGE UP (ref 27–34)
MCHC RBC-ENTMCNC: 32.2 GM/DL — SIGNIFICANT CHANGE UP (ref 32–36)
MCV RBC AUTO: 91.4 FL — SIGNIFICANT CHANGE UP (ref 80–100)
NITRITE UR-MCNC: POSITIVE
PH UR: 5 — SIGNIFICANT CHANGE UP (ref 5–8)
PLATELET # BLD AUTO: 316 K/UL — SIGNIFICANT CHANGE UP (ref 150–400)
PROT UR-MCNC: SIGNIFICANT CHANGE UP MG/DL
RBC # BLD: 3.02 M/UL — LOW (ref 3.8–5.2)
RBC # FLD: 15.3 % — HIGH (ref 10.3–14.5)
RBC CASTS # UR COMP ASSIST: ABNORMAL /HPF
SP GR SPEC: 1.02 — SIGNIFICANT CHANGE UP (ref 1–1.03)
URATE CRY FLD QL MICRO: PRESENT
UROBILINOGEN FLD QL: 0.2 MG/DL — SIGNIFICANT CHANGE UP (ref 0.2–1)
WBC # BLD: 12.41 K/UL — HIGH (ref 3.8–10.5)
WBC # FLD AUTO: 12.41 K/UL — HIGH (ref 3.8–10.5)
WBC UR QL: 6 /HPF — SIGNIFICANT CHANGE UP (ref 0–5)

## 2024-07-07 PROCEDURE — 99232 SBSQ HOSP IP/OBS MODERATE 35: CPT

## 2024-07-07 RX ADMIN — APIXABAN 5 MILLIGRAM(S): 5 TABLET, FILM COATED ORAL at 18:22

## 2024-07-07 RX ADMIN — DILTIAZEM HYDROCHLORIDE 90 MILLIGRAM(S): 240 CAPSULE, EXTENDED RELEASE ORAL at 18:19

## 2024-07-07 RX ADMIN — ATORVASTATIN CALCIUM 80 MILLIGRAM(S): 20 TABLET, FILM COATED ORAL at 22:25

## 2024-07-07 RX ADMIN — DILTIAZEM HYDROCHLORIDE 90 MILLIGRAM(S): 240 CAPSULE, EXTENDED RELEASE ORAL at 11:09

## 2024-07-07 RX ADMIN — AMIODARONE HYDROCHLORIDE 200 MILLIGRAM(S): 50 INJECTION, SOLUTION INTRAVENOUS at 10:02

## 2024-07-07 RX ADMIN — TRAMADOL HYDROCHLORIDE 25 MILLIGRAM(S): 50 TABLET, FILM COATED ORAL at 05:36

## 2024-07-07 RX ADMIN — EZETIMIBE 10 MILLIGRAM(S): 10 TABLET ORAL at 10:02

## 2024-07-07 RX ADMIN — Medication 650 MILLIGRAM(S): at 11:45

## 2024-07-07 RX ADMIN — CLOPIDOGREL BISULFATE 75 MILLIGRAM(S): 75 TABLET, FILM COATED ORAL at 10:02

## 2024-07-07 RX ADMIN — Medication 650 MILLIGRAM(S): at 11:08

## 2024-07-07 RX ADMIN — DILTIAZEM HYDROCHLORIDE 90 MILLIGRAM(S): 240 CAPSULE, EXTENDED RELEASE ORAL at 05:12

## 2024-07-07 RX ADMIN — Medication 3 MILLIGRAM(S): at 22:25

## 2024-07-07 RX ADMIN — APIXABAN 5 MILLIGRAM(S): 5 TABLET, FILM COATED ORAL at 05:12

## 2024-07-07 RX ADMIN — TRAMADOL HYDROCHLORIDE 25 MILLIGRAM(S): 50 TABLET, FILM COATED ORAL at 06:18

## 2024-07-07 NOTE — PROVIDER CONTACT NOTE (OTHER) - BACKGROUND
On eliquis
pt on heparin gtt due to afib
Patient was admitted with afib RVR and has been on cardizem IVF @ 20ml/hr since admission.

## 2024-07-07 NOTE — PROVIDER CONTACT NOTE (OTHER) - ASSESSMENT
Pateint denies any pain or discomfort with urination. VSS.
Patient denies and symptoms at this time. HR ranging from 70's to 100. BP remains WDL
continues to bleed through wraps

## 2024-07-07 NOTE — PROVIDER CONTACT NOTE (OTHER) - ACTION/TREATMENT ORDERED:
Lower cardizem by 5ml/hr
As per MD Gaines collect UA/UCx and pending culture will start any medication if need be. STAT CBC.
rewrap arm and monitor, notify if wrap saturates again. Arm rewrapped with abd pad and coban

## 2024-07-07 NOTE — PROGRESS NOTE ADULT - SUBJECTIVE AND OBJECTIVE BOX
Chief Complaint: Sudden onset worsening of left sided weakness of LLE and LUE during PT also associated with slurred speech     Interval Hx: No acute events overnight. Tolerated AVJ ablation with leadless PPM implantation yesterday. No acute complaints. R arm inspected, swelling improving. Scattered bruising stable. Remains neurovascularly intact in that extremity. Now awaiting PPM check. Discharge / placement.    07/05/24: Patient seen and examined. No new issues.   07/06/24: No more bradycardia. Discussed with sister yesterday regarding plan of care  07/07/24: Patient seen and examined. No new issues.     ROS: Multi system review is bit limited due to patient's neurocognitive impairment    Vitals:    Vital Signs Last 24 Hrs  T(C): 37.3 (07 Jul 2024 09:29), Max: 37.3 (07 Jul 2024 09:29)  T(F): 99.2 (07 Jul 2024 09:29), Max: 99.2 (07 Jul 2024 09:29)  HR: 70 (07 Jul 2024 11:05) (70 - 76)  BP: 156/52 (07 Jul 2024 11:05) (121/58 - 156/52)  BP(mean): 82 (07 Jul 2024 11:05) (75 - 82)  RR: 18 (07 Jul 2024 09:29) (18 - 18)  SpO2: 99% (07 Jul 2024 09:29) (94% - 99%)    Parameters below as of 06 Jul 2024 21:04  Patient On (Oxygen Delivery Method): room air          Exam:  Gen: Comfortable  HEENT: NCAT PERRL EOMI MMM clear oropharynx  Neck: Supple, no LAD  CVS: s1 s2 normal, regular, rate ~70  Chest: Normal resp effort at rest, lungs CTA B/L  Abd: +BS, soft, NT ND   Ext: RUE with mild swelling, no tenderness, scattered bruises, no longer with active bleeding from old IV site. R hand sensation and motor function intact. Normal peripheral pulses.   Skin: Warm, dry  Neuro: Awake and alert, answers simple questions, follows simple commands    Labs:                                                            9.5    11.04 )-----------( 310      ( 06 Jul 2024 07:49 )             28.9     06 Jul 2024 07:49    144    |  114    |  21     ----------------------------<  103    4.2     |  22     |  1.06     Ca    9.1        06 Jul 2024 07:49          CAPILLARY BLOOD GLUCOSE            Urinalysis Basic - ( 06 Jul 2024 07:49 )    Color: x / Appearance: x / SG: x / pH: x  Gluc: 103 mg/dL / Ketone: x  / Bili: x / Urobili: x   Blood: x / Protein: x / Nitrite: x   Leuk Esterase: x / RBC: x / WBC x   Sq Epi: x / Non Sq Epi: x / Bacteria: x        Micro:  Urine culture 6/21: 10,000-49,000 CFU/mL E.coli pan-S  Blood culture 6/21: Negative     Imaging:  CXR 7/3: Lungs appear clear similar to April 19. Present film shows a Micra pacemaker slightly to the left of the midline.    CT B/L LE WO 6/20: Acute-appearing intramuscular and subcutaneous hematomas within the proximal L thigh. Indeterminate but chronic appearing mixed attenuation lesion along the lateral margin of the R hamstring musculature within the mid R thigh, possibly related to a subacute to chronic hematoma.     MR head WO 6/20: Mild diffuse parenchymal volume loss. Mild chronic microvascular ischemic changes. Small chronic infarcts noted in the right MCA territory Acute infarcts in the medial posterior right frontal parietal region, right parietal lobe and the watershed territory of right anterior cerebral artery and middle cerebral artery in the right frontal and parietal lobes. Largest infarct measures approximately 1.4 x 0.8 cm in the right parietal lobe. Additional nonspecific subcentimeter high diffusion signal with ADC map restriction noted in the anterior corpus callosum just right of midline. This could be related to small acute infarct. Subcentimeter high diffusion signals also noted in the medial left frontal lobe possibly related to subacute infarcts. There is no acute parenchymal hemorrhage, or midline shift. There is no extra-axial fluid collection.  There is no hydrocephalus. Partial empty sella noted. Mild mucosal thickening paranasal sinuses.    CT abdomen and pelvis W/ 6/20: Nodular masslike appearance of the spleen. There is a small ill-defined intramuscular hematoma in the left upper thigh medial compartment.    CT chest W/ 6/20: No acute pulmonary embolism to the lobar level. Limited assessment of segmental and subsegmental branches. Cardiomegaly with mild perihilar groundglass opacities which may represent mild pulmonary edema or an infectious process.    CTA head and neck 6/20: Bilateral vertebral arteries patent. No significant stenosis, occlusion or dissection bilateral extracranial carotid arteries. Severe focal stenosis right P3 segment origin. Moderate stenosis distal cavernous and supraclinoid segments right internal carotid artery. 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 Focal occlusion with reconstitution left proximal A2 segment centered 1-2 cm distal to its origin. Additional findings, as above, including an approximate 6 cm left thyroid mass, resulting in regional mass effect. Recommend nonemergent thyroid ultrasound.    CT perfusion 6/20: No predicted core infarct. Evidence of active ischemia-tissue at risk R posterior frontal-parietal region.    CT head 6/20: Compared with 4/19/2024. No evidence of acute hemorrhage, extra-axial collection or mass effect. Multiple chronic right frontal infarcts as well as findings suspicious for acute ischemia right superior frontal gyrus, as above.    Cardiac Testing:  Tele 7/4: Personally reviewed. SR rate WNL aside for brief episode of HR 40s and failed capturing. EP input pending.     Tele 7/1-7/2: Personally reviewed. Afib. Rates 80s-120s.     TTE 6/21: Technically difficult image quality. Left ventricular systolic function is hyperdynamic with an ejection fraction estimated at 70 to 75 %. Normal right ventricular cavity size and normal systolic function. The left atrium is mildly dilated. Agitated saline injection was negative for intracardiac shunt. Moderate pulmonary hypertension.    Meds:  MEDICATIONS  (STANDING):  aMIOdarone    Tablet 200 milliGRAM(s) Oral daily  apixaban 5 milliGRAM(s) Oral two times a day  atorvastatin 80 milliGRAM(s) Oral at bedtime  clopidogrel Tablet 75 milliGRAM(s) Oral daily  diltiazem    Tablet 90 milliGRAM(s) Oral every 6 hours  ezetimibe 10 milliGRAM(s) Oral daily  melatonin 3 milliGRAM(s) Oral at bedtime    MEDICATIONS  (PRN):  acetaminophen     Tablet .. 650 milliGRAM(s) Oral every 6 hours PRN Mild Pain (1 - 3)  metoprolol tartrate Injectable 5 milliGRAM(s) IV Push every 6 hours PRN HR > 130  ondansetron Injectable 4 milliGRAM(s) IV Push every 6 hours PRN Nausea and/or Vomiting

## 2024-07-07 NOTE — PROGRESS NOTE ADULT - ASSESSMENT
69-year-old female with DM, HTN, afib, CKD, hx of multiple cancer diagnosis, admitted back on 6/20/24 for neurological changes, found to have an acute CVA, also found to have acute anemia likely related to hematoma status post RBC transfusion, splenic mass concerning for malignancy, and persisting atrial fibrillation that has been difficult to control at times. Admitted to Medicine.      Acute infarcts in the R hemisphere and possible L.    Appreciate input from Neurology. Likely embolic in setting of afib. Now s/p AVJ ablation and Micra leadless pacemaker insertion. Back on Eliquis.  - Continue Plavix and Eliquis, atorvastatin at bedtime.     Persistent Afib with difficult to control rates  Patient failed rhythm control. Was unable to control rates either. Patient is now s/p AVJ ablation with leadless PPM implant.   - Continue Cardizem 90mg PO q6h, Amiodarone 200mg daily  - Continue Eliquis  - Cardiac EP follow up 7/8/24 at 14:30 in EP clinic for wound check    RUE bleeding  Resolved. Developed in setting of supratherapeutic PTT / heparin drip. Appreciate input from Surgery who assisted with pressure dressing. Heparin drip was held and then restarted at lower rate. Bleeding resolved. Unclear whether she may have an additional component causing the issue, such as an acquired factor inhibitor in setting of possible cancer diagnosis. Discussed with Hematology Oncology Dr Turcios who will assist with workup.   - Continue to monitor    Acute on chronic anemia  Likely multi factorial. Concern for a possible underlying malignancy, splenic mass, etc. Leg hematoma that was noted upon admission. And then RUE bleeding a few days ago, since resolved. S/p PRBC transfusion in the setting of that bleeding.   - Continue to monitor    JUSTINO on CKD  Appreciate input from Nephrology. JUSTINO/KHAI. Fluid balance stable and electrolytes stable. Cr now improved.    Cr 1.3 today  - Continue to monitor    Splenic mass  Appreciate Hematology Oncology input. Possibly consistent with malignancy however no tissue ever obtained. There was outpatient plan for splenectomy. Oncology noted that consideration can be given to IR guided bx of splenic mass or splenectomy to help clarify diagnosis and prognosis. HCP wishes to defer at this point.   - Follow up with Hematology / Oncology    Thigh hematomas  On CT, suggestion of acute-appearing intramuscular and subcutaneous hematomas within the proximal L thigh. Indeterminate but chronic appearing mixed attenuation lesion along the lateral margin of the R hamstring musculature within the mid R thigh, possibly related to a subacute to chronic hematoma.   - Recommended short-term interval follow-up MRI of bilateral thighs with and without contrast in 8-12 weeks to demonstrate resolution/improvement of these findings and to rule out an underlying mass/neoplasm.    Deconditioning and debility  PT consulted.   - F/u PT eval      Dispo: back to rehab tomorrow

## 2024-07-08 PROCEDURE — 93280 PM DEVICE PROGR EVAL DUAL: CPT | Mod: 26

## 2024-07-08 PROCEDURE — 99232 SBSQ HOSP IP/OBS MODERATE 35: CPT

## 2024-07-08 RX ADMIN — DILTIAZEM HYDROCHLORIDE 90 MILLIGRAM(S): 240 CAPSULE, EXTENDED RELEASE ORAL at 06:06

## 2024-07-08 RX ADMIN — CLOPIDOGREL BISULFATE 75 MILLIGRAM(S): 75 TABLET, FILM COATED ORAL at 09:25

## 2024-07-08 RX ADMIN — APIXABAN 5 MILLIGRAM(S): 5 TABLET, FILM COATED ORAL at 06:06

## 2024-07-08 RX ADMIN — APIXABAN 5 MILLIGRAM(S): 5 TABLET, FILM COATED ORAL at 18:09

## 2024-07-08 RX ADMIN — EZETIMIBE 10 MILLIGRAM(S): 10 TABLET ORAL at 09:25

## 2024-07-08 RX ADMIN — DILTIAZEM HYDROCHLORIDE 90 MILLIGRAM(S): 240 CAPSULE, EXTENDED RELEASE ORAL at 18:05

## 2024-07-08 RX ADMIN — ATORVASTATIN CALCIUM 80 MILLIGRAM(S): 20 TABLET, FILM COATED ORAL at 21:27

## 2024-07-08 RX ADMIN — Medication 3 MILLIGRAM(S): at 21:27

## 2024-07-08 RX ADMIN — Medication 650 MILLIGRAM(S): at 18:54

## 2024-07-08 RX ADMIN — DILTIAZEM HYDROCHLORIDE 90 MILLIGRAM(S): 240 CAPSULE, EXTENDED RELEASE ORAL at 00:00

## 2024-07-08 RX ADMIN — DILTIAZEM HYDROCHLORIDE 90 MILLIGRAM(S): 240 CAPSULE, EXTENDED RELEASE ORAL at 13:50

## 2024-07-08 RX ADMIN — AMIODARONE HYDROCHLORIDE 200 MILLIGRAM(S): 50 INJECTION, SOLUTION INTRAVENOUS at 09:25

## 2024-07-08 NOTE — CHART NOTE - NSCHARTNOTEFT_GEN_A_CORE
This SW spoke with pts sister Rosalinda to follow up. Pt. had Pacemaker placed and plan is for [facility] upon d/c. Emotional support provided. Our team will sign off as goals of care are clear.

## 2024-07-08 NOTE — PROGRESS NOTE ADULT - ASSESSMENT
69-year-old female with DM, HTN, afib, CKD, hx of multiple cancer diagnosis, admitted back on 6/20/24 for neurological changes, found to have an acute CVA, also found to have acute anemia likely related to hematoma status post RBC transfusion, splenic mass concerning for malignancy, and persisting atrial fibrillation that has been difficult to control at times. Admitted to Medicine.        Hematuria  -New hematuria overnight and present today. Unclear etiology. U/A noted - no significant pyuria. Asymptomatic.  Patient with hx of UTI - treated with Ceftriaxone earlier this admission  -Urology consult placed  -Monitor hematuria  -Monitor off abx. Sent culture     Acute infarcts in the R hemisphere and possible L.    Appreciate input from Neurology. Likely embolic in setting of afib. Now s/p AVJ ablation and Micra leadless pacemaker insertion. Back on Eliquis.  - Continue Plavix and Eliquis, atorvastatin at bedtime.     Persistent Afib with difficult to control rates  Patient failed rhythm control. Was unable to control rates either. Patient is now s/p AVJ ablation with leadless PPM implant.   - Continue Cardizem 90mg PO q6h, Amiodarone 200mg daily  - Continue Eliquis  - Cardiac EP follow up 7/8/24 at 14:30 in EP clinic for wound check    RUE bleeding  Resolved. Developed in setting of supratherapeutic PTT / heparin drip. Appreciate input from Surgery who assisted with pressure dressing. Heparin drip was held and then restarted at lower rate. Bleeding resolved. Unclear whether she may have an additional component causing the issue, such as an acquired factor inhibitor in setting of possible cancer diagnosis. Discussed with Hematology Oncology Dr Turcios who will assist with workup.   - Continue to monitor    Acute on chronic anemia  Likely multi factorial. Concern for a possible underlying malignancy, splenic mass, etc. Leg hematoma that was noted upon admission. And then RUE bleeding a few days ago, since resolved. S/p PRBC transfusion in the setting of that bleeding.   - Continue to monitor    JUSTINO on CKD  Appreciate input from Nephrology. JUSTINO/KHAI. Fluid balance stable and electrolytes stable. Cr now improved.    Cr 1.3 today  - Continue to monitor    Splenic mass  Appreciate Hematology Oncology input. Possibly consistent with malignancy however no tissue ever obtained. There was outpatient plan for splenectomy. Oncology noted that consideration can be given to IR guided bx of splenic mass or splenectomy to help clarify diagnosis and prognosis. HCP wishes to defer at this point.   - Follow up with Hematology / Oncology    Thigh hematomas  On CT, suggestion of acute-appearing intramuscular and subcutaneous hematomas within the proximal L thigh. Indeterminate but chronic appearing mixed attenuation lesion along the lateral margin of the R hamstring musculature within the mid R thigh, possibly related to a subacute to chronic hematoma.   - Recommended short-term interval follow-up MRI of bilateral thighs with and without contrast in 8-12 weeks to demonstrate resolution/improvement of these findings and to rule out an underlying mass/neoplasm.    Deconditioning and debility  PT consulted.   - F/u PT eval    Dispo: rehab tomorrow if hematuria resolves   69-year-old female with DM, HTN, afib, CKD, hx of multiple cancer diagnosis, admitted back on 6/20/24 for neurological changes, found to have an acute CVA, also found to have acute anemia likely related to hematoma status post RBC transfusion, splenic mass concerning for malignancy, and persisting atrial fibrillation that has been difficult to control at times. Admitted to Medicine.        Hematuria  -New hematuria overnight and present today. Unclear etiology. U/A noted - no significant pyuria. Asymptomatic.  Patient with hx of UTI - treated with Ceftriaxone earlier this admission  -Urology consult placed  -Monitor hematuria  -Monitor off abx. Sent culture     Acute infarcts in the R hemisphere and possible L.    Appreciate input from Neurology. Likely embolic in setting of afib. Now s/p AVJ ablation and Micra leadless pacemaker insertion. Back on Eliquis.  - Continue Plavix and Eliquis, atorvastatin at bedtime.     Persistent Afib with difficult to control rates  Patient failed rhythm control. Was unable to control rates either. Patient is now s/p AVJ ablation with leadless PPM implant.   - Continue Cardizem 90mg PO q6h, Amiodarone 200mg daily  - Continue Eliquis  - Cardiac EP follow up 7/8/24 at 14:30 in EP clinic for wound check    RUE bleeding  Resolved. Developed in setting of supratherapeutic PTT / heparin drip. Appreciate input from Surgery who assisted with pressure dressing. Heparin drip was held and then restarted at lower rate. Bleeding resolved. Unclear whether she may have an additional component causing the issue, such as an acquired factor inhibitor in setting of possible cancer diagnosis. Discussed with Hematology Oncology Dr Turcios who will assist with workup.   - Continue to monitor    Acute on chronic anemia  Likely multi factorial. Concern for a possible underlying malignancy, splenic mass, etc. Leg hematoma that was noted upon admission. And then RUE bleeding a few days ago, since resolved. S/p PRBC transfusion in the setting of that bleeding.   - Continue to monitor    JUSTINO on CKD  Appreciate input from Nephrology. JUSTINO/KHAI. Fluid balance stable and electrolytes stable. Cr now improved.    Cr 1.3 today  - Continue to monitor    Splenic mass  Appreciate Hematology Oncology input. Possibly consistent with malignancy however no tissue ever obtained. There was outpatient plan for splenectomy. Oncology noted that consideration can be given to IR guided bx of splenic mass or splenectomy to help clarify diagnosis and prognosis. HCP wishes to defer at this point.   - Follow up with Hematology / Oncology    Thigh hematomas  On CT, suggestion of acute-appearing intramuscular and subcutaneous hematomas within the proximal L thigh. Indeterminate but chronic appearing mixed attenuation lesion along the lateral margin of the R hamstring musculature within the mid R thigh, possibly related to a subacute to chronic hematoma.   - Recommended short-term interval follow-up MRI of bilateral thighs with and without contrast in 8-12 weeks to demonstrate resolution/improvement of these findings and to rule out an underlying mass/neoplasm.    Deconditioning and debility  PT consulted.   - F/u PT eval    Dispo: rehab tomorrow if hematuria resolves  Sister Rosalinda updated, please update daily

## 2024-07-08 NOTE — PROGRESS NOTE ADULT - SUBJECTIVE AND OBJECTIVE BOX
Pt seen, had hematuria yest but now resolving. Feeling well. No complaint    MEDICATIONS  (STANDING):  aMIOdarone    Tablet 200 milliGRAM(s) Oral daily  apixaban 5 milliGRAM(s) Oral two times a day  atorvastatin 80 milliGRAM(s) Oral at bedtime  clopidogrel Tablet 75 milliGRAM(s) Oral daily  diltiazem    Tablet 90 milliGRAM(s) Oral every 6 hours  ezetimibe 10 milliGRAM(s) Oral daily  melatonin 3 milliGRAM(s) Oral at bedtime    MEDICATIONS  (PRN):  acetaminophen     Tablet .. 650 milliGRAM(s) Oral every 6 hours PRN Mild Pain (1 - 3)  metoprolol tartrate Injectable 5 milliGRAM(s) IV Push every 6 hours PRN HR > 130  ondansetron Injectable 4 milliGRAM(s) IV Push every 6 hours PRN Nausea and/or Vomiting  traMADol 25 milliGRAM(s) Oral every 8 hours PRN Moderate Pain (4 - 6)      ROS  No fever, sweats, chills  No epistaxis, HA, sore throat      Vital Signs Last 24 Hrs  T(C): 36.3 (08 Jul 2024 15:32), Max: 37.1 (08 Jul 2024 08:00)  T(F): 97.4 (08 Jul 2024 15:32), Max: 98.8 (08 Jul 2024 08:00)  HR: 69 (08 Jul 2024 15:32) (69 - 76)  BP: 158/49 (08 Jul 2024 15:32) (95/93 - 158/49)  BP(mean): --  RR: 18 (08 Jul 2024 15:32) (17 - 18)  SpO2: 97% (08 Jul 2024 15:32) (92% - 98%)    Parameters below as of 08 Jul 2024 15:32  Patient On (Oxygen Delivery Method): room air        PE  NAD  Awake, alert    No c/c/e  No rash grossly  FROM                          8.9    12.41 )-----------( 316      ( 07 Jul 2024 21:34 )             27.6

## 2024-07-08 NOTE — PROGRESS NOTE ADULT - SUBJECTIVE AND OBJECTIVE BOX
HOSPITALIST PROGRESS NOTE    24: No acute events overnight. Tolerated AVJ ablation with leadless PPM implantation yesterday. No acute complaints. R arm inspected, swelling improving. Scattered bruising stable. Remains neurovascularly intact in that extremity. Now awaiting PPM check. Discharge / placement.    24: Patient seen and examined. No new issues.   24: No more bradycardia. Discussed with sister yesterday regarding plan of care  24: Patient seen and examined. No new issues.   24: New hematuria overnight. Patient denies any symptoms. U/A, culture ordered. Sister updated at bedside    ROS: All 10 systems reviewed and found to be negative with the exception of what has been described above.     VITAL SIGNS:  Vital Signs Last 24 Hrs  T(C): 36.3 (2024 15:32), Max: 37.1 (2024 08:00)  T(F): 97.4 (2024 15:32), Max: 98.8 (2024 08:00)  HR: 69 (2024 15:32) (69 - 77)  BP: 158/49 (2024 15:32) (95/93 - 158/49)  BP(mean): 72 (2024 18:20) (72 - 72)  RR: 18 (2024 15:32) (17 - 18)  SpO2: 97% (2024 15:32) (92% - 98%)    Parameters below as of 2024 15:32  Patient On (Oxygen Delivery Method): room air    PHYSICAL EXAM:  Gen: Comfortable  HEENT: NCAT PERRL EOMI MMM clear oropharynx  Neck: Supple, no LAD  CVS: s1 s2 normal, regular, rate  Chest: Normal resp effort at rest, lungs CTA B/L  Abd: +BS, soft, NT ND   Ext: RUE with mild swelling, no tenderness, scattered bruises, no longer with active bleeding from old IV site. R hand sensation and motor function intact. Normal peripheral pulses.   Skin: Warm, dry  Neuro: Awake and alert, answers simple questions, follows simple commands    MEDICATIONS:  MEDICATIONS  (STANDING):  aMIOdarone    Tablet 200 milliGRAM(s) Oral daily  apixaban 5 milliGRAM(s) Oral two times a day  atorvastatin 80 milliGRAM(s) Oral at bedtime  clopidogrel Tablet 75 milliGRAM(s) Oral daily  diltiazem    Tablet 90 milliGRAM(s) Oral every 6 hours  ezetimibe 10 milliGRAM(s) Oral daily  melatonin 3 milliGRAM(s) Oral at bedtime    MEDICATIONS  (PRN):  acetaminophen     Tablet .. 650 milliGRAM(s) Oral every 6 hours PRN Mild Pain (1 - 3)  metoprolol tartrate Injectable 5 milliGRAM(s) IV Push every 6 hours PRN HR > 130  ondansetron Injectable 4 milliGRAM(s) IV Push every 6 hours PRN Nausea and/or Vomiting  traMADol 25 milliGRAM(s) Oral every 8 hours PRN Moderate Pain (4 - 6)      ALLERGIES:  Allergies    Bee stings (Swelling)  No Known Drug Allergies    Intolerances        LABS:                        8.9    12.41 )-----------( 316      ( 2024 21:34 )             27.6             Urinalysis Basic - ( 2024 18:50 )    Color: Red / Appearance: Turbid / S.020 / pH: x  Gluc: x / Ketone: Negative mg/dL  / Bili: Negative / Urobili: 0.2 mg/dL   Blood: x / Protein: Trace mg/dL / Nitrite: Positive   Leuk Esterase: Large / RBC: Too Numerous to count /HPF / WBC 6 /HPF   Sq Epi: x / Non Sq Epi: x / Bacteria: Moderate /HPF      CAPILLARY BLOOD GLUCOSE        Blood, Urine: Trace ( @ 18:50)      RADIOLOGY & ADDITIONAL TESTS: Reviewed.

## 2024-07-08 NOTE — PROGRESS NOTE ADULT - ASSESSMENT
69 year old female with prior uterine and ovarian cancer without evidence of disease recurrence  Recent imaging reveals splenic mass : etiology not clear  She is admitted with embolic CVA   Bleeding from anticoagulation  She has an arrythmia and now post pacemaker    In terms of the splenic mass, etiology is unclear  There are no other sites of metastases    Would defer DUFFY of splenic mass till cardiac/ neurologic/ hematologic status have stabilized  Family wishes to defer at this time    will cont to follow as needed    Lamonte Turcios MD  [facility]  Cell: [phone]

## 2024-07-09 LAB
ANION GAP SERPL CALC-SCNC: 6 MMOL/L — SIGNIFICANT CHANGE UP (ref 5–17)
BUN SERPL-MCNC: 25 MG/DL — HIGH (ref 7–23)
CALCIUM SERPL-MCNC: 8.8 MG/DL — SIGNIFICANT CHANGE UP (ref 8.5–10.1)
CHLORIDE SERPL-SCNC: 112 MMOL/L — HIGH (ref 96–108)
CO2 SERPL-SCNC: 24 MMOL/L — SIGNIFICANT CHANGE UP (ref 22–31)
CREAT SERPL-MCNC: 1.15 MG/DL — SIGNIFICANT CHANGE UP (ref 0.5–1.3)
EGFR: 51 ML/MIN/1.73M2 — LOW
GLUCOSE SERPL-MCNC: 111 MG/DL — HIGH (ref 70–99)
HCT VFR BLD CALC: 26.4 % — LOW (ref 34.5–45)
HGB BLD-MCNC: 8.5 G/DL — LOW (ref 11.5–15.5)
MCHC RBC-ENTMCNC: 29.6 PG — SIGNIFICANT CHANGE UP (ref 27–34)
MCHC RBC-ENTMCNC: 32.2 GM/DL — SIGNIFICANT CHANGE UP (ref 32–36)
MCV RBC AUTO: 92 FL — SIGNIFICANT CHANGE UP (ref 80–100)
PLATELET # BLD AUTO: 306 K/UL — SIGNIFICANT CHANGE UP (ref 150–400)
POTASSIUM SERPL-MCNC: 4.5 MMOL/L — SIGNIFICANT CHANGE UP (ref 3.5–5.3)
POTASSIUM SERPL-SCNC: 4.5 MMOL/L — SIGNIFICANT CHANGE UP (ref 3.5–5.3)
RBC # BLD: 2.87 M/UL — LOW (ref 3.8–5.2)
RBC # FLD: 15.3 % — HIGH (ref 10.3–14.5)
SODIUM SERPL-SCNC: 142 MMOL/L — SIGNIFICANT CHANGE UP (ref 135–145)
WBC # BLD: 10.19 K/UL — SIGNIFICANT CHANGE UP (ref 3.8–10.5)
WBC # FLD AUTO: 10.19 K/UL — SIGNIFICANT CHANGE UP (ref 3.8–10.5)

## 2024-07-09 PROCEDURE — 99221 1ST HOSP IP/OBS SF/LOW 40: CPT

## 2024-07-09 PROCEDURE — 99232 SBSQ HOSP IP/OBS MODERATE 35: CPT

## 2024-07-09 RX ADMIN — AMIODARONE HYDROCHLORIDE 200 MILLIGRAM(S): 50 INJECTION, SOLUTION INTRAVENOUS at 09:46

## 2024-07-09 RX ADMIN — DILTIAZEM HYDROCHLORIDE 90 MILLIGRAM(S): 240 CAPSULE, EXTENDED RELEASE ORAL at 05:52

## 2024-07-09 RX ADMIN — APIXABAN 5 MILLIGRAM(S): 5 TABLET, FILM COATED ORAL at 17:09

## 2024-07-09 RX ADMIN — DILTIAZEM HYDROCHLORIDE 90 MILLIGRAM(S): 240 CAPSULE, EXTENDED RELEASE ORAL at 00:41

## 2024-07-09 RX ADMIN — ATORVASTATIN CALCIUM 80 MILLIGRAM(S): 20 TABLET, FILM COATED ORAL at 22:59

## 2024-07-09 RX ADMIN — CLOPIDOGREL BISULFATE 75 MILLIGRAM(S): 75 TABLET, FILM COATED ORAL at 09:47

## 2024-07-09 RX ADMIN — APIXABAN 5 MILLIGRAM(S): 5 TABLET, FILM COATED ORAL at 05:53

## 2024-07-09 RX ADMIN — EZETIMIBE 10 MILLIGRAM(S): 10 TABLET ORAL at 09:47

## 2024-07-09 RX ADMIN — Medication 3 MILLIGRAM(S): at 22:58

## 2024-07-09 RX ADMIN — DILTIAZEM HYDROCHLORIDE 90 MILLIGRAM(S): 240 CAPSULE, EXTENDED RELEASE ORAL at 12:45

## 2024-07-09 RX ADMIN — DILTIAZEM HYDROCHLORIDE 90 MILLIGRAM(S): 240 CAPSULE, EXTENDED RELEASE ORAL at 23:33

## 2024-07-09 RX ADMIN — DILTIAZEM HYDROCHLORIDE 90 MILLIGRAM(S): 240 CAPSULE, EXTENDED RELEASE ORAL at 17:09

## 2024-07-09 RX ADMIN — TRAMADOL HYDROCHLORIDE 25 MILLIGRAM(S): 50 TABLET, FILM COATED ORAL at 09:46

## 2024-07-09 NOTE — OCCUPATIONAL THERAPY INITIAL EVALUATION ADULT - PERTINENT HX OF CURRENT PROBLEM, REHAB EVAL
Per EMR pt is a 68 y/o female with PMHx of learning disability, JUSTINO on CKD, DM II, HTN, A-fib s/p DCCV with h/o poor response to medications, also has ovarian and uterine CA, recent diagnosis of Splenic CA, moderate stenosis and DDD, who presents as a code stroke due to sudden onset worsening of left sided weakness of LLE and LUE during PT also associated with slurred speech which has since resolved. MRI +Acute Rt infarct, subacute left infarcts.     -CT BRAIN: Compared with 4/19/2024. No evidence of acute hemorrhage, extra-axial collection or mass effect. Multiple chronic right frontal infarcts as well as findings suspicious for acute ischemia right superior frontal gyrus, as above.  -CT PERFUSION: No predicted core infarct. Evidence of active ischemia-tissue at risk RIGHT posterior frontal-parietal region.  -CTA HEAD/NECK: Bilateral vertebral arteries patent. No significant stenosis, occlusion or dissection bilateral extracranial carotid arteries. Severe focal stenosis right P3 segment origin. Moderate stenosis distal cavernous and supraclinoid segments right internal carotid artery. 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 Focal occlusion with reconstitution left proximal A2 segment centered 1-2 cm distal to its origin. Additional findings, as above, including an approximate 6 cm left thyroid mass, resulting in regional mass effect. Recommend nonemergent thyroid ultrasound.  -CTAP/CTA CHEST: No acute pulmonary embolism to the lobar level. Limited assessment of segmental and subsegmental branches. Cardiomegaly with mild perihilar groundglass opacities which may represent mild pulmonary edema or an infectious process. Nodular masslike appearance of the spleen. Follow-up MRI of the abdomen with and without contrast recommended. There is a small ill-defined intramuscular hematoma in the left upper thigh medial compartment.  -MRI HEAD: Right-sided acute infarcts as described above. Small subacute infarcts in the medial left frontal lobe. No acute intracranial hemorrhage
see IE. 69-year-old female with DM, HTN, afib, CKD, hx of multiple cancer diagnosis, adm back on 6/20/24 for neurological changes, found to have an acute CVA- Acute infarcts in the R hemisphere and possibly L, likely embolic. also found to have acute anemia likely related to hematoma-CT s/o acute-appearing IM and subcutaneous hematomas within the proximal L thigh, Indeterminate but chronic appearing mixed attenuation lesion along the lateral margin of the R hamstring musculature within the mid R thigh, possibly related to a subacute to chronic hematoma. status post RBC transfusion, Hgb today 8.7. Issue w/ RUE bleeding- In setting of supratherapeutic PTT / heparin drip. seen by surgery-pressure dsg applied, bleeding appeared to have resolved. PTT no longer critically supratherapeutic. this adm Pt found w/ splenic mass concerning for malignancy. Course c/b  persisting atrial fibrillation that has been difficult to control at times, required IV cardizem this adm.  EP rec. AVJ ablation and PPM which was done 7/3.

## 2024-07-09 NOTE — CONSULT NOTE ADULT - SUBJECTIVE AND OBJECTIVE BOX
HPI:  "70 yo female with hx of learning disability, JUSTINO on CKD, DM II, HTN, A-fib s/p DCCV with h/o poor response to medications, also has ovarian and uterine CA, recent diagnosis of Splenic CA, CT Lumbar shows moderate stenosis and DDD, recent UTI, who presents as a code stroke due to sudden onset worsening of left sided weakness of LLE and LUE during PT also associated with slurred speech which has sinced resolved. Arrived with NIHSS of 8 with unknown LKW. Pt is a poor historian. History taken from EMR, ED MD and pt sister at bedside.  (20 Jun 2024 21:01)"    70 yo female with PMH as as above admitted for anemia, afib RVR. Patient seen at bedside reports she had an episode of hematuria which was self resolving and is no longer having hematuria. Denies any pain or clots. Denies any abd/flank pain, fevers, chills, hx of gross hematuria, hx ot stones or recurrent UTI, denies any urologic hx, denies smoking hx. Notes hip and thigh pain on the left.   PAST MEDICAL & SURGICAL HISTORY:  HTN (hypertension)      Hyperlipidemia      DM (diabetes mellitus)      Arthritis      Ovarian cancer  and uterine cancer--2004 surgery & chemo      Obesity, morbid, BMI 40.0-49.9      Uterine cancer      Splenic cancer      H/O umbilical hernia repair  2005      S/P total hysterectomy  bilateral salpingectomy-oophorectomy --2004 ovarian & uterine cancer      H/O total knee replacement, right          REVIEW OF SYSTEMS     All other review of systems neg, except as noted in HPI  MEDICATIONS  (STANDING):  aMIOdarone    Tablet 200 milliGRAM(s) Oral daily  apixaban 5 milliGRAM(s) Oral two times a day  atorvastatin 80 milliGRAM(s) Oral at bedtime  clopidogrel Tablet 75 milliGRAM(s) Oral daily  diltiazem    Tablet 90 milliGRAM(s) Oral every 6 hours  ezetimibe 10 milliGRAM(s) Oral daily  melatonin 3 milliGRAM(s) Oral at bedtime    MEDICATIONS  (PRN):  acetaminophen     Tablet .. 650 milliGRAM(s) Oral every 6 hours PRN Mild Pain (1 - 3)  metoprolol tartrate Injectable 5 milliGRAM(s) IV Push every 6 hours PRN HR > 130  ondansetron Injectable 4 milliGRAM(s) IV Push every 6 hours PRN Nausea and/or Vomiting  traMADol 25 milliGRAM(s) Oral every 8 hours PRN Moderate Pain (4 - 6)      Allergies    Bee stings (Swelling)  No Known Drug Allergies    Intolerances        SOCIAL HISTORY:    FAMILY HISTORY:  Patient unable to provide medical history        Vital Signs Last 24 Hrs  T(C): 36.2 (09 Jul 2024 07:56), Max: 37 (08 Jul 2024 23:15)  T(F): 97.2 (09 Jul 2024 07:56), Max: 98.6 (08 Jul 2024 23:15)  HR: 78 (09 Jul 2024 07:56) (69 - 78)  BP: 155/55 (09 Jul 2024 07:56) (121/52 - 158/49)  BP(mean): 80 (09 Jul 2024 07:56) (80 - 80)  RR: 18 (09 Jul 2024 07:56) (17 - 18)  SpO2: 97% (09 Jul 2024 07:56) (97% - 100%)    Parameters below as of 09 Jul 2024 07:56  Patient On (Oxygen Delivery Method): room air        PHYSICAL EXAM:     General: No distress, No anxiety  VITALS  T(C): 36.2 (07-09-24 @ 07:56), Max: 37 (07-08-24 @ 23:15)  HR: 78 (07-09-24 @ 07:56) (69 - 78)  BP: 155/55 (07-09-24 @ 07:56) (121/52 - 158/49)  RR: 18 (07-09-24 @ 07:56) (17 - 18)  SpO2: 97% (07-09-24 @ 07:56) (97% - 100%)            Skin     : No jaundice   HEENT: Normocephalic, no icterus , EOM full , No epistaxis  Lung    : No resp distress  Abdo:   : Soft, Non tender, No guarding, No distension   Back    : No CVAT b/l   Genitalia Female: No Marie  Neuro   : A&Ox3          LABS:                        8.5    10.19 )-----------( 306      ( 09 Jul 2024 09:19 )             26.4     07-09    142  |  112<H>  |  25<H>  ----------------------------<  111<H>  4.5   |  24  |  1.15    Ca    8.8      09 Jul 2024 09:19        Urinalysis Basic - ( 09 Jul 2024 09:19 )    Color: x / Appearance: x / SG: x / pH: x  Gluc: 111 mg/dL / Ketone: x  / Bili: x / Urobili: x   Blood: x / Protein: x / Nitrite: x   Leuk Esterase: x / RBC: x / WBC x   Sq Epi: x / Non Sq Epi: x / Bacteria: x        RADIOLOGY & ADDITIONAL STUDIES:< from: CT Abdomen and Pelvis w/ IV Cont (06.20.24 @ 19:14) >  ACC: [id number] EXAM:  CT ABDOMEN AND PELVIS IC   ORDERED BY: MAURO AVALOS     ACC: [id number] EXAM:  CT ANGIO CHEST PULM ART WAWIC   ORDERED BY: MARUO AVALOS     PROCEDURE DATE:  06/20/2024          INTERPRETATION:  CLINICAL INFORMATION: A. Fib, anemia    COMPARISON: None.    CONTRAST/COMPLICATIONS:  IV Contrast: Omnipaque 350 (accession [id number]), IV contrast documented   in unlinked concurrent exam (accession [id number])  75 cc administered   0   cc discarded  Oral Contrast: NONE  Complications:None reported at time of study completion    PROCEDURE:  CT Angiography of the Chest was performed followed by portal venous phase   imaging of the Abdomen and Pelvis.  Sagittal and coronal reformats were performed as well as 3D (MIP)   reconstructions.    FINDINGS:  CHEST:  LUNGS AND LARGE AIRWAYS: Patent central airways. There is some faint   perihilar groundglass opacities and right basilar atelectasis.  PLEURA: No pleural effusion.  VESSELS: Good contrast bolus in the pulmonary arteries and aorta. No   acute pulmonary embolism to the lobar level. Limited assessment of   segmental and subsegmental branches due to respiratory motion artifact.  HEART: Cardiomegaly. No pericardial effusion.  MEDIASTINUM AND JOHN: No lymphadenopathy.  CHEST WALL AND LOWER NECK: Left thyroid nodular goiter with substernal   extension    ABDOMEN AND PELVIS:  LIVER: Within normal limits.  BILE DUCTS: Normal caliber.  GALLBLADDER: Cholelithiasis.  SPLEEN: Limited assessment is extensive streak artifact. Questionable   nodular contour. There is a rounded mass like density along the spleen   anteriorly (series 3-33)  PANCREAS: Within normal limits.  ADRENALS: Within normal limits.  KIDNEYS/URETERS: Within normal limits. Few small renal cysts.    BLADDER: Within normal limits.  REPRODUCTIVE ORGANS:    BOWEL: No bowel obstruction. Appendix is normal.  PERITONEUM/RETROPERITONEUM: Within normal limits.  VESSELS: Within normal limits.  LYMPH NODES: No lymphadenopathy.  ABDOMINAL WALL: Diastases of the rectus abdominis. Midline surgical scar.   There is a left upper thigh hematoma in the medial compartment,   ill-defined but measuring up to 5 cm.  BONES: Degenerative changes.    IMPRESSION:    No acute pulmonary embolism to the lobar level. Limited assessment of   segmental and subsegmental branches. Cardiomegaly with mild perihilar   groundglass opacities which may represent mild pulmonary edema or an   infectious process.    Nodular masslike appearance of the spleen. Follow-up MRI of the abdomen   with and without contrast recommended.    There is a small ill-defined intramuscular hematoma in the left upper   thigh medial compartment.        --- End of Report ---            BALTAZAR SUTHERLAND MD; Attending Radiologist  This document has been electronically signed. Jun 20 2024  8:11PM    < end of copied text >

## 2024-07-09 NOTE — PROGRESS NOTE ADULT - SUBJECTIVE AND OBJECTIVE BOX
HOSPITALIST PROGRESS NOTE    24: No acute events overnight. Tolerated AVJ ablation with leadless PPM implantation yesterday. No acute complaints. R arm inspected, swelling improving. Scattered bruising stable. Remains neurovascularly intact in that extremity. Now awaiting PPM check. Discharge / placement.    24: Patient seen and examined. No new issues.   24: No more bradycardia. Discussed with sister yesterday regarding plan of care  24: Patient seen and examined. No new issues.   24: New hematuria overnight. Patient denies any symptoms. U/A, culture ordered. Sister updated at bedside  : hematuria resolved  U cx awaited    ROS: All 10 systems reviewed and found to be negative with the exception of what has been described above.     VITAL SIGNS:    Vital Signs Last 24 Hrs  T(C): 36.2 (2024 07:56), Max: 37 (2024 23:15)  T(F): 97.2 (2024 07:56), Max: 98.6 (2024 23:15)  HR: 78 (2024 07:56) (69 - 78)  BP: 155/55 (2024 07:56) (121/52 - 158/49)  BP(mean): 80 (2024 07:56) (80 - 80)  RR: 18 (2024 07:56) (17 - 18)  SpO2: 97% (2024 07:56) (97% - 100%)    Parameters below as of 2024 07:56  Patient On (Oxygen Delivery Method): room air      PHYSICAL EXAM:  Gen: Comfortable  HEENT: NCAT PERRL EOMI MMM clear oropharynx  Neck: Supple, no LAD  CVS: s1 s2 normal, regular, rate  Chest: Normal resp effort at rest, lungs CTA B/L  Abd: +BS, soft, NT ND   Ext: RUE with mild swelling, no tenderness, scattered bruises, no longer with active bleeding from old IV site. R hand sensation and motor function intact. Normal peripheral pulses.   Skin: Warm, dry  Neuro: Awake and alert, answers simple questions, follows simple commands    MEDICATIONS:  MEDICATIONS  (STANDING):  aMIOdarone    Tablet 200 milliGRAM(s) Oral daily  apixaban 5 milliGRAM(s) Oral two times a day  atorvastatin 80 milliGRAM(s) Oral at bedtime  clopidogrel Tablet 75 milliGRAM(s) Oral daily  diltiazem    Tablet 90 milliGRAM(s) Oral every 6 hours  ezetimibe 10 milliGRAM(s) Oral daily  melatonin 3 milliGRAM(s) Oral at bedtime    MEDICATIONS  (PRN):  acetaminophen     Tablet .. 650 milliGRAM(s) Oral every 6 hours PRN Mild Pain (1 - 3)  metoprolol tartrate Injectable 5 milliGRAM(s) IV Push every 6 hours PRN HR > 130  ondansetron Injectable 4 milliGRAM(s) IV Push every 6 hours PRN Nausea and/or Vomiting  traMADol 25 milliGRAM(s) Oral every 8 hours PRN Moderate Pain (4 - 6)      ALLERGIES:  Allergies    Bee stings (Swelling)  No Known Drug Allergies    Intolerances        LABS:             All Labs/EKG/Radiology/Meds reviewed    Lab Results:  CBC  CBC Full  -  ( 2024 09:19 )  WBC Count : 10.19 K/uL  RBC Count : 2.87 M/uL  Hemoglobin : 8.5 g/dL  Hematocrit : 26.4 %  Platelet Count - Automated : 306 K/uL  Mean Cell Volume : 92.0 fl  Mean Cell Hemoglobin : 29.6 pg  Mean Cell Hemoglobin Concentration : 32.2 gm/dL  Auto Neutrophil # : x  Auto Lymphocyte # : x  Auto Monocyte # : x  Auto Eosinophil # : x  Auto Basophil # : x  Auto Neutrophil % : x  Auto Lymphocyte % : x  Auto Monocyte % : x  Auto Eosinophil % : x  Auto Basophil % : x    .		Differential:	[] Automated		[] Manual  Chemistry      142  |  112<H>  |  25<H>  ----------------------------<  111<H>  4.5   |  24  |  1.15    Ca    8.8      2024 09:19          Urinalysis Basic - ( 2024 09:19 )    Color: x / Appearance: x / SG: x / pH: x  Gluc: 111 mg/dL / Ketone: x  / Bili: x / Urobili: x   Blood: x / Protein: x / Nitrite: x   Leuk Esterase: x / RBC: x / WBC x   Sq Epi: x / Non Sq Epi: x / Bacteria: x        MICROBIOLOGY/CULTURES:                 8.9    12.41 )-----------( 316      ( 2024 21:34 )             27.6             Urinalysis Basic - ( 2024 18:50 )    Color: Red / Appearance: Turbid / S.020 / pH: x  Gluc: x / Ketone: Negative mg/dL  / Bili: Negative / Urobili: 0.2 mg/dL   Blood: x / Protein: Trace mg/dL / Nitrite: Positive   Leuk Esterase: Large / RBC: Too Numerous to count /HPF / WBC 6 /HPF   Sq Epi: x / Non Sq Epi: x / Bacteria: Moderate /HPF      CAPILLARY BLOOD GLUCOSE        Blood, Urine: Trace ( @ 18:50)      RADIOLOGY & ADDITIONAL TESTS: Reviewed.

## 2024-07-09 NOTE — OCCUPATIONAL THERAPY INITIAL EVALUATION ADULT - PRECAUTIONS/LIMITATIONS, REHAB EVAL
diet: DASH/TLC, elevate HOB 30 degrees, supervise meals/aspiration precautions/cardiac precautions/fall precautions/seizure precautions
standard, fall, PPM

## 2024-07-09 NOTE — OCCUPATIONAL THERAPY INITIAL EVALUATION ADULT - NSACTIVITYREC_GEN_A_OT
Pt presents with impaired balance, endurance and muscle strength that will benefit from skilled OT to improve independence in ADLs, reduce fall risk and chance for readmission.
Pt re-evaluated s/p PPM placement. Pt presents with impaired balance, endurance and muscle strength that will benefit from skilled OT to improve independence in ADLs, reduce fall risk and chance for readmission

## 2024-07-09 NOTE — PROGRESS NOTE ADULT - ASSESSMENT
69 year old female with prior uterine and ovarian cancer without evidence of disease recurrence  Recent imaging reveals splenic mass : etiology not clear  She is admitted with embolic CVA   Bleeding from anticoagulation  She has an arrythmia and now post pacemaker placement    In terms of the splenic mass, etiology is unclear  There are no other sites of metastases    Would defer DUFFY of splenic mass till cardiac/ neurologic/ hematologic status have stabilized  Family wishes to defer at this time    Pt to undergo work up for hematuria including cystoscopy in the next few days    will cont to follow as needed    Lamonte Turcios MD  [facility]  Cell: [phone]

## 2024-07-09 NOTE — OCCUPATIONAL THERAPY INITIAL EVALUATION ADULT - MANUAL MUSCLE TESTING RESULTS, REHAB EVAL
RUE: WFL grossly 3+/5, LUE: impaired, hand 2-/5, pt with difficulty following/participating in MMT on LUE to accurately assess
RUE WFL, LUE flaccid except for hand functional gross grasp

## 2024-07-09 NOTE — PROGRESS NOTE ADULT - ASSESSMENT
69-year-old female with DM, HTN, afib, CKD, hx of multiple cancer diagnosis, admitted back on 6/20/24 for neurological changes, found to have an acute CVA, also found to have acute anemia likely related to hematoma status post RBC transfusion, splenic mass concerning for malignancy, and persisting atrial fibrillation that has been difficult to control at times. Admitted to Medicine.        Hematuria  -New hematuria overnight and present today. Unclear etiology. U/A noted - no significant pyuria. Asymptomatic.  Patient with hx of UTI - treated with Ceftriaxone earlier this admission  -Urology consult placed  -Monitor hematuria; resolved  -Monitor off abx. Sent culture   U cx awaited    Acute infarcts in the R hemisphere and possible L.    Appreciate input from Neurology. Likely embolic in setting of afib. Now s/p AVJ ablation and Micra leadless pacemaker insertion. Back on Eliquis.  - Continue Plavix and Eliquis, atorvastatin at bedtime.     Persistent Afib with difficult to control rates  Patient failed rhythm control. Was unable to control rates either. Patient is now s/p AVJ ablation with leadless PPM implant.   - Continue Cardizem 90mg PO q6h, Amiodarone 200mg daily  - Continue Eliquis  - Cardiac EP follow up 7/8/24 at 14:30 in EP clinic for wound check    RUE bleeding  Resolved. Developed in setting of supratherapeutic PTT / heparin drip. Appreciate input from Surgery who assisted with pressure dressing. Heparin drip was held and then restarted at lower rate. Bleeding resolved. Unclear whether she may have an additional component causing the issue, such as an acquired factor inhibitor in setting of possible cancer diagnosis. Discussed with Hematology Oncology Dr Turcios who will assist with workup.   - Continue to monitor    Acute on chronic anemia  Likely multi factorial. Concern for a possible underlying malignancy, splenic mass, etc. Leg hematoma that was noted upon admission. And then RUE bleeding a few days ago, since resolved. S/p PRBC transfusion in the setting of that bleeding.   - Continue to monitor    JUSTINO on CKD  Appreciate input from Nephrology. JUSTINO/KHAI. Fluid balance stable and electrolytes stable. Cr now improved.    Cr 1.3 today  - Continue to monitor    Splenic mass  Appreciate Hematology Oncology input. Possibly consistent with malignancy however no tissue ever obtained. There was outpatient plan for splenectomy. Oncology noted that consideration can be given to IR guided bx of splenic mass or splenectomy to help clarify diagnosis and prognosis. HCP wishes to defer at this point.   - Follow up with Hematology / Oncology    Thigh hematomas  On CT, suggestion of acute-appearing intramuscular and subcutaneous hematomas within the proximal L thigh. Indeterminate but chronic appearing mixed attenuation lesion along the lateral margin of the R hamstring musculature within the mid R thigh, possibly related to a subacute to chronic hematoma.   - Recommended short-term interval follow-up MRI of bilateral thighs with and without contrast in 8-12 weeks to demonstrate resolution/improvement of these findings and to rule out an underlying mass/neoplasm.    Deconditioning and debility  PT consulted.   - F/u PT eval    Dispo: rehab after urine cx results    Sister Rosalinda updated, please update daily   69-year-old female with DM, HTN, afib, CKD, hx of multiple cancer diagnosis, admitted back on 6/20/24 for neurological changes, found to have an acute CVA, also found to have acute anemia likely related to hematoma status post RBC transfusion, splenic mass concerning for malignancy, and persisting atrial fibrillation that has been difficult to control at times. Admitted to Medicine.        Hematuria  -New hematuria overnight and present today. Unclear etiology. U/A noted - no significant pyuria. Asymptomatic.  Patient with hx of UTI - treated with Ceftriaxone earlier this admission  -Urology consult placed  -Monitor hematuria; resolved  -Monitor off abx. Sent culture     Recurrent UTI: U cx awaited  pt's sister , Rosalinda, would like the Cysto and other urological procedures to be done  as in pt while she is here as the pt is bed bound    Acute infarcts in the R hemisphere and possible L.    Appreciate input from Neurology. Likely embolic in setting of afib. Now s/p AVJ ablation and Micra leadless pacemaker insertion. Back on Eliquis.  - Continue Plavix and Eliquis, atorvastatin at bedtime.     Persistent Afib with difficult to control rates  Patient failed rhythm control. Was unable to control rates either. Patient is now s/p AVJ ablation with leadless PPM implant.   - Continue Cardizem 90mg PO q6h, Amiodarone 200mg daily  - Continue Eliquis  - Cardiac EP follow up 7/8/24 at 14:30 in EP clinic for wound check    RUE bleeding  Resolved. Developed in setting of supratherapeutic PTT / heparin drip. Appreciate input from Surgery who assisted with pressure dressing. Heparin drip was held and then restarted at lower rate. Bleeding resolved. Unclear whether she may have an additional component causing the issue, such as an acquired factor inhibitor in setting of possible cancer diagnosis. Discussed with Hematology Oncology Dr Turcios who will assist with workup.   - Continue to monitor    Acute on chronic anemia  Likely multi factorial. Concern for a possible underlying malignancy, splenic mass, etc. Leg hematoma that was noted upon admission. And then RUE bleeding a few days ago, since resolved. S/p PRBC transfusion in the setting of that bleeding.   - Continue to monitor    JUSTINO on CKD  Appreciate input from Nephrology. JUSTINO/KHAI. Fluid balance stable and electrolytes stable. Cr now improved.    Cr 1.3 today  - Continue to monitor    Splenic mass  Appreciate Hematology Oncology input. Possibly consistent with malignancy however no tissue ever obtained. There was outpatient plan for splenectomy. Oncology noted that consideration can be given to IR guided bx of splenic mass or splenectomy to help clarify diagnosis and prognosis. HCP wishes to defer at this point.   - Follow up with Hematology / Oncology    Thigh hematomas  On CT, suggestion of acute-appearing intramuscular and subcutaneous hematomas within the proximal L thigh. Indeterminate but chronic appearing mixed attenuation lesion along the lateral margin of the R hamstring musculature within the mid R thigh, possibly related to a subacute to chronic hematoma.   - Recommended short-term interval follow-up MRI of bilateral thighs with and without contrast in 8-12 weeks to demonstrate resolution/improvement of these findings and to rule out an underlying mass/neoplasm.    Deconditioning and debility  PT consulted.   - F/u PT eval    Dispo: rehab after urine cx results    Sister Rosalinda updated, please update daily   69-year-old female with DM, HTN, afib, CKD, hx of multiple cancer diagnosis, admitted back on 6/20/24 for neurological changes, found to have an acute CVA, also found to have acute anemia likely related to hematoma status post RBC transfusion, splenic mass concerning for malignancy, and persisting atrial fibrillation that has been difficult to control at times. Admitted to Medicine.        Hematuria  -New hematuria overnight and present today. Unclear etiology. U/A noted - no significant pyuria. Asymptomatic.  Patient with hx of UTI - treated with Ceftriaxone earlier this admission  -Urology consult placed  -Monitor hematuria; resolved  -Monitor off abx. Sent culture     Recurrent UTI+ Hematuria:   U cx from 7/8 awaited  pt's sister , Rosalinda, would like the Cysto and other urological procedures to be done  as in pt while she is here as the pt is bed bound  Spoke with Urology team; Dr. Acosta to do bedside Cystoscopy on 7/10 or 7/11    Acute infarcts in the R hemisphere and possible L.    Appreciate input from Neurology. Likely embolic in setting of afib. Now s/p AVJ ablation and Micra leadless pacemaker insertion. Back on Eliquis.  - Continue Plavix and Eliquis, atorvastatin at bedtime.     Persistent Afib with difficult to control rates  Patient failed rhythm control. Was unable to control rates either. Patient is now s/p AVJ ablation with leadless PPM implant.   - Continue Cardizem 90mg PO q6h, Amiodarone 200mg daily  - Continue Eliquis  - Cardiac EP follow up 7/8/24 at 14:30 in EP clinic for wound check    RUE bleeding  Resolved. Developed in setting of supratherapeutic PTT / heparin drip. Appreciate input from Surgery who assisted with pressure dressing. Heparin drip was held and then restarted at lower rate. Bleeding resolved. Unclear whether she may have an additional component causing the issue, such as an acquired factor inhibitor in setting of possible cancer diagnosis. Discussed with Hematology Oncology Dr Turcios who will assist with workup.   - Continue to monitor    Acute on chronic anemia  Likely multi factorial. Concern for a possible underlying malignancy, splenic mass, etc. Leg hematoma that was noted upon admission. And then RUE bleeding a few days ago, since resolved. S/p PRBC transfusion in the setting of that bleeding.   - Continue to monitor    JUSTINO on CKD  Appreciate input from Nephrology. JUSTINO/KHAI. Fluid balance stable and electrolytes stable. Cr now improved.    Cr 1.3 today  - Continue to monitor    Splenic mass  Appreciate Hematology Oncology input. Possibly consistent with malignancy however no tissue ever obtained. There was outpatient plan for splenectomy. Oncology noted that consideration can be given to IR guided bx of splenic mass or splenectomy to help clarify diagnosis and prognosis. HCP wishes to defer at this point.   - Follow up with Hematology / Oncology    Thigh hematomas  On CT, suggestion of acute-appearing intramuscular and subcutaneous hematomas within the proximal L thigh. Indeterminate but chronic appearing mixed attenuation lesion along the lateral margin of the R hamstring musculature within the mid R thigh, possibly related to a subacute to chronic hematoma.   - Recommended short-term interval follow-up MRI of bilateral thighs with and without contrast in 8-12 weeks to demonstrate resolution/improvement of these findings and to rule out an underlying mass/neoplasm.    Deconditioning and debility  PT consulted.   - F/u PT eval    Dispo: rehab after urine cx results  Bedside Cystoscopy 7/10 or 7/11    POC discussed with pt, her sister, Rosalinda, team, urology team

## 2024-07-09 NOTE — OCCUPATIONAL THERAPY INITIAL EVALUATION ADULT - ADL RETRAINING, OT EVAL
Pt will improve LB dressing to MOD A in 2 weeks. Pt will improve UB dressing to MIN A in 2 weeks. Pt will improve LUE strength by 1/2 grade in 2 weeks
Pt will perform self-feeding with supervision assist in 2 weeks. Pt will perform UBD using compensatory techniques with moderate assist in 2 weeks.

## 2024-07-09 NOTE — OCCUPATIONAL THERAPY INITIAL EVALUATION ADULT - NS ASR FOLLOW COMMAND OT EVAL
w/ cues for encouragement/75% of the time/able to follow single-step instructions
follows simple commands with increased time

## 2024-07-09 NOTE — OCCUPATIONAL THERAPY INITIAL EVALUATION ADULT - GENERAL OBSERVATIONS, REHAB EVAL
Pt rec'd/left semi-supine in bed, bed alarmed, +IV infusing, lines intact,
Pt received semi-supine in bed, vss, nad. Pt left as received, all items within reach.

## 2024-07-09 NOTE — CONSULT NOTE ADULT - ASSESSMENT
70 yo female with PMH as as above admitted for anemia, afib RVR. Urology consulted for episode of hematuria which was self resolving and is no longer having hematuria  CT scan with IV with no acute pathology. PVR 10 mL- no indication for carson or CBI  Recommend  F/U Urinalysis / Urine culture- Treat UTI if present  Patient should follow up outpatient for Cystoscopy and urine cytology/ further work up outpatient      Case discussed with Dr. Spence      70 yo female with PMH as as above admitted for anemia, afib RVR. Urology consulted for episode of hematuria which was self resolving and is no longer having hematuria  CT scan with IV with no acute pathology. PVR 10 mL- no indication for carson or CBI  Recommend  F/U Urinalysis / Urine culture- Treat UTI if present  Patient should follow up outpatient for Cystoscopy and urine cytology/ further work up outpatient      Case discussed with Dr. Acosta    Addendum 7/9/24 2:56 PM :  Patient's sister/family is requesting cystoscopy to be done inpatient as patient is bedbound and it would be difficult for her to follow up outpatient.   Discussed with Dr. Acosta who will try to do a bedside cystoscopy this week.    Case discussed with Dr. Acosta and Dr. Dougherty

## 2024-07-09 NOTE — PROGRESS NOTE ADULT - SUBJECTIVE AND OBJECTIVE BOX
Pt seen, comfortable    MEDICATIONS  (STANDING):  aMIOdarone    Tablet 200 milliGRAM(s) Oral daily  apixaban 5 milliGRAM(s) Oral two times a day  atorvastatin 80 milliGRAM(s) Oral at bedtime  clopidogrel Tablet 75 milliGRAM(s) Oral daily  diltiazem    Tablet 90 milliGRAM(s) Oral every 6 hours  ezetimibe 10 milliGRAM(s) Oral daily  melatonin 3 milliGRAM(s) Oral at bedtime    MEDICATIONS  (PRN):  acetaminophen     Tablet .. 650 milliGRAM(s) Oral every 6 hours PRN Mild Pain (1 - 3)  metoprolol tartrate Injectable 5 milliGRAM(s) IV Push every 6 hours PRN HR > 130  ondansetron Injectable 4 milliGRAM(s) IV Push every 6 hours PRN Nausea and/or Vomiting  traMADol 25 milliGRAM(s) Oral every 8 hours PRN Moderate Pain (4 - 6)      ROS  No fever, sweats, chills  No epistaxis, HA, sore throat  No CP, SOB, cough, sputum  No n/v/d, abd pain, melena, hematochezia  No edema  No rash  No anxiety  No back pain, joint pain  No bleeding, bruising  No dysuria, hematuria    Vital Signs Last 24 Hrs  T(C): 36.8 (09 Jul 2024 15:40), Max: 37 (08 Jul 2024 23:15)  T(F): 98.2 (09 Jul 2024 15:40), Max: 98.6 (08 Jul 2024 23:15)  HR: 74 (09 Jul 2024 15:40) (72 - 78)  BP: 133/75 (09 Jul 2024 15:40) (121/52 - 155/55)  BP(mean): 80 (09 Jul 2024 07:56) (80 - 80)  RR: 18 (09 Jul 2024 15:40) (17 - 18)  SpO2: 95% (09 Jul 2024 15:40) (95% - 100%)    Parameters below as of 09 Jul 2024 15:40  Patient On (Oxygen Delivery Method): room air        PE  NAD  Awake, alert    No rash grossly  FROM                          8.5    10.19 )-----------( 306      ( 09 Jul 2024 09:19 )             26.4       07-09    142  |  112<H>  |  25<H>  ----------------------------<  111<H>  4.5   |  24  |  1.15    Ca    8.8      09 Jul 2024 09:19

## 2024-07-10 ENCOUNTER — APPOINTMENT (OUTPATIENT)
Dept: ELECTROPHYSIOLOGY | Facility: CLINIC | Age: 69
End: 2024-07-10

## 2024-07-10 PROCEDURE — 99232 SBSQ HOSP IP/OBS MODERATE 35: CPT

## 2024-07-10 RX ORDER — CEFTRIAXONE SODIUM 500 MG
1000 VIAL (EA) INJECTION EVERY 24 HOURS
Refills: 0 | Status: DISCONTINUED | OUTPATIENT
Start: 2024-07-10 | End: 2024-07-12

## 2024-07-10 RX ADMIN — DILTIAZEM HYDROCHLORIDE 90 MILLIGRAM(S): 240 CAPSULE, EXTENDED RELEASE ORAL at 17:35

## 2024-07-10 RX ADMIN — DILTIAZEM HYDROCHLORIDE 90 MILLIGRAM(S): 240 CAPSULE, EXTENDED RELEASE ORAL at 06:39

## 2024-07-10 RX ADMIN — APIXABAN 5 MILLIGRAM(S): 5 TABLET, FILM COATED ORAL at 17:35

## 2024-07-10 RX ADMIN — ATORVASTATIN CALCIUM 80 MILLIGRAM(S): 20 TABLET, FILM COATED ORAL at 21:23

## 2024-07-10 RX ADMIN — EZETIMIBE 10 MILLIGRAM(S): 10 TABLET ORAL at 09:01

## 2024-07-10 RX ADMIN — Medication 650 MILLIGRAM(S): at 10:01

## 2024-07-10 RX ADMIN — DILTIAZEM HYDROCHLORIDE 90 MILLIGRAM(S): 240 CAPSULE, EXTENDED RELEASE ORAL at 12:03

## 2024-07-10 RX ADMIN — Medication 650 MILLIGRAM(S): at 09:01

## 2024-07-10 RX ADMIN — Medication 1000 MILLIGRAM(S): at 11:00

## 2024-07-10 RX ADMIN — AMIODARONE HYDROCHLORIDE 200 MILLIGRAM(S): 50 INJECTION, SOLUTION INTRAVENOUS at 09:00

## 2024-07-10 RX ADMIN — CLOPIDOGREL BISULFATE 75 MILLIGRAM(S): 75 TABLET, FILM COATED ORAL at 09:01

## 2024-07-10 RX ADMIN — APIXABAN 5 MILLIGRAM(S): 5 TABLET, FILM COATED ORAL at 06:39

## 2024-07-10 RX ADMIN — Medication 3 MILLIGRAM(S): at 21:23

## 2024-07-10 NOTE — PROGRESS NOTE ADULT - SUBJECTIVE AND OBJECTIVE BOX
HOSPITALIST PROGRESS NOTE    07/04/24: No acute events overnight. Tolerated AVJ ablation with leadless PPM implantation yesterday. No acute complaints. R arm inspected, swelling improving. Scattered bruising stable. Remains neurovascularly intact in that extremity. Now awaiting PPM check. Discharge / placement.    07/05/24: Patient seen and examined. No new issues.   07/06/24: No more bradycardia. Discussed with sister yesterday regarding plan of care  07/07/24: Patient seen and examined. No new issues.   07/08/24: New hematuria overnight. Patient denies any symptoms. U/A, culture ordered. Sister updated at bedside  7/9: hematuria resolved  U cx awaited    7/10: no complaints  U cx: GNR; iv ceftriaxone started    ROS: All 10 systems reviewed and found to be negative with the exception of what has been described above.     VITAL SIGNS:    Vital Signs Last 24 Hrs  T(C): 36.4 (10 Jul 2024 15:35), Max: 37.5 (09 Jul 2024 23:30)  T(F): 97.6 (10 Jul 2024 15:35), Max: 99.5 (09 Jul 2024 23:30)  HR: 73 (10 Jul 2024 15:35) (72 - 77)  BP: 130/58 (10 Jul 2024 15:35) (113/55 - 144/79)  BP(mean): --  RR: 18 (10 Jul 2024 15:35) (17 - 18)  SpO2: 97% (10 Jul 2024 15:35) (94% - 97%)    Parameters below as of 10 Jul 2024 15:35  Patient On (Oxygen Delivery Method): room air          PHYSICAL EXAM:  Gen: Comfortable  HEENT: NCAT PERRL EOMI MMM clear oropharynx  Neck: Supple, no LAD  CVS: s1 s2 normal, regular, rate  Chest: Normal resp effort at rest, lungs CTA B/L  Abd: +BS, soft, NT ND   Ext: RUE with mild swelling, no tenderness, scattered bruises, no longer with active bleeding from old IV site. R hand sensation and motor function intact. Normal peripheral pulses.   Skin: Warm, dry  Neuro: Awake and alert, answers simple questions, follows simple commands    ALLERGIES:  Allergies    Bee stings (Swelling)  No Known Drug Allergies    Intolerances    All Labs/EKG/Radiology/Meds reviewed    Lab Results:  CBC  CBC Full  -  ( 09 Jul 2024 09:19 )  WBC Count : 10.19 K/uL  RBC Count : 2.87 M/uL  Hemoglobin : 8.5 g/dL  Hematocrit : 26.4 %  Platelet Count - Automated : 306 K/uL  Mean Cell Volume : 92.0 fl  Mean Cell Hemoglobin : 29.6 pg  Mean Cell Hemoglobin Concentration : 32.2 gm/dL  Auto Neutrophil # : x  Auto Lymphocyte # : x  Auto Monocyte # : x  Auto Eosinophil # : x  Auto Basophil # : x  Auto Neutrophil % : x  Auto Lymphocyte % : x  Auto Monocyte % : x  Auto Eosinophil % : x  Auto Basophil % : x    .		Differential:	[] Automated		[] Manual  Chemistry  07-09    142  |  112<H>  |  25<H>  ----------------------------<  111<H>  4.5   |  24  |  1.15    Ca    8.8      09 Jul 2024 09:19          Urinalysis Basic - ( 09 Jul 2024 09:19 )    Color: x / Appearance: x / SG: x / pH: x  Gluc: 111 mg/dL / Ketone: x  / Bili: x / Urobili: x   Blood: x / Protein: x / Nitrite: x   Leuk Esterase: x / RBC: x / WBC x   Sq Epi: x / Non Sq Epi: x / Bacteria: x        MICROBIOLOGY/CULTURES:  Culture Results:   >100,000 CFU/ml Gram Negative Rods (07-08 @ 15:00)        .

## 2024-07-10 NOTE — PROGRESS NOTE ADULT - ASSESSMENT
69-year-old female with DM, HTN, afib, CKD, hx of multiple cancer diagnosis, admitted back on 6/20/24 for neurological changes, found to have an acute CVA, also found to have acute anemia likely related to hematoma status post RBC transfusion, splenic mass concerning for malignancy, and persisting atrial fibrillation that has been difficult to control at times. Admitted to Medicine.        Hematuria  -New hematuria overnight and present today. Unclear etiology. U/A noted - no significant pyuria. Asymptomatic.  Patient with hx of UTI - treated with Ceftriaxone earlier this admission  -Urology consult placed  -Monitor hematuria; resolved  -Monitor off abx. Sent culture     Recurrent UTI+ Hematuria:   U cx from 7/8 : GNR; iv ceftriaxone started; await final cx and sx  pt's sister , Rosalidna, would like the Cysto and other urological procedures to be done  as in pt while she is here as the pt is bed bound  Spoke with Urology team; Dr. Acosta to do bedside Cystoscopy on 7/10 or 7/11    Acute infarcts in the R hemisphere and possible L.    Appreciate input from Neurology. Likely embolic in setting of afib. Now s/p AVJ ablation and Micra leadless pacemaker insertion. Back on Eliquis.  - Continue Plavix and Eliquis, atorvastatin at bedtime.     Persistent Afib with difficult to control rates  Patient failed rhythm control. Was unable to control rates either. Patient is now s/p AVJ ablation with leadless PPM implant.   - Continue Cardizem 90mg PO q6h, Amiodarone 200mg daily  - Continue Eliquis  - Cardiac EP follow up 7/8/24 at 14:30 in EP clinic for wound check    RUE bleeding  Resolved. Developed in setting of supratherapeutic PTT / heparin drip. Appreciate input from Surgery who assisted with pressure dressing. Heparin drip was held and then restarted at lower rate. Bleeding resolved. Unclear whether she may have an additional component causing the issue, such as an acquired factor inhibitor in setting of possible cancer diagnosis. Discussed with Hematology Oncology Dr Turcios who will assist with workup.   - Continue to monitor    Acute on chronic anemia  Likely multi factorial. Concern for a possible underlying malignancy, splenic mass, etc. Leg hematoma that was noted upon admission. And then RUE bleeding a few days ago, since resolved. S/p PRBC transfusion in the setting of that bleeding.   - Continue to monitor    JUSTINO on CKD  Appreciate input from Nephrology. JUSTINO/KHAI. Fluid balance stable and electrolytes stable. Cr now improved.    Cr 1.3 today  - Continue to monitor    Splenic mass  Appreciate Hematology Oncology input. Possibly consistent with malignancy however no tissue ever obtained. There was outpatient plan for splenectomy. Oncology noted that consideration can be given to IR guided bx of splenic mass or splenectomy to help clarify diagnosis and prognosis. HCP wishes to defer at this point.   - Follow up with Hematology / Oncology    Thigh hematomas  On CT, suggestion of acute-appearing intramuscular and subcutaneous hematomas within the proximal L thigh. Indeterminate but chronic appearing mixed attenuation lesion along the lateral margin of the R hamstring musculature within the mid R thigh, possibly related to a subacute to chronic hematoma.   - Recommended short-term interval follow-up MRI of bilateral thighs with and without contrast in 8-12 weeks to demonstrate resolution/improvement of these findings and to rule out an underlying mass/neoplasm.    Deconditioning and debility  PT consulted.   - F/u PT eval    Dispo: rehab after urine cx results  Bedside Cystoscopy 7/10 or 7/11    POC discussed with pt, her sister, Rosalinda, team, urology team

## 2024-07-11 LAB
-  AMPICILLIN/SULBACTAM: SIGNIFICANT CHANGE UP
-  AMPICILLIN: SIGNIFICANT CHANGE UP
-  AZTREONAM: SIGNIFICANT CHANGE UP
-  CEFAZOLIN: SIGNIFICANT CHANGE UP
-  CEFEPIME: SIGNIFICANT CHANGE UP
-  CEFTRIAXONE: SIGNIFICANT CHANGE UP
-  CEFUROXIME: SIGNIFICANT CHANGE UP
-  CIPROFLOXACIN: SIGNIFICANT CHANGE UP
-  ERTAPENEM: SIGNIFICANT CHANGE UP
-  GENTAMICIN: SIGNIFICANT CHANGE UP
-  LEVOFLOXACIN: SIGNIFICANT CHANGE UP
-  MEROPENEM: SIGNIFICANT CHANGE UP
-  NITROFURANTOIN: SIGNIFICANT CHANGE UP
-  PIPERACILLIN/TAZOBACTAM: SIGNIFICANT CHANGE UP
-  TOBRAMYCIN: SIGNIFICANT CHANGE UP
-  TRIMETHOPRIM/SULFAMETHOXAZOLE: SIGNIFICANT CHANGE UP
METHOD TYPE: SIGNIFICANT CHANGE UP

## 2024-07-11 PROCEDURE — 99232 SBSQ HOSP IP/OBS MODERATE 35: CPT

## 2024-07-11 RX ORDER — LIDOCAINE HCL 28 MG/G
1 GEL TOPICAL DAILY
Refills: 0 | Status: DISCONTINUED | OUTPATIENT
Start: 2024-07-11 | End: 2024-07-17

## 2024-07-11 RX ADMIN — APIXABAN 5 MILLIGRAM(S): 5 TABLET, FILM COATED ORAL at 17:21

## 2024-07-11 RX ADMIN — CLOPIDOGREL BISULFATE 75 MILLIGRAM(S): 75 TABLET, FILM COATED ORAL at 10:20

## 2024-07-11 RX ADMIN — ATORVASTATIN CALCIUM 80 MILLIGRAM(S): 20 TABLET, FILM COATED ORAL at 21:15

## 2024-07-11 RX ADMIN — EZETIMIBE 10 MILLIGRAM(S): 10 TABLET ORAL at 10:20

## 2024-07-11 RX ADMIN — Medication 1000 MILLIGRAM(S): at 10:20

## 2024-07-11 RX ADMIN — DILTIAZEM HYDROCHLORIDE 90 MILLIGRAM(S): 240 CAPSULE, EXTENDED RELEASE ORAL at 00:24

## 2024-07-11 RX ADMIN — LIDOCAINE HCL 1 PATCH: 28 GEL TOPICAL at 19:27

## 2024-07-11 RX ADMIN — AMIODARONE HYDROCHLORIDE 200 MILLIGRAM(S): 50 INJECTION, SOLUTION INTRAVENOUS at 10:20

## 2024-07-11 RX ADMIN — APIXABAN 5 MILLIGRAM(S): 5 TABLET, FILM COATED ORAL at 05:28

## 2024-07-11 RX ADMIN — DILTIAZEM HYDROCHLORIDE 90 MILLIGRAM(S): 240 CAPSULE, EXTENDED RELEASE ORAL at 17:20

## 2024-07-11 RX ADMIN — DILTIAZEM HYDROCHLORIDE 90 MILLIGRAM(S): 240 CAPSULE, EXTENDED RELEASE ORAL at 12:49

## 2024-07-11 RX ADMIN — DILTIAZEM HYDROCHLORIDE 90 MILLIGRAM(S): 240 CAPSULE, EXTENDED RELEASE ORAL at 05:28

## 2024-07-11 RX ADMIN — LIDOCAINE HCL 1 PATCH: 28 GEL TOPICAL at 17:21

## 2024-07-11 RX ADMIN — TRAMADOL HYDROCHLORIDE 25 MILLIGRAM(S): 50 TABLET, FILM COATED ORAL at 12:49

## 2024-07-11 RX ADMIN — Medication 3 MILLIGRAM(S): at 21:15

## 2024-07-11 RX ADMIN — TRAMADOL HYDROCHLORIDE 25 MILLIGRAM(S): 50 TABLET, FILM COATED ORAL at 05:30

## 2024-07-11 RX ADMIN — TRAMADOL HYDROCHLORIDE 25 MILLIGRAM(S): 50 TABLET, FILM COATED ORAL at 20:49

## 2024-07-11 NOTE — PROGRESS NOTE ADULT - SUBJECTIVE AND OBJECTIVE BOX
HOSPITALIST PROGRESS NOTE    07/04/24: No acute events overnight. Tolerated AVJ ablation with leadless PPM implantation yesterday. No acute complaints. R arm inspected, swelling improving. Scattered bruising stable. Remains neurovascularly intact in that extremity. Now awaiting PPM check. Discharge / placement.    07/05/24: Patient seen and examined. No new issues.   07/06/24: No more bradycardia. Discussed with sister yesterday regarding plan of care  07/07/24: Patient seen and examined. No new issues.   07/08/24: New hematuria overnight. Patient denies any symptoms. U/A, culture ordered. Sister updated at bedside  7/9: hematuria resolved  U cx awaited    7/10: no complaints  U cx: GNR; iv ceftriaxone started    7/11: no complaints  u cx : Proteus mirabilis; await sx    ROS: All 10 systems reviewed and found to be negative with the exception of what has been described above.     VITAL SIGNS:    Vital Signs Last 24 Hrs  T(C): 36.6 (11 Jul 2024 15:35), Max: 37 (11 Jul 2024 06:24)  T(F): 97.8 (11 Jul 2024 15:35), Max: 98.6 (11 Jul 2024 06:24)  HR: 70 (11 Jul 2024 15:35) (70 - 75)  BP: 152/58 (11 Jul 2024 15:35) (114/50 - 158/58)  BP(mean): --  RR: 18 (11 Jul 2024 15:35) (18 - 18)  SpO2: 93% (11 Jul 2024 15:35) (93% - 94%)    Parameters below as of 11 Jul 2024 15:35  Patient On (Oxygen Delivery Method): room air        PHYSICAL EXAM:  Gen: Comfortable  HEENT: NCAT PERRL EOMI MMM clear oropharynx  Neck: Supple, no LAD  CVS: s1 s2 normal, regular, rate  Chest: Normal resp effort at rest, lungs CTA B/L  Abd: +BS, soft, NT ND   Ext: RUE with mild swelling, no tenderness, scattered bruises, no longer with active bleeding from old IV site. R hand sensation and motor function intact. Normal peripheral pulses.   Skin: Warm, dry  Neuro: Awake and alert, answers simple questions, follows simple commands    ALLERGIES:  Allergies    Bee stings (Swelling)  No Known Drug Allergies    Intolerances    All Labs/EKG/Radiology/Meds reviewed    Lab Results:  CBC    .		Differential:	[] Automated		[] Manual  Chemistry                07-10-24 @ 07:01  -  07-11-24 @ 07:00  --------------------------------------------------------  IN: 0 mL / OUT: 500 mL / NET: -500 mL      MICROBIOLOGY/CULTURES:  Culture Results:   >100,000 CFU/ml Proteus mirabilis (07-08 @ 15:00)            Lab Results:  CBC  CBC Full  -  ( 09 Jul 2024 09:19 )  WBC Count : 10.19 K/uL  RBC Count : 2.87 M/uL  Hemoglobin : 8.5 g/dL  Hematocrit : 26.4 %  Platelet Count - Automated : 306 K/uL  Mean Cell Volume : 92.0 fl  Mean Cell Hemoglobin : 29.6 pg  Mean Cell Hemoglobin Concentration : 32.2 gm/dL  Auto Neutrophil # : x  Auto Lymphocyte # : x  Auto Monocyte # : x  Auto Eosinophil # : x  Auto Basophil # : x  Auto Neutrophil % : x  Auto Lymphocyte % : x  Auto Monocyte % : x  Auto Eosinophil % : x  Auto Basophil % : x    .		Differential:	[] Automated		[] Manual  Chemistry  07-09    142  |  112<H>  |  25<H>  ----------------------------<  111<H>  4.5   |  24  |  1.15    Ca    8.8      09 Jul 2024 09:19          Urinalysis Basic - ( 09 Jul 2024 09:19 )    Color: x / Appearance: x / SG: x / pH: x  Gluc: 111 mg/dL / Ketone: x  / Bili: x / Urobili: x   Blood: x / Protein: x / Nitrite: x   Leuk Esterase: x / RBC: x / WBC x   Sq Epi: x / Non Sq Epi: x / Bacteria: x        MICROBIOLOGY/CULTURES:  Culture Results:   >100,000 CFU/ml Gram Negative Rods (07-08 @ 15:00)        .

## 2024-07-11 NOTE — PROGRESS NOTE ADULT - ASSESSMENT
69-year-old female with DM, HTN, afib, CKD, hx of multiple cancer diagnosis, admitted back on 6/20/24 for neurological changes, found to have an acute CVA, also found to have acute anemia likely related to hematoma status post RBC transfusion, splenic mass concerning for malignancy, and persisting atrial fibrillation that has been difficult to control at times. Admitted to Medicine.        Hematuria  -New hematuria overnight and present today. Unclear etiology. U/A noted - no significant pyuria. Asymptomatic.  Patient with hx of UTI - treated with Ceftriaxone earlier this admission  -Urology consult placed  -Monitor hematuria; resolved  -Monitor off abx. Sent culture     Recurrent UTI+ Hematuria:   U cx from 7/8 : GNR; iv ceftriaxone started; Proteus mirabilis ; await  sx  pt's sister , Rosalinda, would like the Cysto and other urological procedures to be done  as in pt while she is here as the pt is bed bound  Spoke with Urology team; Dr. Acosta to do bedside Cystoscopy after 5 days of ABX likely on Monday 7/15    Acute infarcts in the R hemisphere and possible L.    Appreciate input from Neurology. Likely embolic in setting of afib. Now s/p AVJ ablation and Micra leadless pacemaker insertion. Back on Eliquis.  - Continue Plavix and Eliquis, atorvastatin at bedtime.     Persistent Afib with difficult to control rates  Patient failed rhythm control. Was unable to control rates either. Patient is now s/p AVJ ablation with leadless PPM implant.   - Continue Cardizem 90mg PO q6h, Amiodarone 200mg daily  - Continue Eliquis  - Cardiac EP follow up 7/8/24 at 14:30 in EP clinic for wound check    RUE bleeding  Resolved. Developed in setting of supratherapeutic PTT / heparin drip. Appreciate input from Surgery who assisted with pressure dressing. Heparin drip was held and then restarted at lower rate. Bleeding resolved. Unclear whether she may have an additional component causing the issue, such as an acquired factor inhibitor in setting of possible cancer diagnosis. Discussed with Hematology Oncology Dr Turcios who will assist with workup.   - Continue to monitor    Acute on chronic anemia  Likely multi factorial. Concern for a possible underlying malignancy, splenic mass, etc. Leg hematoma that was noted upon admission. And then RUE bleeding a few days ago, since resolved. S/p PRBC transfusion in the setting of that bleeding.   - Continue to monitor    JUSTINO on CKD  Appreciate input from Nephrology. JUSTINO/KHAI. Fluid balance stable and electrolytes stable. Cr now improved.    Cr 1.3 today  - Continue to monitor    Splenic mass  Appreciate Hematology Oncology input. Possibly consistent with malignancy however no tissue ever obtained. There was outpatient plan for splenectomy. Oncology noted that consideration can be given to IR guided bx of splenic mass or splenectomy to help clarify diagnosis and prognosis. HCP wishes to defer at this point.   - Follow up with Hematology / Oncology    Thigh hematomas  On CT, suggestion of acute-appearing intramuscular and subcutaneous hematomas within the proximal L thigh. Indeterminate but chronic appearing mixed attenuation lesion along the lateral margin of the R hamstring musculature within the mid R thigh, possibly related to a subacute to chronic hematoma.   - Recommended short-term interval follow-up MRI of bilateral thighs with and without contrast in 8-12 weeks to demonstrate resolution/improvement of these findings and to rule out an underlying mass/neoplasm.    Deconditioning and debility  PT consulted.   - F/u PT eval    Dispo: await final u cx and sx  Bedside Cystoscopy 7/15    POC discussed with pt, her sister, Rosalinda, team, urology team

## 2024-07-12 PROCEDURE — 99232 SBSQ HOSP IP/OBS MODERATE 35: CPT

## 2024-07-12 RX ORDER — MEROPENEM 500 MG/1
1000 INJECTION, POWDER, FOR SOLUTION INTRAVENOUS EVERY 8 HOURS
Refills: 0 | Status: DISCONTINUED | OUTPATIENT
Start: 2024-07-12 | End: 2024-07-12

## 2024-07-12 RX ORDER — MEROPENEM 500 MG/1
1000 INJECTION, POWDER, FOR SOLUTION INTRAVENOUS EVERY 8 HOURS
Refills: 0 | Status: COMPLETED | OUTPATIENT
Start: 2024-07-12 | End: 2024-07-15

## 2024-07-12 RX ADMIN — TRAMADOL HYDROCHLORIDE 25 MILLIGRAM(S): 50 TABLET, FILM COATED ORAL at 21:14

## 2024-07-12 RX ADMIN — AMIODARONE HYDROCHLORIDE 200 MILLIGRAM(S): 50 INJECTION, SOLUTION INTRAVENOUS at 10:51

## 2024-07-12 RX ADMIN — EZETIMIBE 10 MILLIGRAM(S): 10 TABLET ORAL at 10:51

## 2024-07-12 RX ADMIN — ATORVASTATIN CALCIUM 80 MILLIGRAM(S): 20 TABLET, FILM COATED ORAL at 21:14

## 2024-07-12 RX ADMIN — LIDOCAINE HCL 1 PATCH: 28 GEL TOPICAL at 20:00

## 2024-07-12 RX ADMIN — DILTIAZEM HYDROCHLORIDE 90 MILLIGRAM(S): 240 CAPSULE, EXTENDED RELEASE ORAL at 05:31

## 2024-07-12 RX ADMIN — TRAMADOL HYDROCHLORIDE 25 MILLIGRAM(S): 50 TABLET, FILM COATED ORAL at 22:10

## 2024-07-12 RX ADMIN — LIDOCAINE HCL 1 PATCH: 28 GEL TOPICAL at 05:14

## 2024-07-12 RX ADMIN — DILTIAZEM HYDROCHLORIDE 90 MILLIGRAM(S): 240 CAPSULE, EXTENDED RELEASE ORAL at 00:30

## 2024-07-12 RX ADMIN — TRAMADOL HYDROCHLORIDE 25 MILLIGRAM(S): 50 TABLET, FILM COATED ORAL at 06:33

## 2024-07-12 RX ADMIN — CLOPIDOGREL BISULFATE 75 MILLIGRAM(S): 75 TABLET, FILM COATED ORAL at 10:53

## 2024-07-12 RX ADMIN — LIDOCAINE HCL 1 PATCH: 28 GEL TOPICAL at 23:00

## 2024-07-12 RX ADMIN — LIDOCAINE HCL 1 PATCH: 28 GEL TOPICAL at 10:52

## 2024-07-12 RX ADMIN — APIXABAN 5 MILLIGRAM(S): 5 TABLET, FILM COATED ORAL at 18:37

## 2024-07-12 RX ADMIN — MEROPENEM 1000 MILLIGRAM(S): 500 INJECTION, POWDER, FOR SOLUTION INTRAVENOUS at 21:14

## 2024-07-12 RX ADMIN — MEROPENEM 1000 MILLIGRAM(S): 500 INJECTION, POWDER, FOR SOLUTION INTRAVENOUS at 10:53

## 2024-07-12 RX ADMIN — DILTIAZEM HYDROCHLORIDE 90 MILLIGRAM(S): 240 CAPSULE, EXTENDED RELEASE ORAL at 23:41

## 2024-07-12 RX ADMIN — DILTIAZEM HYDROCHLORIDE 90 MILLIGRAM(S): 240 CAPSULE, EXTENDED RELEASE ORAL at 18:37

## 2024-07-12 RX ADMIN — TRAMADOL HYDROCHLORIDE 25 MILLIGRAM(S): 50 TABLET, FILM COATED ORAL at 02:54

## 2024-07-12 RX ADMIN — DILTIAZEM HYDROCHLORIDE 90 MILLIGRAM(S): 240 CAPSULE, EXTENDED RELEASE ORAL at 13:16

## 2024-07-12 RX ADMIN — Medication 650 MILLIGRAM(S): at 05:35

## 2024-07-12 RX ADMIN — Medication 3 MILLIGRAM(S): at 21:14

## 2024-07-12 RX ADMIN — APIXABAN 5 MILLIGRAM(S): 5 TABLET, FILM COATED ORAL at 05:32

## 2024-07-12 NOTE — CONSULT NOTE ADULT - REASON FOR ADMISSION
CVA, afib with RVR, Acute anemia with thigh hematoma

## 2024-07-12 NOTE — CONSULT NOTE ADULT - CONSULT REQUESTED DATE/TIME
12-Jul-2024 11:23
30-Jun-2024 08:50
24-Jun-2024 06:01
09-Jul-2024
25-Jun-2024 19:31
21-Jun-2024 10:18
26-Jun-2024 11:21
21-Jun-2024 08:25

## 2024-07-12 NOTE — CONSULT NOTE ADULT - CONSULT REASON
AF w/RVR
Hematuria
hx of splenic lesion
ESBL Proteus UTI
Evaluation of JUSTINO/CKD
afib, CVA, thigh bleed
bleeding IV line RUE
complex goals of care and symptom management

## 2024-07-12 NOTE — CONSULT NOTE ADULT - ASSESSMENT
70 yo female with hx of learning disability, JUSTINO on CKD, DM II, HTN, A-fib s/p DCCV with h/o poor response to medications, also has ovarian and uterine CA, recent diagnosis of Splenic CA, CT Lumbar shows moderate stenosis and DDD, recent UTI, who presents as a code stroke due to sudden onset worsening of left sided weakness of LLE and LUE during PT also associated with slurred speech which has sinced resolved. Arrived with NIHSS of 8 with unknown LKW. Pt is a poor historian. History taken from EMR, ED MD and pt sister at bedside. UA positive concern for UTI, urine cx growing Proteus mirabilis - ESBL< abx changed to IV merrem.     1. Proteus mirabilis ESBL Complicated UTI   - imaging reviewed  - agree with IV merrem 1gmq8h  - 3-5 day course  - monitor temps  - tolerating abx well so far; no side effects noted  - reason for abx use and side effects reviewed with patient  - supportive care  - fu cbc    IV to Po abx token not applicable to case

## 2024-07-12 NOTE — CONSULT NOTE ADULT - SUBJECTIVE AND OBJECTIVE BOX
Patient is a 69y old  Female who presents with a chief complaint of CVA, afib with RVR, Acute anemia with thigh hematoma (11 Jul 2024 16:15)    HPI:  68 yo female with hx of learning disability, JUSTINO on CKD, DM II, HTN, A-fib s/p DCCV with h/o poor response to medications, also has ovarian and uterine CA, recent diagnosis of Splenic CA, CT Lumbar shows moderate stenosis and DDD, recent UTI, who presents as a code stroke due to sudden onset worsening of left sided weakness of LLE and LUE during PT also associated with slurred speech which has sinced resolved. Arrived with NIHSS of 8 with unknown LKW. Pt is a poor historian. History taken from EMR, ED MD and pt sister at bedside. UA positive concern for UTI, urine cx growing Proteus mirabilis - ESBL< abx changed to IV merrem.       PMH: as above  PSH: as above  Meds: per reconciliation sheet, noted below  MEDICATIONS  (STANDING):  aMIOdarone    Tablet 200 milliGRAM(s) Oral daily  apixaban 5 milliGRAM(s) Oral two times a day  atorvastatin 80 milliGRAM(s) Oral at bedtime  chlorhexidine 4% Liquid 1 Application(s) Topical <User Schedule>  clopidogrel Tablet 75 milliGRAM(s) Oral daily  diltiazem    Tablet 90 milliGRAM(s) Oral every 6 hours  ezetimibe 10 milliGRAM(s) Oral daily  lidocaine   4% Patch 1 Patch Transdermal daily  melatonin 3 milliGRAM(s) Oral at bedtime  meropenem Injectable 1000 milliGRAM(s) IV Push every 8 hours        Allergies    Bee stings (Swelling)  No Known Drug Allergies    Intolerances      Social: no smoking, no alcohol, no illegal drugs; no recent travel, no exposure to TB  FAMILY HISTORY:  Patient unable to provide medical history       no history of premature cardiovascular disease in first degree relatives    ROS: unable to obtain d/t medical condition     All other systems reviewed and are negative    Vital Signs Last 24 Hrs  T(C): 36.7 (12 Jul 2024 09:07), Max: 36.7 (11 Jul 2024 23:35)  T(F): 98.1 (12 Jul 2024 09:07), Max: 98.1 (11 Jul 2024 23:35)  HR: 78 (12 Jul 2024 09:07) (70 - 78)  BP: 167/60 (12 Jul 2024 09:07) (150/61 - 167/60)  BP(mean): 88 (12 Jul 2024 09:07) (88 - 88)  RR: 18 (12 Jul 2024 09:07) (17 - 18)  SpO2: 94% (12 Jul 2024 09:07) (93% - 98%)    Parameters below as of 12 Jul 2024 09:07  Patient On (Oxygen Delivery Method): room air      Daily     Daily     PE:  Constitutional: frail looking  HEENT: NC/AT, EOMI, PERRLA, conjunctivae clear; ears and nose atraumatic; pharynx benign  Neck: supple; thyroid not palpable  Back: no tenderness  Respiratory: respiratory effort normal; clear to auscultation  Cardiovascular: S1S2 regular, no murmurs  Abdomen: soft, not tender, not distended, positive BS; liver and spleen WNL  Genitourinary: no suprapubic tenderness  Lymphatic: no LN palpable  Musculoskeletal: no muscle tenderness, no joint swelling or tenderness  Extremities: no pedal edema  Neurological/ Psychiatric:  moving all extremities  Skin: no rashes; no palpable lesions    Labs: all available labs reviewed           Culture - Urine (07.08.24 @ 15:00)   - Ampicillin: R >16 These ampicillin results predict results for amoxicillin  - Ampicillin/Sulbactam: S <=4/2  - Aztreonam: R 8  - Cefazolin: R >16 For uncomplicated UTI with K. pneumoniae, E. coli, or P. mirablis: MARCO <=16 is sensitive and MARCO >=32 is resistant. This also predicts results for oral agents cefaclor, cefdinir, cefpodoxime, cefprozil, cefuroxime axetil, cephalexin and locarbef for uncomplicated UTI. Note that some isolates may be susceptible to these agents while testing resistant to cefazolin.  - Cefepime: R >16  - Ceftriaxone: R >32  - Cefuroxime: R >16  - Ciprofloxacin: R >2  - Ertapenem: S <=0.5  - Gentamicin: R >8  - Levofloxacin: R >4  - Meropenem: S <=1  - Nitrofurantoin: R 64 Should not be used to treat pyelonephritis  - Piperacillin/Tazobactam: S <=8  - Tobramycin: R 8  - Trimethoprim/Sulfamethoxazole: R >2/38  Specimen Source: Clean Catch Clean Catch (Midstream)  Culture Results:   >100,000 CFU/ml Proteus mirabilis ESBL  Organism Identification: Proteus mirabilis ESBL  Organism: Proteus mirabilis ESBL  Method Type: MARCO        Radiology: all available radiological tests reviewed  < from: Xray Chest 1 View- PORTABLE-Urgent (07.03.24 @ 13:00) >    ACC: [id number] EXAM:  XR CHEST PORTABLE URGENT 1V   ORDERED BY: CALIXTO VILLARREAL     PROCEDURE DATE:  07/03/2024          INTERPRETATION:  AP chest on July 3, 2024 at 12:46 PM. Patient had Micra   pacemaker insertion.    Elevated right hemidiaphragm again noted. Heart magnified by technique.    Lungs appear clear similar to April 19.    Present film shows a Micra pacemaker slightly to the left of the midline.    IMPRESSION: Micra pacemaker insertion.    --- End of Report ---    < end of copied text >    Advanced directives addressed: full resuscitation

## 2024-07-12 NOTE — PROGRESS NOTE ADULT - SUBJECTIVE AND OBJECTIVE BOX
Pt seen, resting    MEDICATIONS  (STANDING):  aMIOdarone    Tablet 200 milliGRAM(s) Oral daily  apixaban 5 milliGRAM(s) Oral two times a day  atorvastatin 80 milliGRAM(s) Oral at bedtime  chlorhexidine 4% Liquid 1 Application(s) Topical <User Schedule>  clopidogrel Tablet 75 milliGRAM(s) Oral daily  diltiazem    Tablet 90 milliGRAM(s) Oral every 6 hours  ezetimibe 10 milliGRAM(s) Oral daily  lidocaine   4% Patch 1 Patch Transdermal daily  melatonin 3 milliGRAM(s) Oral at bedtime  meropenem Injectable 1000 milliGRAM(s) IV Push every 8 hours    MEDICATIONS  (PRN):  acetaminophen     Tablet .. 650 milliGRAM(s) Oral every 6 hours PRN Mild Pain (1 - 3)  metoprolol tartrate Injectable 5 milliGRAM(s) IV Push every 6 hours PRN HR > 130  ondansetron Injectable 4 milliGRAM(s) IV Push every 6 hours PRN Nausea and/or Vomiting  traMADol 25 milliGRAM(s) Oral every 8 hours PRN Moderate Pain (4 - 6)      ROS  No fever, sweats, chills       Vital Signs Last 24 Hrs  T(C): 36.7 (12 Jul 2024 09:07), Max: 36.7 (11 Jul 2024 23:35)  T(F): 98.1 (12 Jul 2024 09:07), Max: 98.1 (11 Jul 2024 23:35)  HR: 78 (12 Jul 2024 09:07) (70 - 78)  BP: 167/60 (12 Jul 2024 09:07) (150/61 - 167/60)  BP(mean): 88 (12 Jul 2024 09:07) (88 - 88)  RR: 18 (12 Jul 2024 09:07) (17 - 18)  SpO2: 94% (12 Jul 2024 09:07) (93% - 98%)    Parameters below as of 12 Jul 2024 09:07  Patient On (Oxygen Delivery Method): room air        PE  NAD  Awake, alert     No rash grossly  FROM

## 2024-07-12 NOTE — PROGRESS NOTE ADULT - ASSESSMENT
69-year-old female with DM, HTN, afib, CKD, hx of multiple cancer diagnosis, admitted back on 6/20/24 for neurological changes, found to have an acute CVA, also found to have acute anemia likely related to hematoma status post RBC transfusion, splenic mass concerning for malignancy, and persisting atrial fibrillation that has been difficult to control at times. Admitted to Medicine.        Hematuria  -New hematuria overnight and present today. Unclear etiology. U/A noted - no significant pyuria. Asymptomatic.  Patient with hx of UTI - treated with Ceftriaxone earlier this admission  -Urology consult placed  -Monitor hematuria; resolved  -Monitor off abx. Sent culture     Recurrent UTI+ Hematuria: ESBL PrMi UTI   U cx from 7/8 : GNR; ; Proteus mirabilis ; ESBL; iv meropenem 3-5 days  pt's sister , Rosalinda, would like the Cysto and other urological procedures to be done  as in pt while she is here as the pt is bed bound  Spoke with Urology team; Dr. Acosta to do bedside Cystoscopy after 5 days of ABX   contact precautions    Acute infarcts in the R hemisphere and possible L.    Appreciate input from Neurology. Likely embolic in setting of afib. Now s/p AVJ ablation and Micra leadless pacemaker insertion. Back on Eliquis.  - Continue Plavix and Eliquis, atorvastatin at bedtime.     Persistent Afib with difficult to control rates  Patient failed rhythm control. Was unable to control rates either. Patient is now s/p AVJ ablation with leadless PPM implant.   - Continue Cardizem 90mg PO q6h, Amiodarone 200mg daily  - Continue Eliquis  - Cardiac EP follow up 7/8/24 at 14:30 in EP clinic for wound check    RUE bleeding  Resolved. Developed in setting of supratherapeutic PTT / heparin drip. Appreciate input from Surgery who assisted with pressure dressing. Heparin drip was held and then restarted at lower rate. Bleeding resolved. Unclear whether she may have an additional component causing the issue, such as an acquired factor inhibitor in setting of possible cancer diagnosis. Discussed with Hematology Oncology Dr Turcios who will assist with workup.   - Continue to monitor    Acute on chronic anemia  Likely multi factorial. Concern for a possible underlying malignancy, splenic mass, etc. Leg hematoma that was noted upon admission. And then RUE bleeding a few days ago, since resolved. S/p PRBC transfusion in the setting of that bleeding.   - Continue to monitor    JUSTINO on CKD  Appreciate input from Nephrology. JUSTINO/KHAI. Fluid balance stable and electrolytes stable. Cr now improved.    Cr 1.3 today  - Continue to monitor    Splenic mass  Appreciate Hematology Oncology input. Possibly consistent with malignancy however no tissue ever obtained. There was outpatient plan for splenectomy. Oncology noted that consideration can be given to IR guided bx of splenic mass or splenectomy to help clarify diagnosis and prognosis. HCP wishes to defer at this point.   - Follow up with Hematology / Oncology    Thigh hematomas  On CT, suggestion of acute-appearing intramuscular and subcutaneous hematomas within the proximal L thigh. Indeterminate but chronic appearing mixed attenuation lesion along the lateral margin of the R hamstring musculature within the mid R thigh, possibly related to a subacute to chronic hematoma.   - Recommended short-term interval follow-up MRI of bilateral thighs with and without contrast in 8-12 weeks to demonstrate resolution/improvement of these findings and to rule out an underlying mass/neoplasm.    Deconditioning and debility  PT consulted.   - F/u PT eval    Dispo: 3-5 days iv meropenem  Bedside Cystoscopy after meropenem    POC discussed with pt, her sister, Rosalinda, team,

## 2024-07-12 NOTE — PROGRESS NOTE ADULT - SUBJECTIVE AND OBJECTIVE BOX
HOSPITALIST PROGRESS NOTE    07/04/24: No acute events overnight. Tolerated AVJ ablation with leadless PPM implantation yesterday. No acute complaints. R arm inspected, swelling improving. Scattered bruising stable. Remains neurovascularly intact in that extremity. Now awaiting PPM check. Discharge / placement.    07/05/24: Patient seen and examined. No new issues.   07/06/24: No more bradycardia. Discussed with sister yesterday regarding plan of care  07/07/24: Patient seen and examined. No new issues.   07/08/24: New hematuria overnight. Patient denies any symptoms. U/A, culture ordered. Sister updated at bedside  7/9: hematuria resolved  U cx awaited    7/10: no complaints  U cx: GNR; iv ceftriaxone started    7/11: no complaints  u cx : Proteus mirabilis; await sx    7/12: ESBL UTI; meropenem started  no complaints    ROS: All 10 systems reviewed and found to be negative with the exception of what has been described above.     VITAL SIGNS:    Vital Signs Last 24 Hrs  T(C): 36.7 (12 Jul 2024 09:07), Max: 36.7 (11 Jul 2024 23:35)  T(F): 98.1 (12 Jul 2024 09:07), Max: 98.1 (11 Jul 2024 23:35)  HR: 78 (12 Jul 2024 09:07) (75 - 78)  BP: 167/60 (12 Jul 2024 09:07) (150/61 - 167/60)  BP(mean): 88 (12 Jul 2024 09:07) (88 - 88)  RR: 18 (12 Jul 2024 09:07) (17 - 18)  SpO2: 94% (12 Jul 2024 09:07) (94% - 98%)    Parameters below as of 12 Jul 2024 09:07  Patient On (Oxygen Delivery Method): room air        PHYSICAL EXAM:  Gen: Comfortable  HEENT: NCAT PERRL EOMI MMM clear oropharynx  Neck: Supple, no LAD  CVS: s1 s2 normal, regular, rate  Chest: Normal resp effort at rest, lungs CTA B/L  Abd: +BS, soft, NT ND   Ext: RUE with mild swelling, no tenderness, scattered bruises, no longer with active bleeding from old IV site. R hand sensation and motor function intact. Normal peripheral pulses.   Skin: Warm, dry  Neuro: Awake and alert, answers simple questions, follows simple commands    ALLERGIES:  Allergies    Bee stings (Swelling)  No Known Drug Allergies    Intolerances    All Labs/EKG/Radiology/Meds reviewed            MICROBIOLOGY/CULTURES:  Culture Results:   >100,000 CFU/ml Proteus mirabilis ESBL (07-08 @ 15:00)            07-10-24 @ 07:01  -  07-11-24 @ 07:00  --------------------------------------------------------  IN: 0 mL / OUT: 500 mL / NET: -500 mL      MICROBIOLOGY/CULTURES:  Culture Results:   >100,000 CFU/ml Proteus mirabilis (07-08 @ 15:00)            Lab Results:  CBC  CBC Full  -  ( 09 Jul 2024 09:19 )  WBC Count : 10.19 K/uL  RBC Count : 2.87 M/uL  Hemoglobin : 8.5 g/dL  Hematocrit : 26.4 %  Platelet Count - Automated : 306 K/uL  Mean Cell Volume : 92.0 fl  Mean Cell Hemoglobin : 29.6 pg  Mean Cell Hemoglobin Concentration : 32.2 gm/dL  Auto Neutrophil # : x  Auto Lymphocyte # : x  Auto Monocyte # : x  Auto Eosinophil # : x  Auto Basophil # : x  Auto Neutrophil % : x  Auto Lymphocyte % : x  Auto Monocyte % : x  Auto Eosinophil % : x  Auto Basophil % : x    .		Differential:	[] Automated		[] Manual  Chemistry  07-09    142  |  112<H>  |  25<H>  ----------------------------<  111<H>  4.5   |  24  |  1.15    Ca    8.8      09 Jul 2024 09:19          Urinalysis Basic - ( 09 Jul 2024 09:19 )    Color: x / Appearance: x / SG: x / pH: x  Gluc: 111 mg/dL / Ketone: x  / Bili: x / Urobili: x   Blood: x / Protein: x / Nitrite: x   Leuk Esterase: x / RBC: x / WBC x   Sq Epi: x / Non Sq Epi: x / Bacteria: x        MICROBIOLOGY/CULTURES:  Culture Results:   >100,000 CFU/ml Gram Negative Rods (07-08 @ 15:00)        .

## 2024-07-12 NOTE — CONSULT NOTE ADULT - PROVIDER SPECIALTY LIST ADULT
Nephrology
Palliative Care
Urology
Cardiology
Infectious Disease
Electrophysiology
Vascular Surgery
Heme/Onc

## 2024-07-12 NOTE — PROGRESS NOTE ADULT - ASSESSMENT
69 year old female with prior uterine and ovarian cancer without evidence of disease recurrence  Recent imaging reveals splenic mass : etiology not clear  She is admitted with embolic CVA   Bleeding from anticoagulation  She has an arrythmia and now post pacemaker placement    In terms of the splenic mass, etiology is unclear  There are no other sites of metastases    Would defer DUFFY of splenic mass till cardiac/ neurologic/ hematologic status have stabilized  Family wishes to defer at this time    Pt to undergo work up for hematuria including cystoscopy in the next few days after completes abx    please call if any oncologic questions    Lamonte Turcios MD  [facility]  Cell: [phone]

## 2024-07-13 LAB
ANION GAP SERPL CALC-SCNC: 6 MMOL/L — SIGNIFICANT CHANGE UP (ref 5–17)
BUN SERPL-MCNC: 28 MG/DL — HIGH (ref 7–23)
CALCIUM SERPL-MCNC: 8.9 MG/DL — SIGNIFICANT CHANGE UP (ref 8.5–10.1)
CHLORIDE SERPL-SCNC: 113 MMOL/L — HIGH (ref 96–108)
CO2 SERPL-SCNC: 24 MMOL/L — SIGNIFICANT CHANGE UP (ref 22–31)
CREAT SERPL-MCNC: 1.25 MG/DL — SIGNIFICANT CHANGE UP (ref 0.5–1.3)
EGFR: 46 ML/MIN/1.73M2 — LOW
GLUCOSE SERPL-MCNC: 139 MG/DL — HIGH (ref 70–99)
HCT VFR BLD CALC: 26.9 % — LOW (ref 34.5–45)
HGB BLD-MCNC: 8.6 G/DL — LOW (ref 11.5–15.5)
MCHC RBC-ENTMCNC: 29.5 PG — SIGNIFICANT CHANGE UP (ref 27–34)
MCHC RBC-ENTMCNC: 32 GM/DL — SIGNIFICANT CHANGE UP (ref 32–36)
MCV RBC AUTO: 92.1 FL — SIGNIFICANT CHANGE UP (ref 80–100)
PLATELET # BLD AUTO: 377 K/UL — SIGNIFICANT CHANGE UP (ref 150–400)
POTASSIUM SERPL-MCNC: 4.4 MMOL/L — SIGNIFICANT CHANGE UP (ref 3.5–5.3)
POTASSIUM SERPL-SCNC: 4.4 MMOL/L — SIGNIFICANT CHANGE UP (ref 3.5–5.3)
RBC # BLD: 2.92 M/UL — LOW (ref 3.8–5.2)
RBC # FLD: 15.5 % — HIGH (ref 10.3–14.5)
SODIUM SERPL-SCNC: 143 MMOL/L — SIGNIFICANT CHANGE UP (ref 135–145)
WBC # BLD: 9.13 K/UL — SIGNIFICANT CHANGE UP (ref 3.8–10.5)
WBC # FLD AUTO: 9.13 K/UL — SIGNIFICANT CHANGE UP (ref 3.8–10.5)

## 2024-07-13 PROCEDURE — 99232 SBSQ HOSP IP/OBS MODERATE 35: CPT

## 2024-07-13 RX ORDER — NYSTATIN 100000/G
1 POWDER (GRAM) TOPICAL
Refills: 0 | Status: DISCONTINUED | OUTPATIENT
Start: 2024-07-13 | End: 2024-07-17

## 2024-07-13 RX ADMIN — Medication 1 APPLICATION(S): at 10:02

## 2024-07-13 RX ADMIN — CLOPIDOGREL BISULFATE 75 MILLIGRAM(S): 75 TABLET, FILM COATED ORAL at 11:13

## 2024-07-13 RX ADMIN — DILTIAZEM HYDROCHLORIDE 90 MILLIGRAM(S): 240 CAPSULE, EXTENDED RELEASE ORAL at 14:37

## 2024-07-13 RX ADMIN — Medication 650 MILLIGRAM(S): at 22:20

## 2024-07-13 RX ADMIN — DILTIAZEM HYDROCHLORIDE 90 MILLIGRAM(S): 240 CAPSULE, EXTENDED RELEASE ORAL at 06:29

## 2024-07-13 RX ADMIN — MEROPENEM 1000 MILLIGRAM(S): 500 INJECTION, POWDER, FOR SOLUTION INTRAVENOUS at 11:14

## 2024-07-13 RX ADMIN — Medication 650 MILLIGRAM(S): at 07:00

## 2024-07-13 RX ADMIN — Medication 650 MILLIGRAM(S): at 21:26

## 2024-07-13 RX ADMIN — Medication 650 MILLIGRAM(S): at 14:36

## 2024-07-13 RX ADMIN — DILTIAZEM HYDROCHLORIDE 90 MILLIGRAM(S): 240 CAPSULE, EXTENDED RELEASE ORAL at 19:04

## 2024-07-13 RX ADMIN — ATORVASTATIN CALCIUM 80 MILLIGRAM(S): 20 TABLET, FILM COATED ORAL at 21:25

## 2024-07-13 RX ADMIN — APIXABAN 5 MILLIGRAM(S): 5 TABLET, FILM COATED ORAL at 06:29

## 2024-07-13 RX ADMIN — LIDOCAINE HCL 1 PATCH: 28 GEL TOPICAL at 20:00

## 2024-07-13 RX ADMIN — Medication 1 APPLICATION(S): at 12:02

## 2024-07-13 RX ADMIN — Medication 3 MILLIGRAM(S): at 21:26

## 2024-07-13 RX ADMIN — APIXABAN 5 MILLIGRAM(S): 5 TABLET, FILM COATED ORAL at 19:05

## 2024-07-13 RX ADMIN — Medication 650 MILLIGRAM(S): at 06:29

## 2024-07-13 RX ADMIN — MEROPENEM 1000 MILLIGRAM(S): 500 INJECTION, POWDER, FOR SOLUTION INTRAVENOUS at 21:26

## 2024-07-13 RX ADMIN — AMIODARONE HYDROCHLORIDE 200 MILLIGRAM(S): 50 INJECTION, SOLUTION INTRAVENOUS at 11:14

## 2024-07-13 RX ADMIN — Medication 650 MILLIGRAM(S): at 15:15

## 2024-07-13 RX ADMIN — Medication 1 APPLICATION(S): at 21:27

## 2024-07-13 RX ADMIN — EZETIMIBE 10 MILLIGRAM(S): 10 TABLET ORAL at 11:14

## 2024-07-13 RX ADMIN — LIDOCAINE HCL 1 PATCH: 28 GEL TOPICAL at 14:37

## 2024-07-13 NOTE — PROGRESS NOTE ADULT - ASSESSMENT
69 year old female with prior uterine and ovarian cancer without evidence of disease recurrence  Recent imaging reveals splenic mass : etiology not clear  She is admitted with embolic CVA   Bleeding from anticoagulation  She has an arrythmia and now post pacemaker placement    In terms of the splenic mass, etiology is unclear  There are no other sites of metastases    Would defer DUFFY of splenic mass till cardiac/ neurologic/ hematologic status have stabilized  Family wishes to defer at this time    Pt to undergo work up for hematuria including cystoscopy in the next few days after completes abx    please call if any oncologic questions    Lamonte Turcios MD  [facility]  Cell: [phone]

## 2024-07-13 NOTE — PROGRESS NOTE ADULT - SUBJECTIVE AND OBJECTIVE BOX
HOSPITALIST PROGRESS NOTE    07/04/24: No acute events overnight. Tolerated AVJ ablation with leadless PPM implantation yesterday. No acute complaints. R arm inspected, swelling improving. Scattered bruising stable. Remains neurovascularly intact in that extremity. Now awaiting PPM check. Discharge / placement.    07/05/24: Patient seen and examined. No new issues.   07/06/24: No more bradycardia. Discussed with sister yesterday regarding plan of care  07/07/24: Patient seen and examined. No new issues.   07/08/24: New hematuria overnight. Patient denies any symptoms. U/A, culture ordered. Sister updated at bedside  7/9: hematuria resolved  U cx awaited    7/10: no complaints  U cx: GNR; iv ceftriaxone started    7/11: no complaints  u cx : Proteus mirabilis; await sx    7/12: ESBL UTI; meropenem started  no complaints    7/13: no complaints    ROS: All 10 systems reviewed and found to be negative with the exception of what has been described above.     VITAL SIGNS:    Vital Signs Last 24 Hrs  T(C): 36.6 (13 Jul 2024 09:02), Max: 37 (12 Jul 2024 16:32)  T(F): 97.8 (13 Jul 2024 09:02), Max: 98.6 (12 Jul 2024 16:32)  HR: 70 (13 Jul 2024 09:02) (70 - 81)  BP: 134/100 (13 Jul 2024 09:02) (114/92 - 163/57)  BP(mean): --  RR: 18 (13 Jul 2024 09:02) (18 - 18)  SpO2: 97% (13 Jul 2024 09:02) (94% - 97%)    Parameters below as of 13 Jul 2024 09:02  Patient On (Oxygen Delivery Method): room air        PHYSICAL EXAM:  Gen: Comfortable  HEENT: NCAT PERRL EOMI MMM clear oropharynx  Neck: Supple, no LAD  CVS: s1 s2 normal, regular, rate  Chest: Normal resp effort at rest, lungs CTA B/L  Abd: +BS, soft, NT ND   Ext: RUE with mild swelling, no tenderness, scattered bruises, no longer with active bleeding from old IV site. R hand sensation and motor function intact. Normal peripheral pulses.   Skin: Warm, dry  Neuro: Awake and alert, answers simple questions, follows simple commands    ALLERGIES:  Allergies    Bee stings (Swelling)  No Known Drug Allergies    Intolerances    All Labs/EKG/Radiology/Meds reviewed            MICROBIOLOGY/CULTURES:  Culture Results:   >100,000 CFU/ml Proteus mirabilis ESBL (07-08 @ 15:00)            07-10-24 @ 07:01  -  07-11-24 @ 07:00  --------------------------------------------------------  IN: 0 mL / OUT: 500 mL / NET: -500 mL      MICROBIOLOGY/CULTURES:  Culture Results:   >100,000 CFU/ml Proteus mirabilis (07-08 @ 15:00)            Lab Results:  CBC  CBC Full  -  ( 09 Jul 2024 09:19 )  WBC Count : 10.19 K/uL  RBC Count : 2.87 M/uL  Hemoglobin : 8.5 g/dL  Hematocrit : 26.4 %  Platelet Count - Automated : 306 K/uL  Mean Cell Volume : 92.0 fl  Mean Cell Hemoglobin : 29.6 pg  Mean Cell Hemoglobin Concentration : 32.2 gm/dL  Auto Neutrophil # : x  Auto Lymphocyte # : x  Auto Monocyte # : x  Auto Eosinophil # : x  Auto Basophil # : x  Auto Neutrophil % : x  Auto Lymphocyte % : x  Auto Monocyte % : x  Auto Eosinophil % : x  Auto Basophil % : x    .		Differential:	[] Automated		[] Manual  Chemistry  07-09    142  |  112<H>  |  25<H>  ----------------------------<  111<H>  4.5   |  24  |  1.15    Ca    8.8      09 Jul 2024 09:19          Urinalysis Basic - ( 09 Jul 2024 09:19 )    Color: x / Appearance: x / SG: x / pH: x  Gluc: 111 mg/dL / Ketone: x  / Bili: x / Urobili: x   Blood: x / Protein: x / Nitrite: x   Leuk Esterase: x / RBC: x / WBC x   Sq Epi: x / Non Sq Epi: x / Bacteria: x        MICROBIOLOGY/CULTURES:  Culture Results:   >100,000 CFU/ml Gram Negative Rods (07-08 @ 15:00)        .

## 2024-07-13 NOTE — PROGRESS NOTE ADULT - ASSESSMENT
69-year-old female with DM, HTN, afib, CKD, hx of multiple cancer diagnosis, admitted back on 6/20/24 for neurological changes, found to have an acute CVA, also found to have acute anemia likely related to hematoma status post RBC transfusion, splenic mass concerning for malignancy, and persisting atrial fibrillation that has been difficult to control at times. Admitted to Medicine.        Hematuria  -New hematuria overnight and present today. Unclear etiology. U/A noted - no significant pyuria. Asymptomatic.  Patient with hx of UTI - treated with Ceftriaxone earlier this admission  -Urology consult placed  -Monitor hematuria; resolved  -Monitor off abx. Sent culture     Recurrent UTI+ Hematuria: ESBL PrMi UTI   U cx from 7/8 : GNR; ; Proteus mirabilis ; ESBL; iv meropenem 3 days  pt's sister , Rosalinda, would like the Cysto and other urological procedures to be done  as in pt while she is here as the pt is bed bound  Spoke with Urology team; Dr. Acosta to do bedside Cystoscopy after 5 days of ABX   contact precautions    Acute infarcts in the R hemisphere and possible L.    Appreciate input from Neurology. Likely embolic in setting of afib. Now s/p AVJ ablation and Micra leadless pacemaker insertion. Back on Eliquis.  - Continue Plavix and Eliquis, atorvastatin at bedtime.     Persistent Afib with difficult to control rates  Patient failed rhythm control. Was unable to control rates either. Patient is now s/p AVJ ablation with leadless PPM implant.   - Continue Cardizem 90mg PO q6h, Amiodarone 200mg daily  - Continue Eliquis  - Cardiac EP follow up 7/8/24 at 14:30 in EP clinic for wound check    RUE bleeding  Resolved. Developed in setting of supratherapeutic PTT / heparin drip. Appreciate input from Surgery who assisted with pressure dressing. Heparin drip was held and then restarted at lower rate. Bleeding resolved. Unclear whether she may have an additional component causing the issue, such as an acquired factor inhibitor in setting of possible cancer diagnosis. Discussed with Hematology Oncology Dr Turcios who will assist with workup.   - Continue to monitor    Acute on chronic anemia  Likely multi factorial. Concern for a possible underlying malignancy, splenic mass, etc. Leg hematoma that was noted upon admission. And then RUE bleeding a few days ago, since resolved. S/p PRBC transfusion in the setting of that bleeding.   - Continue to monitor    JUSTINO on CKD  Appreciate input from Nephrology. JUSTINO/KHAI. Fluid balance stable and electrolytes stable. Cr now improved.    Cr 1.3 today  - Continue to monitor    Splenic mass  Appreciate Hematology Oncology input. Possibly consistent with malignancy however no tissue ever obtained. There was outpatient plan for splenectomy. Oncology noted that consideration can be given to IR guided bx of splenic mass or splenectomy to help clarify diagnosis and prognosis. HCP wishes to defer at this point.   - Follow up with Hematology / Oncology    Thigh hematomas  On CT, suggestion of acute-appearing intramuscular and subcutaneous hematomas within the proximal L thigh. Indeterminate but chronic appearing mixed attenuation lesion along the lateral margin of the R hamstring musculature within the mid R thigh, possibly related to a subacute to chronic hematoma.   - Recommended short-term interval follow-up MRI of bilateral thighs with and without contrast in 8-12 weeks to demonstrate resolution/improvement of these findings and to rule out an underlying mass/neoplasm.    Deconditioning and debility  PT consulted.   - F/u PT eval    Dispo: 3 days iv meropenem  Bedside Cystoscopy after meropenem    POC discussed with pt, her sister, Rosalinda, team,

## 2024-07-13 NOTE — PROGRESS NOTE ADULT - SUBJECTIVE AND OBJECTIVE BOX
Pt seen, comfortable    MEDICATIONS  (STANDING):  aMIOdarone    Tablet 200 milliGRAM(s) Oral daily  apixaban 5 milliGRAM(s) Oral two times a day  atorvastatin 80 milliGRAM(s) Oral at bedtime  chlorhexidine 4% Liquid 1 Application(s) Topical <User Schedule>  clopidogrel Tablet 75 milliGRAM(s) Oral daily  diltiazem    Tablet 90 milliGRAM(s) Oral every 6 hours  ezetimibe 10 milliGRAM(s) Oral daily  lidocaine   4% Patch 1 Patch Transdermal daily  melatonin 3 milliGRAM(s) Oral at bedtime  meropenem Injectable 1000 milliGRAM(s) IV Push every 8 hours  nystatin Powder 1 Application(s) Topical two times a day    MEDICATIONS  (PRN):  acetaminophen     Tablet .. 650 milliGRAM(s) Oral every 6 hours PRN Mild Pain (1 - 3)  metoprolol tartrate Injectable 5 milliGRAM(s) IV Push every 6 hours PRN HR > 130  ondansetron Injectable 4 milliGRAM(s) IV Push every 6 hours PRN Nausea and/or Vomiting      ROS  No fever, sweats, chills  No epistaxis, HA, sore throat  No CP, SOB, cough, sputum  No n/v/d, abd pain, melena, hematochezia  No edema  No rash  No anxiety  No back pain, joint pain  No bleeding, bruising  No dysuria, hematuria    Vital Signs Last 24 Hrs  T(C): 36.6 (13 Jul 2024 09:02), Max: 37 (12 Jul 2024 16:32)  T(F): 97.8 (13 Jul 2024 09:02), Max: 98.6 (12 Jul 2024 16:32)  HR: 70 (13 Jul 2024 09:02) (70 - 81)  BP: 134/100 (13 Jul 2024 09:02) (114/92 - 163/57)  BP(mean): --  RR: 18 (13 Jul 2024 09:02) (18 - 18)  SpO2: 97% (13 Jul 2024 09:02) (94% - 97%)    Parameters below as of 13 Jul 2024 09:02  Patient On (Oxygen Delivery Method): room air        PE  NAD  Awake, alert    No c/c/e  No rash grossly  FROM                          8.6    9.13  )-----------( 377      ( 13 Jul 2024 07:27 )             26.9       07-13    143  |  113<H>  |  28<H>  ----------------------------<  139<H>  4.4   |  24  |  1.25    Ca    8.9      13 Jul 2024 07:27         normal... Well appearing, awake, alert, oriented to person, place, time/situation and in no apparent distress.

## 2024-07-14 LAB
-  AMPICILLIN: SIGNIFICANT CHANGE UP
-  CIPROFLOXACIN: SIGNIFICANT CHANGE UP
-  DAPTOMYCIN: SIGNIFICANT CHANGE UP
-  LEVOFLOXACIN: SIGNIFICANT CHANGE UP
-  LINEZOLID: SIGNIFICANT CHANGE UP
-  NITROFURANTOIN: SIGNIFICANT CHANGE UP
-  TETRACYCLINE: SIGNIFICANT CHANGE UP
-  VANCOMYCIN: SIGNIFICANT CHANGE UP
ANION GAP SERPL CALC-SCNC: 5 MMOL/L — SIGNIFICANT CHANGE UP (ref 5–17)
BUN SERPL-MCNC: 33 MG/DL — HIGH (ref 7–23)
CALCIUM SERPL-MCNC: 8.9 MG/DL — SIGNIFICANT CHANGE UP (ref 8.5–10.1)
CHLORIDE SERPL-SCNC: 112 MMOL/L — HIGH (ref 96–108)
CO2 SERPL-SCNC: 25 MMOL/L — SIGNIFICANT CHANGE UP (ref 22–31)
CREAT SERPL-MCNC: 1.55 MG/DL — HIGH (ref 0.5–1.3)
CULTURE RESULTS: ABNORMAL
EGFR: 36 ML/MIN/1.73M2 — LOW
GLUCOSE SERPL-MCNC: 147 MG/DL — HIGH (ref 70–99)
METHOD TYPE: SIGNIFICANT CHANGE UP
ORGANISM # SPEC MICROSCOPIC CNT: ABNORMAL
ORGANISM # SPEC MICROSCOPIC CNT: ABNORMAL
ORGANISM # SPEC MICROSCOPIC CNT: SIGNIFICANT CHANGE UP
POTASSIUM SERPL-MCNC: 4.3 MMOL/L — SIGNIFICANT CHANGE UP (ref 3.5–5.3)
POTASSIUM SERPL-SCNC: 4.3 MMOL/L — SIGNIFICANT CHANGE UP (ref 3.5–5.3)
SODIUM SERPL-SCNC: 142 MMOL/L — SIGNIFICANT CHANGE UP (ref 135–145)
SPECIMEN SOURCE: SIGNIFICANT CHANGE UP

## 2024-07-14 PROCEDURE — 99232 SBSQ HOSP IP/OBS MODERATE 35: CPT

## 2024-07-14 RX ORDER — LIDOCAINE HCL 28 MG/G
1 GEL TOPICAL DAILY
Refills: 0 | Status: DISCONTINUED | OUTPATIENT
Start: 2024-07-14 | End: 2024-07-17

## 2024-07-14 RX ORDER — SODIUM CHLORIDE 0.9 % (FLUSH) 0.9 %
1000 SYRINGE (ML) INJECTION
Refills: 0 | Status: DISCONTINUED | OUTPATIENT
Start: 2024-07-14 | End: 2024-07-16

## 2024-07-14 RX ADMIN — APIXABAN 5 MILLIGRAM(S): 5 TABLET, FILM COATED ORAL at 06:05

## 2024-07-14 RX ADMIN — MEROPENEM 1000 MILLIGRAM(S): 500 INJECTION, POWDER, FOR SOLUTION INTRAVENOUS at 06:04

## 2024-07-14 RX ADMIN — CLOPIDOGREL BISULFATE 75 MILLIGRAM(S): 75 TABLET, FILM COATED ORAL at 09:26

## 2024-07-14 RX ADMIN — LIDOCAINE HCL 1 PATCH: 28 GEL TOPICAL at 02:30

## 2024-07-14 RX ADMIN — DILTIAZEM HYDROCHLORIDE 90 MILLIGRAM(S): 240 CAPSULE, EXTENDED RELEASE ORAL at 06:05

## 2024-07-14 RX ADMIN — Medication 1 APPLICATION(S): at 23:51

## 2024-07-14 RX ADMIN — AMIODARONE HYDROCHLORIDE 200 MILLIGRAM(S): 50 INJECTION, SOLUTION INTRAVENOUS at 09:26

## 2024-07-14 RX ADMIN — APIXABAN 5 MILLIGRAM(S): 5 TABLET, FILM COATED ORAL at 17:56

## 2024-07-14 RX ADMIN — ATORVASTATIN CALCIUM 80 MILLIGRAM(S): 20 TABLET, FILM COATED ORAL at 21:58

## 2024-07-14 RX ADMIN — DILTIAZEM HYDROCHLORIDE 90 MILLIGRAM(S): 240 CAPSULE, EXTENDED RELEASE ORAL at 15:12

## 2024-07-14 RX ADMIN — Medication 650 MILLIGRAM(S): at 18:37

## 2024-07-14 RX ADMIN — Medication 3 MILLIGRAM(S): at 21:58

## 2024-07-14 RX ADMIN — DILTIAZEM HYDROCHLORIDE 90 MILLIGRAM(S): 240 CAPSULE, EXTENDED RELEASE ORAL at 19:56

## 2024-07-14 RX ADMIN — Medication 1 APPLICATION(S): at 11:24

## 2024-07-14 RX ADMIN — Medication 1 APPLICATION(S): at 06:20

## 2024-07-14 RX ADMIN — DILTIAZEM HYDROCHLORIDE 90 MILLIGRAM(S): 240 CAPSULE, EXTENDED RELEASE ORAL at 00:08

## 2024-07-14 RX ADMIN — LIDOCAINE HCL 1 PATCH: 28 GEL TOPICAL at 20:00

## 2024-07-14 RX ADMIN — MEROPENEM 1000 MILLIGRAM(S): 500 INJECTION, POWDER, FOR SOLUTION INTRAVENOUS at 15:11

## 2024-07-14 RX ADMIN — Medication 650 MILLIGRAM(S): at 17:55

## 2024-07-14 RX ADMIN — LIDOCAINE HCL 1 PATCH: 28 GEL TOPICAL at 09:26

## 2024-07-14 RX ADMIN — Medication 650 MILLIGRAM(S): at 06:04

## 2024-07-14 RX ADMIN — LIDOCAINE HCL 1 PATCH: 28 GEL TOPICAL at 22:00

## 2024-07-14 RX ADMIN — MEROPENEM 1000 MILLIGRAM(S): 500 INJECTION, POWDER, FOR SOLUTION INTRAVENOUS at 22:01

## 2024-07-14 RX ADMIN — Medication 650 MILLIGRAM(S): at 06:43

## 2024-07-14 RX ADMIN — EZETIMIBE 10 MILLIGRAM(S): 10 TABLET ORAL at 09:27

## 2024-07-14 RX ADMIN — Medication 60 MILLILITER(S): at 19:56

## 2024-07-14 NOTE — PROGRESS NOTE ADULT - ASSESSMENT
70 yo female with hx of learning disability, JUSTINO on CKD, DM II, HTN, A-fib s/p DCCV with h/o poor response to medications, also has ovarian and uterine CA, recent diagnosis of Splenic CA, CT Lumbar shows moderate stenosis and DDD, recent UTI, who presents as a code stroke due to sudden onset worsening of left sided weakness of LLE and LUE during PT also associated with slurred speech which has sinced resolved. Arrived with NIHSS of 8 with unknown LKW. Pt is a poor historian. History taken from EMR, ED MD and pt sister at bedside. UA positive concern for UTI, urine cx growing Proteus mirabilis - ESBL< abx changed to IV merrem.     1. Proteus mirabilis ESBL Complicated UTI   - imaging reviewed  - on with IV merrem 1gmq8h #3  - continue with abx coverage   - 3-5 day course  - monitor temps  - tolerating abx well so far; no side effects noted  - reason for abx use and side effects reviewed with patient  - supportive care  - fu cbc    IV to Po abx token not applicable to case

## 2024-07-14 NOTE — PROGRESS NOTE ADULT - ASSESSMENT
69 year old female with prior uterine and ovarian cancer without evidence of disease recurrence  Recent imaging reveals splenic mass : etiology not clear  She is admitted with embolic CVA   Bleeding from anticoagulation  She has an arrythmia and now post pacemaker placement    In terms of the splenic mass, etiology is unclear  There are no other sites of metastases    Would defer DUFFY of splenic mass till cardiac/ neurologic/ hematologic status have stabilized  Family wishes to defer at this time    Pt to undergo work up for hematuria including cystoscopy in the next few days after completes abx - 3 more days of meropenem planned    please call if any oncologic questions    Lamonte Turcios MD  [facility]  Cell: [phone]

## 2024-07-14 NOTE — PROGRESS NOTE ADULT - SUBJECTIVE AND OBJECTIVE BOX
Patient seen, resting    MEDICATIONS  (STANDING):  aMIOdarone    Tablet 200 milliGRAM(s) Oral daily  apixaban 5 milliGRAM(s) Oral two times a day  atorvastatin 80 milliGRAM(s) Oral at bedtime  chlorhexidine 4% Liquid 1 Application(s) Topical <User Schedule>  clopidogrel Tablet 75 milliGRAM(s) Oral daily  diltiazem    Tablet 90 milliGRAM(s) Oral every 6 hours  ezetimibe 10 milliGRAM(s) Oral daily  lidocaine   4% Patch 1 Patch Transdermal daily  lidocaine   4% Patch 1 Patch Transdermal daily  melatonin 3 milliGRAM(s) Oral at bedtime  meropenem Injectable 1000 milliGRAM(s) IV Push every 8 hours  nystatin Powder 1 Application(s) Topical two times a day  sodium chloride 0.9%. 1000 milliLiter(s) (60 mL/Hr) IV Continuous <Continuous>    MEDICATIONS  (PRN):  acetaminophen     Tablet .. 650 milliGRAM(s) Oral every 6 hours PRN Mild Pain (1 - 3)  metoprolol tartrate Injectable 5 milliGRAM(s) IV Push every 6 hours PRN HR > 130  ondansetron Injectable 4 milliGRAM(s) IV Push every 6 hours PRN Nausea and/or Vomiting  oxycodone    5 mG/acetaminophen 325 mG 1 Tablet(s) Oral every 6 hours PRN Moderate Pain (4 - 6)      ROS  No fever, sweats, chills  No epistaxis, HA, sore throat  No CP, SOB, cough, sputum  No n/v/d, abd pain, melena, hematochezia  No edema  No rash  No anxiety  No back pain, joint pain  No bleeding, bruising  No dysuria, hematuria    Vital Signs Last 24 Hrs  T(C): 36.6 (14 Jul 2024 09:16), Max: 36.7 (13 Jul 2024 15:54)  T(F): 97.9 (14 Jul 2024 09:16), Max: 98.1 (13 Jul 2024 20:52)  HR: 71 (14 Jul 2024 09:16) (70 - 72)  BP: 150/67 (14 Jul 2024 09:16) (134/83 - 172/71)  BP(mean): 95 (13 Jul 2024 23:37) (88 - 95)  RR: 18 (14 Jul 2024 09:16) (17 - 18)  SpO2: 94% (14 Jul 2024 09:16) (94% - 96%)    Parameters below as of 14 Jul 2024 09:16  Patient On (Oxygen Delivery Method): room air        PE  NAD  Awake, alert    No c/c/e  No rash grossly  FROM                          8.6    9.13  )-----------( 377      ( 13 Jul 2024 07:27 )             26.9       07-14    142  |  112<H>  |  33<H>  ----------------------------<  147<H>  4.3   |  25  |  1.55<H>    Ca    8.9      14 Jul 2024 08:23

## 2024-07-14 NOTE — PROGRESS NOTE ADULT - ASSESSMENT
69-year-old female with DM, HTN, afib, CKD, hx of multiple cancer diagnosis, admitted back on 6/20/24 for neurological changes, found to have an acute CVA, also found to have acute anemia likely related to hematoma status post RBC transfusion, splenic mass concerning for malignancy, and persisting atrial fibrillation that has been difficult to control at times. Admitted to Medicine.        Hematuria  -New hematuria overnight and present today. Unclear etiology. U/A noted - no significant pyuria. Asymptomatic.  Patient with hx of UTI - treated with Ceftriaxone earlier this admission  -Urology consult placed  -Monitor hematuria; resolved  -Monitor off abx. Sent culture     Recurrent UTI+ Hematuria: ESBL PrMi UTI   U cx from 7/8 : GNR; ; Proteus mirabilis ; ESBL; iv meropenem 3 days  pt's sister , Rosalinda, would like the Cysto and other urological procedures to be done  as in pt while she is here as the pt is bed bound  Spoke with Urology team; Dr. Acosta to do bedside Cystoscopy after 3 days of ABX   contact precautions    Increasing Cr:  iv fluids  BMP in am    Acute infarcts in the R hemisphere and possible L.    Appreciate input from Neurology. Likely embolic in setting of afib. Now s/p AVJ ablation and Micra leadless pacemaker insertion. Back on Eliquis.  - Continue Plavix and Eliquis, atorvastatin at bedtime.     Persistent Afib with difficult to control rates  Patient failed rhythm control. Was unable to control rates either. Patient is now s/p AVJ ablation with leadless PPM implant.   - Continue Cardizem 90mg PO q6h, Amiodarone 200mg daily  - Continue Eliquis  - Cardiac EP follow up 7/8/24 at 14:30 in EP clinic for wound check    RUE bleeding  Resolved. Developed in setting of supratherapeutic PTT / heparin drip. Appreciate input from Surgery who assisted with pressure dressing. Heparin drip was held and then restarted at lower rate. Bleeding resolved. Unclear whether she may have an additional component causing the issue, such as an acquired factor inhibitor in setting of possible cancer diagnosis. Discussed with Hematology Oncology Dr Turcios who will assist with workup.   - Continue to monitor    Acute on chronic anemia  Likely multi factorial. Concern for a possible underlying malignancy, splenic mass, etc. Leg hematoma that was noted upon admission. And then RUE bleeding a few days ago, since resolved. S/p PRBC transfusion in the setting of that bleeding.   - Continue to monitor    JUSTINO on CKD  Appreciate input from Nephrology. JUSTINO/KHAI. Fluid balance stable and electrolytes stable. Cr now improved.    Cr 1.3 today  - Continue to monitor    Splenic mass  Appreciate Hematology Oncology input. Possibly consistent with malignancy however no tissue ever obtained. There was outpatient plan for splenectomy. Oncology noted that consideration can be given to IR guided bx of splenic mass or splenectomy to help clarify diagnosis and prognosis. HCP wishes to defer at this point.   - Follow up with Hematology / Oncology    Thigh hematomas  On CT, suggestion of acute-appearing intramuscular and subcutaneous hematomas within the proximal L thigh. Indeterminate but chronic appearing mixed attenuation lesion along the lateral margin of the R hamstring musculature within the mid R thigh, possibly related to a subacute to chronic hematoma.   - Recommended short-term interval follow-up MRI of bilateral thighs with and without contrast in 8-12 weeks to demonstrate resolution/improvement of these findings and to rule out an underlying mass/neoplasm.    Deconditioning and debility  PT consulted.   - F/u PT eval    Dispo: 3 days iv meropenem  Bedside Cystoscopy after meropenem    POC discussed with pt, her sister, Rosalinda, team,  69-year-old female with DM, HTN, afib, CKD, hx of multiple cancer diagnosis, admitted back on 6/20/24 for neurological changes, found to have an acute CVA, also found to have acute anemia likely related to hematoma status post RBC transfusion, splenic mass concerning for malignancy, and persisting atrial fibrillation that has been difficult to control at times. Admitted to Medicine.        Hematuria  -New hematuria overnight and present today. Unclear etiology. U/A noted - no significant pyuria. Asymptomatic.  Patient with hx of UTI - treated with Ceftriaxone earlier this admission  -Urology consult placed  -Monitor hematuria; resolved  -Monitor off abx. Sent culture     Recurrent UTI+ Hematuria: ESBL PrMi UTI   U cx from 7/8 : GNR; ; Proteus mirabilis ; ESBL; iv meropenem 3 days  pt's sister , Rosalinda, would like the Cysto and other urological procedures to be done  as in pt while she is here as the pt is bed bound  Spoke with Urology team; Dr. Acosta to do bedside Cystoscopy after 3 days of ABX   contact precautions    Increasing Cr:  iv fluids  BMP in am    Right Hip pain; lidocaine patch      Acute infarcts in the R hemisphere and possible L.    Appreciate input from Neurology. Likely embolic in setting of afib. Now s/p AVJ ablation and Micra leadless pacemaker insertion. Back on Eliquis.  - Continue Plavix and Eliquis, atorvastatin at bedtime.     Persistent Afib with difficult to control rates  Patient failed rhythm control. Was unable to control rates either. Patient is now s/p AVJ ablation with leadless PPM implant.   - Continue Cardizem 90mg PO q6h, Amiodarone 200mg daily  - Continue Eliquis  - Cardiac EP follow up 7/8/24 at 14:30 in EP clinic for wound check    RUE bleeding  Resolved. Developed in setting of supratherapeutic PTT / heparin drip. Appreciate input from Surgery who assisted with pressure dressing. Heparin drip was held and then restarted at lower rate. Bleeding resolved. Unclear whether she may have an additional component causing the issue, such as an acquired factor inhibitor in setting of possible cancer diagnosis. Discussed with Hematology Oncology Dr Turcios who will assist with workup.   - Continue to monitor    Acute on chronic anemia  Likely multi factorial. Concern for a possible underlying malignancy, splenic mass, etc. Leg hematoma that was noted upon admission. And then RUE bleeding a few days ago, since resolved. S/p PRBC transfusion in the setting of that bleeding.   - Continue to monitor    JUSTINO on CKD  Appreciate input from Nephrology. JUSTINO/KHAI. Fluid balance stable and electrolytes stable. Cr now improved.    - Continue to monitor  iv fluids    Splenic mass  Appreciate Hematology Oncology input. Possibly consistent with malignancy however no tissue ever obtained. There was outpatient plan for splenectomy. Oncology noted that consideration can be given to IR guided bx of splenic mass or splenectomy to help clarify diagnosis and prognosis. HCP wishes to defer at this point.   - Follow up with Hematology / Oncology    Thigh hematomas  On CT, suggestion of acute-appearing intramuscular and subcutaneous hematomas within the proximal L thigh. Indeterminate but chronic appearing mixed attenuation lesion along the lateral margin of the R hamstring musculature within the mid R thigh, possibly related to a subacute to chronic hematoma.   - Recommended short-term interval follow-up MRI of bilateral thighs with and without contrast in 8-12 weeks to demonstrate resolution/improvement of these findings and to rule out an underlying mass/neoplasm.    Deconditioning and debility  PT consulted.   - F/u PT eval    Dispo: 3 days iv meropenem  Bedside Cystoscopy after meropenem    POC discussed with pt, her sister, Rosalinda, team,

## 2024-07-14 NOTE — PROGRESS NOTE ADULT - SUBJECTIVE AND OBJECTIVE BOX
Date of service: 07-14-24 @ 12:42      pt seen and examined  no o/.n events  afebrile    ROS: no fever or chills; denies dizziness, no HA, no SOB or cough, no abdominal pain, no diarrhea or constipation;  no legs pain, no rashes    MEDICATIONS  (STANDING):  aMIOdarone    Tablet 200 milliGRAM(s) Oral daily  apixaban 5 milliGRAM(s) Oral two times a day  atorvastatin 80 milliGRAM(s) Oral at bedtime  chlorhexidine 4% Liquid 1 Application(s) Topical <User Schedule>  clopidogrel Tablet 75 milliGRAM(s) Oral daily  diltiazem    Tablet 90 milliGRAM(s) Oral every 6 hours  ezetimibe 10 milliGRAM(s) Oral daily  lidocaine   4% Patch 1 Patch Transdermal daily  lidocaine   4% Patch 1 Patch Transdermal daily  melatonin 3 milliGRAM(s) Oral at bedtime  meropenem Injectable 1000 milliGRAM(s) IV Push every 8 hours  nystatin Powder 1 Application(s) Topical two times a day  sodium chloride 0.9%. 1000 milliLiter(s) (60 mL/Hr) IV Continuous <Continuous>      Vital Signs Last 24 Hrs  T(C): 36.4 (14 Jul 2024 20:00), Max: 36.7 (13 Jul 2024 20:52)  T(F): 97.5 (14 Jul 2024 20:00), Max: 98.1 (13 Jul 2024 20:52)  HR: 70 (14 Jul 2024 20:00) (70 - 72)  BP: 141/66 (14 Jul 2024 20:00) (134/83 - 155/61)  BP(mean): 95 (13 Jul 2024 23:37) (88 - 95)  RR: 18 (14 Jul 2024 20:00) (17 - 18)  SpO2: 98% (14 Jul 2024 20:00) (94% - 98%)    Parameters below as of 14 Jul 2024 20:00  Patient On (Oxygen Delivery Method): room air              PE:  Constitutional: frail looking  HEENT: NC/AT, EOMI, PERRLA, conjunctivae clear; ears and nose atraumatic; pharynx benign  Neck: supple; thyroid not palpable  Back: no tenderness  Respiratory: respiratory effort normal; clear to auscultation  Cardiovascular: S1S2 regular, no murmurs  Abdomen: soft, not tender, not distended, positive BS; liver and spleen WNL  Genitourinary: no suprapubic tenderness  Lymphatic: no LN palpable  Musculoskeletal: no muscle tenderness, no joint swelling or tenderness  Extremities: no pedal edema  Neurological/ Psychiatric:  moving all extremities  Skin: no rashes; no palpable lesions    Labs: all available labs reviewed           Culture - Urine (07.08.24 @ 15:00)   - Ampicillin: R >16 These ampicillin results predict results for amoxicillin  - Ampicillin/Sulbactam: S <=4/2  - Aztreonam: R 8  - Cefazolin: R >16 For uncomplicated UTI with K. pneumoniae, E. coli, or P. mirablis: MARCO <=16 is sensitive and MARCO >=32 is resistant. This also predicts results for oral agents cefaclor, cefdinir, cefpodoxime, cefprozil, cefuroxime axetil, cephalexin and locarbef for uncomplicated UTI. Note that some isolates may be susceptible to these agents while testing resistant to cefazolin.  - Cefepime: R >16  - Ceftriaxone: R >32  - Cefuroxime: R >16  - Ciprofloxacin: R >2  - Ertapenem: S <=0.5  - Gentamicin: R >8  - Levofloxacin: R >4  - Meropenem: S <=1  - Nitrofurantoin: R 64 Should not be used to treat pyelonephritis  - Piperacillin/Tazobactam: S <=8  - Tobramycin: R 8  - Trimethoprim/Sulfamethoxazole: R >2/38  Specimen Source: Clean Catch Clean Catch (Midstream)  Culture Results:   >100,000 CFU/ml Proteus mirabilis ESBL  Organism Identification: Proteus mirabilis ESBL  Organism: Proteus mirabilis ESBL  Method Type: MARCO        Radiology: all available radiological tests reviewed  < from: Xray Chest 1 View- PORTABLE-Urgent (07.03.24 @ 13:00) >    ACC: [id number] EXAM:  XR CHEST PORTABLE URGENT 1V   ORDERED BY: CALIXTO VILLARREAL     PROCEDURE DATE:  07/03/2024          INTERPRETATION:  AP chest on July 3, 2024 at 12:46 PM. Patient had Micra   pacemaker insertion.    Elevated right hemidiaphragm again noted. Heart magnified by technique.    Lungs appear clear similar to April 19.    Present film shows a Micra pacemaker slightly to the left of the midline.    IMPRESSION: Micra pacemaker insertion.    --- End of Report ---    < end of copied text >    Advanced directives addressed: full resuscitation

## 2024-07-14 NOTE — PROGRESS NOTE ADULT - SUBJECTIVE AND OBJECTIVE BOX
HOSPITALIST PROGRESS NOTE    07/04/24: No acute events overnight. Tolerated AVJ ablation with leadless PPM implantation yesterday. No acute complaints. R arm inspected, swelling improving. Scattered bruising stable. Remains neurovascularly intact in that extremity. Now awaiting PPM check. Discharge / placement.    07/05/24: Patient seen and examined. No new issues.   07/06/24: No more bradycardia. Discussed with sister yesterday regarding plan of care  07/07/24: Patient seen and examined. No new issues.   07/08/24: New hematuria overnight. Patient denies any symptoms. U/A, culture ordered. Sister updated at bedside  7/9: hematuria resolved  U cx awaited    7/10: no complaints  U cx: GNR; iv ceftriaxone started    7/11: no complaints  u cx : Proteus mirabilis; await sx    7/12: ESBL UTI; meropenem started  no complaints    7/13: no complaints    7/14: c/o right hip pain; asking for patch  Cr increased. start low dose iv fluids    ROS: All 10 systems reviewed and found to be negative with the exception of what has been described above.     VITAL SIGNS:    Vital Signs Last 24 Hrs  T(C): 36.6 (14 Jul 2024 09:16), Max: 36.7 (13 Jul 2024 15:54)  T(F): 97.9 (14 Jul 2024 09:16), Max: 98.1 (13 Jul 2024 20:52)  HR: 71 (14 Jul 2024 09:16) (70 - 72)  BP: 150/67 (14 Jul 2024 09:16) (134/83 - 172/71)  BP(mean): 95 (13 Jul 2024 23:37) (88 - 95)  RR: 18 (14 Jul 2024 09:16) (17 - 18)  SpO2: 94% (14 Jul 2024 09:16) (94% - 96%)    Parameters below as of 14 Jul 2024 09:16  Patient On (Oxygen Delivery Method): room air      PHYSICAL EXAM:  Gen: Comfortable  HEENT: NCAT PERRL EOMI MMM clear oropharynx  Neck: Supple, no LAD  CVS: s1 s2 normal, regular, rate  Chest: Normal resp effort at rest, lungs CTA B/L  Abd: +BS, soft, NT ND   Ext: RUE with mild swelling, no tenderness, scattered bruises, no longer with active bleeding from old IV site. R hand sensation and motor function intact. Normal peripheral pulses.   Skin: Warm, dry  Neuro: Awake and alert, answers simple questions, follows simple commands    ALLERGIES:  Allergies    Bee stings (Swelling)  No Known Drug Allergies    Intolerances      All Labs/EKG/Radiology/Meds reviewed    Lab Results:  CBC  CBC Full  -  ( 13 Jul 2024 07:27 )  WBC Count : 9.13 K/uL  RBC Count : 2.92 M/uL  Hemoglobin : 8.6 g/dL  Hematocrit : 26.9 %  Platelet Count - Automated : 377 K/uL  Mean Cell Volume : 92.1 fl  Mean Cell Hemoglobin : 29.5 pg  Mean Cell Hemoglobin Concentration : 32.0 gm/dL  Auto Neutrophil # : x  Auto Lymphocyte # : x  Auto Monocyte # : x  Auto Eosinophil # : x  Auto Basophil # : x  Auto Neutrophil % : x  Auto Lymphocyte % : x  Auto Monocyte % : x  Auto Eosinophil % : x  Auto Basophil % : x    .		Differential:	[] Automated		[] Manual  Chemistry  07-14    142  |  112<H>  |  33<H>  ----------------------------<  147<H>  4.3   |  25  |  1.55<H>    Ca    8.9      14 Jul 2024 08:23          Urinalysis Basic - ( 14 Jul 2024 08:23 )    Color: x / Appearance: x / SG: x / pH: x  Gluc: 147 mg/dL / Ketone: x  / Bili: x / Urobili: x   Blood: x / Protein: x / Nitrite: x   Leuk Esterase: x / RBC: x / WBC x   Sq Epi: x / Non Sq Epi: x / Bacteria: x        MICROBIOLOGY/CULTURES:  Culture Results:   >100,000 CFU/ml Proteus mirabilis ESBL  50,000 - 99,000 CFU/mL Enterococcus faecium (vancomycin resistant) (07-08 @ 15:00)                  MICROBIOLOGY/CULTURES:  Culture Results:   >100,000 CFU/ml Proteus mirabilis ESBL (07-08 @ 15:00)            07-10-24 @ 07:01  -  07-11-24 @ 07:00  --------------------------------------------------------  IN: 0 mL / OUT: 500 mL / NET: -500 mL      MICROBIOLOGY/CULTURES:  Culture Results:   >100,000 CFU/ml Proteus mirabilis (07-08 @ 15:00)            Lab Results:  CBC  CBC Full  -  ( 09 Jul 2024 09:19 )  WBC Count : 10.19 K/uL  RBC Count : 2.87 M/uL  Hemoglobin : 8.5 g/dL  Hematocrit : 26.4 %  Platelet Count - Automated : 306 K/uL  Mean Cell Volume : 92.0 fl  Mean Cell Hemoglobin : 29.6 pg  Mean Cell Hemoglobin Concentration : 32.2 gm/dL  Auto Neutrophil # : x  Auto Lymphocyte # : x  Auto Monocyte # : x  Auto Eosinophil # : x  Auto Basophil # : x  Auto Neutrophil % : x  Auto Lymphocyte % : x  Auto Monocyte % : x  Auto Eosinophil % : x  Auto Basophil % : x    .		Differential:	[] Automated		[] Manual  Chemistry  07-09    142  |  112<H>  |  25<H>  ----------------------------<  111<H>  4.5   |  24  |  1.15    Ca    8.8      09 Jul 2024 09:19          Urinalysis Basic - ( 09 Jul 2024 09:19 )    Color: x / Appearance: x / SG: x / pH: x  Gluc: 111 mg/dL / Ketone: x  / Bili: x / Urobili: x   Blood: x / Protein: x / Nitrite: x   Leuk Esterase: x / RBC: x / WBC x   Sq Epi: x / Non Sq Epi: x / Bacteria: x        MICROBIOLOGY/CULTURES:  Culture Results:   >100,000 CFU/ml Gram Negative Rods (07-08 @ 15:00)        .

## 2024-07-15 LAB
ANION GAP SERPL CALC-SCNC: 5 MMOL/L — SIGNIFICANT CHANGE UP (ref 5–17)
APTT BLD: 36.3 SEC — HIGH (ref 24.5–35.6)
BUN SERPL-MCNC: 30 MG/DL — HIGH (ref 7–23)
CALCIUM SERPL-MCNC: 8.7 MG/DL — SIGNIFICANT CHANGE UP (ref 8.5–10.1)
CHLORIDE SERPL-SCNC: 114 MMOL/L — HIGH (ref 96–108)
CO2 SERPL-SCNC: 25 MMOL/L — SIGNIFICANT CHANGE UP (ref 22–31)
CREAT SERPL-MCNC: 1.19 MG/DL — SIGNIFICANT CHANGE UP (ref 0.5–1.3)
EGFR: 49 ML/MIN/1.73M2 — LOW
GLUCOSE SERPL-MCNC: 106 MG/DL — HIGH (ref 70–99)
HCT VFR BLD CALC: 25.6 % — LOW (ref 34.5–45)
HGB BLD-MCNC: 8.1 G/DL — LOW (ref 11.5–15.5)
INR BLD: 1.95 RATIO — HIGH (ref 0.85–1.18)
MCHC RBC-ENTMCNC: 29.6 PG — SIGNIFICANT CHANGE UP (ref 27–34)
MCHC RBC-ENTMCNC: 31.6 GM/DL — LOW (ref 32–36)
MCV RBC AUTO: 93.4 FL — SIGNIFICANT CHANGE UP (ref 80–100)
PLATELET # BLD AUTO: 404 K/UL — HIGH (ref 150–400)
POTASSIUM SERPL-MCNC: 4.5 MMOL/L — SIGNIFICANT CHANGE UP (ref 3.5–5.3)
POTASSIUM SERPL-SCNC: 4.5 MMOL/L — SIGNIFICANT CHANGE UP (ref 3.5–5.3)
PROTHROM AB SERPL-ACNC: 21.6 SEC — HIGH (ref 9.5–13)
RBC # BLD: 2.74 M/UL — LOW (ref 3.8–5.2)
RBC # FLD: 15.8 % — HIGH (ref 10.3–14.5)
SODIUM SERPL-SCNC: 144 MMOL/L — SIGNIFICANT CHANGE UP (ref 135–145)
WBC # BLD: 8.76 K/UL — SIGNIFICANT CHANGE UP (ref 3.8–10.5)
WBC # FLD AUTO: 8.76 K/UL — SIGNIFICANT CHANGE UP (ref 3.8–10.5)

## 2024-07-15 PROCEDURE — 99232 SBSQ HOSP IP/OBS MODERATE 35: CPT

## 2024-07-15 RX ADMIN — LIDOCAINE HCL 1 PATCH: 28 GEL TOPICAL at 09:32

## 2024-07-15 RX ADMIN — CLOPIDOGREL BISULFATE 75 MILLIGRAM(S): 75 TABLET, FILM COATED ORAL at 09:31

## 2024-07-15 RX ADMIN — DILTIAZEM HYDROCHLORIDE 90 MILLIGRAM(S): 240 CAPSULE, EXTENDED RELEASE ORAL at 17:48

## 2024-07-15 RX ADMIN — Medication 60 MILLILITER(S): at 11:46

## 2024-07-15 RX ADMIN — Medication 1 APPLICATION(S): at 07:45

## 2024-07-15 RX ADMIN — LIDOCAINE HCL 1 PATCH: 28 GEL TOPICAL at 20:00

## 2024-07-15 RX ADMIN — APIXABAN 5 MILLIGRAM(S): 5 TABLET, FILM COATED ORAL at 06:33

## 2024-07-15 RX ADMIN — DILTIAZEM HYDROCHLORIDE 90 MILLIGRAM(S): 240 CAPSULE, EXTENDED RELEASE ORAL at 06:33

## 2024-07-15 RX ADMIN — LIDOCAINE HCL 1 PATCH: 28 GEL TOPICAL at 09:31

## 2024-07-15 RX ADMIN — ATORVASTATIN CALCIUM 80 MILLIGRAM(S): 20 TABLET, FILM COATED ORAL at 21:56

## 2024-07-15 RX ADMIN — AMIODARONE HYDROCHLORIDE 200 MILLIGRAM(S): 50 INJECTION, SOLUTION INTRAVENOUS at 09:31

## 2024-07-15 RX ADMIN — LIDOCAINE HCL 1 PATCH: 28 GEL TOPICAL at 22:00

## 2024-07-15 RX ADMIN — Medication 60 MILLILITER(S): at 21:56

## 2024-07-15 RX ADMIN — DILTIAZEM HYDROCHLORIDE 90 MILLIGRAM(S): 240 CAPSULE, EXTENDED RELEASE ORAL at 11:46

## 2024-07-15 RX ADMIN — APIXABAN 5 MILLIGRAM(S): 5 TABLET, FILM COATED ORAL at 17:48

## 2024-07-15 RX ADMIN — Medication 1 APPLICATION(S): at 09:31

## 2024-07-15 RX ADMIN — DILTIAZEM HYDROCHLORIDE 90 MILLIGRAM(S): 240 CAPSULE, EXTENDED RELEASE ORAL at 00:48

## 2024-07-15 RX ADMIN — EZETIMIBE 10 MILLIGRAM(S): 10 TABLET ORAL at 09:31

## 2024-07-15 RX ADMIN — Medication 1 APPLICATION(S): at 21:56

## 2024-07-15 RX ADMIN — LIDOCAINE HCL 1 PATCH: 28 GEL TOPICAL at 20:24

## 2024-07-15 RX ADMIN — DILTIAZEM HYDROCHLORIDE 90 MILLIGRAM(S): 240 CAPSULE, EXTENDED RELEASE ORAL at 23:23

## 2024-07-15 RX ADMIN — MEROPENEM 1000 MILLIGRAM(S): 500 INJECTION, POWDER, FOR SOLUTION INTRAVENOUS at 06:32

## 2024-07-15 RX ADMIN — Medication 3 MILLIGRAM(S): at 21:56

## 2024-07-15 NOTE — PROGRESS NOTE ADULT - ASSESSMENT
69 year old female with prior uterine and ovarian cancer without evidence of disease recurrence  Recent imaging reveals splenic mass : etiology not clear  She is admitted with embolic CVA   Bleeding from anticoagulation  She has an arrythmia and now post pacemaker placement    In terms of the splenic mass, etiology is unclear  There are no other sites of metastases    Would defer DUFFY of splenic mass till cardiac/ neurologic/ hematologic status have stabilized  Family wishes to defer at this time    Pt to undergo work up for hematuria including cystoscopy in the next few days after completes abx     please call if any oncologic questions    Lamonte Turcios MD  [facility]  Cell: [phone]

## 2024-07-15 NOTE — PROGRESS NOTE ADULT - ASSESSMENT
69-year-old female with DM, HTN, afib, CKD, hx of multiple cancer diagnosis, admitted back on 6/20/24 for neurological changes, found to have an acute CVA, also found to have acute anemia likely related to hematoma status post RBC transfusion, splenic mass concerning for malignancy, and persisting atrial fibrillation that has been difficult to control at times. Admitted to Medicine.        Hematuria  -New hematuria overnight and present today. Unclear etiology. U/A noted - no significant pyuria. Asymptomatic.  Patient with hx of UTI - treated with Ceftriaxone earlier this admission  -Urology consult placed  -Monitor hematuria; resolved  -Monitor off abx. Sent culture     Recurrent UTI+ Hematuria: ESBL PrMi UTI   U cx from 7/8 : GNR; ; Proteus mirabilis ; ESBL; iv meropenem 3 days; completed  pt's sister , Rosalinda, would like the Cysto and other urological procedures to be done  as in pt while she is here as the pt is bed bound  Spoke with Urology team; Dr. Acosta to do bedside Cystoscopy after 3 days of ABX   contact precautions  cysto 7/17    Increasing Cr:  iv fluids to continue  cr normalized; BUN still up but better  BMP in am    Right Hip pain; lidocaine patch      Acute infarcts in the R hemisphere and possible L.    Appreciate input from Neurology. Likely embolic in setting of afib. Now s/p AVJ ablation and Micra leadless pacemaker insertion. Back on Eliquis.  - Continue Plavix and Eliquis, atorvastatin at bedtime.     Persistent Afib with difficult to control rates  Patient failed rhythm control. Was unable to control rates either. Patient is now s/p AVJ ablation with leadless PPM implant.   - Continue Cardizem 90mg PO q6h, Amiodarone 200mg daily  - Continue Eliquis  - Cardiac EP follow up 7/8/24 at 14:30 in EP clinic for wound check    RUE bleeding  Resolved. Developed in setting of supratherapeutic PTT / heparin drip. Appreciate input from Surgery who assisted with pressure dressing. Heparin drip was held and then restarted at lower rate. Bleeding resolved. Unclear whether she may have an additional component causing the issue, such as an acquired factor inhibitor in setting of possible cancer diagnosis. Discussed with Hematology Oncology Dr Turcios who will assist with workup.   - Continue to monitor    Acute on chronic anemia  Likely multi factorial. Concern for a possible underlying malignancy, splenic mass, etc. Leg hematoma that was noted upon admission. And then RUE bleeding a few days ago, since resolved. S/p PRBC transfusion in the setting of that bleeding.   - Continue to monitor    JUSTINO on CKD  Appreciate input from Nephrology. JUSTINO/KHAI. Fluid balance stable and electrolytes stable. Cr now improved.    - Continue to monitor  iv fluids    Splenic mass  Appreciate Hematology Oncology input. Possibly consistent with malignancy however no tissue ever obtained. There was outpatient plan for splenectomy. Oncology noted that consideration can be given to IR guided bx of splenic mass or splenectomy to help clarify diagnosis and prognosis. HCP wishes to defer at this point.   - Follow up with Hematology / Oncology    Thigh hematomas  On CT, suggestion of acute-appearing intramuscular and subcutaneous hematomas within the proximal L thigh. Indeterminate but chronic appearing mixed attenuation lesion along the lateral margin of the R hamstring musculature within the mid R thigh, possibly related to a subacute to chronic hematoma.   - Recommended short-term interval follow-up MRI of bilateral thighs with and without contrast in 8-12 weeks to demonstrate resolution/improvement of these findings and to rule out an underlying mass/neoplasm.    Deconditioning and debility  PT consulted.   - F/u PT eval    Dispo: cysto 7/17  Bedside Cystoscopy after meropenem    POC discussed with pt, her sister, Rosalinda, team,

## 2024-07-15 NOTE — PROGRESS NOTE ADULT - SUBJECTIVE AND OBJECTIVE BOX
HOSPITALIST PROGRESS NOTE    07/04/24: No acute events overnight. Tolerated AVJ ablation with leadless PPM implantation yesterday. No acute complaints. R arm inspected, swelling improving. Scattered bruising stable. Remains neurovascularly intact in that extremity. Now awaiting PPM check. Discharge / placement.    07/05/24: Patient seen and examined. No new issues.   07/06/24: No more bradycardia. Discussed with sister yesterday regarding plan of care  07/07/24: Patient seen and examined. No new issues.   07/08/24: New hematuria overnight. Patient denies any symptoms. U/A, culture ordered. Sister updated at bedside  7/9: hematuria resolved  U cx awaited    7/10: no complaints  U cx: GNR; iv ceftriaxone started    7/11: no complaints  u cx : Proteus mirabilis; await sx    7/12: ESBL UTI; meropenem started  no complaints    7/13: no complaints    7/14: c/o right hip pain; asking for patch  Cr increased. start low dose iv fluids    7/15: hip pain better  Cr normalized;  BUN still elevated  cont iv fluids today  cysto on 7/17    ROS: All 10 systems reviewed and found to be negative with the exception of what has been described above.     VITAL SIGNS:    Vital Signs Last 24 Hrs  T(C): 36.4 (15 Jul 2024 08:57), Max: 36.8 (15 Jul 2024 06:00)  T(F): 97.5 (15 Jul 2024 08:57), Max: 98.2 (15 Jul 2024 06:00)  HR: 70 (15 Jul 2024 10:21) (70 - 79)  BP: 130/50 (15 Jul 2024 10:21) (117/49 - 145/62)  BP(mean): --  RR: 18 (15 Jul 2024 08:57) (18 - 18)  SpO2: 99% (15 Jul 2024 08:57) (97% - 100%)    Parameters below as of 15 Jul 2024 08:57  Patient On (Oxygen Delivery Method): room air          PHYSICAL EXAM:  Gen: Comfortable  HEENT: NCAT PERRL EOMI MMM clear oropharynx  Neck: Supple, no LAD  CVS: s1 s2 normal, regular, rate  Chest: Normal resp effort at rest, lungs CTA B/L  Abd: +BS, soft, NT ND   Ext: RUE with mild swelling, no tenderness, scattered bruises, no longer with active bleeding from old IV site. R hand sensation and motor function intact. Normal peripheral pulses.   Skin: Warm, dry  Neuro: Awake and alert, answers simple questions, follows simple commands    ALLERGIES:  Allergies    Bee stings (Swelling)  No Known Drug Allergies    Intolerances    Vital Signs Last 24 Hrs  T(C): 36.4 (15 Jul 2024 08:57), Max: 36.8 (15 Jul 2024 06:00)  T(F): 97.5 (15 Jul 2024 08:57), Max: 98.2 (15 Jul 2024 06:00)  HR: 70 (15 Jul 2024 10:21) (70 - 79)  BP: 130/50 (15 Jul 2024 10:21) (117/49 - 145/62)  BP(mean): --  RR: 18 (15 Jul 2024 08:57) (18 - 18)  SpO2: 99% (15 Jul 2024 08:57) (97% - 100%)    Parameters below as of 15 Jul 2024 08:57  Patient On (Oxygen Delivery Method): room air        Lab Results:  CBC  CBC Full  -  ( 13 Jul 2024 07:27 )  WBC Count : 9.13 K/uL  RBC Count : 2.92 M/uL  Hemoglobin : 8.6 g/dL  Hematocrit : 26.9 %  Platelet Count - Automated : 377 K/uL  Mean Cell Volume : 92.1 fl  Mean Cell Hemoglobin : 29.5 pg  Mean Cell Hemoglobin Concentration : 32.0 gm/dL  Auto Neutrophil # : x  Auto Lymphocyte # : x  Auto Monocyte # : x  Auto Eosinophil # : x  Auto Basophil # : x  Auto Neutrophil % : x  Auto Lymphocyte % : x  Auto Monocyte % : x  Auto Eosinophil % : x  Auto Basophil % : x    .		Differential:	[] Automated		[] Manual  Chemistry  07-14    142  |  112<H>  |  33<H>  ----------------------------<  147<H>  4.3   |  25  |  1.55<H>    Ca    8.9      14 Jul 2024 08:23          Urinalysis Basic - ( 14 Jul 2024 08:23 )    Color: x / Appearance: x / SG: x / pH: x  Gluc: 147 mg/dL / Ketone: x  / Bili: x / Urobili: x   Blood: x / Protein: x / Nitrite: x   Leuk Esterase: x / RBC: x / WBC x   Sq Epi: x / Non Sq Epi: x / Bacteria: x        MICROBIOLOGY/CULTURES:  Culture Results:   >100,000 CFU/ml Proteus mirabilis ESBL  50,000 - 99,000 CFU/mL Enterococcus faecium (vancomycin resistant) (07-08 @ 15:00)                  MICROBIOLOGY/CULTURES:  Culture Results:   >100,000 CFU/ml Proteus mirabilis ESBL (07-08 @ 15:00)            07-10-24 @ 07:01  -  07-11-24 @ 07:00  --------------------------------------------------------  IN: 0 mL / OUT: 500 mL / NET: -500 mL      MICROBIOLOGY/CULTURES:  Culture Results:   >100,000 CFU/ml Proteus mirabilis (07-08 @ 15:00)            Lab Results:  CBC  CBC Full  -  ( 09 Jul 2024 09:19 )  WBC Count : 10.19 K/uL  RBC Count : 2.87 M/uL  Hemoglobin : 8.5 g/dL  Hematocrit : 26.4 %  Platelet Count - Automated : 306 K/uL  Mean Cell Volume : 92.0 fl  Mean Cell Hemoglobin : 29.6 pg  Mean Cell Hemoglobin Concentration : 32.2 gm/dL  Auto Neutrophil # : x  Auto Lymphocyte # : x  Auto Monocyte # : x  Auto Eosinophil # : x  Auto Basophil # : x  Auto Neutrophil % : x  Auto Lymphocyte % : x  Auto Monocyte % : x  Auto Eosinophil % : x  Auto Basophil % : x    .		Differential:	[] Automated		[] Manual  Chemistry  07-09    142  |  112<H>  |  25<H>  ----------------------------<  111<H>  4.5   |  24  |  1.15    Ca    8.8      09 Jul 2024 09:19          Urinalysis Basic - ( 09 Jul 2024 09:19 )    Color: x / Appearance: x / SG: x / pH: x  Gluc: 111 mg/dL / Ketone: x  / Bili: x / Urobili: x   Blood: x / Protein: x / Nitrite: x   Leuk Esterase: x / RBC: x / WBC x   Sq Epi: x / Non Sq Epi: x / Bacteria: x        MICROBIOLOGY/CULTURES:  Culture Results:   >100,000 CFU/ml Gram Negative Rods (07-08 @ 15:00)        .

## 2024-07-15 NOTE — PROGRESS NOTE ADULT - SUBJECTIVE AND OBJECTIVE BOX
Patient seen, still c/o pink colored urine, awaiting cystoscopy plan    MEDICATIONS  (STANDING):  aMIOdarone    Tablet 200 milliGRAM(s) Oral daily  apixaban 5 milliGRAM(s) Oral two times a day  atorvastatin 80 milliGRAM(s) Oral at bedtime  chlorhexidine 4% Liquid 1 Application(s) Topical <User Schedule>  clopidogrel Tablet 75 milliGRAM(s) Oral daily  diltiazem    Tablet 90 milliGRAM(s) Oral every 6 hours  ezetimibe 10 milliGRAM(s) Oral daily  lidocaine   4% Patch 1 Patch Transdermal daily  lidocaine   4% Patch 1 Patch Transdermal daily  melatonin 3 milliGRAM(s) Oral at bedtime  nystatin Powder 1 Application(s) Topical two times a day  sodium chloride 0.9%. 1000 milliLiter(s) (60 mL/Hr) IV Continuous <Continuous>    MEDICATIONS  (PRN):  acetaminophen     Tablet .. 650 milliGRAM(s) Oral every 6 hours PRN Mild Pain (1 - 3)  metoprolol tartrate Injectable 5 milliGRAM(s) IV Push every 6 hours PRN HR > 130  ondansetron Injectable 4 milliGRAM(s) IV Push every 6 hours PRN Nausea and/or Vomiting  oxycodone    5 mG/acetaminophen 325 mG 1 Tablet(s) Oral every 6 hours PRN Moderate Pain (4 - 6)      ROS  No fever, sweats, chills     Vital Signs Last 24 Hrs  T(C): 36.4 (15 Jul 2024 08:57), Max: 36.8 (15 Jul 2024 06:00)  T(F): 97.5 (15 Jul 2024 08:57), Max: 98.2 (15 Jul 2024 06:00)  HR: 77 (15 Jul 2024 08:57) (70 - 79)  BP: 138/49 (15 Jul 2024 08:57) (117/49 - 145/62)  BP(mean): --  RR: 18 (15 Jul 2024 08:57) (18 - 18)  SpO2: 99% (15 Jul 2024 08:57) (97% - 100%)    Parameters below as of 15 Jul 2024 08:57  Patient On (Oxygen Delivery Method): room air        PE  NAD  Awake, alert     No rash grossly  FROM                          8.1    8.76  )-----------( 404      ( 15 Jul 2024 08:17 )             25.6       07-15    144  |  114<H>  |  30<H>  ----------------------------<  106<H>  4.5   |  25  |  1.19    Ca    8.7      15 Jul 2024 08:17

## 2024-07-16 LAB
ANION GAP SERPL CALC-SCNC: 6 MMOL/L — SIGNIFICANT CHANGE UP (ref 5–17)
BUN SERPL-MCNC: 28 MG/DL — HIGH (ref 7–23)
CALCIUM SERPL-MCNC: 8.5 MG/DL — SIGNIFICANT CHANGE UP (ref 8.5–10.1)
CHLORIDE SERPL-SCNC: 115 MMOL/L — HIGH (ref 96–108)
CO2 SERPL-SCNC: 22 MMOL/L — SIGNIFICANT CHANGE UP (ref 22–31)
CREAT SERPL-MCNC: 1.07 MG/DL — SIGNIFICANT CHANGE UP (ref 0.5–1.3)
EGFR: 56 ML/MIN/1.73M2 — LOW
GLUCOSE SERPL-MCNC: 115 MG/DL — HIGH (ref 70–99)
POTASSIUM SERPL-MCNC: 4.1 MMOL/L — SIGNIFICANT CHANGE UP (ref 3.5–5.3)
POTASSIUM SERPL-SCNC: 4.1 MMOL/L — SIGNIFICANT CHANGE UP (ref 3.5–5.3)
SODIUM SERPL-SCNC: 143 MMOL/L — SIGNIFICANT CHANGE UP (ref 135–145)

## 2024-07-16 PROCEDURE — 99232 SBSQ HOSP IP/OBS MODERATE 35: CPT

## 2024-07-16 PROCEDURE — 52000 CYSTOURETHROSCOPY: CPT

## 2024-07-16 RX ADMIN — Medication 650 MILLIGRAM(S): at 08:36

## 2024-07-16 RX ADMIN — CLOPIDOGREL BISULFATE 75 MILLIGRAM(S): 75 TABLET, FILM COATED ORAL at 09:25

## 2024-07-16 RX ADMIN — Medication 1 APPLICATION(S): at 09:25

## 2024-07-16 RX ADMIN — Medication 1 APPLICATION(S): at 22:07

## 2024-07-16 RX ADMIN — AMIODARONE HYDROCHLORIDE 200 MILLIGRAM(S): 50 INJECTION, SOLUTION INTRAVENOUS at 09:26

## 2024-07-16 RX ADMIN — DILTIAZEM HYDROCHLORIDE 90 MILLIGRAM(S): 240 CAPSULE, EXTENDED RELEASE ORAL at 23:44

## 2024-07-16 RX ADMIN — Medication 650 MILLIGRAM(S): at 07:56

## 2024-07-16 RX ADMIN — DILTIAZEM HYDROCHLORIDE 90 MILLIGRAM(S): 240 CAPSULE, EXTENDED RELEASE ORAL at 06:30

## 2024-07-16 RX ADMIN — Medication 1 APPLICATION(S): at 05:29

## 2024-07-16 RX ADMIN — Medication 650 MILLIGRAM(S): at 01:06

## 2024-07-16 RX ADMIN — APIXABAN 5 MILLIGRAM(S): 5 TABLET, FILM COATED ORAL at 06:30

## 2024-07-16 RX ADMIN — LIDOCAINE HCL 1 PATCH: 28 GEL TOPICAL at 09:26

## 2024-07-16 RX ADMIN — APIXABAN 5 MILLIGRAM(S): 5 TABLET, FILM COATED ORAL at 17:21

## 2024-07-16 RX ADMIN — DILTIAZEM HYDROCHLORIDE 90 MILLIGRAM(S): 240 CAPSULE, EXTENDED RELEASE ORAL at 17:21

## 2024-07-16 RX ADMIN — ATORVASTATIN CALCIUM 80 MILLIGRAM(S): 20 TABLET, FILM COATED ORAL at 22:07

## 2024-07-16 RX ADMIN — Medication 3 MILLIGRAM(S): at 22:07

## 2024-07-16 RX ADMIN — DILTIAZEM HYDROCHLORIDE 90 MILLIGRAM(S): 240 CAPSULE, EXTENDED RELEASE ORAL at 12:13

## 2024-07-16 RX ADMIN — EZETIMIBE 10 MILLIGRAM(S): 10 TABLET ORAL at 09:25

## 2024-07-16 RX ADMIN — Medication 650 MILLIGRAM(S): at 22:07

## 2024-07-16 NOTE — PROGRESS NOTE ADULT - SUBJECTIVE AND OBJECTIVE BOX
HOSPITALIST PROGRESS NOTE    07/04/24: No acute events overnight. Tolerated AVJ ablation with leadless PPM implantation yesterday. No acute complaints. R arm inspected, swelling improving. Scattered bruising stable. Remains neurovascularly intact in that extremity. Now awaiting PPM check. Discharge / placement.    07/05/24: Patient seen and examined. No new issues.   07/06/24: No more bradycardia. Discussed with sister yesterday regarding plan of care  07/07/24: Patient seen and examined. No new issues.   07/08/24: New hematuria overnight. Patient denies any symptoms. U/A, culture ordered. Sister updated at bedside  7/9: hematuria resolved  U cx awaited    7/10: no complaints  U cx: GNR; iv ceftriaxone started    7/11: no complaints  u cx : Proteus mirabilis; await sx    7/12: ESBL UTI; meropenem started  no complaints    7/13: no complaints    7/14: c/o right hip pain; asking for patch  Cr increased. start low dose iv fluids    7/15: hip pain better  Cr normalized;  BUN still elevated  cont iv fluids today  cysto on 7/17 7/16: c/o right hip pain  bedside cysto today  BUN/cr better    iv fluids stopped    ROS: All 10 systems reviewed and found to be negative with the exception of what has been described above.     VITAL SIGNS:    Vital Signs Last 24 Hrs  T(C): 36.9 (16 Jul 2024 12:03), Max: 37.2 (16 Jul 2024 09:37)  T(F): 98.4 (16 Jul 2024 12:03), Max: 99 (16 Jul 2024 09:37)  HR: 61 (16 Jul 2024 12:03) (61 - 74)  BP: 138/77 (16 Jul 2024 12:03) (130/54 - 145/51)  BP(mean): --  RR: 18 (16 Jul 2024 12:03) (18 - 18)  SpO2: 94% (16 Jul 2024 12:03) (94% - 98%)    Parameters below as of 16 Jul 2024 12:03  Patient On (Oxygen Delivery Method): room air        PHYSICAL EXAM:  Gen: Comfortable  HEENT: NCAT PERRL EOMI MMM clear oropharynx  Neck: Supple, no LAD  CVS: s1 s2 normal, regular, rate  Chest: Normal resp effort at rest, lungs CTA B/L  Abd: +BS, soft, NT ND   Ext: RUE with mild swelling, no tenderness, scattered bruises, no longer with active bleeding from old IV site. R hand sensation and motor function intact. Normal peripheral pulses.   Skin: Warm, dry  Neuro: Awake and alert, answers simple questions, follows simple commands    ALLERGIES:  Allergies    Bee stings (Swelling)  No Known Drug Allergies    Intolerances    All Labs/EKG/Radiology/Meds reviewed    Lab Results:  CBC  CBC Full  -  ( 15 Jul 2024 08:17 )  WBC Count : 8.76 K/uL  RBC Count : 2.74 M/uL  Hemoglobin : 8.1 g/dL  Hematocrit : 25.6 %  Platelet Count - Automated : 404 K/uL  Mean Cell Volume : 93.4 fl  Mean Cell Hemoglobin : 29.6 pg  Mean Cell Hemoglobin Concentration : 31.6 gm/dL  Auto Neutrophil # : x  Auto Lymphocyte # : x  Auto Monocyte # : x  Auto Eosinophil # : x  Auto Basophil # : x  Auto Neutrophil % : x  Auto Lymphocyte % : x  Auto Monocyte % : x  Auto Eosinophil % : x  Auto Basophil % : x    .		Differential:	[] Automated		[] Manual  Chemistry  07-16    143  |  115<H>  |  28<H>  ----------------------------<  115<H>  4.1   |  22  |  1.07    Ca    8.5      16 Jul 2024 08:09        PT/INR - ( 15 Jul 2024 08:17 )   PT: 21.6 sec;   INR: 1.95 ratio         PTT - ( 15 Jul 2024 08:17 )  PTT:36.3 sec  Urinalysis Basic - ( 16 Jul 2024 08:09 )    Color: x / Appearance: x / SG: x / pH: x  Gluc: 115 mg/dL / Ketone: x  / Bili: x / Urobili: x   Blood: x / Protein: x / Nitrite: x   Leuk Esterase: x / RBC: x / WBC x   Sq Epi: x / Non Sq Epi: x / Bacteria: x        MICROBIOLOGY/CULTURES:              Lab Results:  CBC  CBC Full  -  ( 13 Jul 2024 07:27 )  WBC Count : 9.13 K/uL  RBC Count : 2.92 M/uL  Hemoglobin : 8.6 g/dL  Hematocrit : 26.9 %  Platelet Count - Automated : 377 K/uL  Mean Cell Volume : 92.1 fl  Mean Cell Hemoglobin : 29.5 pg  Mean Cell Hemoglobin Concentration : 32.0 gm/dL  Auto Neutrophil # : x  Auto Lymphocyte # : x  Auto Monocyte # : x  Auto Eosinophil # : x  Auto Basophil # : x  Auto Neutrophil % : x  Auto Lymphocyte % : x  Auto Monocyte % : x  Auto Eosinophil % : x  Auto Basophil % : x    .		Differential:	[] Automated		[] Manual  Chemistry  07-14    142  |  112<H>  |  33<H>  ----------------------------<  147<H>  4.3   |  25  |  1.55<H>    Ca    8.9      14 Jul 2024 08:23          Urinalysis Basic - ( 14 Jul 2024 08:23 )    Color: x / Appearance: x / SG: x / pH: x  Gluc: 147 mg/dL / Ketone: x  / Bili: x / Urobili: x   Blood: x / Protein: x / Nitrite: x   Leuk Esterase: x / RBC: x / WBC x   Sq Epi: x / Non Sq Epi: x / Bacteria: x        MICROBIOLOGY/CULTURES:  Culture Results:   >100,000 CFU/ml Proteus mirabilis ESBL  50,000 - 99,000 CFU/mL Enterococcus faecium (vancomycin resistant) (07-08 @ 15:00)                  MICROBIOLOGY/CULTURES:  Culture Results:   >100,000 CFU/ml Proteus mirabilis ESBL (07-08 @ 15:00)            07-10-24 @ 07:01  -  07-11-24 @ 07:00  --------------------------------------------------------  IN: 0 mL / OUT: 500 mL / NET: -500 mL      MICROBIOLOGY/CULTURES:  Culture Results:   >100,000 CFU/ml Proteus mirabilis (07-08 @ 15:00)            Lab Results:  CBC  CBC Full  -  ( 09 Jul 2024 09:19 )  WBC Count : 10.19 K/uL  RBC Count : 2.87 M/uL  Hemoglobin : 8.5 g/dL  Hematocrit : 26.4 %  Platelet Count - Automated : 306 K/uL  Mean Cell Volume : 92.0 fl  Mean Cell Hemoglobin : 29.6 pg  Mean Cell Hemoglobin Concentration : 32.2 gm/dL  Auto Neutrophil # : x  Auto Lymphocyte # : x  Auto Monocyte # : x  Auto Eosinophil # : x  Auto Basophil # : x  Auto Neutrophil % : x  Auto Lymphocyte % : x  Auto Monocyte % : x  Auto Eosinophil % : x  Auto Basophil % : x    .		Differential:	[] Automated		[] Manual  Chemistry  07-09    142  |  112<H>  |  25<H>  ----------------------------<  111<H>  4.5   |  24  |  1.15    Ca    8.8      09 Jul 2024 09:19          Urinalysis Basic - ( 09 Jul 2024 09:19 )    Color: x / Appearance: x / SG: x / pH: x  Gluc: 111 mg/dL / Ketone: x  / Bili: x / Urobili: x   Blood: x / Protein: x / Nitrite: x   Leuk Esterase: x / RBC: x / WBC x   Sq Epi: x / Non Sq Epi: x / Bacteria: x        MICROBIOLOGY/CULTURES:  Culture Results:   >100,000 CFU/ml Gram Negative Rods (07-08 @ 15:00)        .

## 2024-07-16 NOTE — PROGRESS NOTE ADULT - SUBJECTIVE AND OBJECTIVE BOX
Patient noted with positive urine culture being treated with IV antibiotics. Patient's sister/family prefers for cystoscopy to be done in house prior to discharge due to pt's mobility. Discussed with Dr. Alvares (ID) who states patient has been on enough days of antibiotics and can proceed with a cystoscopy.   - Plan for Dr. Acosta to do bedside cystoscopy today (7/16/24)    Discussed with Dr. Acosta

## 2024-07-16 NOTE — PROGRESS NOTE ADULT - ASSESSMENT
69-year-old female with DM, HTN, afib, CKD, hx of multiple cancer diagnosis, admitted back on 6/20/24 for neurological changes, found to have an acute CVA, also found to have acute anemia likely related to hematoma status post RBC transfusion, splenic mass concerning for malignancy, and persisting atrial fibrillation that has been difficult to control at times. Admitted to Medicine.        Hematuria  -New hematuria overnight and present today. Unclear etiology. U/A noted - no significant pyuria. Asymptomatic.  Patient with hx of UTI - treated with Ceftriaxone earlier this admission  -Urology consult placed  -Monitor hematuria; resolved  -Monitor off abx. Sent culture     Recurrent UTI+ Hematuria: ESBL PrMi UTI   U cx from 7/8 : GNR; ; Proteus mirabilis ; ESBL; iv meropenem 3 days; completed  pt's sister , Rosalinda, would like the Cysto and other urological procedures to be done  as in pt while she is here as the pt is bed bound  Spoke with Urology team; Dr. Acosta to do bedside Cystoscopy after 3 days of ABX   contact precautions  cysto 7/16    Increasing Cr:  iv fluids to continue  cr normalized; BUN still up but better  BMP in am    Right Hip pain; lidocaine patch      Acute infarcts in the R hemisphere and possible L.    Appreciate input from Neurology. Likely embolic in setting of afib. Now s/p AVJ ablation and Micra leadless pacemaker insertion. Back on Eliquis.  - Continue Plavix and Eliquis, atorvastatin at bedtime.     Persistent Afib with difficult to control rates  Patient failed rhythm control. Was unable to control rates either. Patient is now s/p AVJ ablation with leadless PPM implant.   - Continue Cardizem 90mg PO q6h, Amiodarone 200mg daily  - Continue Eliquis  - Cardiac EP follow up 7/8/24 at 14:30 in EP clinic for wound check    RUE bleeding  Resolved. Developed in setting of supratherapeutic PTT / heparin drip. Appreciate input from Surgery who assisted with pressure dressing. Heparin drip was held and then restarted at lower rate. Bleeding resolved. Unclear whether she may have an additional component causing the issue, such as an acquired factor inhibitor in setting of possible cancer diagnosis. Discussed with Hematology Oncology Dr Turcios who will assist with workup.   - Continue to monitor    Acute on chronic anemia  Likely multi factorial. Concern for a possible underlying malignancy, splenic mass, etc. Leg hematoma that was noted upon admission. And then RUE bleeding a few days ago, since resolved. S/p PRBC transfusion in the setting of that bleeding.   - Continue to monitor    JUSTINO on CKD  Appreciate input from Nephrology. JUSTINO/KHAI. Fluid balance stable and electrolytes stable. Cr now improved.    - Continue to monitor  iv fluids    Splenic mass  Appreciate Hematology Oncology input. Possibly consistent with malignancy however no tissue ever obtained. There was outpatient plan for splenectomy. Oncology noted that consideration can be given to IR guided bx of splenic mass or splenectomy to help clarify diagnosis and prognosis. HCP wishes to defer at this point.   - Follow up with Hematology / Oncology    Thigh hematomas  On CT, suggestion of acute-appearing intramuscular and subcutaneous hematomas within the proximal L thigh. Indeterminate but chronic appearing mixed attenuation lesion along the lateral margin of the R hamstring musculature within the mid R thigh, possibly related to a subacute to chronic hematoma.   - Recommended short-term interval follow-up MRI of bilateral thighs with and without contrast in 8-12 weeks to demonstrate resolution/improvement of these findings and to rule out an underlying mass/neoplasm.    Deconditioning and debility  PT consulted.   - F/u PT eval    Dispo: cysto 7/16      POC discussed with pt, her sister, Rosalinda, team,

## 2024-07-16 NOTE — PROGRESS NOTE ADULT - ASSESSMENT
69 year old female with prior uterine and ovarian cancer without evidence of disease recurrence  Recent imaging reveals splenic mass : etiology not clear  She is admitted with embolic CVA   Bleeding from anticoagulation  She has an arrythmia and now post pacemaker placement    In terms of the splenic mass, etiology is unclear  There are no other sites of metastases    Would defer DUFFY of splenic mass till cardiac/ neurologic/ hematologic status have stabilized  Family wishes to defer at this time    Pt to undergo work up for hematuria including bedside cystoscopy today    please call if any oncologic questions    Lamonte Turcios MD  [facility]  Cell: [phone]

## 2024-07-16 NOTE — PROGRESS NOTE ADULT - SUBJECTIVE AND OBJECTIVE BOX
Patient seen, comfortable    MEDICATIONS  (STANDING):  aMIOdarone    Tablet 200 milliGRAM(s) Oral daily  apixaban 5 milliGRAM(s) Oral two times a day  atorvastatin 80 milliGRAM(s) Oral at bedtime  chlorhexidine 4% Liquid 1 Application(s) Topical <User Schedule>  clopidogrel Tablet 75 milliGRAM(s) Oral daily  diltiazem    Tablet 90 milliGRAM(s) Oral every 6 hours  ezetimibe 10 milliGRAM(s) Oral daily  lidocaine   4% Patch 1 Patch Transdermal daily  lidocaine   4% Patch 1 Patch Transdermal daily  melatonin 3 milliGRAM(s) Oral at bedtime  nystatin Powder 1 Application(s) Topical two times a day    MEDICATIONS  (PRN):  acetaminophen     Tablet .. 650 milliGRAM(s) Oral every 6 hours PRN Mild Pain (1 - 3)  metoprolol tartrate Injectable 5 milliGRAM(s) IV Push every 6 hours PRN HR > 130  ondansetron Injectable 4 milliGRAM(s) IV Push every 6 hours PRN Nausea and/or Vomiting  oxycodone    5 mG/acetaminophen 325 mG 1 Tablet(s) Oral every 6 hours PRN Moderate Pain (4 - 6)      ROS  No fever, sweats, chills      Vital Signs Last 24 Hrs  T(C): 37.2 (16 Jul 2024 15:36), Max: 37.2 (16 Jul 2024 09:37)  T(F): 98.9 (16 Jul 2024 15:36), Max: 99 (16 Jul 2024 09:37)  HR: 71 (16 Jul 2024 15:36) (61 - 72)  BP: 134/75 (16 Jul 2024 15:36) (130/54 - 145/51)  BP(mean): 95 (16 Jul 2024 15:36) (95 - 95)  RR: 17 (16 Jul 2024 15:36) (17 - 18)  SpO2: 97% (16 Jul 2024 15:36) (94% - 98%)    Parameters below as of 16 Jul 2024 15:36  Patient On (Oxygen Delivery Method): room air        PE  NAD  Awake, alert    No rash grossly  FROM                          8.1    8.76  )-----------( 404      ( 15 Jul 2024 08:17 )             25.6       07-16    143  |  115<H>  |  28<H>  ----------------------------<  115<H>  4.1   |  22  |  1.07    Ca    8.5      16 Jul 2024 08:09

## 2024-07-17 ENCOUNTER — TRANSCRIPTION ENCOUNTER (OUTPATIENT)
Age: 69
End: 2024-07-17

## 2024-07-17 VITALS
HEART RATE: 71 BPM | DIASTOLIC BLOOD PRESSURE: 51 MMHG | SYSTOLIC BLOOD PRESSURE: 136 MMHG | TEMPERATURE: 98 F | RESPIRATION RATE: 17 BRPM | OXYGEN SATURATION: 95 %

## 2024-07-17 LAB
ANION GAP SERPL CALC-SCNC: 7 MMOL/L — SIGNIFICANT CHANGE UP (ref 5–17)
BUN SERPL-MCNC: 24 MG/DL — HIGH (ref 7–23)
CALCIUM SERPL-MCNC: 8.4 MG/DL — LOW (ref 8.5–10.1)
CHLORIDE SERPL-SCNC: 116 MMOL/L — HIGH (ref 96–108)
CO2 SERPL-SCNC: 21 MMOL/L — LOW (ref 22–31)
CREAT SERPL-MCNC: 0.97 MG/DL — SIGNIFICANT CHANGE UP (ref 0.5–1.3)
EGFR: 63 ML/MIN/1.73M2 — SIGNIFICANT CHANGE UP
GLUCOSE SERPL-MCNC: 98 MG/DL — SIGNIFICANT CHANGE UP (ref 70–99)
HCT VFR BLD CALC: 24.4 % — LOW (ref 34.5–45)
HCT VFR BLD CALC: 26.8 % — LOW (ref 34.5–45)
HGB BLD-MCNC: 7.7 G/DL — LOW (ref 11.5–15.5)
HGB BLD-MCNC: 8.7 G/DL — LOW (ref 11.5–15.5)
MCHC RBC-ENTMCNC: 29.4 PG — SIGNIFICANT CHANGE UP (ref 27–34)
MCHC RBC-ENTMCNC: 30.1 PG — SIGNIFICANT CHANGE UP (ref 27–34)
MCHC RBC-ENTMCNC: 31.6 GM/DL — LOW (ref 32–36)
MCHC RBC-ENTMCNC: 32.5 GM/DL — SIGNIFICANT CHANGE UP (ref 32–36)
MCV RBC AUTO: 92.7 FL — SIGNIFICANT CHANGE UP (ref 80–100)
MCV RBC AUTO: 93.1 FL — SIGNIFICANT CHANGE UP (ref 80–100)
PLATELET # BLD AUTO: 379 K/UL — SIGNIFICANT CHANGE UP (ref 150–400)
PLATELET # BLD AUTO: 435 K/UL — HIGH (ref 150–400)
POTASSIUM SERPL-MCNC: 4 MMOL/L — SIGNIFICANT CHANGE UP (ref 3.5–5.3)
POTASSIUM SERPL-SCNC: 4 MMOL/L — SIGNIFICANT CHANGE UP (ref 3.5–5.3)
RBC # BLD: 2.62 M/UL — LOW (ref 3.8–5.2)
RBC # BLD: 2.89 M/UL — LOW (ref 3.8–5.2)
RBC # FLD: 16 % — HIGH (ref 10.3–14.5)
RBC # FLD: 16 % — HIGH (ref 10.3–14.5)
SODIUM SERPL-SCNC: 144 MMOL/L — SIGNIFICANT CHANGE UP (ref 135–145)
WBC # BLD: 6.72 K/UL — SIGNIFICANT CHANGE UP (ref 3.8–10.5)
WBC # BLD: 6.85 K/UL — SIGNIFICANT CHANGE UP (ref 3.8–10.5)
WBC # FLD AUTO: 6.72 K/UL — SIGNIFICANT CHANGE UP (ref 3.8–10.5)
WBC # FLD AUTO: 6.85 K/UL — SIGNIFICANT CHANGE UP (ref 3.8–10.5)

## 2024-07-17 PROCEDURE — 93279 PRGRMG DEV EVAL PM/LDLS PM: CPT | Mod: 26

## 2024-07-17 PROCEDURE — 99239 HOSP IP/OBS DSCHRG MGMT >30: CPT

## 2024-07-17 RX ORDER — CLOPIDOGREL BISULFATE 75 MG/1
1 TABLET, FILM COATED ORAL
Qty: 0 | Refills: 0 | DISCHARGE
Start: 2024-07-17

## 2024-07-17 RX ORDER — DILTIAZEM HYDROCHLORIDE 240 MG/1
1 CAPSULE, EXTENDED RELEASE ORAL
Qty: 0 | Refills: 0 | DISCHARGE
Start: 2024-07-17

## 2024-07-17 RX ORDER — APIXABAN 5 MG/1
1 TABLET, FILM COATED ORAL
Qty: 0 | Refills: 0 | DISCHARGE
Start: 2024-07-17

## 2024-07-17 RX ORDER — LIDOCAINE HCL 28 MG/G
1 GEL TOPICAL
Qty: 0 | Refills: 0 | DISCHARGE
Start: 2024-07-17

## 2024-07-17 RX ORDER — LEVETIRACETAM 100 MG/ML
1 INJECTION INTRAVENOUS
Refills: 0 | DISCHARGE

## 2024-07-17 RX ORDER — AMIODARONE HYDROCHLORIDE 50 MG/ML
1 INJECTION, SOLUTION INTRAVENOUS
Qty: 0 | Refills: 0 | DISCHARGE
Start: 2024-07-17

## 2024-07-17 RX ADMIN — DILTIAZEM HYDROCHLORIDE 90 MILLIGRAM(S): 240 CAPSULE, EXTENDED RELEASE ORAL at 06:36

## 2024-07-17 RX ADMIN — LIDOCAINE HCL 1 PATCH: 28 GEL TOPICAL at 09:29

## 2024-07-17 RX ADMIN — DILTIAZEM HYDROCHLORIDE 90 MILLIGRAM(S): 240 CAPSULE, EXTENDED RELEASE ORAL at 18:10

## 2024-07-17 RX ADMIN — APIXABAN 5 MILLIGRAM(S): 5 TABLET, FILM COATED ORAL at 06:36

## 2024-07-17 RX ADMIN — CLOPIDOGREL BISULFATE 75 MILLIGRAM(S): 75 TABLET, FILM COATED ORAL at 09:29

## 2024-07-17 RX ADMIN — Medication 1 APPLICATION(S): at 06:10

## 2024-07-17 RX ADMIN — DILTIAZEM HYDROCHLORIDE 90 MILLIGRAM(S): 240 CAPSULE, EXTENDED RELEASE ORAL at 12:42

## 2024-07-17 RX ADMIN — EZETIMIBE 10 MILLIGRAM(S): 10 TABLET ORAL at 09:28

## 2024-07-17 RX ADMIN — APIXABAN 5 MILLIGRAM(S): 5 TABLET, FILM COATED ORAL at 18:10

## 2024-07-17 RX ADMIN — AMIODARONE HYDROCHLORIDE 200 MILLIGRAM(S): 50 INJECTION, SOLUTION INTRAVENOUS at 09:28

## 2024-07-17 RX ADMIN — Medication 650 MILLIGRAM(S): at 06:37

## 2024-07-17 RX ADMIN — Medication 1 APPLICATION(S): at 09:33

## 2024-07-17 NOTE — DISCHARGE NOTE PROVIDER - NSDCMRMEDTOKEN_GEN_ALL_CORE_FT
amiodarone 200 mg oral tablet: 1 tab(s) orally once a day  apixaban 5 mg oral tablet: 1 tab(s) orally 2 times a day  atorvastatin 40 mg oral tablet: 1 tab(s) orally once a day  bisacodyl 10 mg rectal suppository: 1 suppository(ies) rectally once a day  clopidogrel 75 mg oral tablet: 1 tab(s) orally once a day  dilTIAZem 90 mg oral tablet: 1 tab(s) orally every 6 hours  ezetimibe 10 mg oral tablet: 1 tab(s) orally once a day  gabapentin 100 mg oral capsule: 1 cap(s) orally 2 times a day  insulin lispro 100 units/mL subcutaneous solution: subcutaneous *** As per sliding scale; if 0-149=0 units if BS remains &lt; 70 after initiating protocol call MD; 150-199= 2 UNITS; 200-249= 4 UNITS; 250-299=60 UNITS; 300-349=8 UNITS; 350- 399= 10 UNITS; 400+ = Notify MD for directions, subcutaneously before meals for DM.  Keppra 250 mg oral tablet: 1 tab(s) orally 2 times a day  lidocaine 4% topical film: Apply topically to affected area once a day  lidocaine 4% topical film: Apply topically to affected area once a day  melatonin 3 mg oral tablet: 1 tab(s) orally once a day (at bedtime)  Micatin 2% topical cream: Apply topically to affected area 2 times a day  Milk of Magnesia 8% oral suspension: 30 milliliter(s) orally as needed for  mirtazapine 7.5 mg oral tablet: 1 tab(s) orally once a day (at bedtime)  Oyster Shell Calcium 500 mg oral tablet: 1 tab(s) orally once a day (in the morning)  Toprol- mg oral tablet, extended release: 1 tab(s) orally once a day  traMADol 50 mg oral tablet: 0.5 tab(s) orally 2 times a day  Tylenol 500 mg oral tablet: 2 tab(s) orally 3 times a day   amiodarone 200 mg oral tablet: 1 tab(s) orally once a day  apixaban 5 mg oral tablet: 1 tab(s) orally 2 times a day  atorvastatin 40 mg oral tablet: 1 tab(s) orally once a day  bisacodyl 10 mg rectal suppository: 1 suppository(ies) rectally once a day  dilTIAZem 90 mg oral tablet: 1 tab(s) orally every 6 hours  ezetimibe 10 mg oral tablet: 1 tab(s) orally once a day  gabapentin 100 mg oral capsule: 1 cap(s) orally 2 times a day  insulin lispro 100 units/mL subcutaneous solution: subcutaneous *** As per sliding scale; if 0-149=0 units if BS remains &lt; 70 after initiating protocol call MD; 150-199= 2 UNITS; 200-249= 4 UNITS; 250-299=60 UNITS; 300-349=8 UNITS; 350- 399= 10 UNITS; 400+ = Notify MD for directions, subcutaneously before meals for DM.  lidocaine 4% topical film: Apply topically to affected area once a day  lidocaine 4% topical film: Apply topically to affected area once a day  melatonin 3 mg oral tablet: 1 tab(s) orally once a day (at bedtime)  Micatin 2% topical cream: Apply topically to affected area 2 times a day  Milk of Magnesia 8% oral suspension: 30 milliliter(s) orally as needed for  mirtazapine 7.5 mg oral tablet: 1 tab(s) orally once a day (at bedtime)  Oyster Shell Calcium 500 mg oral tablet: 1 tab(s) orally once a day (in the morning)  Toprol- mg oral tablet, extended release: 1 tab(s) orally once a day  traMADol 50 mg oral tablet: 0.5 tab(s) orally 2 times a day  Tylenol 500 mg oral tablet: 2 tab(s) orally 3 times a day

## 2024-07-17 NOTE — DISCHARGE NOTE PROVIDER - CARE PROVIDER_API CALL
Nikhil Lin  Cardiac Electrophysiology  [address] [place] [address]  Phone: [phone]  Fax: [phone]  Follow Up Time:     Cooper Charlton  Neurology  [address], [address]  [place] [address]  Phone: [phone]  Fax: [phone]  Follow Up Time: 1 month    Khoi Zambrano  Cardiovascular Disease  [address], [address]  [place] [address]  Phone: [phone]  Fax: [phone]  Follow Up Time:     Lamonte Turcios  Hematology  [address], [address]  [place] [address]  Phone: [phone]  Fax: [phone]  Follow Up Time:

## 2024-07-17 NOTE — DISCHARGE NOTE NURSING/CASE MANAGEMENT/SOCIAL WORK - PATIENT PORTAL LINK FT
You can access the FollowMyHealth Patient Portal offered by [facility] by registering at the following website: [email or web address]. By joining [employer]’s FollowMyHealth portal, you will also be able to view your health information using other applications (apps) compatible with our system.

## 2024-07-17 NOTE — DISCHARGE NOTE PROVIDER - NSDCCAREPROVSEEN_GEN_ALL_CORE_FT
Chuy, Soniya Acosta, John Alvares, Rosa Acosta, Stan Redd, Deepa Weaver, Jose Maria Morales, Alex Vazquez, Jagjit Vines, Maria Del Carmen Michel, Krunal Hickey, Lexx Rivero, Guillermina Carter, Joe Jarrett, Lala Hinojosa, Silvio Dougherty, Tao Turcios, Lamonte Alex, See Gutierrez, RichardHyeon

## 2024-07-17 NOTE — DISCHARGE NOTE PROVIDER - HOSPITAL COURSE
Admission HPI: 68 yo female with hx of learning disability, JUSTINO on CKD, DM II, HTN, A-fib s/p DCCV with h/o poor response to medications, also has ovarian and uterine CA, recent diagnosis of Splenic CA, CT Lumbar shows moderate stenosis and DDD, recent UTI, who presents as a code stroke due to sudden onset worsening of left sided weakness of LLE and LUE during PT also associated with slurred speech which has sinced resolved. Arrived with NIHSS of 8 with unknown LKW. Pt is a poor historian. History taken from EMR, ED MD and pt sister at bedside.     During hospital stay, patient found to have acute infarcts. s/p AVJ ablation with leadless PPM implant. Course complicated by hematuria and UTI. Treated for ESBL UTI. s/p cystoscopy which was unremarkable. Patient seen and examined on day of discharge. Stable for discharge to [facility].     Hospital course (by problem):   #Hematuria, resolved  -Urology consult placed  -s/p Cystoscopy 7/16, unremarkable   -s/p tx for ESBL PrMi UTI    #Recurrent UTI+ Hematuria: ESBL PrMi UTI  U cx from 7/8 : GNR; ; Proteus mirabilis ; ESBL; iv meropenem 3 days; completed  -s/p Cystoscopy 7/16, unremarkable     #Increasing Cr:  iv fluids to continue  cr normalized; BUN still up but improved    #Right Hip pain; lidocaine patch    #Acute infarcts in the R hemisphere and possible L.    Appreciate input from Neurology. Likely embolic in setting of afib. Now s/p AVJ ablation and Micra leadless pacemaker insertion. Back on Eliquis.  - Continue Plavix and Eliquis, atorvastatin at bedtime.     #Persistent Afib with difficult to control rates  Patient failed rhythm control. Was unable to control rates either. Patient is now s/p AVJ ablation with leadless PPM implant.   - Continue Cardizem 90mg PO q6h, Amiodarone 200mg daily  - Continue Eliquis  - Cardiac EP follow up outpatient    #RUE bleeding, resolved   Resolved. Developed in setting of supratherapeutic PTT / heparin drip. Appreciate input from Surgery who assisted with pressure dressing. Heparin drip was held and then restarted at lower rate. Bleeding resolved.    - Continue to monitor    #Acute on chronic anemia  Likely multi factorial. Concern for a possible underlying malignancy, splenic mass, etc. Leg hematoma that was noted upon admission. And then RUE bleeding a few days ago, since resolved. S/p PRBC transfusion in the setting of that bleeding.   -H/H stable    #Splenic mass  Appreciate Hematology Oncology input. Possibly consistent with malignancy however no tissue ever obtained. There was outpatient plan for splenectomy. Oncology noted that consideration can be given to IR guided bx of splenic mass or splenectomy to help clarify diagnosis and prognosis. HCP wishes to defer at this point.     #Thigh hematomas  On CT, suggestion of acute-appearing intramuscular and subcutaneous hematomas within the proximal L thigh. Indeterminate but chronic appearing mixed attenuation lesion along the lateral margin of the R hamstring musculature within the mid R thigh, possibly related to a subacute to chronic hematoma.   - Recommended short-term interval follow-up MRI of bilateral thighs with and without contrast in 8-12 weeks to demonstrate resolution/improvement of these findings and to rule out an underlying mass/neoplasm.    #Deconditioning and debility  PT consulted. Discharge to [facility]    Physical exam at the time of discharge:  LOS: 27d    VITALS:   T(C): 36.2 (07-17-24 @ 09:13), Max: 37.6 (07-17-24 @ 06:27)  HR: 73 (07-17-24 @ 09:13) (71 - 78)  BP: 113/53 (07-17-24 @ 09:13) (113/53 - 147/45)  RR: 16 (07-17-24 @ 09:13) (16 - 18)  SpO2: 96% (07-17-24 @ 09:13) (95% - 97%)    PHYSICAL EXAM:  Gen: Comfortable  HEENT: NCAT PERRL EOMI MMM clear oropharynx  Neck: Supple, no LAD  CVS: s1 s2 normal, regular, rate  Chest: Normal resp effort at rest, lungs CTA B/L  Abd: +BS, soft, NT ND   Ext: RUE with mild swelling, no tenderness, scattered bruises, no longer with active bleeding from old IV site. R hand sensation and motor function intact. Normal peripheral pulses.   Skin: Warm, dry  Neuro: Awake and alert, answers simple questions, follows simple commands         Admission HPI: 70 yo female with hx of learning disability, JUSTINO on CKD, DM II, HTN, A-fib s/p DCCV with h/o poor response to medications, also has ovarian and uterine CA, recent diagnosis of Splenic CA, CT Lumbar shows moderate stenosis and DDD, recent UTI, who presents as a code stroke due to sudden onset worsening of left sided weakness of LLE and LUE during PT also associated with slurred speech which has sinced resolved. Arrived with NIHSS of 8 with unknown LKW. Pt is a poor historian. History taken from EMR, ED MD and pt sister at bedside.     During hospital stay, patient found to have acute infarcts. s/p AVJ ablation with leadless PPM implant. Course complicated by hematuria and UTI. Treated for ESBL UTI. s/p cystoscopy which was unremarkable. Patient seen and examined on day of discharge. Stable for discharge to [facility]. Plan of care discussed at length with sister/caretaker Rosalinda.     Hospital course (by problem):   #Hematuria, resolved  -Urology consult placed  -s/p Cystoscopy 7/16, unremarkable   -s/p tx for ESBL PrMi UTI    #Recurrent UTI+ Hematuria: ESBL PrMi UTI  U cx from 7/8 : GNR; ; Proteus mirabilis ; ESBL; iv meropenem 3 days; completed  -s/p Cystoscopy 7/16, unremarkable     #Increasing Cr:  iv fluids to continue  cr normalized; BUN still up but improved    #Right Hip pain; lidocaine patch    #Acute infarcts in the R hemisphere and possible L.    Appreciate input from Neurology. Likely embolic in setting of afib. Now s/p AVJ ablation and Micra leadless pacemaker insertion. Back on Eliquis.  - Continue Eliquis and ASA  - No need for plavix - discussed with Neurology on day of discharge 7/17    #Persistent Afib with difficult to control rates  Patient failed rhythm control. Was unable to control rates either. Patient is now s/p AVJ ablation with leadless PPM implant.   - Continue Cardizem 90mg PO q6h, Amiodarone 200mg daily  - Continue Eliquis  - Cardiac EP follow up outpatient    #RUE bleeding, resolved   Resolved. Developed in setting of supratherapeutic PTT / heparin drip. Appreciate input from Surgery who assisted with pressure dressing. Heparin drip was held and then restarted at lower rate. Bleeding resolved.    - Continue to monitor    #Acute on chronic anemia  Likely multi factorial. Concern for a possible underlying malignancy, splenic mass, etc. Leg hematoma that was noted upon admission. And then RUE bleeding a few days ago, since resolved. S/p PRBC transfusion in the setting of that bleeding.   -H/H stable    #Splenic mass  Appreciate Hematology Oncology input. Possibly consistent with malignancy however no tissue ever obtained. There was outpatient plan for splenectomy. Oncology noted that consideration can be given to IR guided bx of splenic mass or splenectomy to help clarify diagnosis and prognosis. HCP wishes to defer at this point.     #Thigh hematomas  On CT, suggestion of acute-appearing intramuscular and subcutaneous hematomas within the proximal L thigh. Indeterminate but chronic appearing mixed attenuation lesion along the lateral margin of the R hamstring musculature within the mid R thigh, possibly related to a subacute to chronic hematoma.   - Recommended short-term interval follow-up MRI of bilateral thighs with and without contrast in 8-12 weeks to demonstrate resolution/improvement of these findings and to rule out an underlying mass/neoplasm.    #Deconditioning and debility  PT consulted. Discharge to [facility]    Physical exam at the time of discharge:  LOS: 27d    VITALS:   T(C): 36.2 (07-17-24 @ 09:13), Max: 37.6 (07-17-24 @ 06:27)  HR: 73 (07-17-24 @ 09:13) (71 - 78)  BP: 113/53 (07-17-24 @ 09:13) (113/53 - 147/45)  RR: 16 (07-17-24 @ 09:13) (16 - 18)  SpO2: 96% (07-17-24 @ 09:13) (95% - 97%)    PHYSICAL EXAM:  Gen: Comfortable  HEENT: NCAT PERRL EOMI MMM clear oropharynx  Neck: Supple, no LAD  CVS: s1 s2 normal, regular, rate  Chest: Normal resp effort at rest, lungs CTA B/L  Abd: +BS, soft, NT ND   Ext: RUE with mild swelling, no tenderness, scattered bruises, no longer with active bleeding from old IV site. R hand sensation and motor function intact. Normal peripheral pulses.   Skin: Warm, dry  Neuro: Awake and alert, answers simple questions, follows simple commands

## 2024-07-17 NOTE — DISCHARGE NOTE PROVIDER - CARE PROVIDERS DIRECT ADDRESSES
[email or web address]

## 2024-07-17 NOTE — DISCHARGE NOTE NURSING/CASE MANAGEMENT/SOCIAL WORK - NSDCVIVACCINE_GEN_ALL_CORE_FT
influenza, injectable, quadrivalent, preservative free; 25-Sep-2021 09:22; Michelle Benedict (RN); [employer]; [id number] (Exp. Date: 30-Jun-2022); IntraMuscular; Deltoid Left.; 0.5 milliLiter(s); VIS (VIS Published: 15-Aug-2019, VIS Presented: 25-Sep-2021);

## 2024-07-17 NOTE — DISCHARGE NOTE PROVIDER - NSDCCPCAREPLAN_GEN_ALL_CORE_FT
PRINCIPAL DISCHARGE DIAGNOSIS  Diagnosis: Cerebrovascular accident (CVA)  Assessment and Plan of Treatment: Please continue Eliquis, Plavix and Atorvastatin.  Follow up with Neurology upon discharge from rehab.      SECONDARY DISCHARGE DIAGNOSES  Diagnosis: Atrial fibrillation with RVR  Assessment and Plan of Treatment:     Diagnosis: Symptomatic anemia  Assessment and Plan of Treatment:      PRINCIPAL DISCHARGE DIAGNOSIS  Diagnosis: Cerebrovascular accident (CVA)  Assessment and Plan of Treatment: Please continue Eliquis, Aspirin and Lipitor   Follow up with Neurology upon discharge for further management.      SECONDARY DISCHARGE DIAGNOSES  Diagnosis: Recurrent UTI  Assessment and Plan of Treatment: You were treated for a urinary tract infection while in the hospital.    Diagnosis: Atrial fibrillation with RVR  Assessment and Plan of Treatment: You received a pacemaker while in the hospital. Continue medications as prescribed. Follow up with Dr. Lin for routine pacemaker checks.    Diagnosis: Symptomatic anemia  Assessment and Plan of Treatment: Anemia is the medical term for when a person has too few red blood cells. Red blood cells are the cells in your blood that carry oxygen. If you have too few red blood cells, your body does not get all the oxygen it needs. Most people with anemia have no symptoms. They find out they have it after their doctor does blood tests for another reason. People who do have symptoms might feel tired or weak, especially if they try to exercise or have headaches. If you experience these symptoms you should see your primary care provider for further evaluation.    Diagnosis: Thigh hematoma  Assessment and Plan of Treatment: We recommend short-term interval follow-up MRI of bilateral thighs with and without contrast in 8-12 weeks to demonstrate resolution/improvement of findings.    Diagnosis: Splenic mass  Assessment and Plan of Treatment: Please follow up with your Hematologist/Oncologist within 2-3 weeks for further management.

## 2024-07-17 NOTE — PROGRESS NOTE ADULT - REASON FOR ADMISSION
CVA  Afib with RVR  Acute anemia with thigh hematoma
CVA, afib with RVR, Acute anemia with thigh hematoma
CVA  Afib with RVR  Acute anemia with thigh hematoma
CVA, afib with RVR, Acute anemia with thigh hematoma
CVA  Afib with RVR  Acute anemia with thigh hematoma
CVA  Afib with RVR  Acute anemia with thigh hematoma
CVA, afib with RVR,
CVA, afib with RVR, Acute anemia with thigh hematoma
CVA  Afib with RVR  Acute anemia with thigh hematoma
CVA, afib with RVR, Acute anemia with thigh hematoma

## 2024-07-17 NOTE — PROGRESS NOTE ADULT - ASSESSMENT
69-year-old female with DM, HTN, afib, CKD, hx of multiple cancer diagnosis, admitted back on 6/20/24 for neurological changes, found to have an acute CVA, also found to have acute anemia likely related to hematoma status post RBC transfusion, splenic mass concerning for malignancy, and persisting atrial fibrillation that has been difficult to control at times. Admitted to Medicine.        Hematuria  -New hematuria overnight and present today. Unclear etiology. U/A noted - no significant pyuria. Asymptomatic.  Patient with hx of UTI - treated with Ceftriaxone earlier this admission  -Urology consult placed  -Monitor hematuria; resolved    Recurrent UTI+ Hematuria: ESBL PrMi UTI   U cx from 7/8 : GNR; ; Proteus mirabilis ; ESBL; iv meropenem 3 days; completed  pt's sister , Rosalinda, would like the Cysto and other urological procedures to be done  as in pt while she is here as the pt is bed bound  Spoke with Urology team; Dr. Acosta to do bedside Cystoscopy after 3 days of ABX   contact precautions  cysto 7/16    Increasing Cr:  iv fluids to continue  cr normalized; BUN still up but better  BMP in am    Right Hip pain; lidocaine patch      Acute infarcts in the R hemisphere and possible L.    Appreciate input from Neurology. Likely embolic in setting of afib. Now s/p AVJ ablation and Micra leadless pacemaker insertion. Back on Eliquis.  - Continue Plavix and Eliquis, atorvastatin at bedtime.     Persistent Afib with difficult to control rates  Patient failed rhythm control. Was unable to control rates either. Patient is now s/p AVJ ablation with leadless PPM implant.   - Continue Cardizem 90mg PO q6h, Amiodarone 200mg daily  - Continue Eliquis  - Cardiac EP follow up 7/8/24 at 14:30 in EP clinic for wound check    RUE bleeding  Resolved. Developed in setting of supratherapeutic PTT / heparin drip. Appreciate input from Surgery who assisted with pressure dressing. Heparin drip was held and then restarted at lower rate. Bleeding resolved. Unclear whether she may have an additional component causing the issue, such as an acquired factor inhibitor in setting of possible cancer diagnosis. Discussed with Hematology Oncology Dr Turcios who will assist with workup.   - Continue to monitor    Acute on chronic anemia  Likely multi factorial. Concern for a possible underlying malignancy, splenic mass, etc. Leg hematoma that was noted upon admission. And then RUE bleeding a few days ago, since resolved. S/p PRBC transfusion in the setting of that bleeding.   - Continue to monitor    JUSTINO on CKD  Appreciate input from Nephrology. JUSTINO/KHAI. Fluid balance stable and electrolytes stable. Cr now improved.    - Continue to monitor  iv fluids    Splenic mass  Appreciate Hematology Oncology input. Possibly consistent with malignancy however no tissue ever obtained. There was outpatient plan for splenectomy. Oncology noted that consideration can be given to IR guided bx of splenic mass or splenectomy to help clarify diagnosis and prognosis. HCP wishes to defer at this point.   - Follow up with Hematology / Oncology    Thigh hematomas  On CT, suggestion of acute-appearing intramuscular and subcutaneous hematomas within the proximal L thigh. Indeterminate but chronic appearing mixed attenuation lesion along the lateral margin of the R hamstring musculature within the mid R thigh, possibly related to a subacute to chronic hematoma.   - Recommended short-term interval follow-up MRI of bilateral thighs with and without contrast in 8-12 weeks to demonstrate resolution/improvement of these findings and to rule out an underlying mass/neoplasm.    Deconditioning and debility  PT consulted.   - F/u PT eval    Dispo: cysto 7/16

## 2024-07-17 NOTE — DISCHARGE NOTE PROVIDER - PROVIDER TOKENS
PROVIDER:[TOKEN:[[id number]]],PROVIDER:[TOKEN:[[id number]],FOLLOWUP:[1 month]],PROVIDER:[TOKEN:[[id number]]],PROVIDER:[TOKEN:[[id number]]]

## 2024-07-17 NOTE — PROGRESS NOTE ADULT - PROVIDER SPECIALTY LIST ADULT
Cardiology
Electrophysiology
Heme/Onc
Hospitalist
Infectious Disease
Nephrology
Nephrology
Cardiology
Electrophysiology
Heme/Onc
Hospitalist
Nephrology
Nephrology
Palliative Care
Heme/Onc
Hospitalist
Nephrology
Nephrology
Urology
Electrophysiology
Heme/Onc
Hospitalist
Neurology
Neurology
Cardiology

## 2024-07-17 NOTE — PROGRESS NOTE ADULT - SUBJECTIVE AND OBJECTIVE BOX
incomplete note    HOSPITALIST PROGRESS NOTE    07/04/24: No acute events overnight. Tolerated AVJ ablation with leadless PPM implantation yesterday. No acute complaints. R arm inspected, swelling improving. Scattered bruising stable. Remains neurovascularly intact in that extremity. Now awaiting PPM check. Discharge / placement.    07/05/24: Patient seen and examined. No new issues.   07/06/24: No more bradycardia. Discussed with sister yesterday regarding plan of care  07/07/24: Patient seen and examined. No new issues.   07/08/24: New hematuria overnight. Patient denies any symptoms. U/A, culture ordered. Sister updated at bedside  7/9: hematuria resolved  U cx awaited  7/10: no complaints. U cx: GNR; iv ceftriaxone started  7/11: no complaints. u cx : Proteus mirabilis; await sx  7/12: ESBL UTI; meropenem started. no complaints  7/13: no complaints  7/14: c/o right hip pain; asking for patch  Cr increased. start low dose iv fluids  7/15: hip pain better  Cr normalized;  BUN still elevated  cont iv fluids today  cysto on 7/17 7/16: c/o right hip pain  bedside cysto today  BUN/cr better    7/17: Patient seen and examined at bedside.     ROS: All 10 systems reviewed and found to be negative with the exception of what has been described above.     VITAL SIGNS:  Vital Signs Last 24 Hrs  T(C): 37.6 (17 Jul 2024 06:27), Max: 37.6 (17 Jul 2024 06:27)  T(F): 99.7 (17 Jul 2024 06:27), Max: 99.7 (17 Jul 2024 06:27)  HR: 78 (17 Jul 2024 06:27) (61 - 78)  BP: 147/45 (17 Jul 2024 06:27) (130/54 - 147/45)  BP(mean): 65 (17 Jul 2024 06:27) (65 - 95)  RR: 18 (16 Jul 2024 19:47) (17 - 18)  SpO2: 96% (17 Jul 2024 06:27) (94% - 98%)    Parameters below as of 16 Jul 2024 19:47  Patient On (Oxygen Delivery Method): room air    PHYSICAL EXAM:  Gen: Comfortable  HEENT: NCAT PERRL EOMI MMM clear oropharynx  Neck: Supple, no LAD  CVS: s1 s2 normal, regular, rate  Chest: Normal resp effort at rest, lungs CTA B/L  Abd: +BS, soft, NT ND   Ext: RUE with mild swelling, no tenderness, scattered bruises, no longer with active bleeding from old IV site. R hand sensation and motor function intact. Normal peripheral pulses.   Skin: Warm, dry  Neuro: Awake and alert, answers simple questions, follows simple commands    MEDICATIONS:  MEDICATIONS  (STANDING):  aMIOdarone    Tablet 200 milliGRAM(s) Oral daily  apixaban 5 milliGRAM(s) Oral two times a day  atorvastatin 80 milliGRAM(s) Oral at bedtime  chlorhexidine 4% Liquid 1 Application(s) Topical <User Schedule>  clopidogrel Tablet 75 milliGRAM(s) Oral daily  diltiazem    Tablet 90 milliGRAM(s) Oral every 6 hours  ezetimibe 10 milliGRAM(s) Oral daily  lidocaine   4% Patch 1 Patch Transdermal daily  lidocaine   4% Patch 1 Patch Transdermal daily  melatonin 3 milliGRAM(s) Oral at bedtime  nystatin Powder 1 Application(s) Topical two times a day    MEDICATIONS  (PRN):  acetaminophen     Tablet .. 650 milliGRAM(s) Oral every 6 hours PRN Mild Pain (1 - 3)  metoprolol tartrate Injectable 5 milliGRAM(s) IV Push every 6 hours PRN HR > 130  ondansetron Injectable 4 milliGRAM(s) IV Push every 6 hours PRN Nausea and/or Vomiting  oxycodone    5 mG/acetaminophen 325 mG 1 Tablet(s) Oral every 6 hours PRN Moderate Pain (4 - 6)      ALLERGIES:  Allergies    Bee stings (Swelling)  No Known Drug Allergies    Intolerances        LABS:                        8.1    8.76  )-----------( 404      ( 15 Jul 2024 08:17 )             25.6     07-16    143  |  115<H>  |  28<H>  ----------------------------<  115<H>  4.1   |  22  |  1.07    Ca    8.5      16 Jul 2024 08:09      PT/INR - ( 15 Jul 2024 08:17 )   PT: 21.6 sec;   INR: 1.95 ratio         PTT - ( 15 Jul 2024 08:17 )  PTT:36.3 sec  Urinalysis Basic - ( 16 Jul 2024 08:09 )    Color: x / Appearance: x / SG: x / pH: x  Gluc: 115 mg/dL / Ketone: x  / Bili: x / Urobili: x   Blood: x / Protein: x / Nitrite: x   Leuk Esterase: x / RBC: x / WBC x   Sq Epi: x / Non Sq Epi: x / Bacteria: x      CAPILLARY BLOOD GLUCOSE            RADIOLOGY & ADDITIONAL TESTS: Reviewed.

## 2024-07-18 ENCOUNTER — APPOINTMENT (OUTPATIENT)
Dept: ELECTROPHYSIOLOGY | Facility: CLINIC | Age: 69
End: 2024-07-18

## 2024-07-24 DIAGNOSIS — R31.0 GROSS HEMATURIA: ICD-10-CM

## 2024-07-24 DIAGNOSIS — I48.0 PAROXYSMAL ATRIAL FIBRILLATION: ICD-10-CM

## 2024-07-24 DIAGNOSIS — N17.0 ACUTE KIDNEY FAILURE WITH TUBULAR NECROSIS: ICD-10-CM

## 2024-07-24 DIAGNOSIS — Z66 DO NOT RESUSCITATE: ICD-10-CM

## 2024-07-24 DIAGNOSIS — R29.6 REPEATED FALLS: ICD-10-CM

## 2024-07-24 DIAGNOSIS — M19.90 UNSPECIFIED OSTEOARTHRITIS, UNSPECIFIED SITE: ICD-10-CM

## 2024-07-24 DIAGNOSIS — Z79.82 LONG TERM (CURRENT) USE OF ASPIRIN: ICD-10-CM

## 2024-07-24 DIAGNOSIS — M51.36 OTHER INTERVERTEBRAL DISC DEGENERATION, LUMBAR REGION: ICD-10-CM

## 2024-07-24 DIAGNOSIS — R47.81 SLURRED SPEECH: ICD-10-CM

## 2024-07-24 DIAGNOSIS — G81.94 HEMIPLEGIA, UNSPECIFIED AFFECTING LEFT NONDOMINANT SIDE: ICD-10-CM

## 2024-07-24 DIAGNOSIS — R29.708 NIHSS SCORE 8: ICD-10-CM

## 2024-07-24 DIAGNOSIS — Z90.79 ACQUIRED ABSENCE OF OTHER GENITAL ORGAN(S): ICD-10-CM

## 2024-07-24 DIAGNOSIS — Z90.710 ACQUIRED ABSENCE OF BOTH CERVIX AND UTERUS: ICD-10-CM

## 2024-07-24 DIAGNOSIS — N39.0 URINARY TRACT INFECTION, SITE NOT SPECIFIED: ICD-10-CM

## 2024-07-24 DIAGNOSIS — B96.4 PROTEUS (MIRABILIS) (MORGANII) AS THE CAUSE OF DISEASES CLASSIFIED ELSEWHERE: ICD-10-CM

## 2024-07-24 DIAGNOSIS — Z79.01 LONG TERM (CURRENT) USE OF ANTICOAGULANTS: ICD-10-CM

## 2024-07-24 DIAGNOSIS — Z91.030 BEE ALLERGY STATUS: ICD-10-CM

## 2024-07-24 DIAGNOSIS — T50.8X5A ADVERSE EFFECT OF DIAGNOSTIC AGENTS, INITIAL ENCOUNTER: ICD-10-CM

## 2024-07-24 DIAGNOSIS — C26.1 MALIGNANT NEOPLASM OF SPLEEN: ICD-10-CM

## 2024-07-24 DIAGNOSIS — I12.9 HYPERTENSIVE CHRONIC KIDNEY DISEASE WITH STAGE 1 THROUGH STAGE 4 CHRONIC KIDNEY DISEASE, OR UNSPECIFIED CHRONIC KIDNEY DISEASE: ICD-10-CM

## 2024-07-24 DIAGNOSIS — E66.01 MORBID (SEVERE) OBESITY DUE TO EXCESS CALORIES: ICD-10-CM

## 2024-07-24 DIAGNOSIS — R00.1 BRADYCARDIA, UNSPECIFIED: ICD-10-CM

## 2024-07-24 DIAGNOSIS — E46 UNSPECIFIED PROTEIN-CALORIE MALNUTRITION: ICD-10-CM

## 2024-07-24 DIAGNOSIS — Z90.722 ACQUIRED ABSENCE OF OVARIES, BILATERAL: ICD-10-CM

## 2024-07-24 DIAGNOSIS — D64.9 ANEMIA, UNSPECIFIED: ICD-10-CM

## 2024-07-24 DIAGNOSIS — E11.22 TYPE 2 DIABETES MELLITUS WITH DIABETIC CHRONIC KIDNEY DISEASE: ICD-10-CM

## 2024-07-24 DIAGNOSIS — N14.11 CONTRAST-INDUCED NEPHROPATHY: ICD-10-CM
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
## 2024-07-24 DIAGNOSIS — N18.9 CHRONIC KIDNEY DISEASE, UNSPECIFIED: ICD-10-CM

## 2024-07-24 DIAGNOSIS — F81.9 DEVELOPMENTAL DISORDER OF SCHOLASTIC SKILLS, UNSPECIFIED: ICD-10-CM

## 2024-07-24 DIAGNOSIS — Z85.42 PERSONAL HISTORY OF MALIGNANT NEOPLASM OF OTHER PARTS OF UTERUS: ICD-10-CM

## 2024-07-24 DIAGNOSIS — M79.81 NONTRAUMATIC HEMATOMA OF SOFT TISSUE: ICD-10-CM

## 2024-07-24 DIAGNOSIS — I65.21 OCCLUSION AND STENOSIS OF RIGHT CAROTID ARTERY: ICD-10-CM

## 2024-07-24 DIAGNOSIS — T82.838A HEMORRHAGE DUE TO VASCULAR PROSTHETIC DEVICES, IMPLANTS AND GRAFTS, INITIAL ENCOUNTER: ICD-10-CM

## 2024-07-24 DIAGNOSIS — Z96.651 PRESENCE OF RIGHT ARTIFICIAL KNEE JOINT: ICD-10-CM

## 2024-07-24 DIAGNOSIS — Z85.43 PERSONAL HISTORY OF MALIGNANT NEOPLASM OF OVARY: ICD-10-CM

## 2024-07-24 DIAGNOSIS — Z74.01 BED CONFINEMENT STATUS: ICD-10-CM

## 2024-07-24 DIAGNOSIS — I27.20 PULMONARY HYPERTENSION, UNSPECIFIED: ICD-10-CM

## 2024-07-24 DIAGNOSIS — M25.551 PAIN IN RIGHT HIP: ICD-10-CM

## 2024-07-24 DIAGNOSIS — I63.40 CEREBRAL INFARCTION DUE TO EMBOLISM OF UNSPECIFIED CEREBRAL ARTERY: ICD-10-CM

## 2024-07-24 DIAGNOSIS — Z86.16 PERSONAL HISTORY OF COVID-19: ICD-10-CM

## 2024-07-24 DIAGNOSIS — Z53.8 PROCEDURE AND TREATMENT NOT CARRIED OUT FOR OTHER REASONS: ICD-10-CM

## 2024-07-30 ENCOUNTER — EMERGENCY (EMERGENCY)
Facility: HOSPITAL | Age: 69
LOS: 0 days | Discharge: HOME | End: 2024-07-30
Attending: STUDENT IN AN ORGANIZED HEALTH CARE EDUCATION/TRAINING PROGRAM
Payer: MEDICARE

## 2024-07-30 VITALS
SYSTOLIC BLOOD PRESSURE: 95 MMHG | HEART RATE: 75 BPM | RESPIRATION RATE: 34 BRPM | DIASTOLIC BLOOD PRESSURE: 53 MMHG | OXYGEN SATURATION: 100 %

## 2024-07-30 VITALS
RESPIRATION RATE: 40 BRPM | TEMPERATURE: 103 F | DIASTOLIC BLOOD PRESSURE: 50 MMHG | HEART RATE: 68 BPM | HEIGHT: 65 IN | OXYGEN SATURATION: 90 % | SYSTOLIC BLOOD PRESSURE: 127 MMHG

## 2024-07-30 DIAGNOSIS — N17.9 ACUTE KIDNEY FAILURE, UNSPECIFIED: ICD-10-CM

## 2024-07-30 DIAGNOSIS — Z98.890 OTHER SPECIFIED POSTPROCEDURAL STATES: Chronic | ICD-10-CM

## 2024-07-30 DIAGNOSIS — D72.829 ELEVATED WHITE BLOOD CELL COUNT, UNSPECIFIED: ICD-10-CM

## 2024-07-30 DIAGNOSIS — Z86.73 PERSONAL HISTORY OF TRANSIENT ISCHEMIC ATTACK (TIA), AND CEREBRAL INFARCTION WITHOUT RESIDUAL DEFICITS: ICD-10-CM

## 2024-07-30 DIAGNOSIS — R06.02 SHORTNESS OF BREATH: ICD-10-CM

## 2024-07-30 DIAGNOSIS — Z91.030 BEE ALLERGY STATUS: ICD-10-CM

## 2024-07-30 DIAGNOSIS — Z95.0 PRESENCE OF CARDIAC PACEMAKER: ICD-10-CM

## 2024-07-30 DIAGNOSIS — Z90.710 ACQUIRED ABSENCE OF BOTH CERVIX AND UTERUS: Chronic | ICD-10-CM

## 2024-07-30 DIAGNOSIS — Z96.651 PRESENCE OF RIGHT ARTIFICIAL KNEE JOINT: Chronic | ICD-10-CM

## 2024-07-30 DIAGNOSIS — Z20.822 CONTACT WITH AND (SUSPECTED) EXPOSURE TO COVID-19: ICD-10-CM

## 2024-07-30 DIAGNOSIS — A41.9 SEPSIS, UNSPECIFIED ORGANISM: ICD-10-CM

## 2024-07-30 DIAGNOSIS — J96.01 ACUTE RESPIRATORY FAILURE WITH HYPOXIA: ICD-10-CM

## 2024-07-30 LAB
ALBUMIN SERPL ELPH-MCNC: 1.9 G/DL — LOW (ref 3.3–5)
ALP SERPL-CCNC: 71 U/L — SIGNIFICANT CHANGE UP (ref 40–120)
ALT FLD-CCNC: 16 U/L — SIGNIFICANT CHANGE UP (ref 12–78)
ANION GAP SERPL CALC-SCNC: 12 MMOL/L — SIGNIFICANT CHANGE UP (ref 5–17)
APPEARANCE UR: ABNORMAL
APTT BLD: 35.9 SEC — HIGH (ref 24.5–35.6)
AST SERPL-CCNC: 29 U/L — SIGNIFICANT CHANGE UP (ref 15–37)
BACTERIA # UR AUTO: ABNORMAL /HPF
BASOPHILS # BLD AUTO: 0 K/UL — SIGNIFICANT CHANGE UP (ref 0–0.2)
BASOPHILS NFR BLD AUTO: 0 % — SIGNIFICANT CHANGE UP (ref 0–2)
BILIRUB SERPL-MCNC: 0.4 MG/DL — SIGNIFICANT CHANGE UP (ref 0.2–1.2)
BILIRUB UR-MCNC: NEGATIVE — SIGNIFICANT CHANGE UP
BUN SERPL-MCNC: 53 MG/DL — HIGH (ref 7–23)
CALCIUM SERPL-MCNC: 9.4 MG/DL — SIGNIFICANT CHANGE UP (ref 8.5–10.1)
CAST: 0 /LPF — SIGNIFICANT CHANGE UP (ref 0–4)
CHLORIDE SERPL-SCNC: 119 MMOL/L — HIGH (ref 96–108)
CO2 SERPL-SCNC: 16 MMOL/L — LOW (ref 22–31)
COLOR SPEC: ABNORMAL
CREAT SERPL-MCNC: 2.32 MG/DL — HIGH (ref 0.5–1.3)
DIFF PNL FLD: ABNORMAL
EGFR: 22 ML/MIN/1.73M2 — LOW
EOSINOPHIL # BLD AUTO: 0 K/UL — SIGNIFICANT CHANGE UP (ref 0–0.5)
EOSINOPHIL NFR BLD AUTO: 0 % — SIGNIFICANT CHANGE UP (ref 0–6)
FLUAV AG NPH QL: SIGNIFICANT CHANGE UP
FLUBV AG NPH QL: SIGNIFICANT CHANGE UP
GLUCOSE SERPL-MCNC: 142 MG/DL — HIGH (ref 70–99)
GLUCOSE UR QL: NEGATIVE MG/DL — SIGNIFICANT CHANGE UP
HCT VFR BLD CALC: 29.8 % — LOW (ref 34.5–45)
HGB BLD-MCNC: 9.1 G/DL — LOW (ref 11.5–15.5)
INR BLD: 2.37 RATIO — HIGH (ref 0.85–1.18)
KETONES UR-MCNC: NEGATIVE MG/DL — SIGNIFICANT CHANGE UP
LACTATE SERPL-SCNC: 1.8 MMOL/L — SIGNIFICANT CHANGE UP (ref 0.7–2)
LACTATE SERPL-SCNC: 3 MMOL/L — HIGH (ref 0.7–2)
LEUKOCYTE ESTERASE UR-ACNC: ABNORMAL
LYMPHOCYTES # BLD AUTO: 0.76 K/UL — LOW (ref 1–3.3)
LYMPHOCYTES # BLD AUTO: 3 % — LOW (ref 13–44)
MANUAL SMEAR VERIFICATION: SIGNIFICANT CHANGE UP
MCHC RBC-ENTMCNC: 28.5 PG — SIGNIFICANT CHANGE UP (ref 27–34)
MCHC RBC-ENTMCNC: 30.5 GM/DL — LOW (ref 32–36)
MCV RBC AUTO: 93.4 FL — SIGNIFICANT CHANGE UP (ref 80–100)
MONOCYTES # BLD AUTO: 0.25 K/UL — SIGNIFICANT CHANGE UP (ref 0–0.9)
MONOCYTES NFR BLD AUTO: 1 % — LOW (ref 2–14)
NEUTROPHILS # BLD AUTO: 24.17 K/UL — HIGH (ref 1.8–7.4)
NEUTROPHILS NFR BLD AUTO: 93 % — HIGH (ref 43–77)
NEUTS BAND # BLD: 3 % — SIGNIFICANT CHANGE UP (ref 0–8)
NITRITE UR-MCNC: POSITIVE
NRBC # BLD: 0 /100 WBCS — SIGNIFICANT CHANGE UP (ref 0–0)
NRBC # BLD: SIGNIFICANT CHANGE UP /100 WBCS (ref 0–0)
NT-PROBNP SERPL-SCNC: 4194 PG/ML — HIGH (ref 0–125)
PH UR: 8 — SIGNIFICANT CHANGE UP (ref 5–8)
PLAT MORPH BLD: NORMAL — SIGNIFICANT CHANGE UP
PLATELET # BLD AUTO: 492 K/UL — HIGH (ref 150–400)
POTASSIUM SERPL-MCNC: 6.1 MMOL/L — HIGH (ref 3.5–5.3)
POTASSIUM SERPL-SCNC: 6.1 MMOL/L — HIGH (ref 3.5–5.3)
PROT SERPL-MCNC: 5.9 GM/DL — LOW (ref 6–8.3)
PROT UR-MCNC: 300 MG/DL
PROTHROM AB SERPL-ACNC: 26.1 SEC — HIGH (ref 9.5–13)
RBC # BLD: 3.19 M/UL — LOW (ref 3.8–5.2)
RBC # FLD: 15.9 % — HIGH (ref 10.3–14.5)
RBC BLD AUTO: NORMAL — SIGNIFICANT CHANGE UP
RBC CASTS # UR COMP ASSIST: >1900 /HPF — HIGH (ref 0–4)
RSV RNA NPH QL NAA+NON-PROBE: SIGNIFICANT CHANGE UP
SARS-COV-2 RNA SPEC QL NAA+PROBE: SIGNIFICANT CHANGE UP
SODIUM SERPL-SCNC: 147 MMOL/L — HIGH (ref 135–145)
SP GR SPEC: 1.01 — SIGNIFICANT CHANGE UP (ref 1–1.03)
SQUAMOUS # UR AUTO: 4 /HPF — SIGNIFICANT CHANGE UP (ref 0–5)
TROPONIN I, HIGH SENSITIVITY RESULT: 22.4 NG/L — SIGNIFICANT CHANGE UP
UROBILINOGEN FLD QL: 0.2 MG/DL — SIGNIFICANT CHANGE UP (ref 0.2–1)
WBC # BLD: 25.18 K/UL — HIGH (ref 3.8–10.5)
WBC # FLD AUTO: 25.18 K/UL — HIGH (ref 3.8–10.5)
WBC UR QL: 609 /HPF — HIGH (ref 0–5)

## 2024-07-30 PROCEDURE — 99285 EMERGENCY DEPT VISIT HI MDM: CPT | Mod: 25

## 2024-07-30 PROCEDURE — 99291 CRITICAL CARE FIRST HOUR: CPT

## 2024-07-30 PROCEDURE — 71045 X-RAY EXAM CHEST 1 VIEW: CPT | Mod: 26

## 2024-07-30 PROCEDURE — 87077 CULTURE AEROBIC IDENTIFY: CPT

## 2024-07-30 PROCEDURE — 99497 ADVNCD CARE PLAN 30 MIN: CPT

## 2024-07-30 PROCEDURE — 36415 COLL VENOUS BLD VENIPUNCTURE: CPT

## 2024-07-30 PROCEDURE — 84484 ASSAY OF TROPONIN QUANT: CPT

## 2024-07-30 PROCEDURE — 83605 ASSAY OF LACTIC ACID: CPT

## 2024-07-30 PROCEDURE — 87150 DNA/RNA AMPLIFIED PROBE: CPT

## 2024-07-30 PROCEDURE — 85025 COMPLETE CBC W/AUTO DIFF WBC: CPT

## 2024-07-30 PROCEDURE — 96375 TX/PRO/DX INJ NEW DRUG ADDON: CPT

## 2024-07-30 PROCEDURE — 81001 URINALYSIS AUTO W/SCOPE: CPT

## 2024-07-30 PROCEDURE — 93005 ELECTROCARDIOGRAM TRACING: CPT

## 2024-07-30 PROCEDURE — 87086 URINE CULTURE/COLONY COUNT: CPT

## 2024-07-30 PROCEDURE — 83880 ASSAY OF NATRIURETIC PEPTIDE: CPT

## 2024-07-30 PROCEDURE — 80053 COMPREHEN METABOLIC PANEL: CPT

## 2024-07-30 PROCEDURE — 87186 SC STD MICRODIL/AGAR DIL: CPT

## 2024-07-30 PROCEDURE — 96374 THER/PROPH/DIAG INJ IV PUSH: CPT

## 2024-07-30 PROCEDURE — 99498 ADVNCD CARE PLAN ADDL 30 MIN: CPT

## 2024-07-30 PROCEDURE — 93010 ELECTROCARDIOGRAM REPORT: CPT

## 2024-07-30 PROCEDURE — 85610 PROTHROMBIN TIME: CPT

## 2024-07-30 PROCEDURE — 99285 EMERGENCY DEPT VISIT HI MDM: CPT

## 2024-07-30 PROCEDURE — 0241U: CPT

## 2024-07-30 PROCEDURE — 85730 THROMBOPLASTIN TIME PARTIAL: CPT

## 2024-07-30 PROCEDURE — 71045 X-RAY EXAM CHEST 1 VIEW: CPT

## 2024-07-30 PROCEDURE — 96376 TX/PRO/DX INJ SAME DRUG ADON: CPT

## 2024-07-30 PROCEDURE — 87040 BLOOD CULTURE FOR BACTERIA: CPT

## 2024-07-30 RX ORDER — BISACODYL 5 MG
10 TABLET, DELAYED RELEASE (ENTERIC COATED) ORAL DAILY
Refills: 0 | Status: DISCONTINUED | OUTPATIENT
Start: 2024-07-30 | End: 2024-07-30

## 2024-07-30 RX ORDER — ONDANSETRON HYDROCHLORIDE 2 MG/ML
4 INJECTION INTRAMUSCULAR; INTRAVENOUS EVERY 6 HOURS
Refills: 0 | Status: DISCONTINUED | OUTPATIENT
Start: 2024-07-30 | End: 2024-07-30

## 2024-07-30 RX ORDER — HALOPERIDOL DECANOATE 100 MG/ML
1 VIAL (ML) INTRAMUSCULAR
Refills: 0 | Status: DISCONTINUED | OUTPATIENT
Start: 2024-07-30 | End: 2024-07-30

## 2024-07-30 RX ORDER — SODIUM CHLORIDE 0.9 % (FLUSH) 0.9 %
1000 SYRINGE (ML) INJECTION ONCE
Refills: 0 | Status: DISCONTINUED | OUTPATIENT
Start: 2024-07-30 | End: 2024-07-30

## 2024-07-30 RX ORDER — VANCOMYCIN HYDROCHLORIDE 50 MG/ML
1000 KIT ORAL ONCE
Refills: 0 | Status: COMPLETED | OUTPATIENT
Start: 2024-07-30 | End: 2024-07-30

## 2024-07-30 RX ORDER — ACETAMINOPHEN 325 MG
650 TABLET ORAL EVERY 6 HOURS
Refills: 0 | Status: DISCONTINUED | OUTPATIENT
Start: 2024-07-30 | End: 2024-07-30

## 2024-07-30 RX ORDER — FUROSEMIDE 10 MG/ML
40 INJECTION, SOLUTION INTRAMUSCULAR; INTRAVENOUS ONCE
Refills: 0 | Status: COMPLETED | OUTPATIENT
Start: 2024-07-30 | End: 2024-07-30

## 2024-07-30 RX ORDER — PIPERACILLIN SODIUM AND TAZOBACTAM SODIUM 3; .375 G/15ML; G/15ML
3.38 INJECTION, POWDER, LYOPHILIZED, FOR SOLUTION INTRAVENOUS ONCE
Refills: 0 | Status: COMPLETED | OUTPATIENT
Start: 2024-07-30 | End: 2024-07-30

## 2024-07-30 RX ORDER — LORAZEPAM 0.5 MG
0.5 TABLET ORAL EVERY 4 HOURS
Refills: 0 | Status: DISCONTINUED | OUTPATIENT
Start: 2024-07-30 | End: 2024-07-30

## 2024-07-30 RX ORDER — SODIUM CHLORIDE 0.9 % (FLUSH) 0.9 %
500 SYRINGE (ML) INJECTION ONCE
Refills: 0 | Status: COMPLETED | OUTPATIENT
Start: 2024-07-30 | End: 2024-07-30

## 2024-07-30 RX ORDER — MORPHINE SULFATE 100 MG/1
2 TABLET, EXTENDED RELEASE ORAL
Refills: 0 | Status: DISCONTINUED | OUTPATIENT
Start: 2024-07-30 | End: 2024-07-30

## 2024-07-30 RX ORDER — SCOPOLAMINE 1.5 MG/1
1 PATCH, EXTENDED RELEASE TRANSDERMAL
Refills: 0 | Status: DISCONTINUED | OUTPATIENT
Start: 2024-07-30 | End: 2024-07-30

## 2024-07-30 RX ORDER — ACETAMINOPHEN 325 MG
1000 TABLET ORAL ONCE
Refills: 0 | Status: COMPLETED | OUTPATIENT
Start: 2024-07-30 | End: 2024-07-30

## 2024-07-30 RX ORDER — HYDROMORPHONE HCL 0.2 MG/ML
0.5 INJECTION, SOLUTION INTRAVENOUS
Refills: 0 | Status: DISCONTINUED | OUTPATIENT
Start: 2024-07-30 | End: 2024-07-30

## 2024-07-30 RX ADMIN — Medication 400 MILLIGRAM(S): at 08:11

## 2024-07-30 RX ADMIN — FUROSEMIDE 40 MILLIGRAM(S): 10 INJECTION, SOLUTION INTRAMUSCULAR; INTRAVENOUS at 08:55

## 2024-07-30 RX ADMIN — HYDROMORPHONE HCL 0.5 MILLIGRAM(S): 0.2 INJECTION, SOLUTION INTRAVENOUS at 09:34

## 2024-07-30 RX ADMIN — VANCOMYCIN HYDROCHLORIDE 250 MILLIGRAM(S): KIT at 08:47

## 2024-07-30 RX ADMIN — PIPERACILLIN SODIUM AND TAZOBACTAM SODIUM 200 GRAM(S): 3; .375 INJECTION, POWDER, LYOPHILIZED, FOR SOLUTION INTRAVENOUS at 08:11

## 2024-07-30 RX ADMIN — HYDROMORPHONE HCL 0.5 MILLIGRAM(S): 0.2 INJECTION, SOLUTION INTRAVENOUS at 12:29

## 2024-07-30 RX ADMIN — SCOPOLAMINE 1 PATCH: 1.5 PATCH, EXTENDED RELEASE TRANSDERMAL at 10:54

## 2024-07-30 RX ADMIN — Medication 500 MILLILITER(S): at 08:19

## 2024-07-30 NOTE — ED PROVIDER NOTE - CARE PLAN
Anxiety    Headache    HTN (hypertension)    IBS (irritable bowel syndrome) 1 Principal Discharge DX:	Acute respiratory failure with hypoxia

## 2024-07-30 NOTE — ED PROVIDER NOTE - CLINICAL SUMMARY MEDICAL DECISION MAKING FREE TEXT BOX
69-year-old female with recent admission for stroke, UTI presents emergency department with shortness of breath found to have rectal temp 102.8.  Patient with diffuse rhonchi, rales on examination concern for pneumonia versus CHF.   Sepsis protocol initiated; screening ekg, cxr, labs; urine; admit 69-year-old female with recent admission for stroke, UTI presents emergency department with shortness of breath found to have rectal temp 102.8.  Patient with diffuse rhonchi, rales on examination concern for pneumonia versus CHF.   Sepsis protocol initiated; screening ekg, cxr, labs; urine; admit    [facility] DO: palliative care consulted 2/2 to guarded prognosis; Dr. De La Paz spoke with sister- POA- patient is now comfort care; to be discharged to [facility] and family in agreement with plan of care at this time.

## 2024-07-30 NOTE — CONSULT NOTE ADULT - TREATMENT GUIDELINES
DNR/Comfort measures only/Do not re-hospitalize/No blood draws/No artificial nutrition/No antibiotics/No IV fluids/DNI

## 2024-07-30 NOTE — ED ADULT NURSE NOTE - CHIEF COMPLAINT QUOTE
brought in by EMS from [facility] for shortness of breath. as per EMS, patient was noted to be having difficulty breathing by NH staff around 5 AM in which they placed her on nasal cannula. upon paramedic arrival, patient SpO2 was 89% on reportedly 2-4L NC, so medic placed patient on NRB. in triage, patient is obtunded. + tactile warmth, tachypneic to 40. EMS unknown of baseline mental status. patient is DNR/DNI. bedded to trauma.

## 2024-07-30 NOTE — DISCHARGE NOTE NURSING/CASE MANAGEMENT/SOCIAL WORK - NSDCPEFALRISK_GEN_ALL_CORE
For information on Fall & Injury Prevention, visit: [email or web address] OR  [email or web address] OR  https://www.cdc.gov/steadi/patient.html

## 2024-07-30 NOTE — ED PROVIDER NOTE - OBJECTIVE STATEMENT
Patient is a 69-year-old female brought from rehab facility for shortness of breath.  Patient was hospitalized recently for stroke, urinary tract infection.  History limited secondary to patient's baseline mental status at this time and patient required my immediate response.

## 2024-07-30 NOTE — CONSULT NOTE ADULT - SUBJECTIVE AND OBJECTIVE BOX
HPI: Pt is a 69y old Female with hx of complex medical problems including hx of cancer, splenic mass, recent pacemaker placment and hx of strokes.     Patient came from rehab has been on a decline as per her sister and proxy Rosalinda.     Long discussion with family at this stage, we are recommending comfort measures as patient continues to decline regardless of maximal support.  We discussed prolonging suffering rather than prolonging life.     We discussed what that would mean,  no longer doing blood tests, dx imaging, abx, and palliative extubation to RA with comfort medications to address any symptoms that may arise. This would mean shortened life span but would focus on comfort and quality at the end of life.       She is completely in agreement and opento hospice referral as pt is acutely symptomatic and not well controlled at this time,.      patient is lethargic not verbal, signficant sob and secretions not controlled.     Morphine and anticholinergics for secretions disussed and ordered.     Case d/w [facility] IPU and SW   PAIN: ( ) YES  ( X)  NO  Pt unable to characterise d/t clinical status     DYSPNEA ( ) Yes ( X) No  Patient unable to characterise d/t clinical status       PAST MEDICAL & SURGICAL HISTORY:  HTN (hypertension)      Hyperlipidemia      DM (diabetes mellitus)      Arthritis      Ovarian cancer  and uterine cancer--2004 surgery & chemo      Obesity, morbid, BMI 40.0-49.9      Uterine cancer      Splenic cancer      H/O umbilical hernia repair  2005      S/P total hysterectomy  bilateral salpingectomy-oophorectomy --2004 ovarian & uterine cancer      H/O total knee replacement, right          SOCIAL HX:    Hx opiate tolerance ( )YES  ( )NO    Baseline ADLs  (Prior to Admission)  ( ) Independent   ( )Dependent    FAMILY HISTORY:  Patient unable to provide medical history        Review of Systems:     Unable to obtain/Limited due to: lethargic      PHYSICAL EXAM:    Vital Signs Last 24 Hrs  T(C): 39.3 (30 Jul 2024 07:36), Max: 39.3 (30 Jul 2024 07:36)  T(F): 102.8 (30 Jul 2024 07:36), Max: 102.8 (30 Jul 2024 07:36)  HR: 68 (30 Jul 2024 07:36) (68 - 68)  BP: 127/50 (30 Jul 2024 07:36) (127/50 - 127/50)  BP(mean): --  RR: 40 (30 Jul 2024 07:36) (40 - 40)  SpO2: 90% (30 Jul 2024 07:36) (90% - 90%)    Parameters below as of 30 Jul 2024 07:36  Patient On (Oxygen Delivery Method): mask, nonrebreather  O2 Flow (L/min): 8    Daily Height in cm: 165.1 (30 Jul 2024 07:36)    Daily     PPSV2: 10   %  FAST:  General: uncomfortable sob  Mental Status: lethargic  HEENT: AT NC   Lungs: b/o decreased breath soudns ++ronchous and noisy breathig througout + secretions   Cardiac: s1s2 no mgr  GI: soft nontender +BS  : no urine output  Ext: some edema   Neuro: lethargic    LABS:                        9.1    25.18 )-----------( 492      ( 30 Jul 2024 07:59 )             29.8     07-30    147<H>  |  119<H>  |  53<H>  ----------------------------<  142<H>  6.1<H>   |  16<L>  |  2.32<H>    Ca    9.4      30 Jul 2024 07:59    TPro  5.9<L>  /  Alb  1.9<L>  /  TBili  0.4  /  DBili  x   /  AST  29  /  ALT  16  /  AlkPhos  71  07-30    PT/INR - ( 30 Jul 2024 07:59 )   PT: 26.1 sec;   INR: 2.37 ratio         PTT - ( 30 Jul 2024 07:59 )  PTT:35.9 sec  Albumin: Albumin: 1.9 g/dL (07-30 @ 07:59)      Allergies    No Known Drug Allergies  Bee stings (Swelling)    Intolerances      MEDICATIONS  (STANDING):  furosemide   Injectable 40 milliGRAM(s) IV Push Once  scopolamine 1 mG/72 Hr(s) Patch 1 Patch Transdermal every 72 hours    MEDICATIONS  (PRN):  acetaminophen  Suppository .. 650 milliGRAM(s) Rectal every 6 hours PRN Temp greater or equal to 38C (100.4F), Mild Pain (1 - 3)  bisacodyl Suppository 10 milliGRAM(s) Rectal daily PRN Constipation  haloperidol    Injectable 1 milliGRAM(s) IV Push every 2 hours PRN Agitation  LORazepam   Injectable 0.5 milliGRAM(s) IV Push every 4 hours PRN Anxiety  morphine  - Injectable 2 milliGRAM(s) IV Push every 2 hours PRN Moderate pain (4-6), Severe pain (7-10), Respiratory rate greater than 22  ondansetron Injectable 4 milliGRAM(s) IV Push every 6 hours PRN Nausea and/or Vomiting      RADIOLOGY/ADDITIONAL STUDIES:

## 2024-07-30 NOTE — ED ADULT NURSE NOTE - OBJECTIVE STATEMENT
Patient unresponsive on arrival. Presenting to the ER with c/o shortness of breath. As per the triage note, "EMS reports patient was noted to be having difficulty breathing by the nursing home staff around 5am. They placed the patient on nasal cannula. Upon the paramedic arrival, patient SPO2 was 89% reportedly om 2-4L nasal cannula."  Patient is a DNR/DNI, MOLTS on chart.   20GA IV placed in the right hand and Left AC, blood cultures/lactate/VBG/urine/swab collected and sent to the lab.   EKG completed and cardiac monitor in place. Patient unresponsive on arrival. Presenting to the ER with c/o shortness of breath. As per the triage note, "EMS reports patient was noted to be having difficulty breathing by the nursing home staff around 5am. They placed the patient on nasal cannula. Upon the paramedic arrival, patient SPO2 was 89% reportedly om 2-4L nasal cannula." As per the sister Rosalinda, "she was at the hospital than sent over to [facility]. Recently was had [facility] where she was for about 28 days. While at the hospital she had a pacemaker placed. She went to [facility] on July 17th. After a few days of being there she began having a cough. I asked them if they would prescribe her cough medication but they told me they do not do that and they would treat her with fluids. I went to the nursing home yesterday and she did not look herself."  Patient has ecchymosis noted to the bottom with redness and excortication. Some bleeding noted when cleaning patient.     Patient is a DNR/DNI, MOLTS on chart.   20GA IV placed in the right hand and Left AC, blood cultures/lactate/VBG/urine/swab collected and sent to the lab.   EKG completed and cardiac monitor in place. Patient unresponsive on arrival. Presenting to the ER with c/o shortness of breath. As per the triage note, "EMS reports patient was noted to be having difficulty breathing by the nursing home staff around 5am. They placed the patient on nasal cannula. Upon the paramedic arrival, patient SPO2 was 89% reportedly om 2-4L nasal cannula." As per the sister Rosalinda, "she was at the hospital than sent over to [facility]. Recently was had [facility] where she was for about 28 days. While at the hospital she had a pacemaker placed. She went to [facility] on July 17th. After a few days of being there she began having a cough. I asked them if they would prescribe her cough medication but they told me they do not do that and they would treat her with fluids. I went to the nursing home yesterday and she did not look herself."  Patient has ecchymosis noted to the bottom with redness and excortication. Some bleeding noted when cleaning patient.    Patient's left arm and leg noted to be  edematous.    Patient is a DNR/DNI, MOIRA on chart.   20GA IV placed in the right hand and Left AC, blood cultures/lactate/VBG/urine/swab collected and sent to the lab.   EKG completed and cardiac monitor in place.

## 2024-07-30 NOTE — ED ADULT NURSE NOTE - NSFALLRISKINTERV_ED_ALL_ED
Assistance OOB with selected safe patient handling equipment if applicable/Assistance with ambulation/Communicate fall risk and risk factors to all staff, patient, and family/Monitor gait and stability/Provide visual cue: yellow wristband, yellow gown, etc/Reinforce activity limits and safety measures with patient and family/Call bell, personal items and telephone in reach/Instruct patient to call for assistance before getting out of bed/chair/stretcher/Non-slip footwear applied when patient is off stretcher/[place] to call system/Physically safe environment - no spills, clutter or unnecessary equipment/Purposeful Proactive Rounding/Room/bathroom lighting operational, light cord in reach

## 2024-07-30 NOTE — ED ADULT TRIAGE NOTE - CHIEF COMPLAINT QUOTE
brought in by EMS from [facility] for shortness of breath. as per EMS, patient was noted to be having difficulty breathing by NH staff around 5 AM in which they placed her on nasal cannula. upon paramedic arrival, patient SpO2 was 89% on reportedly 2-4L NC, so medic placed patient on NRB. in triage, patient is obtunded. + tactile warmth, tachypneic to 40. EMS unknown of baseline mental status. patient is DNR/DNI. bedded to trauma.

## 2024-07-30 NOTE — DISCHARGE NOTE NURSING/CASE MANAGEMENT/SOCIAL WORK - PATIENT PORTAL LINK FT
You can access the FollowMyHealth Patient Portal offered by [facility] by registering at the following website: [email or web address]. By joining [employer]’s FollowMyHealth portal, you will also be able to view your health information using other applications (apps) compatible with our system.

## 2024-07-30 NOTE — CHART NOTE - NSCHARTNOTEFT_GEN_A_CORE
SW consulted to assist with [facility] inpatient hospice referral. Referral sent, awaiting approval. YIFAN will continue to follow.

## 2024-07-30 NOTE — DISCHARGE NOTE NURSING/CASE MANAGEMENT/SOCIAL WORK - NSDCVIVACCINE_GEN_ALL_CORE_FT
influenza, injectable, quadrivalent, preservative free; 25-Sep-2021 09:22; Michelle Benedict (RN); [employer]; [id number] (Exp. Date: 30-Jun-2022); IntraMuscular; Deltoid Left.; 0.5 milliLiter(s); VIS (VIS Published: 15-Aug-2019, VIS Presented: 25-Sep-2021);

## 2024-07-30 NOTE — CONSULT NOTE ADULT - ASSESSMENT
pt w sepsis, JUSTINO, WBC >20, Febrile, SOB RR >22   pt chornic illness- discussed w/ family  Comfort only at thsi time  Hospice contacted   [facility]- IPU candidate      Comfort Measures   Ativan 1 mg IV q 1hr PRN for seizures  Glycopyralate 0.2 mg IV q 6 hrs PRN for congestions  Artificial tears Q6 hrs  Biotene mouth spray Q6 hrs  Tylenol Q 6 hrs prn for fever  Dulcolax q 72 hrs PRN if no bowel movements        Symptom Management:  - pain: dilaudid 0.5mg IV H6aryfy PRN severe pain / dyspnea  - secretions:  scopolamine PRN  - dyspnea: O2, PRN opioids  - fever: tylenol PRN  - eye care: biotene, artificial tears   - diet/anorexia: soft or pureed diet as tolerated  - urinary care: carson  - bowel regimen: dulcolax PRN    Psychological & Psychiatric Aspects of Care:  - confusion/agitiation: ativan PRN  - medical and SW providing ongoing psychosocial support to family    Spiritual, Uatsdin, & Existential Aspects of Care:  - pastoral services available as needed    Cultural Aspects of Care:  - with this family there is no language barrier  - the following needs have been identified and addressed: none    Care of the Imminently Dying Patient:  - PPS: 10%  - Prognosis: could be anytime, but given disease progression prognosis is hours to days    Ethical & Legal Aspects of Care:  - the following ethical/legal concerns have been identified: none  - pt continues to need inpatient care for palliative extubation, symptom management and daily medication adjustment.    Discussed plan of care with patient's family, palliative care team.      Time spent:90 minutes including the care, coordination and counseling of this patient, 50% of which was spent coordinating and counseling.

## 2024-07-30 NOTE — ED PROVIDER NOTE - PATIENT PORTAL LINK FT
You can access the FollowMyHealth Patient Portal offered by [facility] by registering at the following website: [email or web address]. By joining [employer]’s FollowMyHealth portal, you will also be able to view your health information using other applications (apps) compatible with our system.

## 2024-07-30 NOTE — CONSULT NOTE ADULT - CONVERSATION DETAILS
We discussed Palliative Care team being a supportive team when a patient has ongoing illnesses.  We also discussed that it is not an end of life care service, but can help navigate symptoms and emotional support throughout their hospital stay here.     Hospice was explained as well as an end of life care philosophy. When a disease cannot be cured, or family/patient decide the treatment burdens out weight the risk and chose to change focus of treatment from cure to quality/comfort.     Long discussion with family at this stage, we are recommending comfort measures as patient continues to decline regardless of maximal support.  We discussed prolonging suffering rather than prolonging life.     We discussed what that would mean,  no longer doing blood tests, dx imaging, abx, and palliative extubation to RA with comfort medications to address any symptoms that may arise. This would mean shortened life span but would focus on comfort and quality at the end of life.

## 2024-07-31 LAB
E FAECALIS DNA BLD POS QL NAA+NON-PROBE: SIGNIFICANT CHANGE UP
GRAM STN FLD: ABNORMAL
GRAM STN FLD: ABNORMAL
METHOD TYPE: SIGNIFICANT CHANGE UP
SPECIMEN SOURCE: SIGNIFICANT CHANGE UP
SPECIMEN SOURCE: SIGNIFICANT CHANGE UP

## 2024-08-01 LAB
-  AMPICILLIN: SIGNIFICANT CHANGE UP
-  GENTAMICIN SYNERGY: SIGNIFICANT CHANGE UP
-  STREPTOMYCIN SYNERGY: SIGNIFICANT CHANGE UP
-  VANCOMYCIN: SIGNIFICANT CHANGE UP
CULTURE RESULTS: ABNORMAL
CULTURE RESULTS: ABNORMAL
METHOD TYPE: SIGNIFICANT CHANGE UP
ORGANISM # SPEC MICROSCOPIC CNT: ABNORMAL
ORGANISM # SPEC MICROSCOPIC CNT: ABNORMAL
ORGANISM # SPEC MICROSCOPIC CNT: SIGNIFICANT CHANGE UP
SPECIMEN SOURCE: SIGNIFICANT CHANGE UP
SPECIMEN SOURCE: SIGNIFICANT CHANGE UP

## 2024-08-02 LAB
-  AMPICILLIN/SULBACTAM: SIGNIFICANT CHANGE UP
-  AMPICILLIN: SIGNIFICANT CHANGE UP
-  AZTREONAM: SIGNIFICANT CHANGE UP
-  CEFAZOLIN: SIGNIFICANT CHANGE UP
-  CEFEPIME: SIGNIFICANT CHANGE UP
-  CEFTRIAXONE: SIGNIFICANT CHANGE UP
-  CEFUROXIME: SIGNIFICANT CHANGE UP
-  CIPROFLOXACIN: SIGNIFICANT CHANGE UP
-  ERTAPENEM: SIGNIFICANT CHANGE UP
-  GENTAMICIN: SIGNIFICANT CHANGE UP
-  LEVOFLOXACIN: SIGNIFICANT CHANGE UP
-  MEROPENEM: SIGNIFICANT CHANGE UP
-  NITROFURANTOIN: SIGNIFICANT CHANGE UP
-  PIPERACILLIN/TAZOBACTAM: SIGNIFICANT CHANGE UP
-  TOBRAMYCIN: SIGNIFICANT CHANGE UP
-  TRIMETHOPRIM/SULFAMETHOXAZOLE: SIGNIFICANT CHANGE UP
CULTURE RESULTS: ABNORMAL
METHOD TYPE: SIGNIFICANT CHANGE UP
ORGANISM # SPEC MICROSCOPIC CNT: ABNORMAL
ORGANISM # SPEC MICROSCOPIC CNT: SIGNIFICANT CHANGE UP
SPECIMEN SOURCE: SIGNIFICANT CHANGE UP
